# Patient Record
Sex: FEMALE | Race: WHITE | NOT HISPANIC OR LATINO | Employment: OTHER | URBAN - METROPOLITAN AREA
[De-identification: names, ages, dates, MRNs, and addresses within clinical notes are randomized per-mention and may not be internally consistent; named-entity substitution may affect disease eponyms.]

---

## 2017-01-19 ENCOUNTER — ALLSCRIPTS OFFICE VISIT (OUTPATIENT)
Dept: OTHER | Facility: OTHER | Age: 72
End: 2017-01-19

## 2017-02-22 ENCOUNTER — APPOINTMENT (EMERGENCY)
Dept: RADIOLOGY | Facility: HOSPITAL | Age: 72
DRG: 811 | End: 2017-02-22
Payer: MEDICARE

## 2017-02-22 ENCOUNTER — HOSPITAL ENCOUNTER (INPATIENT)
Facility: HOSPITAL | Age: 72
LOS: 7 days | Discharge: HOME HEALTH | DRG: 811 | End: 2017-03-01
Attending: EMERGENCY MEDICINE | Admitting: INTERNAL MEDICINE
Payer: MEDICARE

## 2017-02-22 ENCOUNTER — ALLSCRIPTS OFFICE VISIT (OUTPATIENT)
Dept: OTHER | Facility: OTHER | Age: 72
End: 2017-02-22

## 2017-02-22 ENCOUNTER — APPOINTMENT (INPATIENT)
Dept: RADIOLOGY | Facility: HOSPITAL | Age: 72
DRG: 811 | End: 2017-02-22
Payer: MEDICARE

## 2017-02-22 DIAGNOSIS — D64.9 ANEMIA: ICD-10-CM

## 2017-02-22 DIAGNOSIS — K92.2 GI BLEED: ICD-10-CM

## 2017-02-22 DIAGNOSIS — I95.9 HYPOTENSION: ICD-10-CM

## 2017-02-22 DIAGNOSIS — G11.8 SPINOCEREBELLAR ATAXIA (HCC): ICD-10-CM

## 2017-02-22 DIAGNOSIS — E27.40 ADRENAL INSUFFICIENCY (HCC): ICD-10-CM

## 2017-02-22 DIAGNOSIS — G93.40 ENCEPHALOPATHY ACUTE: ICD-10-CM

## 2017-02-22 DIAGNOSIS — R55 SYNCOPE: Primary | ICD-10-CM
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
## 2017-02-22 PROBLEM — L03.116 CELLULITIS OF LEFT LOWER EXTREMITY: Status: ACTIVE | Noted: 2017-02-22

## 2017-02-22 PROBLEM — R10.9 ABDOMINAL PAIN: Status: ACTIVE | Noted: 2017-02-22

## 2017-02-22 LAB
ANION GAP SERPL CALCULATED.3IONS-SCNC: 7 MMOL/L (ref 4–13)
ATRIAL RATE: 86 BPM
BACTERIA UR QL AUTO: ABNORMAL /HPF
BASOPHILS # BLD AUTO: 0.01 THOUSANDS/ΜL (ref 0–0.1)
BASOPHILS NFR BLD AUTO: 0 % (ref 0–1)
BILIRUB UR QL STRIP: NEGATIVE
BUN SERPL-MCNC: 48 MG/DL (ref 5–25)
CALCIUM SERPL-MCNC: 8 MG/DL (ref 8.3–10.1)
CHLORIDE SERPL-SCNC: 103 MMOL/L (ref 100–108)
CLARITY UR: CLEAR
CO2 SERPL-SCNC: 31 MMOL/L (ref 21–32)
COLOR UR: YELLOW
CREAT SERPL-MCNC: 1.1 MG/DL (ref 0.6–1.3)
EOSINOPHIL # BLD AUTO: 0.06 THOUSAND/ΜL (ref 0–0.61)
EOSINOPHIL NFR BLD AUTO: 1 % (ref 0–6)
ERYTHROCYTE [DISTWIDTH] IN BLOOD BY AUTOMATED COUNT: 14 % (ref 11.6–15.1)
GFR SERPL CREATININE-BSD FRML MDRD: 49 ML/MIN/1.73SQ M
GLUCOSE SERPL-MCNC: 86 MG/DL (ref 65–140)
GLUCOSE UR STRIP-MCNC: NEGATIVE MG/DL
HCT VFR BLD AUTO: 27.1 % (ref 34.8–46.1)
HGB BLD-MCNC: 8.8 G/DL (ref 11.5–15.4)
HGB UR QL STRIP.AUTO: NEGATIVE
KETONES UR STRIP-MCNC: NEGATIVE MG/DL
LEUKOCYTE ESTERASE UR QL STRIP: ABNORMAL
LYMPHOCYTES # BLD AUTO: 1.48 THOUSANDS/ΜL (ref 0.6–4.47)
LYMPHOCYTES NFR BLD AUTO: 20 % (ref 14–44)
MCH RBC QN AUTO: 30 PG (ref 26.8–34.3)
MCHC RBC AUTO-ENTMCNC: 32.5 G/DL (ref 31.4–37.4)
MCV RBC AUTO: 93 FL (ref 82–98)
MONOCYTES # BLD AUTO: 0.32 THOUSAND/ΜL (ref 0.17–1.22)
MONOCYTES NFR BLD AUTO: 4 % (ref 4–12)
NEUTROPHILS # BLD AUTO: 5.68 THOUSANDS/ΜL (ref 1.85–7.62)
NEUTS SEG NFR BLD AUTO: 75 % (ref 43–75)
NITRITE UR QL STRIP: NEGATIVE
NON-SQ EPI CELLS URNS QL MICRO: ABNORMAL /HPF
NRBC BLD AUTO-RTO: 0 /100 WBCS
PH UR STRIP.AUTO: 6.5 [PH] (ref 4.5–8)
PLATELET # BLD AUTO: 172 THOUSANDS/UL (ref 149–390)
PMV BLD AUTO: 11 FL (ref 8.9–12.7)
POTASSIUM SERPL-SCNC: 3.7 MMOL/L (ref 3.5–5.3)
PROT UR STRIP-MCNC: ABNORMAL MG/DL
QRS AXIS: -39 DEGREES
QRSD INTERVAL: 96 MS
QT INTERVAL: 372 MS
QTC INTERVAL: 429 MS
RBC # BLD AUTO: 2.93 MILLION/UL (ref 3.81–5.12)
RBC #/AREA URNS AUTO: ABNORMAL /HPF
SODIUM SERPL-SCNC: 141 MMOL/L (ref 136–145)
SP GR UR STRIP.AUTO: 1.01 (ref 1–1.03)
SPECIMEN SOURCE: NORMAL
T WAVE AXIS: 78 DEGREES
TROPONIN I BLD-MCNC: 0.01 NG/ML (ref 0–0.08)
TROPONIN I SERPL-MCNC: 0.02 NG/ML
TSH SERPL DL<=0.05 MIU/L-ACNC: 3.4 UIU/ML (ref 0.36–3.74)
UROBILINOGEN UR QL STRIP.AUTO: 0.2 E.U./DL
VENTRICULAR RATE: 80 BPM
WBC # BLD AUTO: 7.56 THOUSAND/UL (ref 4.31–10.16)
WBC #/AREA URNS AUTO: ABNORMAL /HPF

## 2017-02-22 PROCEDURE — 84484 ASSAY OF TROPONIN QUANT: CPT | Performed by: PHYSICIAN ASSISTANT

## 2017-02-22 PROCEDURE — 85025 COMPLETE CBC W/AUTO DIFF WBC: CPT | Performed by: EMERGENCY MEDICINE

## 2017-02-22 PROCEDURE — 84443 ASSAY THYROID STIM HORMONE: CPT | Performed by: EMERGENCY MEDICINE

## 2017-02-22 PROCEDURE — 72040 X-RAY EXAM NECK SPINE 2-3 VW: CPT

## 2017-02-22 PROCEDURE — 93005 ELECTROCARDIOGRAM TRACING: CPT | Performed by: EMERGENCY MEDICINE

## 2017-02-22 PROCEDURE — 71020 HB CHEST X-RAY 2VW FRONTAL&LATL: CPT

## 2017-02-22 PROCEDURE — 87077 CULTURE AEROBIC IDENTIFY: CPT

## 2017-02-22 PROCEDURE — 96360 HYDRATION IV INFUSION INIT: CPT

## 2017-02-22 PROCEDURE — 70450 CT HEAD/BRAIN W/O DYE: CPT

## 2017-02-22 PROCEDURE — 72080 X-RAY EXAM THORACOLMB 2/> VW: CPT

## 2017-02-22 PROCEDURE — 87086 URINE CULTURE/COLONY COUNT: CPT

## 2017-02-22 PROCEDURE — 72170 X-RAY EXAM OF PELVIS: CPT

## 2017-02-22 PROCEDURE — 80048 BASIC METABOLIC PNL TOTAL CA: CPT | Performed by: EMERGENCY MEDICINE

## 2017-02-22 PROCEDURE — 36415 COLL VENOUS BLD VENIPUNCTURE: CPT | Performed by: EMERGENCY MEDICINE

## 2017-02-22 PROCEDURE — 87186 SC STD MICRODIL/AGAR DIL: CPT

## 2017-02-22 PROCEDURE — 81001 URINALYSIS AUTO W/SCOPE: CPT

## 2017-02-22 PROCEDURE — 84484 ASSAY OF TROPONIN QUANT: CPT

## 2017-02-22 RX ORDER — MORPHINE SULFATE 2 MG/ML
2 INJECTION, SOLUTION INTRAMUSCULAR; INTRAVENOUS EVERY 4 HOURS PRN
Status: DISCONTINUED | OUTPATIENT
Start: 2017-02-22 | End: 2017-02-23

## 2017-02-22 RX ORDER — LORAZEPAM 2 MG/ML
2 INJECTION INTRAMUSCULAR EVERY 6 HOURS PRN
Status: DISCONTINUED | OUTPATIENT
Start: 2017-02-22 | End: 2017-03-01 | Stop reason: HOSPADM

## 2017-02-22 RX ORDER — ROPINIROLE 1 MG/1
3 TABLET, FILM COATED ORAL
Status: DISCONTINUED | OUTPATIENT
Start: 2017-02-22 | End: 2017-02-25

## 2017-02-22 RX ORDER — LORAZEPAM 2 MG/ML
INJECTION INTRAMUSCULAR
Status: DISPENSED
Start: 2017-02-22 | End: 2017-02-23

## 2017-02-22 RX ORDER — ONDANSETRON 2 MG/ML
4 INJECTION INTRAMUSCULAR; INTRAVENOUS EVERY 6 HOURS PRN
Status: DISCONTINUED | OUTPATIENT
Start: 2017-02-22 | End: 2017-03-01 | Stop reason: HOSPADM

## 2017-02-22 RX ORDER — DOXYCYCLINE HYCLATE 100 MG/1
100 CAPSULE ORAL EVERY 12 HOURS SCHEDULED
Status: DISCONTINUED | OUTPATIENT
Start: 2017-02-22 | End: 2017-02-24 | Stop reason: ALTCHOICE

## 2017-02-22 RX ORDER — DOXYCYCLINE HYCLATE 100 MG/1
CAPSULE ORAL
Status: DISPENSED
Start: 2017-02-22 | End: 2017-02-23

## 2017-02-22 RX ORDER — LEVOTHYROXINE SODIUM 0.07 MG/1
75 TABLET ORAL
Status: DISCONTINUED | OUTPATIENT
Start: 2017-02-23 | End: 2017-03-01 | Stop reason: HOSPADM

## 2017-02-22 RX ORDER — PANTOPRAZOLE SODIUM 40 MG/1
40 TABLET, DELAYED RELEASE ORAL
Status: DISCONTINUED | OUTPATIENT
Start: 2017-02-23 | End: 2017-02-23

## 2017-02-22 RX ORDER — SODIUM CHLORIDE 9 MG/ML
100 INJECTION, SOLUTION INTRAVENOUS CONTINUOUS
Status: DISCONTINUED | OUTPATIENT
Start: 2017-02-22 | End: 2017-02-25

## 2017-02-22 RX ORDER — MAGNESIUM HYDROXIDE/ALUMINUM HYDROXICE/SIMETHICONE 120; 1200; 1200 MG/30ML; MG/30ML; MG/30ML
30 SUSPENSION ORAL EVERY 6 HOURS PRN
Status: DISCONTINUED | OUTPATIENT
Start: 2017-02-22 | End: 2017-03-01 | Stop reason: HOSPADM

## 2017-02-22 RX ADMIN — LORAZEPAM 2 MG: 2 INJECTION INTRAMUSCULAR; INTRAVENOUS at 23:24

## 2017-02-22 RX ADMIN — SODIUM CHLORIDE 100 ML/HR: 0.9 INJECTION, SOLUTION INTRAVENOUS at 21:31

## 2017-02-22 RX ADMIN — SODIUM CHLORIDE 1000 ML: 0.9 INJECTION, SOLUTION INTRAVENOUS at 15:28

## 2017-02-23 ENCOUNTER — APPOINTMENT (INPATIENT)
Dept: RADIOLOGY | Facility: HOSPITAL | Age: 72
DRG: 811 | End: 2017-02-23
Payer: MEDICARE

## 2017-02-23 LAB
ABO GROUP BLD BPU: NORMAL
ABO GROUP BLD: NORMAL
ANION GAP SERPL CALCULATED.3IONS-SCNC: 6 MMOL/L (ref 4–13)
BLD GP AB SCN SERPL QL: NEGATIVE
BPU ID: NORMAL
BUN SERPL-MCNC: 47 MG/DL (ref 5–25)
CALCIUM SERPL-MCNC: 7.2 MG/DL (ref 8.3–10.1)
CHLORIDE SERPL-SCNC: 112 MMOL/L (ref 100–108)
CO2 SERPL-SCNC: 27 MMOL/L (ref 21–32)
CORTIS AM PEAK SERPL-MCNC: 2.4 UG/ML (ref 4.2–22.4)
CREAT SERPL-MCNC: 0.82 MG/DL (ref 0.6–1.3)
CROSSMATCH: NORMAL
ERYTHROCYTE [DISTWIDTH] IN BLOOD BY AUTOMATED COUNT: 14.2 % (ref 11.6–15.1)
FERRITIN SERPL-MCNC: 48 NG/ML (ref 8–388)
GFR SERPL CREATININE-BSD FRML MDRD: >60 ML/MIN/1.73SQ M
GLUCOSE SERPL-MCNC: 86 MG/DL (ref 65–140)
HCT VFR BLD AUTO: 17.7 % (ref 34.8–46.1)
HCT VFR BLD AUTO: 19 % (ref 34.8–46.1)
HCT VFR BLD AUTO: 21.9 % (ref 34.8–46.1)
HGB BLD-MCNC: 5.7 G/DL (ref 11.5–15.4)
HGB BLD-MCNC: 6.1 G/DL (ref 11.5–15.4)
HGB BLD-MCNC: 7.3 G/DL (ref 11.5–15.4)
IRON SATN MFR SERPL: 43 %
IRON SERPL-MCNC: 134 UG/DL (ref 50–170)
MAGNESIUM SERPL-MCNC: 1.9 MG/DL (ref 1.6–2.6)
MCH RBC QN AUTO: 29.9 PG (ref 26.8–34.3)
MCHC RBC AUTO-ENTMCNC: 32.1 G/DL (ref 31.4–37.4)
MCV RBC AUTO: 93 FL (ref 82–98)
PLATELET # BLD AUTO: 102 THOUSANDS/UL (ref 149–390)
PMV BLD AUTO: 11.4 FL (ref 8.9–12.7)
POTASSIUM SERPL-SCNC: 4.4 MMOL/L (ref 3.5–5.3)
PROLACTIN SERPL-MCNC: 3.2 NG/ML
RBC # BLD AUTO: 2.04 MILLION/UL (ref 3.81–5.12)
RH BLD: POSITIVE
SODIUM SERPL-SCNC: 145 MMOL/L (ref 136–145)
TIBC SERPL-MCNC: 311 UG/DL (ref 250–450)
TROPONIN I SERPL-MCNC: 0.03 NG/ML
UNIT DISPENSE STATUS: NORMAL
UNIT PRODUCT CODE: NORMAL
UNIT RH: NORMAL
WBC # BLD AUTO: 7.97 THOUSAND/UL (ref 4.31–10.16)

## 2017-02-23 PROCEDURE — 85018 HEMOGLOBIN: CPT | Performed by: INTERNAL MEDICINE

## 2017-02-23 PROCEDURE — 83735 ASSAY OF MAGNESIUM: CPT | Performed by: PHYSICIAN ASSISTANT

## 2017-02-23 PROCEDURE — 30233N1 TRANSFUSION OF NONAUTOLOGOUS RED BLOOD CELLS INTO PERIPHERAL VEIN, PERCUTANEOUS APPROACH: ICD-10-PCS | Performed by: HOSPITALIST

## 2017-02-23 PROCEDURE — 84146 ASSAY OF PROLACTIN: CPT | Performed by: PHYSICIAN ASSISTANT

## 2017-02-23 PROCEDURE — 3E0234Z INTRODUCTION OF SERUM, TOXOID AND VACCINE INTO MUSCLE, PERCUTANEOUS APPROACH: ICD-10-PCS | Performed by: HOSPITALIST

## 2017-02-23 PROCEDURE — 92610 EVALUATE SWALLOWING FUNCTION: CPT

## 2017-02-23 PROCEDURE — 86920 COMPATIBILITY TEST SPIN: CPT

## 2017-02-23 PROCEDURE — 86850 RBC ANTIBODY SCREEN: CPT | Performed by: INTERNAL MEDICINE

## 2017-02-23 PROCEDURE — 85014 HEMATOCRIT: CPT | Performed by: INTERNAL MEDICINE

## 2017-02-23 PROCEDURE — P9021 RED BLOOD CELLS UNIT: HCPCS

## 2017-02-23 PROCEDURE — 82728 ASSAY OF FERRITIN: CPT | Performed by: PHYSICIAN ASSISTANT

## 2017-02-23 PROCEDURE — 85027 COMPLETE CBC AUTOMATED: CPT | Performed by: PHYSICIAN ASSISTANT

## 2017-02-23 PROCEDURE — 72125 CT NECK SPINE W/O DYE: CPT

## 2017-02-23 PROCEDURE — 82533 TOTAL CORTISOL: CPT | Performed by: PHYSICIAN ASSISTANT

## 2017-02-23 PROCEDURE — 84484 ASSAY OF TROPONIN QUANT: CPT | Performed by: PHYSICIAN ASSISTANT

## 2017-02-23 PROCEDURE — 36415 COLL VENOUS BLD VENIPUNCTURE: CPT | Performed by: PHYSICIAN ASSISTANT

## 2017-02-23 PROCEDURE — 86901 BLOOD TYPING SEROLOGIC RH(D): CPT | Performed by: INTERNAL MEDICINE

## 2017-02-23 PROCEDURE — 99285 EMERGENCY DEPT VISIT HI MDM: CPT

## 2017-02-23 PROCEDURE — 83540 ASSAY OF IRON: CPT | Performed by: PHYSICIAN ASSISTANT

## 2017-02-23 PROCEDURE — 80048 BASIC METABOLIC PNL TOTAL CA: CPT | Performed by: PHYSICIAN ASSISTANT

## 2017-02-23 PROCEDURE — 74177 CT ABD & PELVIS W/CONTRAST: CPT

## 2017-02-23 PROCEDURE — 86900 BLOOD TYPING SEROLOGIC ABO: CPT | Performed by: INTERNAL MEDICINE

## 2017-02-23 PROCEDURE — 93971 EXTREMITY STUDY: CPT

## 2017-02-23 PROCEDURE — 83550 IRON BINDING TEST: CPT | Performed by: PHYSICIAN ASSISTANT

## 2017-02-23 RX ORDER — DOCUSATE SODIUM 100 MG/1
100 CAPSULE, LIQUID FILLED ORAL 2 TIMES DAILY
Status: DISCONTINUED | OUTPATIENT
Start: 2017-02-23 | End: 2017-03-01 | Stop reason: HOSPADM

## 2017-02-23 RX ORDER — MORPHINE SULFATE 2 MG/ML
2 INJECTION, SOLUTION INTRAMUSCULAR; INTRAVENOUS ONCE
Status: DISCONTINUED | OUTPATIENT
Start: 2017-02-23 | End: 2017-02-23

## 2017-02-23 RX ORDER — POLYETHYLENE GLYCOL 3350 17 G/17G
17 POWDER, FOR SOLUTION ORAL DAILY
Status: DISCONTINUED | OUTPATIENT
Start: 2017-02-24 | End: 2017-03-01 | Stop reason: HOSPADM

## 2017-02-23 RX ORDER — MORPHINE SULFATE 4 MG/ML
4 INJECTION, SOLUTION INTRAMUSCULAR; INTRAVENOUS EVERY 4 HOURS PRN
Status: DISCONTINUED | OUTPATIENT
Start: 2017-02-23 | End: 2017-02-25

## 2017-02-23 RX ORDER — PANTOPRAZOLE SODIUM 40 MG/1
40 TABLET, DELAYED RELEASE ORAL
Status: DISCONTINUED | OUTPATIENT
Start: 2017-02-23 | End: 2017-03-01 | Stop reason: HOSPADM

## 2017-02-23 RX ORDER — ROPINIROLE 3 MG/1
3 TABLET, FILM COATED ORAL
Status: ON HOLD | COMMUNITY
End: 2017-03-01

## 2017-02-23 RX ORDER — PREGABALIN 50 MG/1
50 CAPSULE ORAL 3 TIMES DAILY
Status: DISCONTINUED | OUTPATIENT
Start: 2017-02-23 | End: 2017-03-01 | Stop reason: HOSPADM

## 2017-02-23 RX ADMIN — MORPHINE SULFATE 4 MG: 4 INJECTION, SOLUTION INTRAMUSCULAR; INTRAVENOUS at 22:49

## 2017-02-23 RX ADMIN — MORPHINE SULFATE 2 MG: 2 INJECTION, SOLUTION INTRAMUSCULAR; INTRAVENOUS at 14:00

## 2017-02-23 RX ADMIN — PREGABALIN 50 MG: 50 CAPSULE ORAL at 16:03

## 2017-02-23 RX ADMIN — MORPHINE SULFATE 2 MG: 2 INJECTION, SOLUTION INTRAMUSCULAR; INTRAVENOUS at 13:48

## 2017-02-23 RX ADMIN — MORPHINE SULFATE 2 MG: 2 INJECTION, SOLUTION INTRAMUSCULAR; INTRAVENOUS at 02:00

## 2017-02-23 RX ADMIN — IOHEXOL 60 ML: 350 INJECTION, SOLUTION INTRAVENOUS at 16:28

## 2017-02-23 RX ADMIN — MORPHINE SULFATE 4 MG: 4 INJECTION, SOLUTION INTRAMUSCULAR; INTRAVENOUS at 15:56

## 2017-02-23 RX ADMIN — PANTOPRAZOLE SODIUM 40 MG: 40 TABLET, DELAYED RELEASE ORAL at 16:01

## 2017-02-23 RX ADMIN — MORPHINE SULFATE 2 MG: 2 INJECTION, SOLUTION INTRAMUSCULAR; INTRAVENOUS at 10:24

## 2017-02-23 RX ADMIN — SODIUM CHLORIDE 100 ML/HR: 0.9 INJECTION, SOLUTION INTRAVENOUS at 19:01

## 2017-02-23 RX ADMIN — CEFAZOLIN SODIUM 1000 MG: 1 SOLUTION INTRAVENOUS at 18:09

## 2017-02-24 ENCOUNTER — APPOINTMENT (INPATIENT)
Dept: NEUROLOGY | Facility: AMBULATORY SURGERY CENTER | Age: 72
DRG: 811 | End: 2017-02-24
Payer: MEDICARE

## 2017-02-24 ENCOUNTER — GENERIC CONVERSION - ENCOUNTER (OUTPATIENT)
Dept: OTHER | Facility: OTHER | Age: 72
End: 2017-02-24

## 2017-02-24 LAB
ABO GROUP BLD BPU: NORMAL
ALBUMIN SERPL BCP-MCNC: 1.9 G/DL (ref 3.5–5)
ALP SERPL-CCNC: 113 U/L (ref 46–116)
ALT SERPL W P-5'-P-CCNC: 10 U/L (ref 12–78)
ANION GAP SERPL CALCULATED.3IONS-SCNC: 7 MMOL/L (ref 4–13)
AST SERPL W P-5'-P-CCNC: 13 U/L (ref 5–45)
BACTERIA UR CULT: NORMAL
BACTERIA UR CULT: NORMAL
BASOPHILS # BLD AUTO: 0.01 THOUSANDS/ΜL (ref 0–0.1)
BASOPHILS NFR BLD AUTO: 0 % (ref 0–1)
BILIRUB DIRECT SERPL-MCNC: 0.23 MG/DL (ref 0–0.2)
BILIRUB SERPL-MCNC: 0.94 MG/DL (ref 0.2–1)
BPU ID: NORMAL
BUN SERPL-MCNC: 33 MG/DL (ref 5–25)
CALCIUM SERPL-MCNC: 6.9 MG/DL (ref 8.3–10.1)
CHLORIDE SERPL-SCNC: 117 MMOL/L (ref 100–108)
CO2 SERPL-SCNC: 24 MMOL/L (ref 21–32)
CREAT SERPL-MCNC: 0.64 MG/DL (ref 0.6–1.3)
CROSSMATCH: NORMAL
EOSINOPHIL # BLD AUTO: 0.11 THOUSAND/ΜL (ref 0–0.61)
EOSINOPHIL NFR BLD AUTO: 2 % (ref 0–6)
ERYTHROCYTE [DISTWIDTH] IN BLOOD BY AUTOMATED COUNT: 15.4 % (ref 11.6–15.1)
GFR SERPL CREATININE-BSD FRML MDRD: >60 ML/MIN/1.73SQ M
GLUCOSE SERPL-MCNC: 106 MG/DL (ref 65–140)
HCT VFR BLD AUTO: 20.2 % (ref 34.8–46.1)
HCT VFR BLD AUTO: 22.7 % (ref 34.8–46.1)
HGB BLD-MCNC: 6.6 G/DL (ref 11.5–15.4)
HGB BLD-MCNC: 7.7 G/DL (ref 11.5–15.4)
LYMPHOCYTES # BLD AUTO: 0.81 THOUSANDS/ΜL (ref 0.6–4.47)
LYMPHOCYTES NFR BLD AUTO: 13 % (ref 14–44)
MCH RBC QN AUTO: 30.7 PG (ref 26.8–34.3)
MCHC RBC AUTO-ENTMCNC: 33.9 G/DL (ref 31.4–37.4)
MCV RBC AUTO: 90 FL (ref 82–98)
MONOCYTES # BLD AUTO: 0.27 THOUSAND/ΜL (ref 0.17–1.22)
MONOCYTES NFR BLD AUTO: 4 % (ref 4–12)
NEUTROPHILS # BLD AUTO: 5.14 THOUSANDS/ΜL (ref 1.85–7.62)
NEUTS SEG NFR BLD AUTO: 81 % (ref 43–75)
NRBC BLD AUTO-RTO: 0 /100 WBCS
PLATELET # BLD AUTO: 116 THOUSANDS/UL (ref 149–390)
PMV BLD AUTO: 10.3 FL (ref 8.9–12.7)
POTASSIUM SERPL-SCNC: 3.5 MMOL/L (ref 3.5–5.3)
PROT SERPL-MCNC: 4 G/DL (ref 6.4–8.2)
RBC # BLD AUTO: 2.51 MILLION/UL (ref 3.81–5.12)
SODIUM SERPL-SCNC: 148 MMOL/L (ref 136–145)
TROPONIN I SERPL-MCNC: <0.02 NG/ML
UNIT DISPENSE STATUS: NORMAL
UNIT PRODUCT CODE: NORMAL
UNIT RH: NORMAL
WBC # BLD AUTO: 6.35 THOUSAND/UL (ref 4.31–10.16)

## 2017-02-24 PROCEDURE — 92526 ORAL FUNCTION THERAPY: CPT

## 2017-02-24 PROCEDURE — 95816 EEG AWAKE AND DROWSY: CPT

## 2017-02-24 PROCEDURE — G8979 MOBILITY GOAL STATUS: HCPCS

## 2017-02-24 PROCEDURE — 80048 BASIC METABOLIC PNL TOTAL CA: CPT | Performed by: INTERNAL MEDICINE

## 2017-02-24 PROCEDURE — G8987 SELF CARE CURRENT STATUS: HCPCS

## 2017-02-24 PROCEDURE — 85025 COMPLETE CBC W/AUTO DIFF WBC: CPT | Performed by: INTERNAL MEDICINE

## 2017-02-24 PROCEDURE — 97167 OT EVAL HIGH COMPLEX 60 MIN: CPT

## 2017-02-24 PROCEDURE — G8988 SELF CARE GOAL STATUS: HCPCS

## 2017-02-24 PROCEDURE — 85018 HEMOGLOBIN: CPT | Performed by: INTERNAL MEDICINE

## 2017-02-24 PROCEDURE — G8978 MOBILITY CURRENT STATUS: HCPCS

## 2017-02-24 PROCEDURE — P9021 RED BLOOD CELLS UNIT: HCPCS

## 2017-02-24 PROCEDURE — 80076 HEPATIC FUNCTION PANEL: CPT | Performed by: PHYSICIAN ASSISTANT

## 2017-02-24 PROCEDURE — 85014 HEMATOCRIT: CPT | Performed by: INTERNAL MEDICINE

## 2017-02-24 PROCEDURE — 97163 PT EVAL HIGH COMPLEX 45 MIN: CPT

## 2017-02-24 PROCEDURE — 84484 ASSAY OF TROPONIN QUANT: CPT | Performed by: PHYSICIAN ASSISTANT

## 2017-02-24 RX ORDER — DOXYCYCLINE HYCLATE 100 MG/1
100 CAPSULE ORAL EVERY 12 HOURS SCHEDULED
Status: DISCONTINUED | OUTPATIENT
Start: 2017-02-24 | End: 2017-03-01

## 2017-02-24 RX ADMIN — DOCUSATE SODIUM 100 MG: 100 CAPSULE, LIQUID FILLED ORAL at 19:26

## 2017-02-24 RX ADMIN — CEFAZOLIN SODIUM 1000 MG: 1 SOLUTION INTRAVENOUS at 19:21

## 2017-02-24 RX ADMIN — SODIUM CHLORIDE 100 ML/HR: 0.9 INJECTION, SOLUTION INTRAVENOUS at 05:26

## 2017-02-24 RX ADMIN — SODIUM CHLORIDE 250 ML: 0.9 INJECTION, SOLUTION INTRAVENOUS at 16:10

## 2017-02-24 RX ADMIN — MORPHINE SULFATE 4 MG: 4 INJECTION, SOLUTION INTRAMUSCULAR; INTRAVENOUS at 14:38

## 2017-02-24 RX ADMIN — PREGABALIN 50 MG: 50 CAPSULE ORAL at 11:35

## 2017-02-24 RX ADMIN — PANTOPRAZOLE SODIUM 40 MG: 40 TABLET, DELAYED RELEASE ORAL at 19:28

## 2017-02-24 RX ADMIN — MORPHINE SULFATE 4 MG: 4 INJECTION, SOLUTION INTRAMUSCULAR; INTRAVENOUS at 22:08

## 2017-02-24 RX ADMIN — HYDROCORTISONE SODIUM SUCCINATE 50 MG: 100 INJECTION, POWDER, FOR SOLUTION INTRAMUSCULAR; INTRAVENOUS at 16:07

## 2017-02-24 RX ADMIN — PREGABALIN 50 MG: 50 CAPSULE ORAL at 19:26

## 2017-02-24 RX ADMIN — DOXYCYCLINE 100 MG: 100 CAPSULE ORAL at 14:10

## 2017-02-24 RX ADMIN — CEFAZOLIN SODIUM 1000 MG: 1 SOLUTION INTRAVENOUS at 01:44

## 2017-02-24 RX ADMIN — DOCUSATE SODIUM 100 MG: 100 CAPSULE, LIQUID FILLED ORAL at 11:36

## 2017-02-24 RX ADMIN — MORPHINE SULFATE 4 MG: 4 INJECTION, SOLUTION INTRAMUSCULAR; INTRAVENOUS at 05:17

## 2017-02-24 RX ADMIN — CEFAZOLIN SODIUM 1000 MG: 1 SOLUTION INTRAVENOUS at 11:25

## 2017-02-24 RX ADMIN — DOXYCYCLINE 100 MG: 100 CAPSULE ORAL at 22:07

## 2017-02-24 RX ADMIN — ROPINIROLE 3 MG: 1 TABLET, FILM COATED ORAL at 22:08

## 2017-02-24 RX ADMIN — POLYETHYLENE GLYCOL 3350 17 G: 17 POWDER, FOR SOLUTION ORAL at 11:35

## 2017-02-24 RX ADMIN — SODIUM CHLORIDE 100 ML/HR: 0.9 INJECTION, SOLUTION INTRAVENOUS at 19:23

## 2017-02-25 LAB
ABO GROUP BLD BPU: NORMAL
ABO GROUP BLD BPU: NORMAL
ANION GAP SERPL CALCULATED.3IONS-SCNC: 6 MMOL/L (ref 4–13)
BPU ID: NORMAL
BPU ID: NORMAL
BUN SERPL-MCNC: 43 MG/DL (ref 5–25)
CALCIUM SERPL-MCNC: 7.5 MG/DL (ref 8.3–10.1)
CHLORIDE SERPL-SCNC: 115 MMOL/L (ref 100–108)
CO2 SERPL-SCNC: 26 MMOL/L (ref 21–32)
CORTIS AM PEAK SERPL-MCNC: 6.8 UG/ML (ref 4.2–22.4)
CORTIS SERPL-MCNC: 21.7 UG/ML
CORTIS SERPL-MCNC: 7.3 UG/ML
CREAT SERPL-MCNC: 0.74 MG/DL (ref 0.6–1.3)
CROSSMATCH: NORMAL
CROSSMATCH: NORMAL
ERYTHROCYTE [DISTWIDTH] IN BLOOD BY AUTOMATED COUNT: 15.4 % (ref 11.6–15.1)
FIBRINOGEN PPP-MCNC: 260 MG/DL (ref 227–495)
GFR SERPL CREATININE-BSD FRML MDRD: >60 ML/MIN/1.73SQ M
GLUCOSE SERPL-MCNC: 102 MG/DL (ref 65–140)
HCT VFR BLD AUTO: 14.1 % (ref 34.8–46.1)
HCT VFR BLD AUTO: 14.7 % (ref 34.8–46.1)
HCT VFR BLD AUTO: 22.9 % (ref 34.8–46.1)
HEMOCCULT STL QL: NEGATIVE
HGB BLD-MCNC: 4.6 G/DL (ref 11.5–15.4)
HGB BLD-MCNC: 4.8 G/DL (ref 11.5–15.4)
HGB BLD-MCNC: 7.8 G/DL (ref 11.5–15.4)
LDH SERPL-CCNC: 141 U/L (ref 81–234)
LDH SERPL-CCNC: 143 U/L (ref 81–234)
MCH RBC QN AUTO: 29.3 PG (ref 26.8–34.3)
MCHC RBC AUTO-ENTMCNC: 32.7 G/DL (ref 31.4–37.4)
MCV RBC AUTO: 90 FL (ref 82–98)
PLATELET # BLD AUTO: 117 THOUSANDS/UL (ref 149–390)
PMV BLD AUTO: 10.4 FL (ref 8.9–12.7)
POTASSIUM SERPL-SCNC: 3.8 MMOL/L (ref 3.5–5.3)
RBC # BLD AUTO: 1.64 MILLION/UL (ref 3.81–5.12)
RETICS # AUTO: ABNORMAL 10*3/UL (ref 14097–95744)
RETICS # CALC: 2.63 % (ref 0.37–1.87)
SODIUM SERPL-SCNC: 147 MMOL/L (ref 136–145)
UNIT DISPENSE STATUS: NORMAL
UNIT DISPENSE STATUS: NORMAL
UNIT PRODUCT CODE: NORMAL
UNIT PRODUCT CODE: NORMAL
UNIT RH: NORMAL
UNIT RH: NORMAL
WBC # BLD AUTO: 6.14 THOUSAND/UL (ref 4.31–10.16)

## 2017-02-25 PROCEDURE — 97530 THERAPEUTIC ACTIVITIES: CPT

## 2017-02-25 PROCEDURE — 85384 FIBRINOGEN ACTIVITY: CPT | Performed by: INTERNAL MEDICINE

## 2017-02-25 PROCEDURE — 85018 HEMOGLOBIN: CPT | Performed by: INTERNAL MEDICINE

## 2017-02-25 PROCEDURE — 85027 COMPLETE CBC AUTOMATED: CPT | Performed by: INTERNAL MEDICINE

## 2017-02-25 PROCEDURE — 83615 LACTATE (LD) (LDH) ENZYME: CPT | Performed by: INTERNAL MEDICINE

## 2017-02-25 PROCEDURE — 85014 HEMATOCRIT: CPT | Performed by: INTERNAL MEDICINE

## 2017-02-25 PROCEDURE — 82272 OCCULT BLD FECES 1-3 TESTS: CPT | Performed by: INTERNAL MEDICINE

## 2017-02-25 PROCEDURE — 80048 BASIC METABOLIC PNL TOTAL CA: CPT | Performed by: INTERNAL MEDICINE

## 2017-02-25 PROCEDURE — P9021 RED BLOOD CELLS UNIT: HCPCS

## 2017-02-25 PROCEDURE — 83010 ASSAY OF HAPTOGLOBIN QUANT: CPT | Performed by: INTERNAL MEDICINE

## 2017-02-25 PROCEDURE — 82533 TOTAL CORTISOL: CPT | Performed by: INTERNAL MEDICINE

## 2017-02-25 PROCEDURE — 85045 AUTOMATED RETICULOCYTE COUNT: CPT | Performed by: INTERNAL MEDICINE

## 2017-02-25 PROCEDURE — 82024 ASSAY OF ACTH: CPT | Performed by: INTERNAL MEDICINE

## 2017-02-25 RX ORDER — COSYNTROPIN 0.25 MG/ML
0.25 INJECTION, POWDER, FOR SOLUTION INTRAMUSCULAR; INTRAVENOUS ONCE
Status: COMPLETED | OUTPATIENT
Start: 2017-02-25 | End: 2017-02-25

## 2017-02-25 RX ORDER — ACETAMINOPHEN 325 MG/1
650 TABLET ORAL EVERY 6 HOURS SCHEDULED
Status: DISCONTINUED | OUTPATIENT
Start: 2017-02-25 | End: 2017-03-01 | Stop reason: HOSPADM

## 2017-02-25 RX ORDER — BISACODYL 10 MG
10 SUPPOSITORY, RECTAL RECTAL DAILY
Status: DISCONTINUED | OUTPATIENT
Start: 2017-02-25 | End: 2017-03-01 | Stop reason: HOSPADM

## 2017-02-25 RX ORDER — ROPINIROLE 1 MG/1
3 TABLET, FILM COATED ORAL 3 TIMES DAILY
Status: DISCONTINUED | OUTPATIENT
Start: 2017-02-25 | End: 2017-02-26

## 2017-02-25 RX ADMIN — CEFAZOLIN SODIUM 1000 MG: 1 SOLUTION INTRAVENOUS at 21:24

## 2017-02-25 RX ADMIN — ACETAMINOPHEN 650 MG: 325 TABLET, FILM COATED ORAL at 23:53

## 2017-02-25 RX ADMIN — DOCUSATE SODIUM 100 MG: 100 CAPSULE, LIQUID FILLED ORAL at 18:48

## 2017-02-25 RX ADMIN — COSYNTROPIN 0.25 MG: 0.25 INJECTION, POWDER, LYOPHILIZED, FOR SOLUTION INTRAMUSCULAR; INTRAVENOUS at 15:45

## 2017-02-25 RX ADMIN — ROPINIROLE 3 MG: 1 TABLET, FILM COATED ORAL at 21:41

## 2017-02-25 RX ADMIN — PREGABALIN 50 MG: 50 CAPSULE ORAL at 18:48

## 2017-02-25 RX ADMIN — DOCUSATE SODIUM 100 MG: 100 CAPSULE, LIQUID FILLED ORAL at 10:13

## 2017-02-25 RX ADMIN — PREGABALIN 50 MG: 50 CAPSULE ORAL at 21:41

## 2017-02-25 RX ADMIN — BISACODYL 10 MG: 10 SUPPOSITORY RECTAL at 11:26

## 2017-02-25 RX ADMIN — LORAZEPAM 2 MG: 2 INJECTION INTRAMUSCULAR; INTRAVENOUS at 07:11

## 2017-02-25 RX ADMIN — DOXYCYCLINE 100 MG: 100 CAPSULE ORAL at 10:12

## 2017-02-25 RX ADMIN — PANTOPRAZOLE SODIUM 40 MG: 40 TABLET, DELAYED RELEASE ORAL at 15:57

## 2017-02-25 RX ADMIN — MORPHINE SULFATE 4 MG: 4 INJECTION, SOLUTION INTRAMUSCULAR; INTRAVENOUS at 10:13

## 2017-02-25 RX ADMIN — PREGABALIN 50 MG: 50 CAPSULE ORAL at 00:34

## 2017-02-25 RX ADMIN — CEFAZOLIN SODIUM 1000 MG: 1 SOLUTION INTRAVENOUS at 02:26

## 2017-02-25 RX ADMIN — SODIUM CHLORIDE 250 ML: 0.9 INJECTION, SOLUTION INTRAVENOUS at 00:33

## 2017-02-25 RX ADMIN — LEVOTHYROXINE SODIUM 75 MCG: 75 TABLET ORAL at 06:05

## 2017-02-25 RX ADMIN — PREGABALIN 50 MG: 50 CAPSULE ORAL at 15:57

## 2017-02-25 RX ADMIN — SODIUM CHLORIDE 100 ML/HR: 0.9 INJECTION, SOLUTION INTRAVENOUS at 06:06

## 2017-02-25 RX ADMIN — DOXYCYCLINE 100 MG: 100 CAPSULE ORAL at 21:41

## 2017-02-25 RX ADMIN — PANTOPRAZOLE SODIUM 40 MG: 40 TABLET, DELAYED RELEASE ORAL at 06:05

## 2017-02-25 RX ADMIN — PREGABALIN 50 MG: 50 CAPSULE ORAL at 10:13

## 2017-02-25 RX ADMIN — ACETAMINOPHEN 650 MG: 325 TABLET, FILM COATED ORAL at 18:48

## 2017-02-25 RX ADMIN — CEFAZOLIN SODIUM 1000 MG: 1 SOLUTION INTRAVENOUS at 10:17

## 2017-02-25 RX ADMIN — POLYETHYLENE GLYCOL 3350 17 G: 17 POWDER, FOR SOLUTION ORAL at 10:12

## 2017-02-26 LAB
ABO GROUP BLD BPU: NORMAL
ANION GAP SERPL CALCULATED.3IONS-SCNC: 5 MMOL/L (ref 4–13)
BASOPHILS # BLD AUTO: 0.01 THOUSANDS/ΜL (ref 0–0.1)
BASOPHILS NFR BLD AUTO: 0 % (ref 0–1)
BPU ID: NORMAL
BUN SERPL-MCNC: 35 MG/DL (ref 5–25)
CALCIUM SERPL-MCNC: 7.7 MG/DL (ref 8.3–10.1)
CHLORIDE SERPL-SCNC: 117 MMOL/L (ref 100–108)
CO2 SERPL-SCNC: 26 MMOL/L (ref 21–32)
CORTIS SERPL-MCNC: 18.2 UG/ML
CREAT SERPL-MCNC: 0.67 MG/DL (ref 0.6–1.3)
CROSSMATCH: NORMAL
EOSINOPHIL # BLD AUTO: 0.11 THOUSAND/ΜL (ref 0–0.61)
EOSINOPHIL NFR BLD AUTO: 2 % (ref 0–6)
ERYTHROCYTE [DISTWIDTH] IN BLOOD BY AUTOMATED COUNT: 15.2 % (ref 11.6–15.1)
ERYTHROCYTE [DISTWIDTH] IN BLOOD BY AUTOMATED COUNT: 15.4 % (ref 11.6–15.1)
FOLATE SERPL-MCNC: 4.9 NG/ML (ref 3.1–17.5)
GFR SERPL CREATININE-BSD FRML MDRD: >60 ML/MIN/1.73SQ M
GLUCOSE SERPL-MCNC: 76 MG/DL (ref 65–140)
HAPTOGLOB SERPL-MCNC: 84 MG/DL (ref 34–200)
HAPTOGLOB SERPL-MCNC: 92 MG/DL (ref 34–200)
HCT VFR BLD AUTO: 20.9 % (ref 34.8–46.1)
HCT VFR BLD AUTO: 25.8 % (ref 34.8–46.1)
HGB BLD-MCNC: 7 G/DL (ref 11.5–15.4)
HGB BLD-MCNC: 8.7 G/DL (ref 11.5–15.4)
LYMPHOCYTES # BLD AUTO: 1.02 THOUSANDS/ΜL (ref 0.6–4.47)
LYMPHOCYTES NFR BLD AUTO: 21 % (ref 14–44)
MCH RBC QN AUTO: 29.5 PG (ref 26.8–34.3)
MCH RBC QN AUTO: 30.5 PG (ref 26.8–34.3)
MCHC RBC AUTO-ENTMCNC: 33.5 G/DL (ref 31.4–37.4)
MCHC RBC AUTO-ENTMCNC: 33.7 G/DL (ref 31.4–37.4)
MCV RBC AUTO: 88 FL (ref 82–98)
MCV RBC AUTO: 91 FL (ref 82–98)
MONOCYTES # BLD AUTO: 0.18 THOUSAND/ΜL (ref 0.17–1.22)
MONOCYTES NFR BLD AUTO: 4 % (ref 4–12)
NEUTROPHILS # BLD AUTO: 3.57 THOUSANDS/ΜL (ref 1.85–7.62)
NEUTS SEG NFR BLD AUTO: 73 % (ref 43–75)
NRBC BLD AUTO-RTO: 0 /100 WBCS
PLATELET # BLD AUTO: 109 THOUSANDS/UL (ref 149–390)
PLATELET # BLD AUTO: 110 THOUSANDS/UL (ref 149–390)
PMV BLD AUTO: 10.8 FL (ref 8.9–12.7)
PMV BLD AUTO: 11 FL (ref 8.9–12.7)
POTASSIUM SERPL-SCNC: 3.9 MMOL/L (ref 3.5–5.3)
RBC # BLD AUTO: 2.37 MILLION/UL (ref 3.81–5.12)
RBC # BLD AUTO: 2.85 MILLION/UL (ref 3.81–5.12)
SODIUM SERPL-SCNC: 148 MMOL/L (ref 136–145)
UNIT DISPENSE STATUS: NORMAL
UNIT PRODUCT CODE: NORMAL
UNIT RH: NORMAL
VIT B12 SERPL-MCNC: 434 PG/ML (ref 100–900)
WBC # BLD AUTO: 4.9 THOUSAND/UL (ref 4.31–10.16)
WBC # BLD AUTO: 6.48 THOUSAND/UL (ref 4.31–10.16)

## 2017-02-26 PROCEDURE — 82746 ASSAY OF FOLIC ACID SERUM: CPT | Performed by: INTERNAL MEDICINE

## 2017-02-26 PROCEDURE — 85025 COMPLETE CBC W/AUTO DIFF WBC: CPT | Performed by: PHYSICIAN ASSISTANT

## 2017-02-26 PROCEDURE — P9021 RED BLOOD CELLS UNIT: HCPCS

## 2017-02-26 PROCEDURE — 82607 VITAMIN B-12: CPT | Performed by: INTERNAL MEDICINE

## 2017-02-26 PROCEDURE — 80048 BASIC METABOLIC PNL TOTAL CA: CPT | Performed by: PHYSICIAN ASSISTANT

## 2017-02-26 PROCEDURE — 85027 COMPLETE CBC AUTOMATED: CPT | Performed by: INTERNAL MEDICINE

## 2017-02-26 RX ORDER — DEXTROSE AND SODIUM CHLORIDE 5; .45 G/100ML; G/100ML
75 INJECTION, SOLUTION INTRAVENOUS CONTINUOUS
Status: DISCONTINUED | OUTPATIENT
Start: 2017-02-26 | End: 2017-02-27

## 2017-02-26 RX ORDER — ROPINIROLE 1 MG/1
2 TABLET, FILM COATED ORAL 3 TIMES DAILY
Status: DISCONTINUED | OUTPATIENT
Start: 2017-02-26 | End: 2017-03-01 | Stop reason: HOSPADM

## 2017-02-26 RX ADMIN — ROPINIROLE 2 MG: 1 TABLET, FILM COATED ORAL at 20:59

## 2017-02-26 RX ADMIN — ACETAMINOPHEN 650 MG: 325 TABLET, FILM COATED ORAL at 17:43

## 2017-02-26 RX ADMIN — CEFAZOLIN SODIUM 1000 MG: 1 SOLUTION INTRAVENOUS at 20:59

## 2017-02-26 RX ADMIN — PANTOPRAZOLE SODIUM 40 MG: 40 TABLET, DELAYED RELEASE ORAL at 17:43

## 2017-02-26 RX ADMIN — PREGABALIN 50 MG: 50 CAPSULE ORAL at 20:59

## 2017-02-26 RX ADMIN — ACETAMINOPHEN 650 MG: 325 TABLET, FILM COATED ORAL at 05:39

## 2017-02-26 RX ADMIN — DOXYCYCLINE 100 MG: 100 CAPSULE ORAL at 20:59

## 2017-02-26 RX ADMIN — SODIUM CHLORIDE 250 ML: 0.9 INJECTION, SOLUTION INTRAVENOUS at 03:26

## 2017-02-26 RX ADMIN — ROPINIROLE 2 MG: 1 TABLET, FILM COATED ORAL at 12:27

## 2017-02-26 RX ADMIN — ACETAMINOPHEN 650 MG: 325 TABLET, FILM COATED ORAL at 12:27

## 2017-02-26 RX ADMIN — LEVOTHYROXINE SODIUM 75 MCG: 75 TABLET ORAL at 05:38

## 2017-02-26 RX ADMIN — ROPINIROLE 2 MG: 1 TABLET, FILM COATED ORAL at 17:43

## 2017-02-26 RX ADMIN — CEFAZOLIN SODIUM 1000 MG: 1 SOLUTION INTRAVENOUS at 12:34

## 2017-02-26 RX ADMIN — PREGABALIN 50 MG: 50 CAPSULE ORAL at 17:43

## 2017-02-26 RX ADMIN — CEFAZOLIN SODIUM 1000 MG: 1 SOLUTION INTRAVENOUS at 04:47

## 2017-02-26 RX ADMIN — POLYETHYLENE GLYCOL 3350 17 G: 17 POWDER, FOR SOLUTION ORAL at 10:45

## 2017-02-26 RX ADMIN — PREGABALIN 50 MG: 50 CAPSULE ORAL at 10:44

## 2017-02-26 RX ADMIN — DEXTROSE AND SODIUM CHLORIDE 75 ML/HR: 5; .45 INJECTION, SOLUTION INTRAVENOUS at 12:24

## 2017-02-26 RX ADMIN — DOXYCYCLINE 100 MG: 100 CAPSULE ORAL at 10:44

## 2017-02-26 RX ADMIN — PANTOPRAZOLE SODIUM 40 MG: 40 TABLET, DELAYED RELEASE ORAL at 05:39

## 2017-02-27 LAB
ANION GAP SERPL CALCULATED.3IONS-SCNC: 6 MMOL/L (ref 4–13)
BUN SERPL-MCNC: 28 MG/DL (ref 5–25)
CALCIUM SERPL-MCNC: 7.8 MG/DL (ref 8.3–10.1)
CHLORIDE SERPL-SCNC: 112 MMOL/L (ref 100–108)
CO2 SERPL-SCNC: 26 MMOL/L (ref 21–32)
CREAT SERPL-MCNC: 0.7 MG/DL (ref 0.6–1.3)
ERYTHROCYTE [DISTWIDTH] IN BLOOD BY AUTOMATED COUNT: 15.5 % (ref 11.6–15.1)
GFR SERPL CREATININE-BSD FRML MDRD: >60 ML/MIN/1.73SQ M
GLUCOSE SERPL-MCNC: 87 MG/DL (ref 65–140)
HCT VFR BLD AUTO: 25.7 % (ref 34.8–46.1)
HGB BLD-MCNC: 8.7 G/DL (ref 11.5–15.4)
MCH RBC QN AUTO: 31.1 PG (ref 26.8–34.3)
MCHC RBC AUTO-ENTMCNC: 33.9 G/DL (ref 31.4–37.4)
MCV RBC AUTO: 92 FL (ref 82–98)
PLATELET # BLD AUTO: 120 THOUSANDS/UL (ref 149–390)
PMV BLD AUTO: 11.9 FL (ref 8.9–12.7)
POTASSIUM SERPL-SCNC: 3.6 MMOL/L (ref 3.5–5.3)
RBC # BLD AUTO: 2.8 MILLION/UL (ref 3.81–5.12)
SODIUM SERPL-SCNC: 144 MMOL/L (ref 136–145)
WBC # BLD AUTO: 5.38 THOUSAND/UL (ref 4.31–10.16)

## 2017-02-27 PROCEDURE — 97110 THERAPEUTIC EXERCISES: CPT

## 2017-02-27 PROCEDURE — 85027 COMPLETE CBC AUTOMATED: CPT | Performed by: INTERNAL MEDICINE

## 2017-02-27 PROCEDURE — 97116 GAIT TRAINING THERAPY: CPT

## 2017-02-27 PROCEDURE — 97530 THERAPEUTIC ACTIVITIES: CPT

## 2017-02-27 PROCEDURE — 80048 BASIC METABOLIC PNL TOTAL CA: CPT | Performed by: INTERNAL MEDICINE

## 2017-02-27 PROCEDURE — 97532 HB COGNITIVE SKILLS DEVELOPMENT: CPT

## 2017-02-27 RX ADMIN — DOCUSATE SODIUM 100 MG: 100 CAPSULE, LIQUID FILLED ORAL at 11:20

## 2017-02-27 RX ADMIN — CEFAZOLIN SODIUM 1000 MG: 1 SOLUTION INTRAVENOUS at 14:00

## 2017-02-27 RX ADMIN — DOXYCYCLINE 100 MG: 100 CAPSULE ORAL at 21:16

## 2017-02-27 RX ADMIN — LEVOTHYROXINE SODIUM 75 MCG: 75 TABLET ORAL at 05:12

## 2017-02-27 RX ADMIN — DOCUSATE SODIUM 100 MG: 100 CAPSULE, LIQUID FILLED ORAL at 18:32

## 2017-02-27 RX ADMIN — DOXYCYCLINE 100 MG: 100 CAPSULE ORAL at 11:20

## 2017-02-27 RX ADMIN — DEXTROSE AND SODIUM CHLORIDE 75 ML/HR: 5; .45 INJECTION, SOLUTION INTRAVENOUS at 01:04

## 2017-02-27 RX ADMIN — ACETAMINOPHEN 650 MG: 325 TABLET, FILM COATED ORAL at 01:05

## 2017-02-27 RX ADMIN — ACETAMINOPHEN 650 MG: 325 TABLET, FILM COATED ORAL at 18:32

## 2017-02-27 RX ADMIN — ROPINIROLE 2 MG: 1 TABLET, FILM COATED ORAL at 21:16

## 2017-02-27 RX ADMIN — ACETAMINOPHEN 650 MG: 325 TABLET, FILM COATED ORAL at 05:12

## 2017-02-27 RX ADMIN — PREGABALIN 50 MG: 50 CAPSULE ORAL at 11:20

## 2017-02-27 RX ADMIN — ROPINIROLE 2 MG: 1 TABLET, FILM COATED ORAL at 17:02

## 2017-02-27 RX ADMIN — POLYETHYLENE GLYCOL 3350 17 G: 17 POWDER, FOR SOLUTION ORAL at 11:20

## 2017-02-27 RX ADMIN — BISACODYL 10 MG: 10 SUPPOSITORY RECTAL at 11:20

## 2017-02-27 RX ADMIN — PANTOPRAZOLE SODIUM 40 MG: 40 TABLET, DELAYED RELEASE ORAL at 17:02

## 2017-02-27 RX ADMIN — CEFAZOLIN SODIUM 1000 MG: 1 SOLUTION INTRAVENOUS at 04:13

## 2017-02-27 RX ADMIN — ACETAMINOPHEN 650 MG: 325 TABLET, FILM COATED ORAL at 13:40

## 2017-02-27 RX ADMIN — CEFAZOLIN SODIUM 1000 MG: 1 SOLUTION INTRAVENOUS at 21:16

## 2017-02-27 RX ADMIN — PREGABALIN 50 MG: 50 CAPSULE ORAL at 17:02

## 2017-02-27 RX ADMIN — PREGABALIN 50 MG: 50 CAPSULE ORAL at 21:16

## 2017-02-28 LAB
ERYTHROCYTE [DISTWIDTH] IN BLOOD BY AUTOMATED COUNT: 15.1 % (ref 11.6–15.1)
HCT VFR BLD AUTO: 29.9 % (ref 34.8–46.1)
HGB BLD-MCNC: 10.2 G/DL (ref 11.5–15.4)
MCH RBC QN AUTO: 31.3 PG (ref 26.8–34.3)
MCHC RBC AUTO-ENTMCNC: 34.1 G/DL (ref 31.4–37.4)
MCV RBC AUTO: 92 FL (ref 82–98)
PLATELET # BLD AUTO: 183 THOUSANDS/UL (ref 149–390)
PMV BLD AUTO: 11.6 FL (ref 8.9–12.7)
RBC # BLD AUTO: 3.26 MILLION/UL (ref 3.81–5.12)
WBC # BLD AUTO: 7.46 THOUSAND/UL (ref 4.31–10.16)

## 2017-02-28 PROCEDURE — 85027 COMPLETE CBC AUTOMATED: CPT | Performed by: INTERNAL MEDICINE

## 2017-02-28 PROCEDURE — 97530 THERAPEUTIC ACTIVITIES: CPT

## 2017-02-28 RX ADMIN — DOXYCYCLINE 100 MG: 100 CAPSULE ORAL at 09:00

## 2017-02-28 RX ADMIN — CEFAZOLIN SODIUM 1000 MG: 1 SOLUTION INTRAVENOUS at 04:49

## 2017-02-28 RX ADMIN — ROPINIROLE 2 MG: 1 TABLET, FILM COATED ORAL at 08:56

## 2017-02-28 RX ADMIN — DOCUSATE SODIUM 100 MG: 100 CAPSULE, LIQUID FILLED ORAL at 17:40

## 2017-02-28 RX ADMIN — PANTOPRAZOLE SODIUM 40 MG: 40 TABLET, DELAYED RELEASE ORAL at 17:40

## 2017-02-28 RX ADMIN — LEVOTHYROXINE SODIUM 75 MCG: 75 TABLET ORAL at 05:02

## 2017-02-28 RX ADMIN — ROPINIROLE 2 MG: 1 TABLET, FILM COATED ORAL at 21:34

## 2017-02-28 RX ADMIN — CEFAZOLIN SODIUM 1000 MG: 1 SOLUTION INTRAVENOUS at 21:34

## 2017-02-28 RX ADMIN — ACETAMINOPHEN 650 MG: 325 TABLET, FILM COATED ORAL at 05:02

## 2017-02-28 RX ADMIN — DOXYCYCLINE 100 MG: 100 CAPSULE ORAL at 21:34

## 2017-02-28 RX ADMIN — PREGABALIN 50 MG: 50 CAPSULE ORAL at 21:34

## 2017-02-28 RX ADMIN — ACETAMINOPHEN 650 MG: 325 TABLET, FILM COATED ORAL at 12:33

## 2017-02-28 RX ADMIN — CEFAZOLIN SODIUM 1000 MG: 1 SOLUTION INTRAVENOUS at 12:32

## 2017-02-28 RX ADMIN — ROPINIROLE 2 MG: 1 TABLET, FILM COATED ORAL at 17:40

## 2017-02-28 RX ADMIN — POLYETHYLENE GLYCOL 3350 17 G: 17 POWDER, FOR SOLUTION ORAL at 09:00

## 2017-02-28 RX ADMIN — BISACODYL 10 MG: 10 SUPPOSITORY RECTAL at 08:56

## 2017-02-28 RX ADMIN — PREGABALIN 50 MG: 50 CAPSULE ORAL at 09:00

## 2017-02-28 RX ADMIN — ACETAMINOPHEN 650 MG: 325 TABLET, FILM COATED ORAL at 18:11

## 2017-02-28 RX ADMIN — DOCUSATE SODIUM 100 MG: 100 CAPSULE, LIQUID FILLED ORAL at 08:59

## 2017-02-28 RX ADMIN — PREGABALIN 50 MG: 50 CAPSULE ORAL at 17:40

## 2017-03-01 ENCOUNTER — APPOINTMENT (INPATIENT)
Dept: PHYSICAL THERAPY | Facility: HOSPITAL | Age: 72
DRG: 811 | End: 2017-03-01
Payer: MEDICARE

## 2017-03-01 ENCOUNTER — APPOINTMENT (INPATIENT)
Dept: SPEECH THERAPY | Facility: HOSPITAL | Age: 72
DRG: 811 | End: 2017-03-01
Payer: MEDICARE

## 2017-03-01 VITALS
TEMPERATURE: 97.6 F | DIASTOLIC BLOOD PRESSURE: 68 MMHG | HEART RATE: 72 BPM | BODY MASS INDEX: 20.49 KG/M2 | WEIGHT: 120 LBS | SYSTOLIC BLOOD PRESSURE: 144 MMHG | OXYGEN SATURATION: 97 % | RESPIRATION RATE: 18 BRPM | HEIGHT: 64 IN

## 2017-03-01 LAB
BASOPHILS # BLD AUTO: 0.02 THOUSANDS/ΜL (ref 0–0.1)
BASOPHILS NFR BLD AUTO: 0 % (ref 0–1)
EOSINOPHIL # BLD AUTO: 0.21 THOUSAND/ΜL (ref 0–0.61)
EOSINOPHIL NFR BLD AUTO: 4 % (ref 0–6)
ERYTHROCYTE [DISTWIDTH] IN BLOOD BY AUTOMATED COUNT: 15.4 % (ref 11.6–15.1)
HCT VFR BLD AUTO: 29.3 % (ref 34.8–46.1)
HGB BLD-MCNC: 9.7 G/DL (ref 11.5–15.4)
LYMPHOCYTES # BLD AUTO: 1.13 THOUSANDS/ΜL (ref 0.6–4.47)
LYMPHOCYTES NFR BLD AUTO: 20 % (ref 14–44)
MCH RBC QN AUTO: 30.5 PG (ref 26.8–34.3)
MCHC RBC AUTO-ENTMCNC: 33.1 G/DL (ref 31.4–37.4)
MCV RBC AUTO: 92 FL (ref 82–98)
MONOCYTES # BLD AUTO: 0.45 THOUSAND/ΜL (ref 0.17–1.22)
MONOCYTES NFR BLD AUTO: 8 % (ref 4–12)
NEUTROPHILS # BLD AUTO: 3.98 THOUSANDS/ΜL (ref 1.85–7.62)
NEUTS SEG NFR BLD AUTO: 68 % (ref 43–75)
NRBC BLD AUTO-RTO: 0 /100 WBCS
PLATELET # BLD AUTO: 188 THOUSANDS/UL (ref 149–390)
PMV BLD AUTO: 11.3 FL (ref 8.9–12.7)
RBC # BLD AUTO: 3.18 MILLION/UL (ref 3.81–5.12)
WBC # BLD AUTO: 5.8 THOUSAND/UL (ref 4.31–10.16)

## 2017-03-01 PROCEDURE — 85025 COMPLETE CBC W/AUTO DIFF WBC: CPT | Performed by: HOSPITALIST

## 2017-03-01 PROCEDURE — 97110 THERAPEUTIC EXERCISES: CPT

## 2017-03-01 PROCEDURE — 92526 ORAL FUNCTION THERAPY: CPT

## 2017-03-01 PROCEDURE — 97116 GAIT TRAINING THERAPY: CPT

## 2017-03-01 RX ORDER — ROPINIROLE 3 MG/1
3 TABLET, FILM COATED ORAL 3 TIMES DAILY
Qty: 90 TABLET | Refills: 0 | Status: SHIPPED | OUTPATIENT
Start: 2017-03-01 | End: 2018-03-29

## 2017-03-01 RX ORDER — PREGABALIN 50 MG/1
50 CAPSULE ORAL 3 TIMES DAILY
Qty: 30 CAPSULE | Refills: 0 | Status: SHIPPED | OUTPATIENT
Start: 2017-03-01 | End: 2017-03-01

## 2017-03-01 RX ORDER — PREGABALIN 50 MG/1
50 CAPSULE ORAL 3 TIMES DAILY
Qty: 30 CAPSULE | Refills: 0 | Status: SHIPPED | OUTPATIENT
Start: 2017-03-01 | End: 2017-03-11

## 2017-03-01 RX ADMIN — DOCUSATE SODIUM 100 MG: 100 CAPSULE, LIQUID FILLED ORAL at 18:35

## 2017-03-01 RX ADMIN — LEVOTHYROXINE SODIUM 75 MCG: 75 TABLET ORAL at 05:53

## 2017-03-01 RX ADMIN — ROPINIROLE 2 MG: 1 TABLET, FILM COATED ORAL at 16:35

## 2017-03-01 RX ADMIN — ACETAMINOPHEN 650 MG: 325 TABLET, FILM COATED ORAL at 05:03

## 2017-03-01 RX ADMIN — PANTOPRAZOLE SODIUM 40 MG: 40 TABLET, DELAYED RELEASE ORAL at 16:35

## 2017-03-01 RX ADMIN — POLYETHYLENE GLYCOL 3350 17 G: 17 POWDER, FOR SOLUTION ORAL at 10:14

## 2017-03-01 RX ADMIN — ACETAMINOPHEN 650 MG: 325 TABLET, FILM COATED ORAL at 18:25

## 2017-03-01 RX ADMIN — BISACODYL 10 MG: 10 SUPPOSITORY RECTAL at 10:14

## 2017-03-01 RX ADMIN — PREGABALIN 50 MG: 50 CAPSULE ORAL at 17:35

## 2017-03-01 RX ADMIN — CEFAZOLIN SODIUM 1000 MG: 1 SOLUTION INTRAVENOUS at 05:03

## 2017-03-01 RX ADMIN — CEFAZOLIN SODIUM 1000 MG: 1 SOLUTION INTRAVENOUS at 12:51

## 2017-03-01 RX ADMIN — PREGABALIN 50 MG: 50 CAPSULE ORAL at 10:14

## 2017-03-01 RX ADMIN — ACETAMINOPHEN 650 MG: 325 TABLET, FILM COATED ORAL at 12:54

## 2017-03-01 RX ADMIN — DOCUSATE SODIUM 100 MG: 100 CAPSULE, LIQUID FILLED ORAL at 10:14

## 2017-03-01 RX ADMIN — ROPINIROLE 2 MG: 1 TABLET, FILM COATED ORAL at 10:14

## 2017-03-01 RX ADMIN — DOXYCYCLINE 100 MG: 100 CAPSULE ORAL at 10:14

## 2017-03-02 ENCOUNTER — GENERIC CONVERSION - ENCOUNTER (OUTPATIENT)
Dept: OTHER | Facility: OTHER | Age: 72
End: 2017-03-02

## 2017-03-02 LAB — ACTH PLAS-MCNC: 6.5 PG/ML (ref 7.2–63.3)

## 2017-03-03 ENCOUNTER — HOSPITAL ENCOUNTER (INPATIENT)
Facility: HOSPITAL | Age: 72
LOS: 13 days | Discharge: NURSING, REHAB OR LONG-TERM CARE | DRG: 100 | End: 2017-03-16
Attending: EMERGENCY MEDICINE | Admitting: INTERNAL MEDICINE
Payer: MEDICARE

## 2017-03-03 ENCOUNTER — APPOINTMENT (INPATIENT)
Dept: RADIOLOGY | Facility: HOSPITAL | Age: 72
DRG: 100 | End: 2017-03-03
Payer: MEDICARE

## 2017-03-03 ENCOUNTER — APPOINTMENT (EMERGENCY)
Dept: RADIOLOGY | Facility: HOSPITAL | Age: 72
DRG: 100 | End: 2017-03-03
Payer: MEDICARE

## 2017-03-03 DIAGNOSIS — E87.0 HYPERNATREMIA: ICD-10-CM

## 2017-03-03 DIAGNOSIS — Z86.59 HISTORY OF DEPRESSION: ICD-10-CM

## 2017-03-03 DIAGNOSIS — E23.2 DIABETES INSIPIDUS (HCC): Primary | ICD-10-CM

## 2017-03-03 DIAGNOSIS — R00.1 BRADYCARDIA: ICD-10-CM

## 2017-03-03 DIAGNOSIS — R27.0 ATAXIA: ICD-10-CM

## 2017-03-03 DIAGNOSIS — K22.0 ESOPHAGEAL ACHALASIA: ICD-10-CM

## 2017-03-03 DIAGNOSIS — N28.0 INFARCTION OF KIDNEY (HCC): ICD-10-CM

## 2017-03-03 DIAGNOSIS — R41.82 ALTERED MENTAL STATUS: ICD-10-CM

## 2017-03-03 DIAGNOSIS — T68.XXXA HYPOTHERMIA: ICD-10-CM

## 2017-03-03 DIAGNOSIS — R41.89 COGNITIVE CHANGE: ICD-10-CM

## 2017-03-03 DIAGNOSIS — R33.9 URINARY RETENTION: ICD-10-CM

## 2017-03-03 DIAGNOSIS — Z87.898 HISTORY OF SEIZURE: ICD-10-CM

## 2017-03-03 PROBLEM — F41.8 DEPRESSION WITH ANXIETY: Chronic | Status: ACTIVE | Noted: 2017-03-03

## 2017-03-03 PROBLEM — I95.9 HYPOTENSION: Status: ACTIVE | Noted: 2017-03-03

## 2017-03-03 PROBLEM — K21.9 GERD (GASTROESOPHAGEAL REFLUX DISEASE): Chronic | Status: ACTIVE | Noted: 2017-03-03

## 2017-03-03 PROBLEM — G25.81 RESTLESS LEG SYNDROME: Chronic | Status: ACTIVE | Noted: 2017-03-03

## 2017-03-03 PROBLEM — E03.9 HYPOTHYROID: Chronic | Status: ACTIVE | Noted: 2017-03-03

## 2017-03-03 PROBLEM — G11.9 CEREBRAL ATAXIA (HCC): Chronic | Status: ACTIVE | Noted: 2017-03-03

## 2017-03-03 PROBLEM — E87.6 HYPOKALEMIA: Status: ACTIVE | Noted: 2017-03-03

## 2017-03-03 PROBLEM — G93.40 ENCEPHALOPATHY: Chronic | Status: ACTIVE | Noted: 2017-03-03

## 2017-03-03 LAB
ALBUMIN SERPL BCP-MCNC: 2.4 G/DL (ref 3.5–5)
ALP SERPL-CCNC: 217 U/L (ref 46–116)
ALT SERPL W P-5'-P-CCNC: 20 U/L (ref 12–78)
ANION GAP SERPL CALCULATED.3IONS-SCNC: 6 MMOL/L (ref 4–13)
ANION GAP SERPL CALCULATED.3IONS-SCNC: 8 MMOL/L (ref 4–13)
ARTERIAL PATENCY WRIST A: YES
AST SERPL W P-5'-P-CCNC: 62 U/L (ref 5–45)
BACTERIA UR QL AUTO: NORMAL /HPF
BASE EXCESS BLDA CALC-SCNC: -3 MMOL/L (ref -2–3)
BASE EXCESS BLDA CALC-SCNC: 0 MMOL/L (ref -2–3)
BASE EXCESS BLDA CALC-SCNC: 0.5 MMOL/L
BASOPHILS # BLD AUTO: 0.01 THOUSANDS/ΜL (ref 0–0.1)
BASOPHILS NFR BLD AUTO: 0 % (ref 0–1)
BILIRUB SERPL-MCNC: 0.78 MG/DL (ref 0.2–1)
BILIRUB UR QL STRIP: NEGATIVE
BUN SERPL-MCNC: 12 MG/DL (ref 5–25)
BUN SERPL-MCNC: 14 MG/DL (ref 5–25)
CA-I BLD-SCNC: 1.08 MMOL/L (ref 1.12–1.32)
CA-I BLD-SCNC: 1.13 MMOL/L (ref 1.12–1.32)
CALCIUM SERPL-MCNC: 7.8 MG/DL (ref 8.3–10.1)
CALCIUM SERPL-MCNC: 8.1 MG/DL (ref 8.3–10.1)
CHLORIDE SERPL-SCNC: 113 MMOL/L (ref 100–108)
CHLORIDE SERPL-SCNC: 115 MMOL/L (ref 100–108)
CK MB SERPL-MCNC: 3 % (ref 0–2.5)
CK MB SERPL-MCNC: 8.9 NG/ML (ref 0–5)
CK SERPL-CCNC: 299 U/L (ref 26–192)
CLARITY UR: CLEAR
CO2 SERPL-SCNC: 25 MMOL/L (ref 21–32)
CO2 SERPL-SCNC: 30 MMOL/L (ref 21–32)
COLOR UR: YELLOW
COLOR, POC: NORMAL
CORTIS SERPL-MCNC: 24.8 UG/ML
CREAT SERPL-MCNC: 0.67 MG/DL (ref 0.6–1.3)
CREAT SERPL-MCNC: 0.76 MG/DL (ref 0.6–1.3)
EOSINOPHIL # BLD AUTO: 0.04 THOUSAND/ΜL (ref 0–0.61)
EOSINOPHIL NFR BLD AUTO: 1 % (ref 0–6)
ERYTHROCYTE [DISTWIDTH] IN BLOOD BY AUTOMATED COUNT: 15.3 % (ref 11.6–15.1)
GFR SERPL CREATININE-BSD FRML MDRD: >60 ML/MIN/1.73SQ M
GFR SERPL CREATININE-BSD FRML MDRD: >60 ML/MIN/1.73SQ M
GLUCOSE SERPL-MCNC: 113 MG/DL (ref 65–140)
GLUCOSE SERPL-MCNC: 146 MG/DL (ref 65–140)
GLUCOSE SERPL-MCNC: 152 MG/DL (ref 65–140)
GLUCOSE SERPL-MCNC: 161 MG/DL (ref 65–140)
GLUCOSE UR STRIP-MCNC: ABNORMAL MG/DL
HCO3 BLDA-SCNC: 23 MMOL/L (ref 22–28)
HCO3 BLDA-SCNC: 24 MMOL/L (ref 24–30)
HCO3 BLDA-SCNC: 27.8 MMOL/L (ref 24–30)
HCT VFR BLD AUTO: 35.9 % (ref 34.8–46.1)
HCT VFR BLD CALC: 19 % (ref 34.8–46.1)
HCT VFR BLD CALC: 35 % (ref 34.8–46.1)
HGB BLD-MCNC: 11.8 G/DL (ref 11.5–15.4)
HGB BLDA-MCNC: 11.9 G/DL (ref 11.5–15.4)
HGB BLDA-MCNC: 6.5 G/DL (ref 11.5–15.4)
HGB UR QL STRIP.AUTO: ABNORMAL
HOROWITZ INDEX BLDA+IHG-RTO: 55 MM[HG]
HYALINE CASTS #/AREA URNS LPF: NORMAL /LPF
INR PPP: 0.99 (ref 0.86–1.16)
KETONES UR STRIP-MCNC: NEGATIVE MG/DL
LACTATE SERPL-SCNC: 1.3 MMOL/L (ref 0.5–2)
LACTATE SERPL-SCNC: 1.4 MMOL/L (ref 0.5–2)
LEUKOCYTE ESTERASE UR QL STRIP: NEGATIVE
LYMPHOCYTES # BLD AUTO: 0.87 THOUSANDS/ΜL (ref 0.6–4.47)
LYMPHOCYTES NFR BLD AUTO: 22 % (ref 14–44)
MAGNESIUM SERPL-MCNC: 1.6 MG/DL (ref 1.6–2.6)
MCH RBC QN AUTO: 30.3 PG (ref 26.8–34.3)
MCHC RBC AUTO-ENTMCNC: 32.9 G/DL (ref 31.4–37.4)
MCV RBC AUTO: 92 FL (ref 82–98)
MONOCYTES # BLD AUTO: 0.3 THOUSAND/ΜL (ref 0.17–1.22)
MONOCYTES NFR BLD AUTO: 8 % (ref 4–12)
NEUTROPHILS # BLD AUTO: 2.73 THOUSANDS/ΜL (ref 1.85–7.62)
NEUTS SEG NFR BLD AUTO: 69 % (ref 43–75)
NITRITE UR QL STRIP: NEGATIVE
NON-SQ EPI CELLS URNS QL MICRO: NORMAL /HPF
NRBC BLD AUTO-RTO: 0 /100 WBCS
O2 CT BLDA-SCNC: 14.5 ML/DL (ref 16–23)
OSMOLALITY UR/SERPL-RTO: 305 MMOL/KG (ref 282–298)
OSMOLALITY UR: 309 MMOL/KG
OXYHGB MFR BLDA: 98.6 % (ref 94–97)
PCO2 BLD: 26 MMOL/L (ref 21–32)
PCO2 BLD: 29 MMOL/L (ref 21–32)
PCO2 BLD: 51.8 MM HG (ref 42–50)
PCO2 BLD: 56.7 MM HG (ref 42–50)
PCO2 BLDA: 29.8 MM HG (ref 36–44)
PEEP RESPIRATORY: 5 CM[H2O]
PH BLD: 7.27 [PH] (ref 7.3–7.4)
PH BLD: 7.3 [PH] (ref 7.3–7.4)
PH BLDA: 7.51 [PH] (ref 7.35–7.45)
PH UR STRIP.AUTO: 8.5 [PH] (ref 4.5–8)
PLATELET # BLD AUTO: 140 THOUSANDS/UL (ref 149–390)
PMV BLD AUTO: 11.2 FL (ref 8.9–12.7)
PO2 BLD: 32 MM HG (ref 35–45)
PO2 BLD: 52 MM HG (ref 35–45)
PO2 BLDA: 220 MM HG (ref 75–129)
POTASSIUM BLD-SCNC: 2.6 MMOL/L (ref 3.5–5.3)
POTASSIUM BLD-SCNC: 2.7 MMOL/L (ref 3.5–5.3)
POTASSIUM SERPL-SCNC: 2.8 MMOL/L (ref 3.5–5.3)
POTASSIUM SERPL-SCNC: 4.2 MMOL/L (ref 3.5–5.3)
PROT SERPL-MCNC: 5.5 G/DL (ref 6.4–8.2)
PROT UR STRIP-MCNC: NEGATIVE MG/DL
PROTHROMBIN TIME: 13.2 SECONDS (ref 12–14.3)
RBC # BLD AUTO: 3.89 MILLION/UL (ref 3.81–5.12)
RBC #/AREA URNS AUTO: NORMAL /HPF
SAO2 % BLD FROM PO2: 54 % (ref 95–98)
SAO2 % BLD FROM PO2: 81 % (ref 95–98)
SODIUM 24H UR-SCNC: 131 MOL/L
SODIUM BLD-SCNC: 146 MMOL/L (ref 136–145)
SODIUM BLD-SCNC: 148 MMOL/L (ref 136–145)
SODIUM SERPL-SCNC: 148 MMOL/L (ref 136–145)
SODIUM SERPL-SCNC: 149 MMOL/L (ref 136–145)
SP GR UR STRIP.AUTO: 1.01 (ref 1–1.03)
SPECIMEN SOURCE: ABNORMAL
T4 FREE SERPL-MCNC: 1.52 NG/DL (ref 0.76–1.46)
TROPONIN I BLD-MCNC: 0.09 NG/ML (ref 0–0.08)
TSH SERPL DL<=0.05 MIU/L-ACNC: 8.97 UIU/ML (ref 0.36–3.74)
UROBILINOGEN UR QL STRIP.AUTO: 0.2 E.U./DL
VENT AC: 14
VENT- AC: AC
VT SETTING VENT: 450 ML
WBC # BLD AUTO: 3.97 THOUSAND/UL (ref 4.31–10.16)
WBC #/AREA URNS AUTO: NORMAL /HPF

## 2017-03-03 PROCEDURE — 85025 COMPLETE CBC W/AUTO DIFF WBC: CPT | Performed by: SURGERY

## 2017-03-03 PROCEDURE — 72125 CT NECK SPINE W/O DYE: CPT

## 2017-03-03 PROCEDURE — 94760 N-INVAS EAR/PLS OXIMETRY 1: CPT

## 2017-03-03 PROCEDURE — 84295 ASSAY OF SERUM SODIUM: CPT

## 2017-03-03 PROCEDURE — 99285 EMERGENCY DEPT VISIT HI MDM: CPT

## 2017-03-03 PROCEDURE — 71010 HB CHEST X-RAY 1 VIEW FRONTAL: CPT

## 2017-03-03 PROCEDURE — 0BH18EZ INSERTION OF ENDOTRACHEAL AIRWAY INTO TRACHEA, VIA NATURAL OR ARTIFICIAL OPENING ENDOSCOPIC: ICD-10-PCS | Performed by: EMERGENCY MEDICINE

## 2017-03-03 PROCEDURE — 94002 VENT MGMT INPAT INIT DAY: CPT

## 2017-03-03 PROCEDURE — 82947 ASSAY GLUCOSE BLOOD QUANT: CPT

## 2017-03-03 PROCEDURE — 96361 HYDRATE IV INFUSION ADD-ON: CPT

## 2017-03-03 PROCEDURE — 71260 CT THORAX DX C+: CPT

## 2017-03-03 PROCEDURE — 81002 URINALYSIS NONAUTO W/O SCOPE: CPT | Performed by: EMERGENCY MEDICINE

## 2017-03-03 PROCEDURE — 02HV33Z INSERTION OF INFUSION DEVICE INTO SUPERIOR VENA CAVA, PERCUTANEOUS APPROACH: ICD-10-PCS | Performed by: EMERGENCY MEDICINE

## 2017-03-03 PROCEDURE — 83735 ASSAY OF MAGNESIUM: CPT | Performed by: INTERNAL MEDICINE

## 2017-03-03 PROCEDURE — 83605 ASSAY OF LACTIC ACID: CPT | Performed by: EMERGENCY MEDICINE

## 2017-03-03 PROCEDURE — 0T9B70Z DRAINAGE OF BLADDER WITH DRAINAGE DEVICE, VIA NATURAL OR ARTIFICIAL OPENING: ICD-10-PCS | Performed by: INTERNAL MEDICINE

## 2017-03-03 PROCEDURE — 36415 COLL VENOUS BLD VENIPUNCTURE: CPT

## 2017-03-03 PROCEDURE — 80053 COMPREHEN METABOLIC PANEL: CPT | Performed by: SURGERY

## 2017-03-03 PROCEDURE — 82330 ASSAY OF CALCIUM: CPT

## 2017-03-03 PROCEDURE — 81001 URINALYSIS AUTO W/SCOPE: CPT

## 2017-03-03 PROCEDURE — 96376 TX/PRO/DX INJ SAME DRUG ADON: CPT

## 2017-03-03 PROCEDURE — 84439 ASSAY OF FREE THYROXINE: CPT | Performed by: EMERGENCY MEDICINE

## 2017-03-03 PROCEDURE — 36600 WITHDRAWAL OF ARTERIAL BLOOD: CPT

## 2017-03-03 PROCEDURE — 84132 ASSAY OF SERUM POTASSIUM: CPT

## 2017-03-03 PROCEDURE — 85610 PROTHROMBIN TIME: CPT | Performed by: SURGERY

## 2017-03-03 PROCEDURE — 5A1945Z RESPIRATORY VENTILATION, 24-96 CONSECUTIVE HOURS: ICD-10-PCS | Performed by: EMERGENCY MEDICINE

## 2017-03-03 PROCEDURE — 82553 CREATINE MB FRACTION: CPT | Performed by: SURGERY

## 2017-03-03 PROCEDURE — 74177 CT ABD & PELVIS W/CONTRAST: CPT

## 2017-03-03 PROCEDURE — 84443 ASSAY THYROID STIM HORMONE: CPT | Performed by: EMERGENCY MEDICINE

## 2017-03-03 PROCEDURE — 83935 ASSAY OF URINE OSMOLALITY: CPT | Performed by: EMERGENCY MEDICINE

## 2017-03-03 PROCEDURE — 82803 BLOOD GASES ANY COMBINATION: CPT

## 2017-03-03 PROCEDURE — 84300 ASSAY OF URINE SODIUM: CPT | Performed by: EMERGENCY MEDICINE

## 2017-03-03 PROCEDURE — 83930 ASSAY OF BLOOD OSMOLALITY: CPT | Performed by: EMERGENCY MEDICINE

## 2017-03-03 PROCEDURE — 71010 HB CHEST X-RAY 1 VIEW FRONTAL (PORTABLE): CPT

## 2017-03-03 PROCEDURE — 96365 THER/PROPH/DIAG IV INF INIT: CPT

## 2017-03-03 PROCEDURE — 82550 ASSAY OF CK (CPK): CPT | Performed by: SURGERY

## 2017-03-03 PROCEDURE — 82533 TOTAL CORTISOL: CPT | Performed by: EMERGENCY MEDICINE

## 2017-03-03 PROCEDURE — 96375 TX/PRO/DX INJ NEW DRUG ADDON: CPT

## 2017-03-03 PROCEDURE — 82805 BLOOD GASES W/O2 SATURATION: CPT | Performed by: INTERNAL MEDICINE

## 2017-03-03 PROCEDURE — 87086 URINE CULTURE/COLONY COUNT: CPT

## 2017-03-03 PROCEDURE — 85014 HEMATOCRIT: CPT

## 2017-03-03 PROCEDURE — 84484 ASSAY OF TROPONIN QUANT: CPT

## 2017-03-03 PROCEDURE — 70450 CT HEAD/BRAIN W/O DYE: CPT

## 2017-03-03 PROCEDURE — 80048 BASIC METABOLIC PNL TOTAL CA: CPT | Performed by: INTERNAL MEDICINE

## 2017-03-03 PROCEDURE — 87040 BLOOD CULTURE FOR BACTERIA: CPT | Performed by: EMERGENCY MEDICINE

## 2017-03-03 RX ORDER — POTASSIUM CHLORIDE 20 MEQ/1
40 TABLET, EXTENDED RELEASE ORAL ONCE
Status: DISCONTINUED | OUTPATIENT
Start: 2017-03-03 | End: 2017-03-03

## 2017-03-03 RX ORDER — SODIUM CHLORIDE 9 MG/ML
INJECTION, SOLUTION INTRAVENOUS
Status: COMPLETED | OUTPATIENT
Start: 2017-03-03 | End: 2017-03-03

## 2017-03-03 RX ORDER — PROPOFOL 10 MG/ML
5-50 INJECTION, EMULSION INTRAVENOUS
Status: DISCONTINUED | OUTPATIENT
Start: 2017-03-03 | End: 2017-03-03

## 2017-03-03 RX ORDER — POTASSIUM CHLORIDE 29.8 MG/ML
40 INJECTION INTRAVENOUS ONCE
Status: COMPLETED | OUTPATIENT
Start: 2017-03-03 | End: 2017-03-03

## 2017-03-03 RX ORDER — SUCCINYLCHOLINE CHLORIDE 20 MG/ML
80 INJECTION INTRAMUSCULAR; INTRAVENOUS ONCE
Status: COMPLETED | OUTPATIENT
Start: 2017-03-03 | End: 2017-03-03

## 2017-03-03 RX ORDER — SODIUM CHLORIDE 450 MG/100ML
100 INJECTION, SOLUTION INTRAVENOUS CONTINUOUS
Status: DISCONTINUED | OUTPATIENT
Start: 2017-03-03 | End: 2017-03-05

## 2017-03-03 RX ORDER — FENTANYL CITRATE 50 UG/ML
100 INJECTION, SOLUTION INTRAMUSCULAR; INTRAVENOUS ONCE
Status: COMPLETED | OUTPATIENT
Start: 2017-03-03 | End: 2017-03-03

## 2017-03-03 RX ORDER — ROPINIROLE 1 MG/1
2 TABLET, FILM COATED ORAL 3 TIMES DAILY
Status: DISCONTINUED | OUTPATIENT
Start: 2017-03-03 | End: 2017-03-04

## 2017-03-03 RX ORDER — LEVOTHYROXINE SODIUM 0.07 MG/1
75 TABLET ORAL
Status: DISCONTINUED | OUTPATIENT
Start: 2017-03-04 | End: 2017-03-16 | Stop reason: HOSPADM

## 2017-03-03 RX ORDER — ETOMIDATE 2 MG/ML
15 INJECTION INTRAVENOUS ONCE
Status: COMPLETED | OUTPATIENT
Start: 2017-03-03 | End: 2017-03-03

## 2017-03-03 RX ORDER — PROPOFOL 10 MG/ML
50 INJECTION, EMULSION INTRAVENOUS ONCE
Status: COMPLETED | OUTPATIENT
Start: 2017-03-03 | End: 2017-03-03

## 2017-03-03 RX ORDER — CHLORHEXIDINE GLUCONATE 0.12 MG/ML
15 RINSE ORAL EVERY 12 HOURS SCHEDULED
Status: DISCONTINUED | OUTPATIENT
Start: 2017-03-03 | End: 2017-03-04

## 2017-03-03 RX ORDER — FENTANYL CITRATE 50 UG/ML
50 INJECTION, SOLUTION INTRAMUSCULAR; INTRAVENOUS ONCE
Status: DISCONTINUED | OUTPATIENT
Start: 2017-03-03 | End: 2017-03-03

## 2017-03-03 RX ORDER — FENTANYL CITRATE 50 UG/ML
50 INJECTION, SOLUTION INTRAMUSCULAR; INTRAVENOUS
Status: DISCONTINUED | OUTPATIENT
Start: 2017-03-03 | End: 2017-03-06

## 2017-03-03 RX ORDER — POTASSIUM CHLORIDE 14.9 MG/ML
20 INJECTION INTRAVENOUS
Status: DISCONTINUED | OUTPATIENT
Start: 2017-03-03 | End: 2017-03-04

## 2017-03-03 RX ORDER — PREGABALIN 25 MG/1
50 CAPSULE ORAL 3 TIMES DAILY
Status: DISCONTINUED | OUTPATIENT
Start: 2017-03-03 | End: 2017-03-16 | Stop reason: HOSPADM

## 2017-03-03 RX ADMIN — PREGABALIN 50 MG: 50 CAPSULE ORAL at 20:31

## 2017-03-03 RX ADMIN — METRONIDAZOLE 500 MG: 500 INJECTION, SOLUTION INTRAVENOUS at 21:09

## 2017-03-03 RX ADMIN — DEXMEDETOMIDINE HYDROCHLORIDE 0.4 MCG/KG/HR: 100 INJECTION, SOLUTION INTRAVENOUS at 23:36

## 2017-03-03 RX ADMIN — IOHEXOL 85 ML: 350 INJECTION, SOLUTION INTRAVENOUS at 11:06

## 2017-03-03 RX ADMIN — PROPOFOL 50 MG: 10 INJECTION, EMULSION INTRAVENOUS at 13:54

## 2017-03-03 RX ADMIN — FENTANYL CITRATE 100 MCG: 50 INJECTION INTRAMUSCULAR; INTRAVENOUS at 13:17

## 2017-03-03 RX ADMIN — Medication 80 MG: at 13:08

## 2017-03-03 RX ADMIN — ETOMIDATE 15 MG: 2 INJECTION, SOLUTION INTRAVENOUS at 13:07

## 2017-03-03 RX ADMIN — SODIUM CHLORIDE 500 ML: 0.9 INJECTION, SOLUTION INTRAVENOUS at 12:29

## 2017-03-03 RX ADMIN — CEFEPIME 2000 MG: 2 INJECTION, POWDER, FOR SOLUTION INTRAMUSCULAR; INTRAVENOUS at 16:00

## 2017-03-03 RX ADMIN — FENTANYL CITRATE 100 MCG: 50 INJECTION INTRAMUSCULAR; INTRAVENOUS at 13:47

## 2017-03-03 RX ADMIN — CHLORHEXIDINE GLUCONATE 15 ML: 1.2 RINSE ORAL at 23:36

## 2017-03-03 RX ADMIN — SODIUM CHLORIDE 1000 ML/HR: 0.9 INJECTION, SOLUTION INTRAVENOUS at 10:15

## 2017-03-03 RX ADMIN — FENTANYL CITRATE 100 MCG/HR: at 13:36

## 2017-03-03 RX ADMIN — SODIUM CHLORIDE 100 ML/HR: 0.45 INJECTION, SOLUTION INTRAVENOUS at 18:55

## 2017-03-03 RX ADMIN — NOREPINEPHRINE BITARTRATE 5 MCG/MIN: 1 INJECTION INTRAVENOUS at 13:02

## 2017-03-03 RX ADMIN — POTASSIUM CHLORIDE 40 MEQ: 400 INJECTION, SOLUTION INTRAVENOUS at 19:54

## 2017-03-03 RX ADMIN — ROPINIROLE 2 MG: 1 TABLET, FILM COATED ORAL at 21:10

## 2017-03-03 RX ADMIN — DEXMEDETOMIDINE HYDROCHLORIDE 0.7 MCG/KG/HR: 100 INJECTION, SOLUTION INTRAVENOUS at 20:27

## 2017-03-03 RX ADMIN — POTASSIUM CHLORIDE 20 MEQ: 200 INJECTION, SOLUTION INTRAVENOUS at 15:45

## 2017-03-03 RX ADMIN — DEXAMETHASONE SODIUM PHOSPHATE 10 MG: 10 INJECTION INTRAMUSCULAR; INTRAVENOUS at 12:41

## 2017-03-03 RX ADMIN — PROPOFOL 20 MCG/KG/MIN: 10 INJECTION, EMULSION INTRAVENOUS at 13:57

## 2017-03-04 ENCOUNTER — APPOINTMENT (INPATIENT)
Dept: NON INVASIVE DIAGNOSTICS | Facility: HOSPITAL | Age: 72
DRG: 100 | End: 2017-03-04
Payer: MEDICARE

## 2017-03-04 ENCOUNTER — APPOINTMENT (INPATIENT)
Dept: RADIOLOGY | Facility: HOSPITAL | Age: 72
DRG: 100 | End: 2017-03-04
Payer: MEDICARE

## 2017-03-04 PROBLEM — E87.6 HYPOKALEMIA: Status: RESOLVED | Noted: 2017-03-03 | Resolved: 2017-03-04

## 2017-03-04 PROBLEM — K22.0 ESOPHAGEAL ACHALASIA: Status: ACTIVE | Noted: 2017-03-04

## 2017-03-04 LAB
ALBUMIN SERPL BCP-MCNC: 2 G/DL (ref 3.5–5)
ALP SERPL-CCNC: 165 U/L (ref 46–116)
ALT SERPL W P-5'-P-CCNC: 13 U/L (ref 12–78)
ANION GAP SERPL CALCULATED.3IONS-SCNC: 6 MMOL/L (ref 4–13)
ANION GAP SERPL CALCULATED.3IONS-SCNC: 7 MMOL/L (ref 4–13)
ARTERIAL PATENCY WRIST A: YES
AST SERPL W P-5'-P-CCNC: 25 U/L (ref 5–45)
ATRIAL RATE: 48 BPM
BACTERIA UR CULT: NORMAL
BASE EXCESS BLDA CALC-SCNC: -1.5 MMOL/L
BASOPHILS # BLD AUTO: 0 THOUSANDS/ΜL (ref 0–0.1)
BASOPHILS NFR BLD AUTO: 0 % (ref 0–1)
BILIRUB SERPL-MCNC: 0.36 MG/DL (ref 0.2–1)
BUN SERPL-MCNC: 14 MG/DL (ref 5–25)
BUN SERPL-MCNC: 17 MG/DL (ref 5–25)
CALCIUM SERPL-MCNC: 7.7 MG/DL (ref 8.3–10.1)
CALCIUM SERPL-MCNC: 7.8 MG/DL (ref 8.3–10.1)
CHLORIDE SERPL-SCNC: 113 MMOL/L (ref 100–108)
CHLORIDE SERPL-SCNC: 114 MMOL/L (ref 100–108)
CO2 SERPL-SCNC: 25 MMOL/L (ref 21–32)
CO2 SERPL-SCNC: 26 MMOL/L (ref 21–32)
CREAT SERPL-MCNC: 0.65 MG/DL (ref 0.6–1.3)
CREAT SERPL-MCNC: 0.77 MG/DL (ref 0.6–1.3)
EOSINOPHIL # BLD AUTO: 0 THOUSAND/ΜL (ref 0–0.61)
EOSINOPHIL NFR BLD AUTO: 0 % (ref 0–6)
ERYTHROCYTE [DISTWIDTH] IN BLOOD BY AUTOMATED COUNT: 15.6 % (ref 11.6–15.1)
GFR SERPL CREATININE-BSD FRML MDRD: >60 ML/MIN/1.73SQ M
GFR SERPL CREATININE-BSD FRML MDRD: >60 ML/MIN/1.73SQ M
GLUCOSE SERPL-MCNC: 105 MG/DL (ref 65–140)
GLUCOSE SERPL-MCNC: 132 MG/DL (ref 65–140)
HCO3 BLDA-SCNC: 22 MMOL/L (ref 22–28)
HCT VFR BLD AUTO: 27.3 % (ref 34.8–46.1)
HGB BLD-MCNC: 8.1 G/DL (ref 11.5–15.4)
HGB BLD-MCNC: 8.9 G/DL (ref 11.5–15.4)
HOROWITZ INDEX BLDA+IHG-RTO: 40 MM[HG]
LYMPHOCYTES # BLD AUTO: 0.57 THOUSANDS/ΜL (ref 0.6–4.47)
LYMPHOCYTES NFR BLD AUTO: 11 % (ref 14–44)
MAGNESIUM SERPL-MCNC: 1.5 MG/DL (ref 1.6–2.6)
MCH RBC QN AUTO: 30.1 PG (ref 26.8–34.3)
MCHC RBC AUTO-ENTMCNC: 32.6 G/DL (ref 31.4–37.4)
MCV RBC AUTO: 92 FL (ref 82–98)
MONOCYTES # BLD AUTO: 0.17 THOUSAND/ΜL (ref 0.17–1.22)
MONOCYTES NFR BLD AUTO: 3 % (ref 4–12)
NEUTROPHILS # BLD AUTO: 4.5 THOUSANDS/ΜL (ref 1.85–7.62)
NEUTS SEG NFR BLD AUTO: 86 % (ref 43–75)
NRBC BLD AUTO-RTO: 0 /100 WBCS
O2 CT BLDA-SCNC: 14.2 ML/DL (ref 16–23)
OXYHGB MFR BLDA: 98.3 % (ref 94–97)
P AXIS: 63 DEGREES
PCO2 BLDA: 32.5 MM HG (ref 36–44)
PEEP RESPIRATORY: 5 CM[H2O]
PH BLDA: 7.45 [PH] (ref 7.35–7.45)
PHOSPHATE SERPL-MCNC: 3.2 MG/DL (ref 2.3–4.1)
PLATELET # BLD AUTO: 166 THOUSANDS/UL (ref 149–390)
PMV BLD AUTO: 10.9 FL (ref 8.9–12.7)
PO2 BLDA: 158.5 MM HG (ref 75–129)
POTASSIUM SERPL-SCNC: 4.2 MMOL/L (ref 3.5–5.3)
POTASSIUM SERPL-SCNC: 4.4 MMOL/L (ref 3.5–5.3)
PR INTERVAL: 142 MS
PROT SERPL-MCNC: 4.4 G/DL (ref 6.4–8.2)
QRS AXIS: -26 DEGREES
QRSD INTERVAL: 113 MS
QT INTERVAL: 517 MS
QTC INTERVAL: 462 MS
RBC # BLD AUTO: 2.96 MILLION/UL (ref 3.81–5.12)
SODIUM SERPL-SCNC: 145 MMOL/L (ref 136–145)
SODIUM SERPL-SCNC: 146 MMOL/L (ref 136–145)
SPECIMEN SOURCE: ABNORMAL
T WAVE AXIS: 53 DEGREES
VENT AC: 14
VENT- AC: AC
VENTRICULAR RATE: 48 BPM
VT SETTING VENT: 400 ML
WBC # BLD AUTO: 5.25 THOUSAND/UL (ref 4.31–10.16)

## 2017-03-04 PROCEDURE — 83735 ASSAY OF MAGNESIUM: CPT | Performed by: INTERNAL MEDICINE

## 2017-03-04 PROCEDURE — 94760 N-INVAS EAR/PLS OXIMETRY 1: CPT

## 2017-03-04 PROCEDURE — 94003 VENT MGMT INPAT SUBQ DAY: CPT

## 2017-03-04 PROCEDURE — 36600 WITHDRAWAL OF ARTERIAL BLOOD: CPT

## 2017-03-04 PROCEDURE — 82805 BLOOD GASES W/O2 SATURATION: CPT | Performed by: EMERGENCY MEDICINE

## 2017-03-04 PROCEDURE — 71010 HB CHEST X-RAY 1 VIEW FRONTAL (PORTABLE): CPT

## 2017-03-04 PROCEDURE — 80053 COMPREHEN METABOLIC PANEL: CPT | Performed by: INTERNAL MEDICINE

## 2017-03-04 PROCEDURE — 83519 RIA NONANTIBODY: CPT | Performed by: NURSE PRACTITIONER

## 2017-03-04 PROCEDURE — 85018 HEMOGLOBIN: CPT | Performed by: INTERNAL MEDICINE

## 2017-03-04 PROCEDURE — 80048 BASIC METABOLIC PNL TOTAL CA: CPT | Performed by: NURSE PRACTITIONER

## 2017-03-04 PROCEDURE — 94669 MECHANICAL CHEST WALL OSCILL: CPT

## 2017-03-04 PROCEDURE — C9113 INJ PANTOPRAZOLE SODIUM, VIA: HCPCS | Performed by: INTERNAL MEDICINE

## 2017-03-04 PROCEDURE — 85025 COMPLETE CBC W/AUTO DIFF WBC: CPT | Performed by: INTERNAL MEDICINE

## 2017-03-04 PROCEDURE — 87476 LYME DIS DNA AMP PROBE: CPT | Performed by: NURSE PRACTITIONER

## 2017-03-04 PROCEDURE — 84100 ASSAY OF PHOSPHORUS: CPT | Performed by: INTERNAL MEDICINE

## 2017-03-04 PROCEDURE — 93306 TTE W/DOPPLER COMPLETE: CPT

## 2017-03-04 PROCEDURE — 84238 ASSAY NONENDOCRINE RECEPTOR: CPT | Performed by: NURSE PRACTITIONER

## 2017-03-04 PROCEDURE — 93005 ELECTROCARDIOGRAM TRACING: CPT

## 2017-03-04 RX ORDER — PANTOPRAZOLE SODIUM 40 MG/1
40 INJECTION, POWDER, FOR SOLUTION INTRAVENOUS
Status: DISCONTINUED | OUTPATIENT
Start: 2017-03-04 | End: 2017-03-16 | Stop reason: HOSPADM

## 2017-03-04 RX ORDER — MAGNESIUM SULFATE HEPTAHYDRATE 40 MG/ML
2 INJECTION, SOLUTION INTRAVENOUS ONCE
Status: COMPLETED | OUTPATIENT
Start: 2017-03-04 | End: 2017-03-04

## 2017-03-04 RX ORDER — LEVETIRACETAM 750 MG/1
750 TABLET ORAL EVERY 12 HOURS SCHEDULED
Status: DISCONTINUED | OUTPATIENT
Start: 2017-03-04 | End: 2017-03-10

## 2017-03-04 RX ORDER — MIDODRINE HYDROCHLORIDE 5 MG/1
5 TABLET ORAL 3 TIMES DAILY
Status: DISCONTINUED | OUTPATIENT
Start: 2017-03-04 | End: 2017-03-05

## 2017-03-04 RX ORDER — ALBUMIN, HUMAN INJ 5% 5 %
12.5 SOLUTION INTRAVENOUS ONCE
Status: COMPLETED | OUTPATIENT
Start: 2017-03-04 | End: 2017-03-04

## 2017-03-04 RX ADMIN — CHLORHEXIDINE GLUCONATE 15 ML: 1.2 RINSE ORAL at 08:07

## 2017-03-04 RX ADMIN — NOREPINEPHRINE BITARTRATE 15 MCG/MIN: 1 INJECTION INTRAVENOUS at 20:20

## 2017-03-04 RX ADMIN — ALBUMIN HUMAN 12.5 G: 0.05 INJECTION, SOLUTION INTRAVENOUS at 11:41

## 2017-03-04 RX ADMIN — ROPINIROLE 2 MG: 1 TABLET, FILM COATED ORAL at 08:07

## 2017-03-04 RX ADMIN — CEFEPIME 2000 MG: 2 INJECTION, POWDER, FOR SOLUTION INTRAMUSCULAR; INTRAVENOUS at 03:43

## 2017-03-04 RX ADMIN — METRONIDAZOLE 500 MG: 500 INJECTION, SOLUTION INTRAVENOUS at 19:43

## 2017-03-04 RX ADMIN — SODIUM CHLORIDE 500 ML: 0.9 INJECTION, SOLUTION INTRAVENOUS at 15:30

## 2017-03-04 RX ADMIN — MAGNESIUM SULFATE HEPTAHYDRATE 2 G: 40 INJECTION, SOLUTION INTRAVENOUS at 08:07

## 2017-03-04 RX ADMIN — LEVOTHYROXINE SODIUM 75 MCG: 75 TABLET ORAL at 08:07

## 2017-03-04 RX ADMIN — PANTOPRAZOLE SODIUM 40 MG: 40 INJECTION, POWDER, FOR SOLUTION INTRAVENOUS at 08:07

## 2017-03-04 RX ADMIN — SODIUM CHLORIDE, SODIUM LACTATE, POTASSIUM CHLORIDE, AND CALCIUM CHLORIDE 500 ML: .6; .31; .03; .02 INJECTION, SOLUTION INTRAVENOUS at 04:00

## 2017-03-04 RX ADMIN — DEXMEDETOMIDINE HYDROCHLORIDE 0.3 MCG/KG/HR: 100 INJECTION, SOLUTION INTRAVENOUS at 10:07

## 2017-03-04 RX ADMIN — ROPINIROLE 2 MG: 1 TABLET, FILM COATED ORAL at 15:07

## 2017-03-04 RX ADMIN — LEVETIRACETAM 750 MG: 750 TABLET, FILM COATED ORAL at 15:06

## 2017-03-04 RX ADMIN — MAGNESIUM SULFATE HEPTAHYDRATE 2 G: 40 INJECTION, SOLUTION INTRAVENOUS at 11:15

## 2017-03-04 RX ADMIN — SODIUM CHLORIDE 100 ML/HR: 0.45 INJECTION, SOLUTION INTRAVENOUS at 03:41

## 2017-03-04 RX ADMIN — MIDODRINE HYDROCHLORIDE 5 MG: 5 TABLET ORAL at 21:03

## 2017-03-04 RX ADMIN — DEXTROSE 2.5 MCG/MIN: 5 SOLUTION INTRAVENOUS at 20:37

## 2017-03-04 RX ADMIN — DEXTROSE 1 MCG/MIN: 5 SOLUTION INTRAVENOUS at 13:24

## 2017-03-04 RX ADMIN — MIDODRINE HYDROCHLORIDE 5 MG: 5 TABLET ORAL at 11:56

## 2017-03-04 RX ADMIN — PREGABALIN 50 MG: 50 CAPSULE ORAL at 08:07

## 2017-03-04 RX ADMIN — ENOXAPARIN SODIUM 40 MG: 40 INJECTION SUBCUTANEOUS at 08:07

## 2017-03-04 RX ADMIN — LEVETIRACETAM 750 MG: 750 TABLET, FILM COATED ORAL at 21:03

## 2017-03-04 RX ADMIN — METRONIDAZOLE 500 MG: 500 INJECTION, SOLUTION INTRAVENOUS at 04:36

## 2017-03-04 RX ADMIN — MIDODRINE HYDROCHLORIDE 5 MG: 5 TABLET ORAL at 15:06

## 2017-03-04 RX ADMIN — PREGABALIN 50 MG: 50 CAPSULE ORAL at 21:20

## 2017-03-04 RX ADMIN — PREGABALIN 50 MG: 50 CAPSULE ORAL at 15:09

## 2017-03-04 RX ADMIN — CEFEPIME 2000 MG: 2 INJECTION, POWDER, FOR SOLUTION INTRAMUSCULAR; INTRAVENOUS at 15:06

## 2017-03-04 RX ADMIN — FENTANYL CITRATE 50 MCG: 50 INJECTION INTRAMUSCULAR; INTRAVENOUS at 05:23

## 2017-03-04 RX ADMIN — METRONIDAZOLE 500 MG: 500 INJECTION, SOLUTION INTRAVENOUS at 11:50

## 2017-03-04 RX ADMIN — SODIUM CHLORIDE 100 ML/HR: 0.45 INJECTION, SOLUTION INTRAVENOUS at 16:48

## 2017-03-05 ENCOUNTER — APPOINTMENT (INPATIENT)
Dept: RADIOLOGY | Facility: HOSPITAL | Age: 72
DRG: 100 | End: 2017-03-05
Payer: MEDICARE

## 2017-03-05 LAB
ANION GAP SERPL CALCULATED.3IONS-SCNC: 6 MMOL/L (ref 4–13)
ANION GAP SERPL CALCULATED.3IONS-SCNC: 6 MMOL/L (ref 4–13)
BASOPHILS # BLD AUTO: 0.01 THOUSANDS/ΜL (ref 0–0.1)
BASOPHILS NFR BLD AUTO: 0 % (ref 0–1)
BUN SERPL-MCNC: 12 MG/DL (ref 5–25)
BUN SERPL-MCNC: 15 MG/DL (ref 5–25)
CALCIUM SERPL-MCNC: 7.4 MG/DL (ref 8.3–10.1)
CALCIUM SERPL-MCNC: 7.6 MG/DL (ref 8.3–10.1)
CHLORIDE SERPL-SCNC: 114 MMOL/L (ref 100–108)
CHLORIDE SERPL-SCNC: 114 MMOL/L (ref 100–108)
CO2 SERPL-SCNC: 26 MMOL/L (ref 21–32)
CO2 SERPL-SCNC: 27 MMOL/L (ref 21–32)
CREAT SERPL-MCNC: 0.72 MG/DL (ref 0.6–1.3)
CREAT SERPL-MCNC: 0.76 MG/DL (ref 0.6–1.3)
EOSINOPHIL # BLD AUTO: 0.01 THOUSAND/ΜL (ref 0–0.61)
EOSINOPHIL NFR BLD AUTO: 0 % (ref 0–6)
ERYTHROCYTE [DISTWIDTH] IN BLOOD BY AUTOMATED COUNT: 16.3 % (ref 11.6–15.1)
GFR SERPL CREATININE-BSD FRML MDRD: >60 ML/MIN/1.73SQ M
GFR SERPL CREATININE-BSD FRML MDRD: >60 ML/MIN/1.73SQ M
GLUCOSE SERPL-MCNC: 115 MG/DL (ref 65–140)
GLUCOSE SERPL-MCNC: 118 MG/DL (ref 65–140)
HCT VFR BLD AUTO: 27.1 % (ref 34.8–46.1)
HGB BLD-MCNC: 8.8 G/DL (ref 11.5–15.4)
LYMPHOCYTES # BLD AUTO: 1.37 THOUSANDS/ΜL (ref 0.6–4.47)
LYMPHOCYTES NFR BLD AUTO: 17 % (ref 14–44)
MAGNESIUM SERPL-MCNC: 2.8 MG/DL (ref 1.6–2.6)
MCH RBC QN AUTO: 30.7 PG (ref 26.8–34.3)
MCHC RBC AUTO-ENTMCNC: 32.5 G/DL (ref 31.4–37.4)
MCV RBC AUTO: 94 FL (ref 82–98)
MONOCYTES # BLD AUTO: 0.4 THOUSAND/ΜL (ref 0.17–1.22)
MONOCYTES NFR BLD AUTO: 5 % (ref 4–12)
NEUTROPHILS # BLD AUTO: 6.37 THOUSANDS/ΜL (ref 1.85–7.62)
NEUTS SEG NFR BLD AUTO: 78 % (ref 43–75)
NRBC BLD AUTO-RTO: 0 /100 WBCS
PLATELET # BLD AUTO: 163 THOUSANDS/UL (ref 149–390)
PMV BLD AUTO: 11.1 FL (ref 8.9–12.7)
POTASSIUM SERPL-SCNC: 3.5 MMOL/L (ref 3.5–5.3)
POTASSIUM SERPL-SCNC: 3.7 MMOL/L (ref 3.5–5.3)
RBC # BLD AUTO: 2.87 MILLION/UL (ref 3.81–5.12)
SODIUM SERPL-SCNC: 146 MMOL/L (ref 136–145)
SODIUM SERPL-SCNC: 147 MMOL/L (ref 136–145)
WBC # BLD AUTO: 8.18 THOUSAND/UL (ref 4.31–10.16)

## 2017-03-05 PROCEDURE — 80048 BASIC METABOLIC PNL TOTAL CA: CPT | Performed by: INTERNAL MEDICINE

## 2017-03-05 PROCEDURE — 85025 COMPLETE CBC W/AUTO DIFF WBC: CPT | Performed by: INTERNAL MEDICINE

## 2017-03-05 PROCEDURE — 94669 MECHANICAL CHEST WALL OSCILL: CPT

## 2017-03-05 PROCEDURE — 83735 ASSAY OF MAGNESIUM: CPT | Performed by: INTERNAL MEDICINE

## 2017-03-05 PROCEDURE — 94760 N-INVAS EAR/PLS OXIMETRY 1: CPT

## 2017-03-05 PROCEDURE — 76770 US EXAM ABDO BACK WALL COMP: CPT

## 2017-03-05 PROCEDURE — C9113 INJ PANTOPRAZOLE SODIUM, VIA: HCPCS | Performed by: INTERNAL MEDICINE

## 2017-03-05 PROCEDURE — 80048 BASIC METABOLIC PNL TOTAL CA: CPT | Performed by: NURSE PRACTITIONER

## 2017-03-05 PROCEDURE — 70551 MRI BRAIN STEM W/O DYE: CPT

## 2017-03-05 RX ORDER — NOREPINEPHRINE BITARTRATE 1 MG/ML
INJECTION, SOLUTION INTRAVENOUS
Status: COMPLETED
Start: 2017-03-05 | End: 2017-03-05

## 2017-03-05 RX ORDER — POTASSIUM CHLORIDE 14.9 MG/ML
20 INJECTION INTRAVENOUS ONCE
Status: COMPLETED | OUTPATIENT
Start: 2017-03-05 | End: 2017-03-05

## 2017-03-05 RX ORDER — ALBUMIN, HUMAN INJ 5% 5 %
12.5 SOLUTION INTRAVENOUS ONCE
Status: COMPLETED | OUTPATIENT
Start: 2017-03-05 | End: 2017-03-05

## 2017-03-05 RX ORDER — DEXTROSE MONOHYDRATE 50 MG/ML
50 INJECTION, SOLUTION INTRAVENOUS CONTINUOUS
Status: DISCONTINUED | OUTPATIENT
Start: 2017-03-05 | End: 2017-03-16 | Stop reason: HOSPADM

## 2017-03-05 RX ORDER — MIDODRINE HYDROCHLORIDE 5 MG/1
10 TABLET ORAL 3 TIMES DAILY
Status: DISCONTINUED | OUTPATIENT
Start: 2017-03-05 | End: 2017-03-16 | Stop reason: HOSPADM

## 2017-03-05 RX ORDER — POTASSIUM CHLORIDE 29.8 MG/ML
40 INJECTION INTRAVENOUS ONCE
Status: COMPLETED | OUTPATIENT
Start: 2017-03-05 | End: 2017-03-05

## 2017-03-05 RX ORDER — ALBUMIN, HUMAN INJ 5% 5 %
SOLUTION INTRAVENOUS
Status: COMPLETED
Start: 2017-03-05 | End: 2017-03-05

## 2017-03-05 RX ADMIN — NOREPINEPHRINE BITARTRATE 2 MCG/MIN: 1 INJECTION INTRAVENOUS at 15:23

## 2017-03-05 RX ADMIN — LEVETIRACETAM 750 MG: 750 TABLET, FILM COATED ORAL at 20:33

## 2017-03-05 RX ADMIN — METRONIDAZOLE 500 MG: 500 INJECTION, SOLUTION INTRAVENOUS at 20:40

## 2017-03-05 RX ADMIN — ENOXAPARIN SODIUM 40 MG: 40 INJECTION SUBCUTANEOUS at 09:50

## 2017-03-05 RX ADMIN — MIDODRINE HYDROCHLORIDE 10 MG: 5 TABLET ORAL at 20:33

## 2017-03-05 RX ADMIN — CEFEPIME 2000 MG: 2 INJECTION, POWDER, FOR SOLUTION INTRAMUSCULAR; INTRAVENOUS at 16:03

## 2017-03-05 RX ADMIN — POTASSIUM CHLORIDE 20 MEQ: 200 INJECTION, SOLUTION INTRAVENOUS at 16:03

## 2017-03-05 RX ADMIN — PANTOPRAZOLE SODIUM 40 MG: 40 INJECTION, POWDER, FOR SOLUTION INTRAVENOUS at 07:04

## 2017-03-05 RX ADMIN — METRONIDAZOLE 500 MG: 500 INJECTION, SOLUTION INTRAVENOUS at 12:38

## 2017-03-05 RX ADMIN — PREGABALIN 50 MG: 50 CAPSULE ORAL at 10:27

## 2017-03-05 RX ADMIN — DEXTROSE 1 MCG/MIN: 5 SOLUTION INTRAVENOUS at 11:00

## 2017-03-05 RX ADMIN — PREGABALIN 50 MG: 50 CAPSULE ORAL at 16:03

## 2017-03-05 RX ADMIN — CEFEPIME 2000 MG: 2 INJECTION, POWDER, FOR SOLUTION INTRAMUSCULAR; INTRAVENOUS at 03:41

## 2017-03-05 RX ADMIN — NOREPINEPHRINE BITARTRATE 1 MCG/MIN: 1 INJECTION INTRAVENOUS at 19:22

## 2017-03-05 RX ADMIN — DEXTROSE 100 ML/HR: 5 SOLUTION INTRAVENOUS at 07:01

## 2017-03-05 RX ADMIN — DEXTROSE 1.5 MCG/MIN: 5 SOLUTION INTRAVENOUS at 23:14

## 2017-03-05 RX ADMIN — NOREPINEPHRINE BITARTRATE: 1 INJECTION INTRAVENOUS at 16:07

## 2017-03-05 RX ADMIN — PREGABALIN 50 MG: 50 CAPSULE ORAL at 20:33

## 2017-03-05 RX ADMIN — MIDODRINE HYDROCHLORIDE 5 MG: 5 TABLET ORAL at 10:01

## 2017-03-05 RX ADMIN — LEVETIRACETAM 750 MG: 750 TABLET, FILM COATED ORAL at 10:01

## 2017-03-05 RX ADMIN — POTASSIUM CHLORIDE 40 MEQ: 400 INJECTION, SOLUTION INTRAVENOUS at 10:00

## 2017-03-05 RX ADMIN — MIDODRINE HYDROCHLORIDE 10 MG: 5 TABLET ORAL at 16:03

## 2017-03-05 RX ADMIN — ALBUMIN HUMAN 12.5 G: 0.05 INJECTION, SOLUTION INTRAVENOUS at 15:12

## 2017-03-05 RX ADMIN — DEXTROSE 100 ML/HR: 5 SOLUTION INTRAVENOUS at 19:24

## 2017-03-05 RX ADMIN — ALBUMIN, HUMAN INJ 5% 12.5 G: 5 SOLUTION at 15:12

## 2017-03-05 RX ADMIN — METRONIDAZOLE 500 MG: 500 INJECTION, SOLUTION INTRAVENOUS at 03:45

## 2017-03-05 RX ADMIN — SODIUM CHLORIDE 100 ML/HR: 0.45 INJECTION, SOLUTION INTRAVENOUS at 03:39

## 2017-03-05 RX ADMIN — LEVOTHYROXINE SODIUM 75 MCG: 75 TABLET ORAL at 07:04

## 2017-03-05 RX ADMIN — HYDROCORTISONE SODIUM SUCCINATE 25 MG: 100 INJECTION, POWDER, FOR SOLUTION INTRAMUSCULAR; INTRAVENOUS at 17:50

## 2017-03-05 RX ADMIN — HYDROCORTISONE SODIUM SUCCINATE 25 MG: 100 INJECTION, POWDER, FOR SOLUTION INTRAMUSCULAR; INTRAVENOUS at 22:12

## 2017-03-06 ENCOUNTER — APPOINTMENT (INPATIENT)
Dept: SPEECH THERAPY | Facility: HOSPITAL | Age: 72
DRG: 100 | End: 2017-03-06
Payer: MEDICARE

## 2017-03-06 LAB
ANION GAP SERPL CALCULATED.3IONS-SCNC: 6 MMOL/L (ref 4–13)
ANION GAP SERPL CALCULATED.3IONS-SCNC: 7 MMOL/L (ref 4–13)
BASOPHILS # BLD AUTO: 0.01 THOUSANDS/ΜL (ref 0–0.1)
BASOPHILS NFR BLD AUTO: 0 % (ref 0–1)
BUN SERPL-MCNC: 8 MG/DL (ref 5–25)
BUN SERPL-MCNC: 8 MG/DL (ref 5–25)
CALCIUM SERPL-MCNC: 8.1 MG/DL (ref 8.3–10.1)
CALCIUM SERPL-MCNC: 8.2 MG/DL (ref 8.3–10.1)
CHLORIDE SERPL-SCNC: 111 MMOL/L (ref 100–108)
CHLORIDE SERPL-SCNC: 114 MMOL/L (ref 100–108)
CO2 SERPL-SCNC: 27 MMOL/L (ref 21–32)
CO2 SERPL-SCNC: 29 MMOL/L (ref 21–32)
CREAT SERPL-MCNC: 0.7 MG/DL (ref 0.6–1.3)
CREAT SERPL-MCNC: 0.74 MG/DL (ref 0.6–1.3)
EOSINOPHIL # BLD AUTO: 0 THOUSAND/ΜL (ref 0–0.61)
EOSINOPHIL NFR BLD AUTO: 0 % (ref 0–6)
ERYTHROCYTE [DISTWIDTH] IN BLOOD BY AUTOMATED COUNT: 16.2 % (ref 11.6–15.1)
GFR SERPL CREATININE-BSD FRML MDRD: >60 ML/MIN/1.73SQ M
GFR SERPL CREATININE-BSD FRML MDRD: >60 ML/MIN/1.73SQ M
GLUCOSE SERPL-MCNC: 134 MG/DL (ref 65–140)
GLUCOSE SERPL-MCNC: 147 MG/DL (ref 65–140)
HCT VFR BLD AUTO: 26.6 % (ref 34.8–46.1)
HGB BLD-MCNC: 8.5 G/DL (ref 11.5–15.4)
LYMPHOCYTES # BLD AUTO: 0.47 THOUSANDS/ΜL (ref 0.6–4.47)
LYMPHOCYTES NFR BLD AUTO: 9 % (ref 14–44)
MAGNESIUM SERPL-MCNC: 2.2 MG/DL (ref 1.6–2.6)
MCH RBC QN AUTO: 30.5 PG (ref 26.8–34.3)
MCHC RBC AUTO-ENTMCNC: 32 G/DL (ref 31.4–37.4)
MCV RBC AUTO: 95 FL (ref 82–98)
MONOCYTES # BLD AUTO: 0.1 THOUSAND/ΜL (ref 0.17–1.22)
MONOCYTES NFR BLD AUTO: 2 % (ref 4–12)
NEUTROPHILS # BLD AUTO: 4.48 THOUSANDS/ΜL (ref 1.85–7.62)
NEUTS SEG NFR BLD AUTO: 89 % (ref 43–75)
NRBC BLD AUTO-RTO: 0 /100 WBCS
OSMOLALITY UR/SERPL-RTO: 303 MMOL/KG (ref 282–298)
OSMOLALITY UR: 314 MMOL/KG
PLATELET # BLD AUTO: 162 THOUSANDS/UL (ref 149–390)
PMV BLD AUTO: 10.2 FL (ref 8.9–12.7)
POTASSIUM SERPL-SCNC: 3.6 MMOL/L (ref 3.5–5.3)
POTASSIUM SERPL-SCNC: 3.9 MMOL/L (ref 3.5–5.3)
RBC # BLD AUTO: 2.79 MILLION/UL (ref 3.81–5.12)
SODIUM 24H UR-SCNC: 60 MOL/L
SODIUM SERPL-SCNC: 145 MMOL/L (ref 136–145)
SODIUM SERPL-SCNC: 149 MMOL/L (ref 136–145)
WBC # BLD AUTO: 5.06 THOUSAND/UL (ref 4.31–10.16)

## 2017-03-06 PROCEDURE — 94669 MECHANICAL CHEST WALL OSCILL: CPT

## 2017-03-06 PROCEDURE — 83735 ASSAY OF MAGNESIUM: CPT | Performed by: NURSE PRACTITIONER

## 2017-03-06 PROCEDURE — 85025 COMPLETE CBC W/AUTO DIFF WBC: CPT | Performed by: NURSE PRACTITIONER

## 2017-03-06 PROCEDURE — 80048 BASIC METABOLIC PNL TOTAL CA: CPT | Performed by: NURSE PRACTITIONER

## 2017-03-06 PROCEDURE — C9113 INJ PANTOPRAZOLE SODIUM, VIA: HCPCS | Performed by: INTERNAL MEDICINE

## 2017-03-06 PROCEDURE — 92610 EVALUATE SWALLOWING FUNCTION: CPT

## 2017-03-06 PROCEDURE — 80048 BASIC METABOLIC PNL TOTAL CA: CPT | Performed by: INTERNAL MEDICINE

## 2017-03-06 PROCEDURE — 83930 ASSAY OF BLOOD OSMOLALITY: CPT | Performed by: EMERGENCY MEDICINE

## 2017-03-06 PROCEDURE — 83935 ASSAY OF URINE OSMOLALITY: CPT | Performed by: EMERGENCY MEDICINE

## 2017-03-06 PROCEDURE — 84300 ASSAY OF URINE SODIUM: CPT | Performed by: EMERGENCY MEDICINE

## 2017-03-06 RX ORDER — POTASSIUM CHLORIDE 29.8 MG/ML
40 INJECTION INTRAVENOUS ONCE
Status: COMPLETED | OUTPATIENT
Start: 2017-03-06 | End: 2017-03-06

## 2017-03-06 RX ADMIN — METRONIDAZOLE 500 MG: 500 INJECTION, SOLUTION INTRAVENOUS at 12:22

## 2017-03-06 RX ADMIN — PANTOPRAZOLE SODIUM 40 MG: 40 INJECTION, POWDER, FOR SOLUTION INTRAVENOUS at 05:03

## 2017-03-06 RX ADMIN — PREGABALIN 50 MG: 50 CAPSULE ORAL at 22:30

## 2017-03-06 RX ADMIN — ENOXAPARIN SODIUM 40 MG: 40 INJECTION SUBCUTANEOUS at 11:41

## 2017-03-06 RX ADMIN — METRONIDAZOLE 500 MG: 500 INJECTION, SOLUTION INTRAVENOUS at 03:38

## 2017-03-06 RX ADMIN — LEVETIRACETAM 750 MG: 750 TABLET, FILM COATED ORAL at 22:31

## 2017-03-06 RX ADMIN — DEXTROSE 100 ML/HR: 5 SOLUTION INTRAVENOUS at 06:43

## 2017-03-06 RX ADMIN — CEFEPIME 2000 MG: 2 INJECTION, POWDER, FOR SOLUTION INTRAMUSCULAR; INTRAVENOUS at 15:42

## 2017-03-06 RX ADMIN — LEVETIRACETAM 750 MG: 750 TABLET, FILM COATED ORAL at 11:47

## 2017-03-06 RX ADMIN — MIDODRINE HYDROCHLORIDE 10 MG: 5 TABLET ORAL at 15:41

## 2017-03-06 RX ADMIN — HYDROCORTISONE SODIUM SUCCINATE 25 MG: 100 INJECTION, POWDER, FOR SOLUTION INTRAMUSCULAR; INTRAVENOUS at 05:03

## 2017-03-06 RX ADMIN — MIDODRINE HYDROCHLORIDE 10 MG: 5 TABLET ORAL at 11:46

## 2017-03-06 RX ADMIN — CEFEPIME 2000 MG: 2 INJECTION, POWDER, FOR SOLUTION INTRAMUSCULAR; INTRAVENOUS at 03:38

## 2017-03-06 RX ADMIN — PREGABALIN 50 MG: 50 CAPSULE ORAL at 15:54

## 2017-03-06 RX ADMIN — POTASSIUM CHLORIDE 40 MEQ: 400 INJECTION, SOLUTION INTRAVENOUS at 15:45

## 2017-03-06 RX ADMIN — PREGABALIN 50 MG: 50 CAPSULE ORAL at 11:47

## 2017-03-06 RX ADMIN — MIDODRINE HYDROCHLORIDE 10 MG: 5 TABLET ORAL at 22:32

## 2017-03-06 RX ADMIN — LEVOTHYROXINE SODIUM 75 MCG: 75 TABLET ORAL at 05:03

## 2017-03-06 RX ADMIN — DEXTROSE 125 ML/HR: 5 SOLUTION INTRAVENOUS at 15:40

## 2017-03-07 ENCOUNTER — APPOINTMENT (INPATIENT)
Dept: SPEECH THERAPY | Facility: HOSPITAL | Age: 72
DRG: 100 | End: 2017-03-07
Payer: MEDICARE

## 2017-03-07 ENCOUNTER — APPOINTMENT (INPATIENT)
Dept: PHYSICAL THERAPY | Facility: HOSPITAL | Age: 72
DRG: 100 | End: 2017-03-07
Payer: MEDICARE

## 2017-03-07 LAB
ACHR BIND AB SER-SCNC: <0.03 NMOL/L (ref 0–0.24)
ANION GAP SERPL CALCULATED.3IONS-SCNC: 6 MMOL/L (ref 4–13)
BUN SERPL-MCNC: 6 MG/DL (ref 5–25)
CALCIUM SERPL-MCNC: 8.4 MG/DL (ref 8.3–10.1)
CHLORIDE SERPL-SCNC: 113 MMOL/L (ref 100–108)
CO2 SERPL-SCNC: 30 MMOL/L (ref 21–32)
CREAT SERPL-MCNC: 0.75 MG/DL (ref 0.6–1.3)
GFR SERPL CREATININE-BSD FRML MDRD: >60 ML/MIN/1.73SQ M
GLUCOSE SERPL-MCNC: 101 MG/DL (ref 65–140)
OSMOLALITY UR: 149 MMOL/KG
POTASSIUM SERPL-SCNC: 3.9 MMOL/L (ref 3.5–5.3)
SODIUM 24H UR-SCNC: 55 MOL/L
SODIUM SERPL-SCNC: 149 MMOL/L (ref 136–145)
UUN 24H UR-MCNC: 52 MG/DL

## 2017-03-07 PROCEDURE — 92526 ORAL FUNCTION THERAPY: CPT

## 2017-03-07 PROCEDURE — 97167 OT EVAL HIGH COMPLEX 60 MIN: CPT

## 2017-03-07 PROCEDURE — 83935 ASSAY OF URINE OSMOLALITY: CPT | Performed by: INTERNAL MEDICINE

## 2017-03-07 PROCEDURE — G8988 SELF CARE GOAL STATUS: HCPCS

## 2017-03-07 PROCEDURE — G8979 MOBILITY GOAL STATUS: HCPCS

## 2017-03-07 PROCEDURE — 80048 BASIC METABOLIC PNL TOTAL CA: CPT | Performed by: INTERNAL MEDICINE

## 2017-03-07 PROCEDURE — 97163 PT EVAL HIGH COMPLEX 45 MIN: CPT

## 2017-03-07 PROCEDURE — 84540 ASSAY OF URINE/UREA-N: CPT | Performed by: INTERNAL MEDICINE

## 2017-03-07 PROCEDURE — C9113 INJ PANTOPRAZOLE SODIUM, VIA: HCPCS | Performed by: INTERNAL MEDICINE

## 2017-03-07 PROCEDURE — 84300 ASSAY OF URINE SODIUM: CPT | Performed by: INTERNAL MEDICINE

## 2017-03-07 PROCEDURE — G8987 SELF CARE CURRENT STATUS: HCPCS

## 2017-03-07 PROCEDURE — 94668 MNPJ CHEST WALL SBSQ: CPT | Performed by: SOCIAL WORKER

## 2017-03-07 PROCEDURE — G8978 MOBILITY CURRENT STATUS: HCPCS

## 2017-03-07 RX ADMIN — CEFEPIME 2000 MG: 2 INJECTION, POWDER, FOR SOLUTION INTRAMUSCULAR; INTRAVENOUS at 05:08

## 2017-03-07 RX ADMIN — PANTOPRAZOLE SODIUM 40 MG: 40 INJECTION, POWDER, FOR SOLUTION INTRAVENOUS at 05:06

## 2017-03-07 RX ADMIN — METRONIDAZOLE 500 MG: 500 INJECTION, SOLUTION INTRAVENOUS at 21:34

## 2017-03-07 RX ADMIN — LEVETIRACETAM 750 MG: 750 TABLET, FILM COATED ORAL at 21:37

## 2017-03-07 RX ADMIN — PREGABALIN 50 MG: 50 CAPSULE ORAL at 15:52

## 2017-03-07 RX ADMIN — DEXTROSE 125 ML/HR: 5 SOLUTION INTRAVENOUS at 00:42

## 2017-03-07 RX ADMIN — ENOXAPARIN SODIUM 40 MG: 40 INJECTION SUBCUTANEOUS at 08:55

## 2017-03-07 RX ADMIN — CEFEPIME 2000 MG: 2 INJECTION, POWDER, FOR SOLUTION INTRAMUSCULAR; INTRAVENOUS at 15:52

## 2017-03-07 RX ADMIN — METRONIDAZOLE 500 MG: 500 INJECTION, SOLUTION INTRAVENOUS at 14:44

## 2017-03-07 RX ADMIN — MIDODRINE HYDROCHLORIDE 10 MG: 5 TABLET ORAL at 21:41

## 2017-03-07 RX ADMIN — PREGABALIN 50 MG: 50 CAPSULE ORAL at 21:36

## 2017-03-07 RX ADMIN — LEVETIRACETAM 750 MG: 750 TABLET, FILM COATED ORAL at 08:55

## 2017-03-07 RX ADMIN — DEXTROSE 150 ML/HR: 5 SOLUTION INTRAVENOUS at 17:46

## 2017-03-07 RX ADMIN — MIDODRINE HYDROCHLORIDE 10 MG: 5 TABLET ORAL at 15:52

## 2017-03-07 RX ADMIN — LEVOTHYROXINE SODIUM 75 MCG: 75 TABLET ORAL at 05:06

## 2017-03-07 RX ADMIN — DEXTROSE 125 ML/HR: 5 SOLUTION INTRAVENOUS at 09:59

## 2017-03-07 RX ADMIN — METRONIDAZOLE 500 MG: 500 INJECTION, SOLUTION INTRAVENOUS at 02:00

## 2017-03-07 RX ADMIN — DEXTROSE 150 ML/HR: 5 SOLUTION INTRAVENOUS at 23:51

## 2017-03-07 RX ADMIN — MIDODRINE HYDROCHLORIDE 10 MG: 5 TABLET ORAL at 08:55

## 2017-03-07 RX ADMIN — PREGABALIN 50 MG: 50 CAPSULE ORAL at 08:55

## 2017-03-08 ENCOUNTER — APPOINTMENT (INPATIENT)
Dept: OCCUPATIONAL THERAPY | Facility: HOSPITAL | Age: 72
DRG: 100 | End: 2017-03-08
Payer: MEDICARE

## 2017-03-08 ENCOUNTER — APPOINTMENT (INPATIENT)
Dept: SPEECH THERAPY | Facility: HOSPITAL | Age: 72
DRG: 100 | End: 2017-03-08
Payer: MEDICARE

## 2017-03-08 ENCOUNTER — APPOINTMENT (INPATIENT)
Dept: PHYSICAL THERAPY | Facility: HOSPITAL | Age: 72
DRG: 100 | End: 2017-03-08
Payer: MEDICARE

## 2017-03-08 LAB
ACHR BLOCK AB/ACHR TOTAL SFR SER: 14 % (ref 0–25)
ANION GAP SERPL CALCULATED.3IONS-SCNC: 6 MMOL/L (ref 4–13)
B BURGDOR DNA SPEC QL NAA+PROBE: NEGATIVE
BACTERIA BLD CULT: NORMAL
BACTERIA BLD CULT: NORMAL
BUN SERPL-MCNC: 7 MG/DL (ref 5–25)
CALCIUM SERPL-MCNC: 8.4 MG/DL (ref 8.3–10.1)
CHLORIDE SERPL-SCNC: 107 MMOL/L (ref 100–108)
CO2 SERPL-SCNC: 30 MMOL/L (ref 21–32)
CREAT SERPL-MCNC: 0.8 MG/DL (ref 0.6–1.3)
GFR SERPL CREATININE-BSD FRML MDRD: >60 ML/MIN/1.73SQ M
GLUCOSE SERPL-MCNC: 122 MG/DL (ref 65–140)
POTASSIUM SERPL-SCNC: 3.9 MMOL/L (ref 3.5–5.3)
SODIUM SERPL-SCNC: 143 MMOL/L (ref 136–145)

## 2017-03-08 PROCEDURE — C9113 INJ PANTOPRAZOLE SODIUM, VIA: HCPCS | Performed by: INTERNAL MEDICINE

## 2017-03-08 PROCEDURE — 97116 GAIT TRAINING THERAPY: CPT

## 2017-03-08 PROCEDURE — 97535 SELF CARE MNGMENT TRAINING: CPT

## 2017-03-08 PROCEDURE — 80048 BASIC METABOLIC PNL TOTAL CA: CPT | Performed by: INTERNAL MEDICINE

## 2017-03-08 PROCEDURE — 92526 ORAL FUNCTION THERAPY: CPT

## 2017-03-08 PROCEDURE — 97530 THERAPEUTIC ACTIVITIES: CPT

## 2017-03-08 RX ORDER — POLYETHYLENE GLYCOL 3350 17 G/17G
17 POWDER, FOR SOLUTION ORAL DAILY
Status: DISCONTINUED | OUTPATIENT
Start: 2017-03-08 | End: 2017-03-16 | Stop reason: HOSPADM

## 2017-03-08 RX ORDER — ACETAMINOPHEN 325 MG/1
650 TABLET ORAL EVERY 6 HOURS PRN
Status: DISCONTINUED | OUTPATIENT
Start: 2017-03-08 | End: 2017-03-16 | Stop reason: HOSPADM

## 2017-03-08 RX ADMIN — ACETAMINOPHEN 650 MG: 325 TABLET, FILM COATED ORAL at 12:00

## 2017-03-08 RX ADMIN — PREGABALIN 50 MG: 50 CAPSULE ORAL at 08:57

## 2017-03-08 RX ADMIN — CEFEPIME 2000 MG: 2 INJECTION, POWDER, FOR SOLUTION INTRAMUSCULAR; INTRAVENOUS at 03:18

## 2017-03-08 RX ADMIN — MIDODRINE HYDROCHLORIDE 10 MG: 5 TABLET ORAL at 15:33

## 2017-03-08 RX ADMIN — LEVOTHYROXINE SODIUM 75 MCG: 75 TABLET ORAL at 05:34

## 2017-03-08 RX ADMIN — LEVETIRACETAM 750 MG: 750 TABLET, FILM COATED ORAL at 08:52

## 2017-03-08 RX ADMIN — PANTOPRAZOLE SODIUM 40 MG: 40 INJECTION, POWDER, FOR SOLUTION INTRAVENOUS at 05:28

## 2017-03-08 RX ADMIN — MIDODRINE HYDROCHLORIDE 10 MG: 5 TABLET ORAL at 08:53

## 2017-03-08 RX ADMIN — ENOXAPARIN SODIUM 40 MG: 40 INJECTION SUBCUTANEOUS at 08:52

## 2017-03-08 RX ADMIN — POLYETHYLENE GLYCOL 3350 17 G: 17 POWDER, FOR SOLUTION ORAL at 12:00

## 2017-03-08 RX ADMIN — ACETAMINOPHEN 650 MG: 325 TABLET, FILM COATED ORAL at 19:51

## 2017-03-08 RX ADMIN — MIDODRINE HYDROCHLORIDE 10 MG: 5 TABLET ORAL at 20:38

## 2017-03-08 RX ADMIN — DEXTROSE 100 ML/HR: 5 SOLUTION INTRAVENOUS at 12:07

## 2017-03-08 RX ADMIN — LEVETIRACETAM 750 MG: 750 TABLET, FILM COATED ORAL at 20:38

## 2017-03-08 RX ADMIN — DEXTROSE 150 ML/HR: 5 SOLUTION INTRAVENOUS at 07:15

## 2017-03-08 RX ADMIN — PREGABALIN 50 MG: 50 CAPSULE ORAL at 20:39

## 2017-03-08 RX ADMIN — METRONIDAZOLE 500 MG: 500 INJECTION, SOLUTION INTRAVENOUS at 05:31

## 2017-03-08 RX ADMIN — PREGABALIN 50 MG: 50 CAPSULE ORAL at 15:33

## 2017-03-08 RX ADMIN — DEXTROSE 100 ML/HR: 5 SOLUTION INTRAVENOUS at 17:43

## 2017-03-08 RX ADMIN — CEFEPIME 2000 MG: 2 INJECTION, POWDER, FOR SOLUTION INTRAMUSCULAR; INTRAVENOUS at 15:29

## 2017-03-08 RX ADMIN — METRONIDAZOLE 500 MG: 500 INJECTION, SOLUTION INTRAVENOUS at 21:50

## 2017-03-08 RX ADMIN — METRONIDAZOLE 500 MG: 500 INJECTION, SOLUTION INTRAVENOUS at 14:15

## 2017-03-09 ENCOUNTER — APPOINTMENT (INPATIENT)
Dept: RADIOLOGY | Facility: HOSPITAL | Age: 72
DRG: 100 | End: 2017-03-09
Payer: MEDICARE

## 2017-03-09 ENCOUNTER — APPOINTMENT (INPATIENT)
Dept: SPEECH THERAPY | Facility: HOSPITAL | Age: 72
DRG: 100 | End: 2017-03-09
Payer: MEDICARE

## 2017-03-09 LAB
ANION GAP SERPL CALCULATED.3IONS-SCNC: 6 MMOL/L (ref 4–13)
BUN SERPL-MCNC: 11 MG/DL (ref 5–25)
CALCIUM SERPL-MCNC: 8.6 MG/DL (ref 8.3–10.1)
CHLORIDE SERPL-SCNC: 112 MMOL/L (ref 100–108)
CO2 SERPL-SCNC: 30 MMOL/L (ref 21–32)
CREAT SERPL-MCNC: 0.93 MG/DL (ref 0.6–1.3)
DEPRECATED AT III PPP: 113 % OF NORMAL (ref 92–136)
GFR SERPL CREATININE-BSD FRML MDRD: 59.4 ML/MIN/1.73SQ M
GLUCOSE SERPL-MCNC: 118 MG/DL (ref 65–140)
POTASSIUM SERPL-SCNC: 3.6 MMOL/L (ref 3.5–5.3)
PROT C AG ACT/NOR PPP IA: 86 % OF NORMAL (ref 60–150)
SODIUM SERPL-SCNC: 148 MMOL/L (ref 136–145)

## 2017-03-09 PROCEDURE — 85598 HEXAGNAL PHOSPH PLTLT NEUTRL: CPT | Performed by: INTERNAL MEDICINE

## 2017-03-09 PROCEDURE — 80048 BASIC METABOLIC PNL TOTAL CA: CPT | Performed by: INTERNAL MEDICINE

## 2017-03-09 PROCEDURE — 85300 ANTITHROMBIN III ACTIVITY: CPT | Performed by: INTERNAL MEDICINE

## 2017-03-09 PROCEDURE — 85613 RUSSELL VIPER VENOM DILUTED: CPT | Performed by: INTERNAL MEDICINE

## 2017-03-09 PROCEDURE — 86147 CARDIOLIPIN ANTIBODY EA IG: CPT | Performed by: INTERNAL MEDICINE

## 2017-03-09 PROCEDURE — C9113 INJ PANTOPRAZOLE SODIUM, VIA: HCPCS | Performed by: INTERNAL MEDICINE

## 2017-03-09 PROCEDURE — 85305 CLOT INHIBIT PROT S TOTAL: CPT | Performed by: INTERNAL MEDICINE

## 2017-03-09 PROCEDURE — 85306 CLOT INHIBIT PROT S FREE: CPT | Performed by: INTERNAL MEDICINE

## 2017-03-09 PROCEDURE — 85705 THROMBOPLASTIN INHIBITION: CPT | Performed by: INTERNAL MEDICINE

## 2017-03-09 PROCEDURE — 85303 CLOT INHIBIT PROT C ACTIVITY: CPT | Performed by: INTERNAL MEDICINE

## 2017-03-09 PROCEDURE — 85670 THROMBIN TIME PLASMA: CPT | Performed by: INTERNAL MEDICINE

## 2017-03-09 PROCEDURE — 86255 FLUORESCENT ANTIBODY SCREEN: CPT | Performed by: INTERNAL MEDICINE

## 2017-03-09 PROCEDURE — 85732 THROMBOPLASTIN TIME PARTIAL: CPT | Performed by: INTERNAL MEDICINE

## 2017-03-09 PROCEDURE — 74220 X-RAY XM ESOPHAGUS 1CNTRST: CPT

## 2017-03-09 PROCEDURE — 81241 F5 GENE: CPT | Performed by: INTERNAL MEDICINE

## 2017-03-09 PROCEDURE — 86146 BETA-2 GLYCOPROTEIN ANTIBODY: CPT | Performed by: INTERNAL MEDICINE

## 2017-03-09 PROCEDURE — 81240 F2 GENE: CPT | Performed by: INTERNAL MEDICINE

## 2017-03-09 RX ADMIN — MIDODRINE HYDROCHLORIDE 10 MG: 5 TABLET ORAL at 07:48

## 2017-03-09 RX ADMIN — PREGABALIN 50 MG: 50 CAPSULE ORAL at 07:47

## 2017-03-09 RX ADMIN — DEXTROSE 100 ML/HR: 5 SOLUTION INTRAVENOUS at 04:53

## 2017-03-09 RX ADMIN — CEFEPIME 2000 MG: 2 INJECTION, POWDER, FOR SOLUTION INTRAMUSCULAR; INTRAVENOUS at 15:49

## 2017-03-09 RX ADMIN — PREGABALIN 50 MG: 50 CAPSULE ORAL at 20:15

## 2017-03-09 RX ADMIN — METRONIDAZOLE 500 MG: 500 INJECTION, SOLUTION INTRAVENOUS at 22:50

## 2017-03-09 RX ADMIN — CEFEPIME 2000 MG: 2 INJECTION, POWDER, FOR SOLUTION INTRAMUSCULAR; INTRAVENOUS at 03:51

## 2017-03-09 RX ADMIN — METRONIDAZOLE 500 MG: 500 INJECTION, SOLUTION INTRAVENOUS at 05:25

## 2017-03-09 RX ADMIN — DEXTROSE 100 ML/HR: 5 SOLUTION INTRAVENOUS at 15:48

## 2017-03-09 RX ADMIN — LEVETIRACETAM 750 MG: 750 TABLET, FILM COATED ORAL at 07:48

## 2017-03-09 RX ADMIN — METRONIDAZOLE 500 MG: 500 INJECTION, SOLUTION INTRAVENOUS at 14:22

## 2017-03-09 RX ADMIN — ENOXAPARIN SODIUM 40 MG: 40 INJECTION SUBCUTANEOUS at 07:47

## 2017-03-09 RX ADMIN — LEVETIRACETAM 750 MG: 750 TABLET, FILM COATED ORAL at 20:15

## 2017-03-09 RX ADMIN — PREGABALIN 50 MG: 50 CAPSULE ORAL at 15:57

## 2017-03-09 RX ADMIN — PANTOPRAZOLE SODIUM 40 MG: 40 INJECTION, POWDER, FOR SOLUTION INTRAVENOUS at 05:25

## 2017-03-09 RX ADMIN — MIDODRINE HYDROCHLORIDE 10 MG: 5 TABLET ORAL at 20:15

## 2017-03-09 RX ADMIN — MIDODRINE HYDROCHLORIDE 10 MG: 5 TABLET ORAL at 15:57

## 2017-03-09 RX ADMIN — LEVOTHYROXINE SODIUM 75 MCG: 75 TABLET ORAL at 05:30

## 2017-03-10 ENCOUNTER — ANESTHESIA EVENT (INPATIENT)
Dept: GASTROENTEROLOGY | Facility: HOSPITAL | Age: 72
DRG: 100 | End: 2017-03-10
Payer: MEDICARE

## 2017-03-10 ENCOUNTER — APPOINTMENT (INPATIENT)
Dept: PHYSICAL THERAPY | Facility: HOSPITAL | Age: 72
DRG: 100 | End: 2017-03-10
Payer: MEDICARE

## 2017-03-10 ENCOUNTER — APPOINTMENT (INPATIENT)
Dept: SPEECH THERAPY | Facility: HOSPITAL | Age: 72
DRG: 100 | End: 2017-03-10
Payer: MEDICARE

## 2017-03-10 ENCOUNTER — GENERIC CONVERSION - ENCOUNTER (OUTPATIENT)
Dept: OTHER | Facility: OTHER | Age: 72
End: 2017-03-10

## 2017-03-10 ENCOUNTER — APPOINTMENT (INPATIENT)
Dept: RADIOLOGY | Facility: HOSPITAL | Age: 72
DRG: 100 | End: 2017-03-10
Payer: MEDICARE

## 2017-03-10 ENCOUNTER — ANESTHESIA (INPATIENT)
Dept: GASTROENTEROLOGY | Facility: HOSPITAL | Age: 72
DRG: 100 | End: 2017-03-10
Payer: MEDICARE

## 2017-03-10 LAB
ANION GAP SERPL CALCULATED.3IONS-SCNC: 6 MMOL/L (ref 4–13)
BUN SERPL-MCNC: 14 MG/DL (ref 5–25)
CALCIUM SERPL-MCNC: 8.5 MG/DL (ref 8.3–10.1)
CARDIOLIPIN IGA SER IA-ACNC: <9 APL U/ML (ref 0–11)
CARDIOLIPIN IGG SER IA-ACNC: <9 GPL U/ML (ref 0–14)
CARDIOLIPIN IGM SER IA-ACNC: 16 MPL U/ML (ref 0–12)
CHLORIDE SERPL-SCNC: 110 MMOL/L (ref 100–108)
CO2 SERPL-SCNC: 29 MMOL/L (ref 21–32)
CREAT SERPL-MCNC: 1.01 MG/DL (ref 0.6–1.3)
CRP SERPL QL: 7.6 MG/L
ERYTHROCYTE [SEDIMENTATION RATE] IN BLOOD: 23 MM/HOUR (ref 0–20)
GFR SERPL CREATININE-BSD FRML MDRD: 54 ML/MIN/1.73SQ M
GLUCOSE SERPL-MCNC: 112 MG/DL (ref 65–140)
POTASSIUM SERPL-SCNC: 3.7 MMOL/L (ref 3.5–5.3)
SODIUM SERPL-SCNC: 145 MMOL/L (ref 136–145)

## 2017-03-10 PROCEDURE — 92610 EVALUATE SWALLOWING FUNCTION: CPT

## 2017-03-10 PROCEDURE — 92526 ORAL FUNCTION THERAPY: CPT

## 2017-03-10 PROCEDURE — 86140 C-REACTIVE PROTEIN: CPT | Performed by: INTERNAL MEDICINE

## 2017-03-10 PROCEDURE — 74000 HB X-RAY EXAM OF ABDOMEN (SINGLE ANTEROPOSTERIOR VIEW) (PORTABLE): CPT

## 2017-03-10 PROCEDURE — 80048 BASIC METABOLIC PNL TOTAL CA: CPT | Performed by: INTERNAL MEDICINE

## 2017-03-10 PROCEDURE — C9113 INJ PANTOPRAZOLE SODIUM, VIA: HCPCS

## 2017-03-10 PROCEDURE — 86038 ANTINUCLEAR ANTIBODIES: CPT | Performed by: INTERNAL MEDICINE

## 2017-03-10 PROCEDURE — 86039 ANTINUCLEAR ANTIBODIES (ANA): CPT | Performed by: INTERNAL MEDICINE

## 2017-03-10 PROCEDURE — 0DJ08ZZ INSPECTION OF UPPER INTESTINAL TRACT, VIA NATURAL OR ARTIFICIAL OPENING ENDOSCOPIC: ICD-10-PCS | Performed by: INTERNAL MEDICINE

## 2017-03-10 PROCEDURE — 85652 RBC SED RATE AUTOMATED: CPT | Performed by: INTERNAL MEDICINE

## 2017-03-10 RX ORDER — LEVOTHYROXINE SODIUM 0.07 MG/1
TABLET ORAL
Status: COMPLETED
Start: 2017-03-10 | End: 2017-03-10

## 2017-03-10 RX ORDER — ALPRAZOLAM 0.5 MG/1
0.5 TABLET ORAL ONCE
Status: DISCONTINUED | OUTPATIENT
Start: 2017-03-10 | End: 2017-03-16 | Stop reason: HOSPADM

## 2017-03-10 RX ORDER — PANTOPRAZOLE SODIUM 40 MG/1
TABLET, DELAYED RELEASE ORAL
Status: DISPENSED
Start: 2017-03-10 | End: 2017-03-10

## 2017-03-10 RX ORDER — EPHEDRINE SULFATE 50 MG/ML
INJECTION, SOLUTION INTRAVENOUS AS NEEDED
Status: DISCONTINUED | OUTPATIENT
Start: 2017-03-10 | End: 2017-03-10 | Stop reason: SURG

## 2017-03-10 RX ORDER — MORPHINE SULFATE 2 MG/ML
INJECTION, SOLUTION INTRAMUSCULAR; INTRAVENOUS
Status: COMPLETED
Start: 2017-03-10 | End: 2017-03-10

## 2017-03-10 RX ORDER — MORPHINE SULFATE 2 MG/ML
2 INJECTION, SOLUTION INTRAMUSCULAR; INTRAVENOUS ONCE
Status: DISCONTINUED | OUTPATIENT
Start: 2017-03-10 | End: 2017-03-16 | Stop reason: HOSPADM

## 2017-03-10 RX ORDER — PANTOPRAZOLE SODIUM 40 MG/1
INJECTION, POWDER, FOR SOLUTION INTRAVENOUS
Status: COMPLETED
Start: 2017-03-10 | End: 2017-03-10

## 2017-03-10 RX ORDER — MORPHINE SULFATE 2 MG/ML
1 INJECTION, SOLUTION INTRAMUSCULAR; INTRAVENOUS
Status: DISCONTINUED | OUTPATIENT
Start: 2017-03-10 | End: 2017-03-16 | Stop reason: HOSPADM

## 2017-03-10 RX ORDER — LIDOCAINE HYDROCHLORIDE 10 MG/ML
INJECTION, SOLUTION INFILTRATION; PERINEURAL AS NEEDED
Status: DISCONTINUED | OUTPATIENT
Start: 2017-03-10 | End: 2017-03-10 | Stop reason: SURG

## 2017-03-10 RX ORDER — PROPOFOL 10 MG/ML
INJECTION, EMULSION INTRAVENOUS AS NEEDED
Status: DISCONTINUED | OUTPATIENT
Start: 2017-03-10 | End: 2017-03-10 | Stop reason: SURG

## 2017-03-10 RX ORDER — SUCCINYLCHOLINE CHLORIDE 20 MG/ML
INJECTION INTRAMUSCULAR; INTRAVENOUS AS NEEDED
Status: DISCONTINUED | OUTPATIENT
Start: 2017-03-10 | End: 2017-03-10 | Stop reason: SURG

## 2017-03-10 RX ORDER — SODIUM CHLORIDE 9 MG/ML
75 INJECTION, SOLUTION INTRAVENOUS CONTINUOUS
Status: DISCONTINUED | OUTPATIENT
Start: 2017-03-10 | End: 2017-03-11

## 2017-03-10 RX ORDER — FENTANYL CITRATE 50 UG/ML
INJECTION, SOLUTION INTRAMUSCULAR; INTRAVENOUS AS NEEDED
Status: DISCONTINUED | OUTPATIENT
Start: 2017-03-10 | End: 2017-03-10 | Stop reason: SURG

## 2017-03-10 RX ADMIN — LIDOCAINE HYDROCHLORIDE 50 MG: 10 INJECTION, SOLUTION INFILTRATION; PERINEURAL at 11:11

## 2017-03-10 RX ADMIN — MIDODRINE HYDROCHLORIDE 10 MG: 5 TABLET ORAL at 17:08

## 2017-03-10 RX ADMIN — PREGABALIN 50 MG: 50 CAPSULE ORAL at 21:33

## 2017-03-10 RX ADMIN — EPHEDRINE SULFATE 5 MG: 50 INJECTION, SOLUTION INTRAMUSCULAR; INTRAVENOUS; SUBCUTANEOUS at 11:22

## 2017-03-10 RX ADMIN — MIDODRINE HYDROCHLORIDE 10 MG: 5 TABLET ORAL at 21:33

## 2017-03-10 RX ADMIN — PROPOFOL 120 MG: 10 INJECTION, EMULSION INTRAVENOUS at 11:11

## 2017-03-10 RX ADMIN — ENOXAPARIN SODIUM 40 MG: 40 INJECTION SUBCUTANEOUS at 09:18

## 2017-03-10 RX ADMIN — LEVOTHYROXINE SODIUM 75 MCG: 75 TABLET ORAL at 05:48

## 2017-03-10 RX ADMIN — SODIUM CHLORIDE 75 ML/HR: 0.9 INJECTION, SOLUTION INTRAVENOUS at 21:33

## 2017-03-10 RX ADMIN — METRONIDAZOLE 500 MG: 500 INJECTION, SOLUTION INTRAVENOUS at 05:43

## 2017-03-10 RX ADMIN — SODIUM CHLORIDE 75 ML/HR: 0.9 INJECTION, SOLUTION INTRAVENOUS at 23:22

## 2017-03-10 RX ADMIN — CEFEPIME 2000 MG: 2 INJECTION, POWDER, FOR SOLUTION INTRAMUSCULAR; INTRAVENOUS at 04:38

## 2017-03-10 RX ADMIN — METRONIDAZOLE 500 MG: 500 INJECTION, SOLUTION INTRAVENOUS at 14:41

## 2017-03-10 RX ADMIN — DEXTROSE 100 ML/HR: 5 SOLUTION INTRAVENOUS at 14:39

## 2017-03-10 RX ADMIN — PANTOPRAZOLE SODIUM 40 MG: 40 INJECTION, POWDER, FOR SOLUTION INTRAVENOUS at 05:52

## 2017-03-10 RX ADMIN — PREGABALIN 50 MG: 50 CAPSULE ORAL at 17:08

## 2017-03-10 RX ADMIN — SODIUM CHLORIDE 500 ML: 0.9 INJECTION, SOLUTION INTRAVENOUS at 17:14

## 2017-03-10 RX ADMIN — DEXTROSE 100 ML/HR: 5 SOLUTION INTRAVENOUS at 01:58

## 2017-03-10 RX ADMIN — MORPHINE SULFATE 2 MG: 2 INJECTION, SOLUTION INTRAMUSCULAR; INTRAVENOUS at 17:56

## 2017-03-10 RX ADMIN — SUCCINYLCHOLINE CHLORIDE 140 MG: 20 INJECTION, SOLUTION INTRAMUSCULAR; INTRAVENOUS at 11:11

## 2017-03-10 RX ADMIN — LEVETIRACETAM 750 MG: 100 INJECTION, SOLUTION INTRAVENOUS at 10:19

## 2017-03-10 RX ADMIN — LEVETIRACETAM 750 MG: 100 INJECTION, SOLUTION INTRAVENOUS at 21:35

## 2017-03-10 RX ADMIN — FENTANYL CITRATE 50 MCG: 50 INJECTION, SOLUTION INTRAMUSCULAR; INTRAVENOUS at 11:11

## 2017-03-11 LAB
ANION GAP SERPL CALCULATED.3IONS-SCNC: 6 MMOL/L (ref 4–13)
APTT HEX PL PPP: 1 SEC (ref 0–11)
APTT SCREEN TO CONFIRM RATIO: 0.86 RATIO (ref 0–1.4)
APTT-LA IMM 4:1 NP PPP: 41 SEC (ref 0–40.6)
B2 GLYCOPROT1 IGA SER-ACNC: <9 GPI IGA UNITS (ref 0–25)
B2 GLYCOPROT1 IGG SER-ACNC: <9 GPI IGG UNITS (ref 0–20)
B2 GLYCOPROT1 IGM SER-ACNC: <9 GPI IGM UNITS (ref 0–32)
BASOPHILS # BLD AUTO: 0.01 THOUSANDS/ΜL (ref 0–0.1)
BASOPHILS NFR BLD AUTO: 0 % (ref 0–1)
BUN SERPL-MCNC: 10 MG/DL (ref 5–25)
CALCIUM SERPL-MCNC: 8.7 MG/DL (ref 8.3–10.1)
CHLORIDE SERPL-SCNC: 107 MMOL/L (ref 100–108)
CO2 SERPL-SCNC: 30 MMOL/L (ref 21–32)
CONFIRM APTT/NORMAL: 43.2 SEC (ref 0–55)
CREAT SERPL-MCNC: 0.89 MG/DL (ref 0.6–1.3)
DRVVT IMM 1:2 NP PPP: 41.6 SEC (ref 0–44)
EOSINOPHIL # BLD AUTO: 0.12 THOUSAND/ΜL (ref 0–0.61)
EOSINOPHIL NFR BLD AUTO: 2 % (ref 0–6)
ERYTHROCYTE [DISTWIDTH] IN BLOOD BY AUTOMATED COUNT: 15.8 % (ref 11.6–15.1)
FERRITIN SERPL-MCNC: 180 NG/ML (ref 8–388)
FERRITIN SERPL-MCNC: 190 NG/ML (ref 8–388)
GFR SERPL CREATININE-BSD FRML MDRD: >60 ML/MIN/1.73SQ M
GLUCOSE SERPL-MCNC: 116 MG/DL (ref 65–140)
HCT VFR BLD AUTO: 30.7 % (ref 34.8–46.1)
HGB BLD-MCNC: 9.7 G/DL (ref 11.5–15.4)
IRON SATN MFR SERPL: 32 %
IRON SERPL-MCNC: 76 UG/DL (ref 50–170)
LA PPP-IMP: ABNORMAL
LYMPHOCYTES # BLD AUTO: 1.27 THOUSANDS/ΜL (ref 0.6–4.47)
LYMPHOCYTES NFR BLD AUTO: 16 % (ref 14–44)
MAGNESIUM SERPL-MCNC: 1.9 MG/DL (ref 1.6–2.6)
MCH RBC QN AUTO: 30.6 PG (ref 26.8–34.3)
MCHC RBC AUTO-ENTMCNC: 31.6 G/DL (ref 31.4–37.4)
MCV RBC AUTO: 97 FL (ref 82–98)
MONOCYTES # BLD AUTO: 0.44 THOUSAND/ΜL (ref 0.17–1.22)
MONOCYTES NFR BLD AUTO: 6 % (ref 4–12)
NEUTROPHILS # BLD AUTO: 5.9 THOUSANDS/ΜL (ref 1.85–7.62)
NEUTS SEG NFR BLD AUTO: 76 % (ref 43–75)
NRBC BLD AUTO-RTO: 0 /100 WBCS
PLATELET # BLD AUTO: 242 THOUSANDS/UL (ref 149–390)
PMV BLD AUTO: 11.1 FL (ref 8.9–12.7)
POTASSIUM SERPL-SCNC: 3.4 MMOL/L (ref 3.5–5.3)
PROT S ACT/NOR PPP: 85 % (ref 63–140)
RBC # BLD AUTO: 3.17 MILLION/UL (ref 3.81–5.12)
SCREEN APTT: 44.9 SEC (ref 0–43.6)
SCREEN DRVVT: 49 SEC (ref 0–44)
SODIUM SERPL-SCNC: 143 MMOL/L (ref 136–145)
THROMBIN TIME: 15.4 SEC (ref 0–20.9)
TIBC SERPL-MCNC: 235 UG/DL (ref 250–450)
WBC # BLD AUTO: 7.75 THOUSAND/UL (ref 4.31–10.16)

## 2017-03-11 PROCEDURE — 80048 BASIC METABOLIC PNL TOTAL CA: CPT | Performed by: INTERNAL MEDICINE

## 2017-03-11 PROCEDURE — C9113 INJ PANTOPRAZOLE SODIUM, VIA: HCPCS | Performed by: INTERNAL MEDICINE

## 2017-03-11 PROCEDURE — 83735 ASSAY OF MAGNESIUM: CPT | Performed by: INTERNAL MEDICINE

## 2017-03-11 PROCEDURE — 82728 ASSAY OF FERRITIN: CPT | Performed by: INTERNAL MEDICINE

## 2017-03-11 PROCEDURE — 83550 IRON BINDING TEST: CPT | Performed by: INTERNAL MEDICINE

## 2017-03-11 PROCEDURE — 85025 COMPLETE CBC W/AUTO DIFF WBC: CPT | Performed by: INTERNAL MEDICINE

## 2017-03-11 PROCEDURE — 83540 ASSAY OF IRON: CPT | Performed by: INTERNAL MEDICINE

## 2017-03-11 RX ORDER — ONDANSETRON 2 MG/ML
4 INJECTION INTRAMUSCULAR; INTRAVENOUS EVERY 6 HOURS PRN
Status: DISCONTINUED | OUTPATIENT
Start: 2017-03-11 | End: 2017-03-16 | Stop reason: HOSPADM

## 2017-03-11 RX ORDER — DOCUSATE SODIUM 100 MG/1
100 CAPSULE, LIQUID FILLED ORAL 2 TIMES DAILY
Status: DISCONTINUED | OUTPATIENT
Start: 2017-03-11 | End: 2017-03-16 | Stop reason: HOSPADM

## 2017-03-11 RX ORDER — ONDANSETRON 2 MG/ML
INJECTION INTRAMUSCULAR; INTRAVENOUS
Status: COMPLETED
Start: 2017-03-11 | End: 2017-03-11

## 2017-03-11 RX ORDER — BISACODYL 10 MG
10 SUPPOSITORY, RECTAL RECTAL ONCE
Status: COMPLETED | OUTPATIENT
Start: 2017-03-11 | End: 2017-03-11

## 2017-03-11 RX ORDER — SIMETHICONE 20 MG/.3ML
40 EMULSION ORAL EVERY 6 HOURS PRN
Status: DISCONTINUED | OUTPATIENT
Start: 2017-03-11 | End: 2017-03-13

## 2017-03-11 RX ORDER — SENNOSIDES 8.6 MG
1 TABLET ORAL
Status: DISCONTINUED | OUTPATIENT
Start: 2017-03-11 | End: 2017-03-16 | Stop reason: HOSPADM

## 2017-03-11 RX ADMIN — POLYETHYLENE GLYCOL 3350 17 G: 17 POWDER, FOR SOLUTION ORAL at 11:19

## 2017-03-11 RX ADMIN — DEXTROSE 100 ML/HR: 5 SOLUTION INTRAVENOUS at 03:54

## 2017-03-11 RX ADMIN — DOCUSATE SODIUM 100 MG: 100 CAPSULE, LIQUID FILLED ORAL at 14:27

## 2017-03-11 RX ADMIN — LEVETIRACETAM 750 MG: 100 INJECTION, SOLUTION INTRAVENOUS at 20:34

## 2017-03-11 RX ADMIN — LEVETIRACETAM 750 MG: 100 INJECTION, SOLUTION INTRAVENOUS at 11:19

## 2017-03-11 RX ADMIN — DOCUSATE SODIUM 100 MG: 100 CAPSULE, LIQUID FILLED ORAL at 16:31

## 2017-03-11 RX ADMIN — MORPHINE SULFATE 1 MG: 2 INJECTION, SOLUTION INTRAMUSCULAR; INTRAVENOUS at 00:10

## 2017-03-11 RX ADMIN — LEVOTHYROXINE SODIUM 75 MCG: 75 TABLET ORAL at 06:20

## 2017-03-11 RX ADMIN — PREGABALIN 50 MG: 50 CAPSULE ORAL at 11:18

## 2017-03-11 RX ADMIN — SENNOSIDES 8.6 MG: 8.6 TABLET, FILM COATED ORAL at 21:06

## 2017-03-11 RX ADMIN — PREGABALIN 50 MG: 50 CAPSULE ORAL at 16:30

## 2017-03-11 RX ADMIN — ONDANSETRON 4 MG: 2 INJECTION INTRAMUSCULAR; INTRAVENOUS at 01:52

## 2017-03-11 RX ADMIN — ACETAMINOPHEN 650 MG: 325 TABLET, FILM COATED ORAL at 11:19

## 2017-03-11 RX ADMIN — LEVOTHYROXINE SODIUM 75 MCG: 75 TABLET ORAL at 20:39

## 2017-03-11 RX ADMIN — MORPHINE SULFATE 1 MG: 2 INJECTION, SOLUTION INTRAMUSCULAR; INTRAVENOUS at 15:40

## 2017-03-11 RX ADMIN — BISACODYL 10 MG: 10 SUPPOSITORY RECTAL at 14:28

## 2017-03-11 RX ADMIN — DEXTROSE 50 ML/HR: 5 SOLUTION INTRAVENOUS at 20:32

## 2017-03-11 RX ADMIN — ENOXAPARIN SODIUM 40 MG: 40 INJECTION SUBCUTANEOUS at 11:19

## 2017-03-11 RX ADMIN — MIDODRINE HYDROCHLORIDE 10 MG: 5 TABLET ORAL at 20:37

## 2017-03-11 RX ADMIN — DEXTROSE 100 ML/HR: 5 SOLUTION INTRAVENOUS at 12:44

## 2017-03-11 RX ADMIN — ACETAMINOPHEN 650 MG: 325 TABLET, FILM COATED ORAL at 19:35

## 2017-03-11 RX ADMIN — MIDODRINE HYDROCHLORIDE 10 MG: 5 TABLET ORAL at 11:19

## 2017-03-11 RX ADMIN — PREGABALIN 50 MG: 50 CAPSULE ORAL at 20:37

## 2017-03-11 RX ADMIN — PANTOPRAZOLE SODIUM 40 MG: 40 INJECTION, POWDER, FOR SOLUTION INTRAVENOUS at 06:20

## 2017-03-11 RX ADMIN — MIDODRINE HYDROCHLORIDE 10 MG: 5 TABLET ORAL at 16:30

## 2017-03-12 LAB
ANION GAP SERPL CALCULATED.3IONS-SCNC: 6 MMOL/L (ref 4–13)
BUN SERPL-MCNC: 7 MG/DL (ref 5–25)
CALCIUM SERPL-MCNC: 8.5 MG/DL (ref 8.3–10.1)
CHLORIDE SERPL-SCNC: 113 MMOL/L (ref 100–108)
CO2 SERPL-SCNC: 29 MMOL/L (ref 21–32)
CREAT SERPL-MCNC: 0.74 MG/DL (ref 0.6–1.3)
GFR SERPL CREATININE-BSD FRML MDRD: >60 ML/MIN/1.73SQ M
GLUCOSE SERPL-MCNC: 79 MG/DL (ref 65–140)
POTASSIUM SERPL-SCNC: 4.4 MMOL/L (ref 3.5–5.3)
PROT S ACT/NOR PPP: 48 % (ref 57–157)
PROT S PPP-ACNC: 102 % (ref 60–150)
SODIUM SERPL-SCNC: 148 MMOL/L (ref 136–145)

## 2017-03-12 PROCEDURE — 80048 BASIC METABOLIC PNL TOTAL CA: CPT | Performed by: INTERNAL MEDICINE

## 2017-03-12 PROCEDURE — C9113 INJ PANTOPRAZOLE SODIUM, VIA: HCPCS | Performed by: INTERNAL MEDICINE

## 2017-03-12 RX ADMIN — MIDODRINE HYDROCHLORIDE 10 MG: 5 TABLET ORAL at 10:45

## 2017-03-12 RX ADMIN — LEVETIRACETAM 750 MG: 100 INJECTION, SOLUTION INTRAVENOUS at 20:26

## 2017-03-12 RX ADMIN — LEVOTHYROXINE SODIUM 75 MCG: 75 TABLET ORAL at 05:24

## 2017-03-12 RX ADMIN — MIDODRINE HYDROCHLORIDE 10 MG: 5 TABLET ORAL at 18:29

## 2017-03-12 RX ADMIN — ACETAMINOPHEN 650 MG: 325 TABLET, FILM COATED ORAL at 10:46

## 2017-03-12 RX ADMIN — MIDODRINE HYDROCHLORIDE 10 MG: 5 TABLET ORAL at 20:35

## 2017-03-12 RX ADMIN — MIDODRINE HYDROCHLORIDE 10 MG: 5 TABLET ORAL at 15:52

## 2017-03-12 RX ADMIN — DOCUSATE SODIUM 100 MG: 100 CAPSULE, LIQUID FILLED ORAL at 18:29

## 2017-03-12 RX ADMIN — DOCUSATE SODIUM 100 MG: 100 CAPSULE, LIQUID FILLED ORAL at 10:46

## 2017-03-12 RX ADMIN — PREGABALIN 50 MG: 50 CAPSULE ORAL at 15:52

## 2017-03-12 RX ADMIN — SENNOSIDES 8.6 MG: 8.6 TABLET, FILM COATED ORAL at 20:35

## 2017-03-12 RX ADMIN — PREGABALIN 50 MG: 50 CAPSULE ORAL at 20:35

## 2017-03-12 RX ADMIN — POLYETHYLENE GLYCOL 3350 17 G: 17 POWDER, FOR SOLUTION ORAL at 10:45

## 2017-03-12 RX ADMIN — LEVETIRACETAM 750 MG: 100 INJECTION, SOLUTION INTRAVENOUS at 11:02

## 2017-03-12 RX ADMIN — ACETAMINOPHEN 650 MG: 325 TABLET, FILM COATED ORAL at 01:22

## 2017-03-12 RX ADMIN — MORPHINE SULFATE 1 MG: 2 INJECTION, SOLUTION INTRAMUSCULAR; INTRAVENOUS at 22:54

## 2017-03-12 RX ADMIN — PANTOPRAZOLE SODIUM 40 MG: 40 INJECTION, POWDER, FOR SOLUTION INTRAVENOUS at 05:26

## 2017-03-12 RX ADMIN — ENOXAPARIN SODIUM 40 MG: 40 INJECTION SUBCUTANEOUS at 10:45

## 2017-03-12 RX ADMIN — PREGABALIN 50 MG: 50 CAPSULE ORAL at 10:46

## 2017-03-13 ENCOUNTER — APPOINTMENT (INPATIENT)
Dept: OCCUPATIONAL THERAPY | Facility: HOSPITAL | Age: 72
DRG: 100 | End: 2017-03-13
Payer: MEDICARE

## 2017-03-13 ENCOUNTER — APPOINTMENT (INPATIENT)
Dept: RADIOLOGY | Facility: HOSPITAL | Age: 72
DRG: 100 | End: 2017-03-13
Attending: INTERNAL MEDICINE
Payer: MEDICARE

## 2017-03-13 ENCOUNTER — GENERIC CONVERSION - ENCOUNTER (OUTPATIENT)
Dept: OTHER | Facility: OTHER | Age: 72
End: 2017-03-13

## 2017-03-13 LAB
ANA HOMOGEN SER QL IF: NORMAL
ANA HOMOGEN TITR SER: NORMAL {TITER}
ANION GAP SERPL CALCULATED.3IONS-SCNC: 6 MMOL/L (ref 4–13)
BUN SERPL-MCNC: 6 MG/DL (ref 5–25)
C-ANCA TITR SER IF: NORMAL TITER
CALCIUM SERPL-MCNC: 8.5 MG/DL (ref 8.3–10.1)
CHLORIDE SERPL-SCNC: 109 MMOL/L (ref 100–108)
CO2 SERPL-SCNC: 33 MMOL/L (ref 21–32)
CREAT SERPL-MCNC: 0.74 MG/DL (ref 0.6–1.3)
GFR SERPL CREATININE-BSD FRML MDRD: >60 ML/MIN/1.73SQ M
GLUCOSE SERPL-MCNC: 102 MG/DL (ref 65–140)
MYELOPEROXIDASE AB SER IA-ACNC: <9 U/ML (ref 0–9)
P-ANCA ATYPICAL TITR SER IF: NORMAL TITER
P-ANCA TITR SER IF: NORMAL TITER
POTASSIUM SERPL-SCNC: 3.7 MMOL/L (ref 3.5–5.3)
PROTEINASE3 AB SER IA-ACNC: <3.5 U/ML (ref 0–3.5)
RYE IGE QN: POSITIVE
SODIUM SERPL-SCNC: 148 MMOL/L (ref 136–145)

## 2017-03-13 PROCEDURE — C9113 INJ PANTOPRAZOLE SODIUM, VIA: HCPCS | Performed by: INTERNAL MEDICINE

## 2017-03-13 PROCEDURE — 92611 MOTION FLUOROSCOPY/SWALLOW: CPT

## 2017-03-13 PROCEDURE — 97530 THERAPEUTIC ACTIVITIES: CPT

## 2017-03-13 PROCEDURE — 71020 HB CHEST X-RAY 2VW FRONTAL&LATL: CPT

## 2017-03-13 PROCEDURE — 74230 X-RAY XM SWLNG FUNCJ C+: CPT

## 2017-03-13 PROCEDURE — 97535 SELF CARE MNGMENT TRAINING: CPT

## 2017-03-13 PROCEDURE — 80048 BASIC METABOLIC PNL TOTAL CA: CPT | Performed by: INTERNAL MEDICINE

## 2017-03-13 PROCEDURE — 97116 GAIT TRAINING THERAPY: CPT

## 2017-03-13 RX ADMIN — POLYETHYLENE GLYCOL 3350 17 G: 17 POWDER, FOR SOLUTION ORAL at 08:11

## 2017-03-13 RX ADMIN — PREGABALIN 50 MG: 50 CAPSULE ORAL at 21:14

## 2017-03-13 RX ADMIN — ACETAMINOPHEN 650 MG: 325 TABLET, FILM COATED ORAL at 07:55

## 2017-03-13 RX ADMIN — MIDODRINE HYDROCHLORIDE 10 MG: 5 TABLET ORAL at 21:14

## 2017-03-13 RX ADMIN — LEVOTHYROXINE SODIUM 75 MCG: 75 TABLET ORAL at 05:36

## 2017-03-13 RX ADMIN — MIDODRINE HYDROCHLORIDE 10 MG: 5 TABLET ORAL at 17:18

## 2017-03-13 RX ADMIN — LEVETIRACETAM 750 MG: 100 INJECTION, SOLUTION INTRAVENOUS at 21:15

## 2017-03-13 RX ADMIN — LEVETIRACETAM 750 MG: 100 INJECTION, SOLUTION INTRAVENOUS at 08:11

## 2017-03-13 RX ADMIN — PANTOPRAZOLE SODIUM 40 MG: 40 INJECTION, POWDER, FOR SOLUTION INTRAVENOUS at 05:37

## 2017-03-13 RX ADMIN — SENNOSIDES 8.6 MG: 8.6 TABLET, FILM COATED ORAL at 21:14

## 2017-03-13 RX ADMIN — DOCUSATE SODIUM 100 MG: 100 CAPSULE, LIQUID FILLED ORAL at 17:18

## 2017-03-13 RX ADMIN — PREGABALIN 50 MG: 50 CAPSULE ORAL at 08:11

## 2017-03-13 RX ADMIN — ENOXAPARIN SODIUM 40 MG: 40 INJECTION SUBCUTANEOUS at 08:11

## 2017-03-13 RX ADMIN — PREGABALIN 50 MG: 50 CAPSULE ORAL at 17:18

## 2017-03-13 RX ADMIN — DOCUSATE SODIUM 100 MG: 100 CAPSULE, LIQUID FILLED ORAL at 08:11

## 2017-03-13 RX ADMIN — MORPHINE SULFATE 1 MG: 2 INJECTION, SOLUTION INTRAMUSCULAR; INTRAVENOUS at 05:34

## 2017-03-13 RX ADMIN — MIDODRINE HYDROCHLORIDE 10 MG: 5 TABLET ORAL at 08:11

## 2017-03-14 ENCOUNTER — APPOINTMENT (INPATIENT)
Dept: PHYSICAL THERAPY | Facility: HOSPITAL | Age: 72
DRG: 100 | End: 2017-03-14
Payer: MEDICARE

## 2017-03-14 LAB
ANION GAP SERPL CALCULATED.3IONS-SCNC: 6 MMOL/L (ref 4–13)
BUN SERPL-MCNC: 7 MG/DL (ref 5–25)
CALCIUM SERPL-MCNC: 9 MG/DL (ref 8.3–10.1)
CHLORIDE SERPL-SCNC: 105 MMOL/L (ref 100–108)
CO2 SERPL-SCNC: 34 MMOL/L (ref 21–32)
CREAT SERPL-MCNC: 0.74 MG/DL (ref 0.6–1.3)
F5 GENE MUT ANL BLD/T: NORMAL
FACTOR V LEIDEN INTERPRETATION: NORMAL
GFR SERPL CREATININE-BSD FRML MDRD: >60 ML/MIN/1.73SQ M
GLUCOSE SERPL-MCNC: 84 MG/DL (ref 65–140)
POTASSIUM SERPL-SCNC: 4.1 MMOL/L (ref 3.5–5.3)
SODIUM SERPL-SCNC: 145 MMOL/L (ref 136–145)

## 2017-03-14 PROCEDURE — 97110 THERAPEUTIC EXERCISES: CPT

## 2017-03-14 PROCEDURE — C9113 INJ PANTOPRAZOLE SODIUM, VIA: HCPCS | Performed by: INTERNAL MEDICINE

## 2017-03-14 PROCEDURE — 80048 BASIC METABOLIC PNL TOTAL CA: CPT | Performed by: INTERNAL MEDICINE

## 2017-03-14 RX ADMIN — PREGABALIN 50 MG: 50 CAPSULE ORAL at 16:39

## 2017-03-14 RX ADMIN — PREGABALIN 50 MG: 50 CAPSULE ORAL at 09:58

## 2017-03-14 RX ADMIN — MIDODRINE HYDROCHLORIDE 10 MG: 5 TABLET ORAL at 09:58

## 2017-03-14 RX ADMIN — LEVOTHYROXINE SODIUM 75 MCG: 75 TABLET ORAL at 05:48

## 2017-03-14 RX ADMIN — POLYETHYLENE GLYCOL 3350 17 G: 17 POWDER, FOR SOLUTION ORAL at 09:58

## 2017-03-14 RX ADMIN — MIDODRINE HYDROCHLORIDE 10 MG: 5 TABLET ORAL at 16:39

## 2017-03-14 RX ADMIN — LEVETIRACETAM 750 MG: 100 INJECTION, SOLUTION INTRAVENOUS at 21:17

## 2017-03-14 RX ADMIN — MORPHINE SULFATE 1 MG: 2 INJECTION, SOLUTION INTRAMUSCULAR; INTRAVENOUS at 03:28

## 2017-03-14 RX ADMIN — DOCUSATE SODIUM 100 MG: 100 CAPSULE, LIQUID FILLED ORAL at 09:58

## 2017-03-14 RX ADMIN — DOCUSATE SODIUM 100 MG: 100 CAPSULE, LIQUID FILLED ORAL at 16:39

## 2017-03-14 RX ADMIN — SENNOSIDES 8.6 MG: 8.6 TABLET, FILM COATED ORAL at 21:13

## 2017-03-14 RX ADMIN — PANTOPRAZOLE SODIUM 40 MG: 40 INJECTION, POWDER, FOR SOLUTION INTRAVENOUS at 05:48

## 2017-03-14 RX ADMIN — MIDODRINE HYDROCHLORIDE 10 MG: 5 TABLET ORAL at 21:14

## 2017-03-14 RX ADMIN — ENOXAPARIN SODIUM 40 MG: 40 INJECTION SUBCUTANEOUS at 09:58

## 2017-03-14 RX ADMIN — DEXTROSE 50 ML/HR: 5 SOLUTION INTRAVENOUS at 09:57

## 2017-03-14 RX ADMIN — LEVETIRACETAM 750 MG: 100 INJECTION, SOLUTION INTRAVENOUS at 09:58

## 2017-03-14 RX ADMIN — PREGABALIN 50 MG: 50 CAPSULE ORAL at 21:14

## 2017-03-15 ENCOUNTER — APPOINTMENT (INPATIENT)
Dept: SPEECH THERAPY | Facility: HOSPITAL | Age: 72
DRG: 100 | End: 2017-03-15
Payer: MEDICARE

## 2017-03-15 LAB
ANION GAP SERPL CALCULATED.3IONS-SCNC: 6 MMOL/L (ref 4–13)
BASOPHILS # BLD AUTO: 0.04 THOUSANDS/ΜL (ref 0–0.1)
BASOPHILS NFR BLD AUTO: 1 % (ref 0–1)
BUN SERPL-MCNC: 10 MG/DL (ref 5–25)
CALCIUM SERPL-MCNC: 9.3 MG/DL (ref 8.3–10.1)
CHLORIDE SERPL-SCNC: 105 MMOL/L (ref 100–108)
CO2 SERPL-SCNC: 34 MMOL/L (ref 21–32)
CREAT SERPL-MCNC: 0.71 MG/DL (ref 0.6–1.3)
EOSINOPHIL # BLD AUTO: 0.18 THOUSAND/ΜL (ref 0–0.61)
EOSINOPHIL NFR BLD AUTO: 4 % (ref 0–6)
ERYTHROCYTE [DISTWIDTH] IN BLOOD BY AUTOMATED COUNT: 15.4 % (ref 11.6–15.1)
F2 GENE MUT ANL BLD/T: NORMAL
GFR SERPL CREATININE-BSD FRML MDRD: >60 ML/MIN/1.73SQ M
GLUCOSE SERPL-MCNC: 87 MG/DL (ref 65–140)
HCT VFR BLD AUTO: 32 % (ref 34.8–46.1)
HGB BLD-MCNC: 10.1 G/DL (ref 11.5–15.4)
LYMPHOCYTES # BLD AUTO: 1.61 THOUSANDS/ΜL (ref 0.6–4.47)
LYMPHOCYTES NFR BLD AUTO: 32 % (ref 14–44)
Lab: NORMAL
MCH RBC QN AUTO: 30.4 PG (ref 26.8–34.3)
MCHC RBC AUTO-ENTMCNC: 31.6 G/DL (ref 31.4–37.4)
MCV RBC AUTO: 96 FL (ref 82–98)
MONOCYTES # BLD AUTO: 0.41 THOUSAND/ΜL (ref 0.17–1.22)
MONOCYTES NFR BLD AUTO: 8 % (ref 4–12)
NEUTROPHILS # BLD AUTO: 2.73 THOUSANDS/ΜL (ref 1.85–7.62)
NEUTS SEG NFR BLD AUTO: 55 % (ref 43–75)
NRBC BLD AUTO-RTO: 0 /100 WBCS
PLATELET # BLD AUTO: 270 THOUSANDS/UL (ref 149–390)
PMV BLD AUTO: 10.9 FL (ref 8.9–12.7)
POTASSIUM SERPL-SCNC: 4 MMOL/L (ref 3.5–5.3)
RBC # BLD AUTO: 3.32 MILLION/UL (ref 3.81–5.12)
SODIUM SERPL-SCNC: 145 MMOL/L (ref 136–145)
WBC # BLD AUTO: 4.99 THOUSAND/UL (ref 4.31–10.16)

## 2017-03-15 PROCEDURE — 92526 ORAL FUNCTION THERAPY: CPT

## 2017-03-15 PROCEDURE — 85025 COMPLETE CBC W/AUTO DIFF WBC: CPT | Performed by: INTERNAL MEDICINE

## 2017-03-15 PROCEDURE — C9113 INJ PANTOPRAZOLE SODIUM, VIA: HCPCS | Performed by: INTERNAL MEDICINE

## 2017-03-15 PROCEDURE — 80048 BASIC METABOLIC PNL TOTAL CA: CPT | Performed by: INTERNAL MEDICINE

## 2017-03-15 RX ADMIN — MIDODRINE HYDROCHLORIDE 10 MG: 5 TABLET ORAL at 16:10

## 2017-03-15 RX ADMIN — MIDODRINE HYDROCHLORIDE 10 MG: 5 TABLET ORAL at 21:46

## 2017-03-15 RX ADMIN — LEVETIRACETAM 750 MG: 100 INJECTION, SOLUTION INTRAVENOUS at 08:52

## 2017-03-15 RX ADMIN — PREGABALIN 50 MG: 50 CAPSULE ORAL at 21:46

## 2017-03-15 RX ADMIN — ACETAMINOPHEN 650 MG: 325 TABLET, FILM COATED ORAL at 19:37

## 2017-03-15 RX ADMIN — MIDODRINE HYDROCHLORIDE 10 MG: 5 TABLET ORAL at 08:50

## 2017-03-15 RX ADMIN — PREGABALIN 50 MG: 50 CAPSULE ORAL at 16:10

## 2017-03-15 RX ADMIN — DEXTROSE 50 ML/HR: 5 SOLUTION INTRAVENOUS at 00:10

## 2017-03-15 RX ADMIN — ENOXAPARIN SODIUM 40 MG: 40 INJECTION SUBCUTANEOUS at 08:50

## 2017-03-15 RX ADMIN — DOCUSATE SODIUM 100 MG: 100 CAPSULE, LIQUID FILLED ORAL at 16:10

## 2017-03-15 RX ADMIN — PANTOPRAZOLE SODIUM 40 MG: 40 INJECTION, POWDER, FOR SOLUTION INTRAVENOUS at 05:53

## 2017-03-15 RX ADMIN — LEVETIRACETAM 750 MG: 100 INJECTION, SOLUTION INTRAVENOUS at 21:46

## 2017-03-15 RX ADMIN — PREGABALIN 50 MG: 50 CAPSULE ORAL at 08:50

## 2017-03-15 RX ADMIN — LEVOTHYROXINE SODIUM 75 MCG: 75 TABLET ORAL at 05:53

## 2017-03-15 RX ADMIN — DOCUSATE SODIUM 100 MG: 100 CAPSULE, LIQUID FILLED ORAL at 08:50

## 2017-03-15 RX ADMIN — SENNOSIDES 8.6 MG: 8.6 TABLET, FILM COATED ORAL at 21:46

## 2017-03-16 ENCOUNTER — APPOINTMENT (INPATIENT)
Dept: PHYSICAL THERAPY | Facility: HOSPITAL | Age: 72
DRG: 100 | End: 2017-03-16
Payer: MEDICARE

## 2017-03-16 ENCOUNTER — APPOINTMENT (INPATIENT)
Dept: SPEECH THERAPY | Facility: HOSPITAL | Age: 72
DRG: 100 | End: 2017-03-16
Payer: MEDICARE

## 2017-03-16 VITALS
TEMPERATURE: 97.4 F | RESPIRATION RATE: 18 BRPM | SYSTOLIC BLOOD PRESSURE: 127 MMHG | HEIGHT: 64 IN | DIASTOLIC BLOOD PRESSURE: 86 MMHG | OXYGEN SATURATION: 99 % | BODY MASS INDEX: 18.78 KG/M2 | HEART RATE: 81 BPM | WEIGHT: 110 LBS

## 2017-03-16 PROCEDURE — 97116 GAIT TRAINING THERAPY: CPT

## 2017-03-16 PROCEDURE — 97530 THERAPEUTIC ACTIVITIES: CPT

## 2017-03-16 PROCEDURE — C9113 INJ PANTOPRAZOLE SODIUM, VIA: HCPCS | Performed by: INTERNAL MEDICINE

## 2017-03-16 PROCEDURE — 97110 THERAPEUTIC EXERCISES: CPT

## 2017-03-16 PROCEDURE — 92526 ORAL FUNCTION THERAPY: CPT

## 2017-03-16 RX ORDER — LEVETIRACETAM 750 MG/1
750 TABLET ORAL 2 TIMES DAILY
Qty: 60 TABLET | Refills: 0 | Status: SHIPPED | OUTPATIENT
Start: 2017-03-16 | End: 2018-03-29

## 2017-03-16 RX ORDER — ROPINIROLE 0.25 MG/1
0.25 TABLET, FILM COATED ORAL 3 TIMES DAILY
Qty: 90 TABLET | Refills: 0 | Status: SHIPPED | OUTPATIENT
Start: 2017-03-16 | End: 2018-03-29

## 2017-03-16 RX ORDER — LEVETIRACETAM 750 MG/1
750 TABLET ORAL EVERY 12 HOURS SCHEDULED
Status: DISCONTINUED | OUTPATIENT
Start: 2017-03-16 | End: 2017-03-16 | Stop reason: HOSPADM

## 2017-03-16 RX ORDER — PREGABALIN 50 MG/1
50 CAPSULE ORAL 3 TIMES DAILY
Qty: 30 CAPSULE | Refills: 0 | Status: SHIPPED | OUTPATIENT
Start: 2017-03-16 | End: 2017-03-26

## 2017-03-16 RX ORDER — MIDODRINE HYDROCHLORIDE 10 MG/1
10 TABLET ORAL 3 TIMES DAILY
Qty: 90 TABLET | Refills: 0 | Status: SHIPPED | OUTPATIENT
Start: 2017-03-16 | End: 2017-04-15

## 2017-03-16 RX ORDER — LEVOTHYROXINE SODIUM 0.07 MG/1
75 TABLET ORAL
Qty: 30 TABLET | Refills: 0 | Status: SHIPPED | OUTPATIENT
Start: 2017-03-16 | End: 2017-04-15

## 2017-03-16 RX ADMIN — PREGABALIN 50 MG: 50 CAPSULE ORAL at 08:33

## 2017-03-16 RX ADMIN — MORPHINE SULFATE 1 MG: 2 INJECTION, SOLUTION INTRAMUSCULAR; INTRAVENOUS at 03:47

## 2017-03-16 RX ADMIN — MIDODRINE HYDROCHLORIDE 10 MG: 5 TABLET ORAL at 08:33

## 2017-03-16 RX ADMIN — ENOXAPARIN SODIUM 40 MG: 40 INJECTION SUBCUTANEOUS at 08:33

## 2017-03-16 RX ADMIN — MIDODRINE HYDROCHLORIDE 10 MG: 5 TABLET ORAL at 15:54

## 2017-03-16 RX ADMIN — DOCUSATE SODIUM 100 MG: 100 CAPSULE, LIQUID FILLED ORAL at 15:54

## 2017-03-16 RX ADMIN — ACETAMINOPHEN 650 MG: 325 TABLET, FILM COATED ORAL at 15:54

## 2017-03-16 RX ADMIN — LEVOTHYROXINE SODIUM 75 MCG: 75 TABLET ORAL at 06:07

## 2017-03-16 RX ADMIN — PREGABALIN 50 MG: 50 CAPSULE ORAL at 15:54

## 2017-03-16 RX ADMIN — LEVETIRACETAM 750 MG: 100 INJECTION, SOLUTION INTRAVENOUS at 08:30

## 2017-03-16 RX ADMIN — DOCUSATE SODIUM 100 MG: 100 CAPSULE, LIQUID FILLED ORAL at 08:33

## 2017-03-16 RX ADMIN — PANTOPRAZOLE SODIUM 40 MG: 40 INJECTION, POWDER, FOR SOLUTION INTRAVENOUS at 06:07

## 2017-03-16 RX ADMIN — POLYETHYLENE GLYCOL 3350 17 G: 17 POWDER, FOR SOLUTION ORAL at 08:33

## 2017-04-19 ENCOUNTER — APPOINTMENT (EMERGENCY)
Dept: CT IMAGING | Facility: HOSPITAL | Age: 72
DRG: 917 | End: 2017-04-19
Payer: MEDICARE

## 2017-04-19 ENCOUNTER — APPOINTMENT (INPATIENT)
Dept: RADIOLOGY | Facility: HOSPITAL | Age: 72
DRG: 917 | End: 2017-04-19
Payer: MEDICARE

## 2017-04-19 ENCOUNTER — HOSPITAL ENCOUNTER (EMERGENCY)
Facility: HOSPITAL | Age: 72
Discharge: HOME | End: 2017-04-19
Payer: MEDICARE

## 2017-04-19 ENCOUNTER — HOSPITAL ENCOUNTER (INPATIENT)
Facility: HOSPITAL | Age: 72
LOS: 9 days | DRG: 917 | End: 2017-04-28
Attending: EMERGENCY MEDICINE | Admitting: INTERNAL MEDICINE
Payer: MEDICARE

## 2017-04-19 DIAGNOSIS — T50.902A OVERDOSE, INTENTIONAL SELF-HARM, INITIAL ENCOUNTER (HCC): Primary | ICD-10-CM

## 2017-04-19 DIAGNOSIS — S52.90XA FOREARM FRACTURE: ICD-10-CM

## 2017-04-19 DIAGNOSIS — J96.90 RESPIRATORY FAILURE (HCC): ICD-10-CM

## 2017-04-19 DIAGNOSIS — R41.82 ALTERED MENTAL STATUS: ICD-10-CM

## 2017-04-19 DIAGNOSIS — T68.XXXA HYPOTHERMIA, INITIAL ENCOUNTER: ICD-10-CM

## 2017-04-19 PROBLEM — T50.901A OVERDOSE: Status: ACTIVE | Noted: 2017-04-19

## 2017-04-19 PROBLEM — M62.82 RHABDOMYOLYSIS: Status: ACTIVE | Noted: 2017-04-19

## 2017-04-19 PROBLEM — R74.01 TRANSAMINITIS: Status: ACTIVE | Noted: 2017-04-19

## 2017-04-19 PROBLEM — G93.40 ACUTE ENCEPHALOPATHY: Status: ACTIVE | Noted: 2017-04-19

## 2017-04-19 PROBLEM — E87.2 LACTIC ACID ACIDOSIS: Status: ACTIVE | Noted: 2017-04-19

## 2017-04-19 PROBLEM — J96.01 ACUTE RESPIRATORY FAILURE WITH HYPOXIA (HCC): Status: ACTIVE | Noted: 2017-04-19

## 2017-04-19 LAB
ALBUMIN SERPL BCP-MCNC: 2.4 G/DL (ref 3.5–5)
ALBUMIN SERPL BCP-MCNC: 3.4 G/DL (ref 3.5–5)
ALP SERPL-CCNC: 178 U/L (ref 46–116)
ALP SERPL-CCNC: 247 U/L (ref 46–116)
ALT SERPL W P-5'-P-CCNC: 32 U/L (ref 12–78)
ALT SERPL W P-5'-P-CCNC: 44 U/L (ref 12–78)
AMMONIA PLAS-SCNC: <10 UMOL/L (ref 11–35)
AMPHETAMINES SERPL QL SCN: NEGATIVE
ANCILLARY VALUES: ABNORMAL
ANION GAP SERPL CALCULATED.3IONS-SCNC: 11 MMOL/L (ref 4–13)
ANION GAP SERPL CALCULATED.3IONS-SCNC: 9 MMOL/L (ref 4–13)
APAP SERPL-MCNC: <2 UG/ML (ref 10–30)
APTT PPP: 29 SECONDS (ref 24–36)
ARTERIAL PATENCY WRIST A: ABNORMAL
AST SERPL W P-5'-P-CCNC: 33 U/L (ref 5–45)
AST SERPL W P-5'-P-CCNC: 48 U/L (ref 5–45)
ATRIAL RATE: 681 BPM
BACTERIA UR QL AUTO: ABNORMAL /HPF
BARBITURATES UR QL: NEGATIVE
BASE EXCESS BLDA CALC-SCNC: 11 MMOL/L (ref -2–3)
BASE EXCESS BLDA CALC-SCNC: 6 MMOL/L (ref -2–3)
BASOPHILS # BLD AUTO: 0.01 THOUSANDS/ΜL (ref 0–0.1)
BASOPHILS # BLD MANUAL: 0 THOUSAND/UL (ref 0–0.1)
BASOPHILS NFR BLD AUTO: 0 % (ref 0–1)
BASOPHILS NFR MAR MANUAL: 0 % (ref 0–1)
BENZODIAZ UR QL: NEGATIVE
BILIRUB SERPL-MCNC: 0.6 MG/DL (ref 0.2–1)
BILIRUB SERPL-MCNC: 0.6 MG/DL (ref 0.2–1)
BILIRUB UR QL STRIP: NEGATIVE
BUN SERPL-MCNC: 15 MG/DL (ref 5–25)
BUN SERPL-MCNC: 19 MG/DL (ref 5–25)
CA-I BLD-SCNC: 0.96 MMOL/L (ref 1.12–1.32)
CA-I BLD-SCNC: 1.1 MMOL/L (ref 1.12–1.32)
CA-I BLD-SCNC: 1.12 MMOL/L (ref 1.12–1.32)
CALCIUM SERPL-MCNC: 7.6 MG/DL (ref 8.3–10.1)
CALCIUM SERPL-MCNC: 9.7 MG/DL (ref 8.3–10.1)
CHLORIDE SERPL-SCNC: 102 MMOL/L (ref 100–108)
CHLORIDE SERPL-SCNC: 106 MMOL/L (ref 100–108)
CK MB SERPL-MCNC: 1.1 % (ref 0–2.5)
CK MB SERPL-MCNC: 1.4 % (ref 0–2.5)
CK MB SERPL-MCNC: 1.5 % (ref 0–2.5)
CK MB SERPL-MCNC: 1.5 % (ref 0–2.5)
CK MB SERPL-MCNC: 10.2 NG/ML (ref 0–5)
CK MB SERPL-MCNC: 10.8 NG/ML (ref 0–5)
CK MB SERPL-MCNC: 10.8 NG/ML (ref 0–5)
CK MB SERPL-MCNC: 6.4 NG/ML (ref 0–5)
CK SERPL-CCNC: 557 U/L (ref 26–192)
CK SERPL-CCNC: 688 U/L (ref 26–192)
CK SERPL-CCNC: 697 U/L (ref 26–192)
CK SERPL-CCNC: 755 U/L (ref 26–192)
CLARITY UR: ABNORMAL
CO2 SERPL-SCNC: 32 MMOL/L (ref 21–32)
CO2 SERPL-SCNC: 36 MMOL/L (ref 21–32)
COCAINE UR QL: NEGATIVE
COLOR UR: ABNORMAL
CORTIS SERPL-MCNC: 31.8 UG/ML
CREAT SERPL-MCNC: 0.94 MG/DL (ref 0.6–1.3)
CREAT SERPL-MCNC: 1.1 MG/DL (ref 0.6–1.3)
DS:DELIVERY SYSTEM: AC
EOSINOPHIL # BLD AUTO: 0.02 THOUSAND/ΜL (ref 0–0.61)
EOSINOPHIL # BLD MANUAL: 0 THOUSAND/UL (ref 0–0.4)
EOSINOPHIL NFR BLD AUTO: 0 % (ref 0–6)
EOSINOPHIL NFR BLD MANUAL: 0 % (ref 0–6)
ERYTHROCYTE [DISTWIDTH] IN BLOOD BY AUTOMATED COUNT: 14.6 % (ref 11.6–15.1)
ERYTHROCYTE [DISTWIDTH] IN BLOOD BY AUTOMATED COUNT: 14.8 % (ref 11.6–15.1)
ETHANOL SERPL-MCNC: <3 MG/DL (ref 0–3)
FIO2 GAS DIL.REBREATH: 40 L
GFR SERPL CREATININE-BSD FRML MDRD: 49 ML/MIN/1.73SQ M
GFR SERPL CREATININE-BSD FRML MDRD: 58.7 ML/MIN/1.73SQ M
GLUCOSE SERPL-MCNC: 105 MG/DL (ref 65–140)
GLUCOSE SERPL-MCNC: 115 MG/DL (ref 65–140)
GLUCOSE SERPL-MCNC: 117 MG/DL (ref 65–140)
GLUCOSE SERPL-MCNC: 117 MG/DL (ref 65–140)
GLUCOSE SERPL-MCNC: 59 MG/DL (ref 65–140)
GLUCOSE SERPL-MCNC: 60 MG/DL (ref 65–140)
GLUCOSE SERPL-MCNC: 77 MG/DL (ref 65–140)
GLUCOSE UR STRIP-MCNC: NEGATIVE MG/DL
HCO3 BLDA-SCNC: 30.2 MMOL/L (ref 22–28)
HCO3 BLDA-SCNC: 36.4 MMOL/L (ref 24–30)
HCT VFR BLD AUTO: 26.4 % (ref 34.8–46.1)
HCT VFR BLD AUTO: 35.9 % (ref 34.8–46.1)
HCT VFR BLD CALC: 24 % (ref 34.8–46.1)
HCT VFR BLD CALC: 35 % (ref 34.8–46.1)
HGB BLD-MCNC: 11.5 G/DL (ref 11.5–15.4)
HGB BLD-MCNC: 8.1 G/DL (ref 11.5–15.4)
HGB BLDA-MCNC: 11.9 G/DL (ref 11.5–15.4)
HGB BLDA-MCNC: 8.2 G/DL (ref 11.5–15.4)
HGB UR QL STRIP.AUTO: ABNORMAL
INR PPP: 0.97 (ref 0.86–1.16)
KETONES UR STRIP-MCNC: NEGATIVE MG/DL
LACTATE SERPL-SCNC: 1.8 MMOL/L (ref 0.5–2)
LACTATE SERPL-SCNC: 2.8 MMOL/L (ref 0.5–2)
LEUKOCYTE ESTERASE UR QL STRIP: ABNORMAL
LYMPHOCYTES # BLD AUTO: 0.85 THOUSAND/UL (ref 0.6–4.47)
LYMPHOCYTES # BLD AUTO: 0.94 THOUSANDS/ΜL (ref 0.6–4.47)
LYMPHOCYTES # BLD AUTO: 7 % (ref 14–44)
LYMPHOCYTES NFR BLD AUTO: 18 % (ref 14–44)
MAGNESIUM SERPL-MCNC: 1.6 MG/DL (ref 1.6–2.6)
MCH RBC QN AUTO: 29.5 PG (ref 26.8–34.3)
MCH RBC QN AUTO: 30.3 PG (ref 26.8–34.3)
MCHC RBC AUTO-ENTMCNC: 30.7 G/DL (ref 31.4–37.4)
MCHC RBC AUTO-ENTMCNC: 32 G/DL (ref 31.4–37.4)
MCV RBC AUTO: 95 FL (ref 82–98)
MCV RBC AUTO: 96 FL (ref 82–98)
METHADONE UR QL: NEGATIVE
MONOCYTES # BLD AUTO: 0.38 THOUSAND/ΜL (ref 0.17–1.22)
MONOCYTES # BLD AUTO: 0.48 THOUSAND/UL (ref 0–1.22)
MONOCYTES NFR BLD AUTO: 7 % (ref 4–12)
MONOCYTES NFR BLD: 4 % (ref 4–12)
NEUTROPHILS # BLD AUTO: 3.97 THOUSANDS/ΜL (ref 1.85–7.62)
NEUTROPHILS # BLD MANUAL: 10.77 THOUSAND/UL (ref 1.85–7.62)
NEUTS BAND NFR BLD MANUAL: 1 % (ref 0–8)
NEUTS SEG NFR BLD AUTO: 75 % (ref 43–75)
NEUTS SEG NFR BLD AUTO: 88 % (ref 43–75)
NITRITE UR QL STRIP: NEGATIVE
NON-SQ EPI CELLS URNS QL MICRO: ABNORMAL /HPF
OPIATES UR QL SCN: NEGATIVE
PCO2 BLD: 31 MMOL/L (ref 21–32)
PCO2 BLD: 38 MMOL/L (ref 21–32)
PCO2 BLD: 42.3 MM HG (ref 36–44)
PCO2 BLD: 48.4 MM HG (ref 42–50)
PCP UR QL: NEGATIVE
PH BLD: 7.46 [PH] (ref 7.35–7.45)
PH BLD: 7.49 [PH] (ref 7.3–7.4)
PH UR STRIP.AUTO: 5.5 [PH] (ref 4.5–8)
PHOSPHATE SERPL-MCNC: 2.5 MG/DL (ref 2.3–4.1)
PLATELET # BLD AUTO: 111 THOUSANDS/UL (ref 149–390)
PLATELET # BLD AUTO: 130 THOUSANDS/UL (ref 149–390)
PLATELET # BLD AUTO: 137 THOUSANDS/UL (ref 149–390)
PLATELET BLD QL SMEAR: ABNORMAL
PMV BLD AUTO: 11.6 FL (ref 8.9–12.7)
PMV BLD AUTO: 12.1 FL (ref 8.9–12.7)
PMV BLD AUTO: 12.2 FL (ref 8.9–12.7)
PO2 BLD: 149 MM HG (ref 75–129)
PO2 BLD: 39 MM HG (ref 35–45)
POTASSIUM BLD-SCNC: 3.3 MMOL/L (ref 3.5–5.3)
POTASSIUM BLD-SCNC: 3.7 MMOL/L (ref 3.5–5.3)
POTASSIUM SERPL-SCNC: 3.3 MMOL/L (ref 3.5–5.3)
POTASSIUM SERPL-SCNC: 3.7 MMOL/L (ref 3.5–5.3)
PRESSURE SETTING: 5
PROT SERPL-MCNC: 5.3 G/DL (ref 6.4–8.2)
PROT SERPL-MCNC: 7.5 G/DL (ref 6.4–8.2)
PROT UR STRIP-MCNC: NEGATIVE MG/DL
PROTHROMBIN TIME: 12.7 SECONDS (ref 12–14.3)
QRS AXIS: -20 DEGREES
QRSD INTERVAL: 110 MS
QT INTERVAL: 426 MS
QTC INTERVAL: 509 MS
RBC # BLD AUTO: 2.75 MILLION/UL (ref 3.81–5.12)
RBC # BLD AUTO: 3.79 MILLION/UL (ref 3.81–5.12)
RBC #/AREA URNS AUTO: ABNORMAL /HPF
RESPIRATORY RATE: 12
SALICYLATES SERPL-MCNC: <3 MG/DL (ref 3–20)
SAMPLE SITE: ABNORMAL
SAO2 % BLD FROM PO2: 77 % (ref 95–98)
SAO2 % BLD FROM PO2: 99 % (ref 95–98)
SODIUM BLD-SCNC: 144 MMOL/L (ref 136–145)
SODIUM BLD-SCNC: 146 MMOL/L (ref 136–145)
SODIUM SERPL-SCNC: 147 MMOL/L (ref 136–145)
SODIUM SERPL-SCNC: 149 MMOL/L (ref 136–145)
SP GR UR STRIP.AUTO: <=1.005 (ref 1–1.03)
SPECIMEN SOURCE: ABNORMAL
SPECIMEN SOURCE: ABNORMAL
T WAVE AXIS: 93 DEGREES
THC UR QL: NEGATIVE
TOTAL CELLS COUNTED SPEC: 100
TROPONIN I SERPL-MCNC: 0.02 NG/ML
TSH SERPL DL<=0.05 MIU/L-ACNC: 0.45 UIU/ML (ref 0.36–3.74)
UROBILINOGEN UR QL STRIP.AUTO: 0.2 E.U./DL
VENT TYPE: ABNORMAL
VENTILATION VALUE: 400
VENTRICULAR RATE: 86 BPM
WBC # BLD AUTO: 12.1 THOUSAND/UL (ref 4.31–10.16)
WBC # BLD AUTO: 5.32 THOUSAND/UL (ref 4.31–10.16)
WBC #/AREA URNS AUTO: ABNORMAL /HPF

## 2017-04-19 PROCEDURE — 82330 ASSAY OF CALCIUM: CPT

## 2017-04-19 PROCEDURE — 96365 THER/PROPH/DIAG IV INF INIT: CPT

## 2017-04-19 PROCEDURE — 85014 HEMATOCRIT: CPT

## 2017-04-19 PROCEDURE — 82803 BLOOD GASES ANY COMBINATION: CPT

## 2017-04-19 PROCEDURE — 36600 WITHDRAWAL OF ARTERIAL BLOOD: CPT

## 2017-04-19 PROCEDURE — 83605 ASSAY OF LACTIC ACID: CPT | Performed by: PHYSICIAN ASSISTANT

## 2017-04-19 PROCEDURE — 80047 BASIC METABLC PNL IONIZED CA: CPT

## 2017-04-19 PROCEDURE — 83735 ASSAY OF MAGNESIUM: CPT | Performed by: PHYSICIAN ASSISTANT

## 2017-04-19 PROCEDURE — 82947 ASSAY GLUCOSE BLOOD QUANT: CPT

## 2017-04-19 PROCEDURE — 82553 CREATINE MB FRACTION: CPT | Performed by: INTERNAL MEDICINE

## 2017-04-19 PROCEDURE — 99282 EMERGENCY DEPT VISIT SF MDM: CPT

## 2017-04-19 PROCEDURE — 85025 COMPLETE CBC W/AUTO DIFF WBC: CPT | Performed by: PHYSICIAN ASSISTANT

## 2017-04-19 PROCEDURE — 36415 COLL VENOUS BLD VENIPUNCTURE: CPT | Performed by: EMERGENCY MEDICINE

## 2017-04-19 PROCEDURE — 85610 PROTHROMBIN TIME: CPT | Performed by: EMERGENCY MEDICINE

## 2017-04-19 PROCEDURE — 84132 ASSAY OF SERUM POTASSIUM: CPT

## 2017-04-19 PROCEDURE — 83605 ASSAY OF LACTIC ACID: CPT | Performed by: EMERGENCY MEDICINE

## 2017-04-19 PROCEDURE — 80320 DRUG SCREEN QUANTALCOHOLS: CPT | Performed by: EMERGENCY MEDICINE

## 2017-04-19 PROCEDURE — 96374 THER/PROPH/DIAG INJ IV PUSH: CPT

## 2017-04-19 PROCEDURE — 85027 COMPLETE CBC AUTOMATED: CPT | Performed by: EMERGENCY MEDICINE

## 2017-04-19 PROCEDURE — 93005 ELECTROCARDIOGRAM TRACING: CPT | Performed by: EMERGENCY MEDICINE

## 2017-04-19 PROCEDURE — 82553 CREATINE MB FRACTION: CPT | Performed by: EMERGENCY MEDICINE

## 2017-04-19 PROCEDURE — 87077 CULTURE AEROBIC IDENTIFY: CPT | Performed by: EMERGENCY MEDICINE

## 2017-04-19 PROCEDURE — 85049 AUTOMATED PLATELET COUNT: CPT | Performed by: PHYSICIAN ASSISTANT

## 2017-04-19 PROCEDURE — 85007 BL SMEAR W/DIFF WBC COUNT: CPT | Performed by: EMERGENCY MEDICINE

## 2017-04-19 PROCEDURE — 94760 N-INVAS EAR/PLS OXIMETRY 1: CPT

## 2017-04-19 PROCEDURE — 71010 HB CHEST X-RAY 1 VIEW FRONTAL (PORTABLE): CPT

## 2017-04-19 PROCEDURE — 84295 ASSAY OF SERUM SODIUM: CPT

## 2017-04-19 PROCEDURE — 80053 COMPREHEN METABOLIC PANEL: CPT | Performed by: PHYSICIAN ASSISTANT

## 2017-04-19 PROCEDURE — 0T9B70Z DRAINAGE OF BLADDER WITH DRAINAGE DEVICE, VIA NATURAL OR ARTIFICIAL OPENING: ICD-10-PCS | Performed by: FAMILY MEDICINE

## 2017-04-19 PROCEDURE — 81001 URINALYSIS AUTO W/SCOPE: CPT | Performed by: EMERGENCY MEDICINE

## 2017-04-19 PROCEDURE — 96361 HYDRATE IV INFUSION ADD-ON: CPT

## 2017-04-19 PROCEDURE — 80307 DRUG TEST PRSMV CHEM ANLYZR: CPT | Performed by: EMERGENCY MEDICINE

## 2017-04-19 PROCEDURE — 82553 CREATINE MB FRACTION: CPT | Performed by: PHYSICIAN ASSISTANT

## 2017-04-19 PROCEDURE — 96375 TX/PRO/DX INJ NEW DRUG ADDON: CPT

## 2017-04-19 PROCEDURE — 82330 ASSAY OF CALCIUM: CPT | Performed by: PHYSICIAN ASSISTANT

## 2017-04-19 PROCEDURE — 84443 ASSAY THYROID STIM HORMONE: CPT | Performed by: PHYSICIAN ASSISTANT

## 2017-04-19 PROCEDURE — 82948 REAGENT STRIP/BLOOD GLUCOSE: CPT

## 2017-04-19 PROCEDURE — 87086 URINE CULTURE/COLONY COUNT: CPT | Performed by: EMERGENCY MEDICINE

## 2017-04-19 PROCEDURE — 82533 TOTAL CORTISOL: CPT | Performed by: PHYSICIAN ASSISTANT

## 2017-04-19 PROCEDURE — 82550 ASSAY OF CK (CPK): CPT | Performed by: EMERGENCY MEDICINE

## 2017-04-19 PROCEDURE — 82550 ASSAY OF CK (CPK): CPT | Performed by: PHYSICIAN ASSISTANT

## 2017-04-19 PROCEDURE — 86592 SYPHILIS TEST NON-TREP QUAL: CPT | Performed by: PHYSICIAN ASSISTANT

## 2017-04-19 PROCEDURE — 84484 ASSAY OF TROPONIN QUANT: CPT | Performed by: EMERGENCY MEDICINE

## 2017-04-19 PROCEDURE — 70450 CT HEAD/BRAIN W/O DYE: CPT

## 2017-04-19 PROCEDURE — 73070 X-RAY EXAM OF ELBOW: CPT

## 2017-04-19 PROCEDURE — 80329 ANALGESICS NON-OPIOID 1 OR 2: CPT | Performed by: EMERGENCY MEDICINE

## 2017-04-19 PROCEDURE — 5A1945Z RESPIRATORY VENTILATION, 24-96 CONSECUTIVE HOURS: ICD-10-PCS | Performed by: FAMILY MEDICINE

## 2017-04-19 PROCEDURE — 82140 ASSAY OF AMMONIA: CPT | Performed by: PHYSICIAN ASSISTANT

## 2017-04-19 PROCEDURE — 82550 ASSAY OF CK (CPK): CPT | Performed by: INTERNAL MEDICINE

## 2017-04-19 PROCEDURE — 73090 X-RAY EXAM OF FOREARM: CPT

## 2017-04-19 PROCEDURE — 0BH17EZ INSERTION OF ENDOTRACHEAL AIRWAY INTO TRACHEA, VIA NATURAL OR ARTIFICIAL OPENING: ICD-10-PCS | Performed by: FAMILY MEDICINE

## 2017-04-19 PROCEDURE — 80053 COMPREHEN METABOLIC PANEL: CPT | Performed by: EMERGENCY MEDICINE

## 2017-04-19 PROCEDURE — 85730 THROMBOPLASTIN TIME PARTIAL: CPT | Performed by: EMERGENCY MEDICINE

## 2017-04-19 PROCEDURE — 87040 BLOOD CULTURE FOR BACTERIA: CPT | Performed by: EMERGENCY MEDICINE

## 2017-04-19 PROCEDURE — 84100 ASSAY OF PHOSPHORUS: CPT | Performed by: PHYSICIAN ASSISTANT

## 2017-04-19 PROCEDURE — 99291 CRITICAL CARE FIRST HOUR: CPT

## 2017-04-19 PROCEDURE — 87186 SC STD MICRODIL/AGAR DIL: CPT | Performed by: EMERGENCY MEDICINE

## 2017-04-19 PROCEDURE — 94002 VENT MGMT INPAT INIT DAY: CPT

## 2017-04-19 RX ORDER — DEXTROSE AND SODIUM CHLORIDE 5; .45 G/100ML; G/100ML
150 INJECTION, SOLUTION INTRAVENOUS CONTINUOUS
Status: DISCONTINUED | OUTPATIENT
Start: 2017-04-19 | End: 2017-04-21

## 2017-04-19 RX ORDER — PROPOFOL 10 MG/ML
5-50 INJECTION, EMULSION INTRAVENOUS
Status: DISCONTINUED | OUTPATIENT
Start: 2017-04-19 | End: 2017-04-21

## 2017-04-19 RX ORDER — ETOMIDATE 2 MG/ML
20 INJECTION INTRAVENOUS ONCE
Status: COMPLETED | OUTPATIENT
Start: 2017-04-19 | End: 2017-04-19

## 2017-04-19 RX ORDER — SUCCINYLCHOLINE CHLORIDE 20 MG/ML
100 INJECTION INTRAMUSCULAR; INTRAVENOUS ONCE
Status: COMPLETED | OUTPATIENT
Start: 2017-04-19 | End: 2017-04-19

## 2017-04-19 RX ORDER — LORAZEPAM 2 MG/ML
INJECTION INTRAMUSCULAR
Status: COMPLETED
Start: 2017-04-19 | End: 2017-04-19

## 2017-04-19 RX ORDER — PROPOFOL 10 MG/ML
INJECTION, EMULSION INTRAVENOUS
Status: DISCONTINUED
Start: 2017-04-19 | End: 2017-04-19 | Stop reason: HOSPADM

## 2017-04-19 RX ORDER — NALOXONE HYDROCHLORIDE 1 MG/ML
2 INJECTION INTRAMUSCULAR; INTRAVENOUS; SUBCUTANEOUS ONCE
Status: COMPLETED | OUTPATIENT
Start: 2017-04-19 | End: 2017-04-19

## 2017-04-19 RX ORDER — LORAZEPAM 2 MG/ML
4 INJECTION INTRAMUSCULAR ONCE
Status: COMPLETED | OUTPATIENT
Start: 2017-04-19 | End: 2017-04-19

## 2017-04-19 RX ORDER — SODIUM CHLORIDE, SODIUM GLUCONATE, SODIUM ACETATE, POTASSIUM CHLORIDE, MAGNESIUM CHLORIDE, SODIUM PHOSPHATE, DIBASIC, AND POTASSIUM PHOSPHATE .53; .5; .37; .037; .03; .012; .00082 G/100ML; G/100ML; G/100ML; G/100ML; G/100ML; G/100ML; G/100ML
500 INJECTION, SOLUTION INTRAVENOUS AS NEEDED
Status: DISCONTINUED | OUTPATIENT
Start: 2017-04-19 | End: 2017-04-20

## 2017-04-19 RX ORDER — SODIUM CHLORIDE 9 MG/ML
999 INJECTION, SOLUTION INTRAVENOUS CONTINUOUS
Status: DISCONTINUED | OUTPATIENT
Start: 2017-04-19 | End: 2017-04-19

## 2017-04-19 RX ORDER — HEPARIN SODIUM 5000 [USP'U]/ML
5000 INJECTION, SOLUTION INTRAVENOUS; SUBCUTANEOUS EVERY 8 HOURS SCHEDULED
Status: DISCONTINUED | OUTPATIENT
Start: 2017-04-19 | End: 2017-04-22

## 2017-04-19 RX ORDER — SODIUM CHLORIDE 9 MG/ML
125 INJECTION, SOLUTION INTRAVENOUS CONTINUOUS
Status: DISCONTINUED | OUTPATIENT
Start: 2017-04-19 | End: 2017-04-19

## 2017-04-19 RX ORDER — MAGNESIUM SULFATE HEPTAHYDRATE 40 MG/ML
2 INJECTION, SOLUTION INTRAVENOUS ONCE
Status: COMPLETED | OUTPATIENT
Start: 2017-04-19 | End: 2017-04-20

## 2017-04-19 RX ORDER — DEXTROSE MONOHYDRATE 25 G/50ML
INJECTION, SOLUTION INTRAVENOUS
Status: COMPLETED
Start: 2017-04-19 | End: 2017-04-19

## 2017-04-19 RX ADMIN — DEXTROSE MONOHYDRATE 25 ML: 25 INJECTION, SOLUTION INTRAVENOUS at 21:28

## 2017-04-19 RX ADMIN — SODIUM CHLORIDE 125 ML/HR: 0.9 INJECTION, SOLUTION INTRAVENOUS at 16:15

## 2017-04-19 RX ADMIN — Medication 100 MG: at 12:58

## 2017-04-19 RX ADMIN — ETOMIDATE 20 MG: 2 INJECTION, SOLUTION INTRAVENOUS at 12:57

## 2017-04-19 RX ADMIN — CALCIUM GLUCONATE 1 G: 94 INJECTION, SOLUTION INTRAVENOUS at 20:52

## 2017-04-19 RX ADMIN — NALOXONE HYDROCHLORIDE 2 MG: 1 INJECTION PARENTERAL at 12:45

## 2017-04-19 RX ADMIN — HEPARIN SODIUM 5000 UNITS: 5000 INJECTION, SOLUTION INTRAVENOUS; SUBCUTANEOUS at 21:29

## 2017-04-19 RX ADMIN — LORAZEPAM 4 MG: 2 INJECTION, SOLUTION INTRAMUSCULAR; INTRAVENOUS at 13:17

## 2017-04-19 RX ADMIN — LORAZEPAM 4 MG: 2 INJECTION INTRAMUSCULAR at 13:17

## 2017-04-19 RX ADMIN — MAGNESIUM SULFATE HEPTAHYDRATE 2 G: 40 INJECTION, SOLUTION INTRAVENOUS at 21:28

## 2017-04-19 RX ADMIN — DEXTROSE AND SODIUM CHLORIDE 125 ML/HR: 5; .45 INJECTION, SOLUTION INTRAVENOUS at 21:25

## 2017-04-19 RX ADMIN — PROPOFOL 30 MCG/KG/MIN: 10 INJECTION, EMULSION INTRAVENOUS at 13:02

## 2017-04-19 RX ADMIN — SODIUM CHLORIDE 1000 ML: 0.9 INJECTION, SOLUTION INTRAVENOUS at 14:12

## 2017-04-19 RX ADMIN — SODIUM CHLORIDE 1000 ML: 0.9 INJECTION, SOLUTION INTRAVENOUS at 12:59

## 2017-04-20 PROBLEM — T68.XXXA HYPOTHERMIA: Status: ACTIVE | Noted: 2017-04-20

## 2017-04-20 PROBLEM — E87.0 HYPERNATREMIA: Status: ACTIVE | Noted: 2017-04-20

## 2017-04-20 PROBLEM — D69.6 THROMBOCYTOPENIA (HCC): Status: ACTIVE | Noted: 2017-04-20

## 2017-04-20 PROBLEM — D72.829 LEUKOCYTOSIS: Status: ACTIVE | Noted: 2017-04-20

## 2017-04-20 LAB
ALBUMIN SERPL BCP-MCNC: 2.6 G/DL (ref 3.5–5)
ALP SERPL-CCNC: 194 U/L (ref 46–116)
ALT SERPL W P-5'-P-CCNC: 30 U/L (ref 12–78)
ANION GAP SERPL CALCULATED.3IONS-SCNC: 10 MMOL/L (ref 4–13)
ANION GAP SERPL CALCULATED.3IONS-SCNC: 5 MMOL/L (ref 4–13)
ANION GAP SERPL CALCULATED.3IONS-SCNC: 9 MMOL/L (ref 4–13)
AST SERPL W P-5'-P-CCNC: 35 U/L (ref 5–45)
ATRIAL RATE: 82 BPM
BASOPHILS # BLD AUTO: 0.01 THOUSANDS/ΜL (ref 0–0.1)
BASOPHILS NFR BLD AUTO: 0 % (ref 0–1)
BILIRUB SERPL-MCNC: 0.6 MG/DL (ref 0.2–1)
BUN SERPL-MCNC: 12 MG/DL (ref 5–25)
BUN SERPL-MCNC: 13 MG/DL (ref 5–25)
BUN SERPL-MCNC: 9 MG/DL (ref 5–25)
CALCIUM SERPL-MCNC: 7.9 MG/DL (ref 8.3–10.1)
CALCIUM SERPL-MCNC: 8 MG/DL (ref 8.3–10.1)
CALCIUM SERPL-MCNC: 8.4 MG/DL (ref 8.3–10.1)
CHLORIDE SERPL-SCNC: 103 MMOL/L (ref 100–108)
CHLORIDE SERPL-SCNC: 105 MMOL/L (ref 100–108)
CHLORIDE SERPL-SCNC: 106 MMOL/L (ref 100–108)
CK MB SERPL-MCNC: 2.6 NG/ML (ref 0–5)
CK MB SERPL-MCNC: 3.4 NG/ML (ref 0–5)
CK MB SERPL-MCNC: 3.5 NG/ML (ref 0–5)
CK MB SERPL-MCNC: 4.5 NG/ML (ref 0–5)
CK MB SERPL-MCNC: <1 % (ref 0–2.5)
CK SERPL-CCNC: 488 U/L (ref 26–192)
CK SERPL-CCNC: 529 U/L (ref 26–192)
CK SERPL-CCNC: 540 U/L (ref 26–192)
CK SERPL-CCNC: 581 U/L (ref 26–192)
CO2 SERPL-SCNC: 32 MMOL/L (ref 21–32)
CO2 SERPL-SCNC: 33 MMOL/L (ref 21–32)
CO2 SERPL-SCNC: 33 MMOL/L (ref 21–32)
CREAT SERPL-MCNC: 0.92 MG/DL (ref 0.6–1.3)
CREAT SERPL-MCNC: 0.99 MG/DL (ref 0.6–1.3)
CREAT SERPL-MCNC: 1.03 MG/DL (ref 0.6–1.3)
EOSINOPHIL # BLD AUTO: 0.09 THOUSAND/ΜL (ref 0–0.61)
EOSINOPHIL NFR BLD AUTO: 1 % (ref 0–6)
ERYTHROCYTE [DISTWIDTH] IN BLOOD BY AUTOMATED COUNT: 14.8 % (ref 11.6–15.1)
FOLATE SERPL-MCNC: 5.4 NG/ML (ref 3.1–17.5)
GFR SERPL CREATININE-BSD FRML MDRD: 52.8 ML/MIN/1.73SQ M
GFR SERPL CREATININE-BSD FRML MDRD: 55.3 ML/MIN/1.73SQ M
GFR SERPL CREATININE-BSD FRML MDRD: >60 ML/MIN/1.73SQ M
GLUCOSE SERPL-MCNC: 100 MG/DL (ref 65–140)
GLUCOSE SERPL-MCNC: 100 MG/DL (ref 65–140)
GLUCOSE SERPL-MCNC: 106 MG/DL (ref 65–140)
HCT VFR BLD AUTO: 28.5 % (ref 34.8–46.1)
HGB BLD-MCNC: 9 G/DL (ref 11.5–15.4)
LYMPHOCYTES # BLD AUTO: 1.21 THOUSANDS/ΜL (ref 0.6–4.47)
LYMPHOCYTES NFR BLD AUTO: 16 % (ref 14–44)
MAGNESIUM SERPL-MCNC: 2.3 MG/DL (ref 1.6–2.6)
MAGNESIUM SERPL-MCNC: 2.5 MG/DL (ref 1.6–2.6)
MCH RBC QN AUTO: 30.3 PG (ref 26.8–34.3)
MCHC RBC AUTO-ENTMCNC: 31.6 G/DL (ref 31.4–37.4)
MCV RBC AUTO: 96 FL (ref 82–98)
MONOCYTES # BLD AUTO: 0.4 THOUSAND/ΜL (ref 0.17–1.22)
MONOCYTES NFR BLD AUTO: 5 % (ref 4–12)
NEUTROPHILS # BLD AUTO: 5.79 THOUSANDS/ΜL (ref 1.85–7.62)
NEUTS SEG NFR BLD AUTO: 78 % (ref 43–75)
P AXIS: 52 DEGREES
PHOSPHATE SERPL-MCNC: 2.5 MG/DL (ref 2.3–4.1)
PHOSPHATE SERPL-MCNC: 2.6 MG/DL (ref 2.3–4.1)
PLATELET # BLD AUTO: 119 THOUSANDS/UL (ref 149–390)
PMV BLD AUTO: 11.2 FL (ref 8.9–12.7)
POTASSIUM SERPL-SCNC: 3.1 MMOL/L (ref 3.5–5.3)
POTASSIUM SERPL-SCNC: 3.5 MMOL/L (ref 3.5–5.3)
POTASSIUM SERPL-SCNC: 3.5 MMOL/L (ref 3.5–5.3)
PR INTERVAL: 124 MS
PROT SERPL-MCNC: 5.7 G/DL (ref 6.4–8.2)
QRS AXIS: -36 DEGREES
QRSD INTERVAL: 108 MS
QT INTERVAL: 408 MS
QTC INTERVAL: 476 MS
RBC # BLD AUTO: 2.97 MILLION/UL (ref 3.81–5.12)
SODIUM SERPL-SCNC: 143 MMOL/L (ref 136–145)
SODIUM SERPL-SCNC: 145 MMOL/L (ref 136–145)
SODIUM SERPL-SCNC: 148 MMOL/L (ref 136–145)
T WAVE AXIS: 86 DEGREES
TSH SERPL DL<=0.05 MIU/L-ACNC: 0.25 UIU/ML (ref 0.36–3.74)
VENTRICULAR RATE: 82 BPM
VIT B12 SERPL-MCNC: 789 PG/ML (ref 100–900)
WBC # BLD AUTO: 7.5 THOUSAND/UL (ref 4.31–10.16)

## 2017-04-20 PROCEDURE — 80048 BASIC METABOLIC PNL TOTAL CA: CPT | Performed by: PHYSICIAN ASSISTANT

## 2017-04-20 PROCEDURE — 93005 ELECTROCARDIOGRAM TRACING: CPT | Performed by: PHYSICIAN ASSISTANT

## 2017-04-20 PROCEDURE — 82553 CREATINE MB FRACTION: CPT | Performed by: PHYSICIAN ASSISTANT

## 2017-04-20 PROCEDURE — 82550 ASSAY OF CK (CPK): CPT | Performed by: PHYSICIAN ASSISTANT

## 2017-04-20 PROCEDURE — 82550 ASSAY OF CK (CPK): CPT | Performed by: INTERNAL MEDICINE

## 2017-04-20 PROCEDURE — 82553 CREATINE MB FRACTION: CPT | Performed by: INTERNAL MEDICINE

## 2017-04-20 PROCEDURE — 84443 ASSAY THYROID STIM HORMONE: CPT | Performed by: PHYSICIAN ASSISTANT

## 2017-04-20 PROCEDURE — 84100 ASSAY OF PHOSPHORUS: CPT | Performed by: PHYSICIAN ASSISTANT

## 2017-04-20 PROCEDURE — 85025 COMPLETE CBC W/AUTO DIFF WBC: CPT | Performed by: INTERNAL MEDICINE

## 2017-04-20 PROCEDURE — 83735 ASSAY OF MAGNESIUM: CPT | Performed by: INTERNAL MEDICINE

## 2017-04-20 PROCEDURE — 94150 VITAL CAPACITY TEST: CPT

## 2017-04-20 PROCEDURE — 94760 N-INVAS EAR/PLS OXIMETRY 1: CPT

## 2017-04-20 PROCEDURE — 80053 COMPREHEN METABOLIC PANEL: CPT | Performed by: INTERNAL MEDICINE

## 2017-04-20 PROCEDURE — 83735 ASSAY OF MAGNESIUM: CPT | Performed by: PHYSICIAN ASSISTANT

## 2017-04-20 PROCEDURE — 84100 ASSAY OF PHOSPHORUS: CPT | Performed by: INTERNAL MEDICINE

## 2017-04-20 PROCEDURE — 82746 ASSAY OF FOLIC ACID SERUM: CPT | Performed by: PHYSICIAN ASSISTANT

## 2017-04-20 PROCEDURE — 82607 VITAMIN B-12: CPT | Performed by: PHYSICIAN ASSISTANT

## 2017-04-20 PROCEDURE — 94003 VENT MGMT INPAT SUBQ DAY: CPT

## 2017-04-20 RX ORDER — CHLORHEXIDINE GLUCONATE 0.12 MG/ML
15 RINSE ORAL EVERY 12 HOURS SCHEDULED
Status: DISCONTINUED | OUTPATIENT
Start: 2017-04-20 | End: 2017-04-23

## 2017-04-20 RX ORDER — POTASSIUM CHLORIDE 14.9 MG/ML
20 INJECTION INTRAVENOUS ONCE
Status: COMPLETED | OUTPATIENT
Start: 2017-04-20 | End: 2017-04-20

## 2017-04-20 RX ORDER — LORAZEPAM 2 MG/ML
0.5 INJECTION INTRAMUSCULAR ONCE
Status: COMPLETED | OUTPATIENT
Start: 2017-04-20 | End: 2017-04-20

## 2017-04-20 RX ORDER — LORAZEPAM 2 MG/ML
INJECTION INTRAMUSCULAR
Status: COMPLETED
Start: 2017-04-20 | End: 2017-04-20

## 2017-04-20 RX ORDER — LEVETIRACETAM 100 MG/ML
750 SOLUTION ORAL EVERY 12 HOURS SCHEDULED
Status: DISCONTINUED | OUTPATIENT
Start: 2017-04-20 | End: 2017-04-23

## 2017-04-20 RX ORDER — POTASSIUM CHLORIDE 20MEQ/15ML
40 LIQUID (ML) ORAL ONCE
Status: COMPLETED | OUTPATIENT
Start: 2017-04-20 | End: 2017-04-20

## 2017-04-20 RX ORDER — POTASSIUM CHLORIDE 20 MEQ/1
20 TABLET, EXTENDED RELEASE ORAL
Status: DISCONTINUED | OUTPATIENT
Start: 2017-04-20 | End: 2017-04-20

## 2017-04-20 RX ADMIN — HEPARIN SODIUM 5000 UNITS: 5000 INJECTION, SOLUTION INTRAVENOUS; SUBCUTANEOUS at 21:02

## 2017-04-20 RX ADMIN — LORAZEPAM 0.5 MG: 2 INJECTION, SOLUTION INTRAMUSCULAR; INTRAVENOUS at 19:48

## 2017-04-20 RX ADMIN — POTASSIUM CHLORIDE 40 MEQ: 20 SOLUTION ORAL at 16:30

## 2017-04-20 RX ADMIN — POTASSIUM CHLORIDE 20 MEQ: 1500 TABLET, EXTENDED RELEASE ORAL at 07:38

## 2017-04-20 RX ADMIN — POTASSIUM CHLORIDE 20 MEQ: 200 INJECTION, SOLUTION INTRAVENOUS at 03:49

## 2017-04-20 RX ADMIN — DEXTROSE AND SODIUM CHLORIDE 125 ML/HR: 5; .45 INJECTION, SOLUTION INTRAVENOUS at 22:50

## 2017-04-20 RX ADMIN — LEVETIRACETAM 750 MG: 100 SOLUTION ORAL at 20:00

## 2017-04-20 RX ADMIN — HEPARIN SODIUM 5000 UNITS: 5000 INJECTION, SOLUTION INTRAVENOUS; SUBCUTANEOUS at 05:00

## 2017-04-20 RX ADMIN — POTASSIUM CHLORIDE 20 MEQ: 200 INJECTION, SOLUTION INTRAVENOUS at 01:24

## 2017-04-20 RX ADMIN — CHLORHEXIDINE GLUCONATE 15 ML: 1.2 RINSE ORAL at 09:16

## 2017-04-20 RX ADMIN — HEPARIN SODIUM 5000 UNITS: 5000 INJECTION, SOLUTION INTRAVENOUS; SUBCUTANEOUS at 13:03

## 2017-04-20 RX ADMIN — DEXTROSE AND SODIUM CHLORIDE 125 ML/HR: 5; .45 INJECTION, SOLUTION INTRAVENOUS at 14:00

## 2017-04-20 RX ADMIN — LEVETIRACETAM 750 MG: 100 SOLUTION ORAL at 13:04

## 2017-04-20 RX ADMIN — LORAZEPAM 0.5 MG: 2 INJECTION INTRAMUSCULAR at 19:48

## 2017-04-20 RX ADMIN — CHLORHEXIDINE GLUCONATE 15 ML: 1.2 RINSE ORAL at 20:00

## 2017-04-20 RX ADMIN — OMEPRAZOLE MAGNESIUM 20 MG: 20 CAPSULE, DELAYED RELEASE ORAL at 09:16

## 2017-04-21 PROBLEM — N39.0 URINARY TRACT INFECTION: Status: ACTIVE | Noted: 2017-04-21

## 2017-04-21 LAB
ALBUMIN SERPL BCP-MCNC: 2.2 G/DL (ref 3.5–5)
ALP SERPL-CCNC: 188 U/L (ref 46–116)
ALT SERPL W P-5'-P-CCNC: 25 U/L (ref 12–78)
ANION GAP BLD CALC-SCNC: 16 MMOL/L (ref 4–13)
ANION GAP SERPL CALCULATED.3IONS-SCNC: 9 MMOL/L (ref 4–13)
AST SERPL W P-5'-P-CCNC: 42 U/L (ref 5–45)
BACTERIA UR CULT: NORMAL
BACTERIA UR CULT: NORMAL
BASOPHILS # BLD AUTO: 0.01 THOUSANDS/ΜL (ref 0–0.1)
BASOPHILS NFR BLD AUTO: 0 % (ref 0–1)
BILIRUB SERPL-MCNC: 1 MG/DL (ref 0.2–1)
BUN BLD-MCNC: 22 MG/DL (ref 5–25)
BUN SERPL-MCNC: 8 MG/DL (ref 5–25)
CA-I BLD-SCNC: 1.1 MMOL/L (ref 1.12–1.32)
CALCIUM SERPL-MCNC: 8.2 MG/DL (ref 8.3–10.1)
CHLORIDE BLD-SCNC: 97 MMOL/L (ref 100–108)
CHLORIDE SERPL-SCNC: 104 MMOL/L (ref 100–108)
CK MB SERPL-MCNC: 0.8 NG/ML (ref 0–5)
CK MB SERPL-MCNC: 3.8 NG/ML (ref 0–5)
CK MB SERPL-MCNC: <1 % (ref 0–2.5)
CK MB SERPL-MCNC: <1 % (ref 0–2.5)
CK SERPL-CCNC: 313 U/L (ref 26–192)
CK SERPL-CCNC: 644 U/L (ref 26–192)
CO2 SERPL-SCNC: 28 MMOL/L (ref 21–32)
CREAT BLD-MCNC: 1 MG/DL (ref 0.6–1.3)
CREAT SERPL-MCNC: 0.98 MG/DL (ref 0.6–1.3)
EOSINOPHIL # BLD AUTO: 0.09 THOUSAND/ΜL (ref 0–0.61)
EOSINOPHIL NFR BLD AUTO: 1 % (ref 0–6)
ERYTHROCYTE [DISTWIDTH] IN BLOOD BY AUTOMATED COUNT: 14.6 % (ref 11.6–15.1)
GFR SERPL CREATININE-BSD FRML MDRD: 54.7 ML/MIN/1.73SQ M
GFR SERPL CREATININE-BSD FRML MDRD: 55.9 ML/MIN/1.73SQ M
GLUCOSE SERPL-MCNC: 117 MG/DL (ref 65–140)
GLUCOSE SERPL-MCNC: 121 MG/DL (ref 65–140)
HCT VFR BLD AUTO: 28.8 % (ref 34.8–46.1)
HCT VFR BLD CALC: 36 % (ref 34.8–46.1)
HGB BLD-MCNC: 9.1 G/DL (ref 11.5–15.4)
HGB BLDA-MCNC: 12.2 G/DL (ref 11.5–15.4)
LYMPHOCYTES # BLD AUTO: 1.05 THOUSANDS/ΜL (ref 0.6–4.47)
LYMPHOCYTES NFR BLD AUTO: 14 % (ref 14–44)
MAGNESIUM SERPL-MCNC: 1.8 MG/DL (ref 1.6–2.6)
MCH RBC QN AUTO: 30 PG (ref 26.8–34.3)
MCHC RBC AUTO-ENTMCNC: 31.6 G/DL (ref 31.4–37.4)
MCV RBC AUTO: 95 FL (ref 82–98)
MONOCYTES # BLD AUTO: 0.5 THOUSAND/ΜL (ref 0.17–1.22)
MONOCYTES NFR BLD AUTO: 7 % (ref 4–12)
NEUTROPHILS # BLD AUTO: 5.96 THOUSANDS/ΜL (ref 1.85–7.62)
NEUTS SEG NFR BLD AUTO: 78 % (ref 43–75)
PCO2 BLD: 35 MMOL/L (ref 21–32)
PHOSPHATE SERPL-MCNC: 3.3 MG/DL (ref 2.3–4.1)
PLATELET # BLD AUTO: 100 THOUSANDS/UL (ref 149–390)
PMV BLD AUTO: 11.7 FL (ref 8.9–12.7)
POTASSIUM BLD-SCNC: 3.7 MMOL/L (ref 3.5–5.3)
POTASSIUM SERPL-SCNC: 3.8 MMOL/L (ref 3.5–5.3)
PROT SERPL-MCNC: 5.5 G/DL (ref 6.4–8.2)
RBC # BLD AUTO: 3.03 MILLION/UL (ref 3.81–5.12)
RPR SER QL: NORMAL
SODIUM BLD-SCNC: 144 MMOL/L (ref 136–145)
SODIUM SERPL-SCNC: 141 MMOL/L (ref 136–145)
SPECIMEN SOURCE: ABNORMAL
WBC # BLD AUTO: 7.61 THOUSAND/UL (ref 4.31–10.16)

## 2017-04-21 PROCEDURE — 94150 VITAL CAPACITY TEST: CPT

## 2017-04-21 PROCEDURE — 82550 ASSAY OF CK (CPK): CPT | Performed by: PHYSICIAN ASSISTANT

## 2017-04-21 PROCEDURE — 85025 COMPLETE CBC W/AUTO DIFF WBC: CPT | Performed by: PHYSICIAN ASSISTANT

## 2017-04-21 PROCEDURE — 82553 CREATINE MB FRACTION: CPT | Performed by: PHYSICIAN ASSISTANT

## 2017-04-21 PROCEDURE — 82306 VITAMIN D 25 HYDROXY: CPT | Performed by: INTERNAL MEDICINE

## 2017-04-21 PROCEDURE — 80053 COMPREHEN METABOLIC PANEL: CPT | Performed by: PHYSICIAN ASSISTANT

## 2017-04-21 PROCEDURE — 84100 ASSAY OF PHOSPHORUS: CPT | Performed by: PHYSICIAN ASSISTANT

## 2017-04-21 PROCEDURE — 94003 VENT MGMT INPAT SUBQ DAY: CPT

## 2017-04-21 PROCEDURE — 83735 ASSAY OF MAGNESIUM: CPT | Performed by: PHYSICIAN ASSISTANT

## 2017-04-21 PROCEDURE — 94760 N-INVAS EAR/PLS OXIMETRY 1: CPT

## 2017-04-21 RX ORDER — MIDODRINE HYDROCHLORIDE 10 MG/1
5 TABLET ORAL 3 TIMES DAILY
COMMUNITY

## 2017-04-21 RX ORDER — LEVOTHYROXINE SODIUM 0.07 MG/1
75 TABLET ORAL
Status: DISCONTINUED | OUTPATIENT
Start: 2017-04-22 | End: 2017-04-28 | Stop reason: HOSPADM

## 2017-04-21 RX ORDER — NYSTATIN 100000 [USP'U]/G
1 POWDER TOPICAL AS NEEDED
Status: DISCONTINUED | OUTPATIENT
Start: 2017-04-21 | End: 2017-04-21

## 2017-04-21 RX ORDER — LORAZEPAM 2 MG/ML
0.5 INJECTION INTRAMUSCULAR EVERY 6 HOURS PRN
Status: DISCONTINUED | OUTPATIENT
Start: 2017-04-21 | End: 2017-04-21

## 2017-04-21 RX ORDER — SODIUM CHLORIDE, SODIUM GLUCONATE, SODIUM ACETATE, POTASSIUM CHLORIDE, MAGNESIUM CHLORIDE, SODIUM PHOSPHATE, DIBASIC, AND POTASSIUM PHOSPHATE .53; .5; .37; .037; .03; .012; .00082 G/100ML; G/100ML; G/100ML; G/100ML; G/100ML; G/100ML; G/100ML
75 INJECTION, SOLUTION INTRAVENOUS CONTINUOUS
Status: DISCONTINUED | OUTPATIENT
Start: 2017-04-21 | End: 2017-04-23

## 2017-04-21 RX ORDER — ROPINIROLE 5 MG/1
5 TABLET, FILM COATED ORAL 3 TIMES DAILY
COMMUNITY
End: 2018-03-29

## 2017-04-21 RX ORDER — LEVOTHYROXINE SODIUM 0.07 MG/1
75 TABLET ORAL DAILY
COMMUNITY
End: 2018-06-18 | Stop reason: SDUPTHER

## 2017-04-21 RX ORDER — FENTANYL CITRATE 50 UG/ML
25 INJECTION, SOLUTION INTRAMUSCULAR; INTRAVENOUS ONCE
Status: COMPLETED | OUTPATIENT
Start: 2017-04-21 | End: 2017-04-21

## 2017-04-21 RX ORDER — GABAPENTIN 100 MG/1
100 CAPSULE ORAL DAILY
Status: DISCONTINUED | OUTPATIENT
Start: 2017-04-22 | End: 2017-04-28 | Stop reason: HOSPADM

## 2017-04-21 RX ORDER — GABAPENTIN 100 MG/1
100 CAPSULE ORAL DAILY
COMMUNITY
End: 2017-05-10 | Stop reason: HOSPADM

## 2017-04-21 RX ORDER — LEVETIRACETAM 750 MG/1
750 TABLET ORAL DAILY
COMMUNITY
End: 2018-03-29

## 2017-04-21 RX ORDER — LORAZEPAM 2 MG/ML
INJECTION INTRAMUSCULAR
Status: COMPLETED
Start: 2017-04-21 | End: 2017-04-21

## 2017-04-21 RX ORDER — PROPOFOL 10 MG/ML
5-50 INJECTION, EMULSION INTRAVENOUS
Status: DISCONTINUED | OUTPATIENT
Start: 2017-04-21 | End: 2017-04-23

## 2017-04-21 RX ORDER — NYSTATIN 100000 [USP'U]/G
1 POWDER TOPICAL 2 TIMES DAILY PRN
Status: DISCONTINUED | OUTPATIENT
Start: 2017-04-21 | End: 2017-04-28 | Stop reason: HOSPADM

## 2017-04-21 RX ORDER — PREGABALIN 50 MG/1
50 CAPSULE ORAL 3 TIMES DAILY
Status: DISCONTINUED | OUTPATIENT
Start: 2017-04-21 | End: 2017-04-28 | Stop reason: HOSPADM

## 2017-04-21 RX ORDER — PREGABALIN 50 MG/1
50 CAPSULE ORAL 3 TIMES DAILY
COMMUNITY
End: 2018-03-29 | Stop reason: SDUPTHER

## 2017-04-21 RX ADMIN — CEFAZOLIN SODIUM 1000 MG: 1 SOLUTION INTRAVENOUS at 21:22

## 2017-04-21 RX ADMIN — SODIUM CHLORIDE, SODIUM GLUCONATE, SODIUM ACETATE, POTASSIUM CHLORIDE, MAGNESIUM CHLORIDE, SODIUM PHOSPHATE, DIBASIC, AND POTASSIUM PHOSPHATE 75 ML/HR: .53; .5; .37; .037; .03; .012; .00082 INJECTION, SOLUTION INTRAVENOUS at 23:22

## 2017-04-21 RX ADMIN — LEVETIRACETAM 750 MG: 100 SOLUTION ORAL at 08:35

## 2017-04-21 RX ADMIN — CHLORHEXIDINE GLUCONATE 15 ML: 1.2 RINSE ORAL at 08:34

## 2017-04-21 RX ADMIN — PREGABALIN 50 MG: 50 CAPSULE ORAL at 21:21

## 2017-04-21 RX ADMIN — CEFAZOLIN SODIUM 1000 MG: 1 SOLUTION INTRAVENOUS at 14:36

## 2017-04-21 RX ADMIN — SODIUM CHLORIDE, SODIUM GLUCONATE, SODIUM ACETATE, POTASSIUM CHLORIDE, MAGNESIUM CHLORIDE, SODIUM PHOSPHATE, DIBASIC, AND POTASSIUM PHOSPHATE 75 ML/HR: .53; .5; .37; .037; .03; .012; .00082 INJECTION, SOLUTION INTRAVENOUS at 11:17

## 2017-04-21 RX ADMIN — LORAZEPAM 0.5 MG: 2 INJECTION, SOLUTION INTRAMUSCULAR; INTRAVENOUS at 01:36

## 2017-04-21 RX ADMIN — CHLORHEXIDINE GLUCONATE 15 ML: 1.2 RINSE ORAL at 21:21

## 2017-04-21 RX ADMIN — DEXTROSE AND SODIUM CHLORIDE 150 ML/HR: 5; .45 INJECTION, SOLUTION INTRAVENOUS at 05:56

## 2017-04-21 RX ADMIN — DEXMEDETOMIDINE HYDROCHLORIDE 0.2 MCG/KG/HR: 100 INJECTION, SOLUTION INTRAVENOUS at 11:20

## 2017-04-21 RX ADMIN — OMEPRAZOLE MAGNESIUM 20 MG: 20 CAPSULE, DELAYED RELEASE ORAL at 08:36

## 2017-04-21 RX ADMIN — LORAZEPAM 0.5 MG: 2 INJECTION INTRAMUSCULAR at 01:36

## 2017-04-21 RX ADMIN — HEPARIN SODIUM 5000 UNITS: 5000 INJECTION, SOLUTION INTRAVENOUS; SUBCUTANEOUS at 21:21

## 2017-04-21 RX ADMIN — PROPOFOL 5 MCG/KG/MIN: 10 INJECTION, EMULSION INTRAVENOUS at 17:24

## 2017-04-21 RX ADMIN — FENTANYL CITRATE 25 MCG: 50 INJECTION INTRAMUSCULAR; INTRAVENOUS at 23:30

## 2017-04-21 RX ADMIN — HEPARIN SODIUM 5000 UNITS: 5000 INJECTION, SOLUTION INTRAVENOUS; SUBCUTANEOUS at 05:04

## 2017-04-21 RX ADMIN — NYSTATIN 1 APPLICATION: 100000 POWDER TOPICAL at 23:20

## 2017-04-21 RX ADMIN — LEVETIRACETAM 750 MG: 100 SOLUTION ORAL at 21:39

## 2017-04-21 RX ADMIN — HEPARIN SODIUM 5000 UNITS: 5000 INJECTION, SOLUTION INTRAVENOUS; SUBCUTANEOUS at 13:57

## 2017-04-22 LAB
ANCILLARY VALUES: ABNORMAL
ANION GAP SERPL CALCULATED.3IONS-SCNC: 12 MMOL/L (ref 4–13)
ARTERIAL PATENCY WRIST A: ABNORMAL
BASE EXCESS BLDA CALC-SCNC: 1 MMOL/L (ref -2–3)
BASOPHILS # BLD AUTO: 0.01 THOUSANDS/ΜL (ref 0–0.1)
BASOPHILS NFR BLD AUTO: 0 % (ref 0–1)
BUN SERPL-MCNC: 10 MG/DL (ref 5–25)
CA-I BLD-SCNC: 1.22 MMOL/L (ref 1.12–1.32)
CALCIUM SERPL-MCNC: 8.9 MG/DL (ref 8.3–10.1)
CHLORIDE SERPL-SCNC: 103 MMOL/L (ref 100–108)
CK MB SERPL-MCNC: 0.7 NG/ML (ref 0–5)
CK MB SERPL-MCNC: <1 % (ref 0–2.5)
CK SERPL-CCNC: 252 U/L (ref 26–192)
CO2 SERPL-SCNC: 27 MMOL/L (ref 21–32)
CREAT SERPL-MCNC: 0.89 MG/DL (ref 0.6–1.3)
DS:DELIVERY SYSTEM: AC
EOSINOPHIL # BLD AUTO: 0.15 THOUSAND/ΜL (ref 0–0.61)
EOSINOPHIL NFR BLD AUTO: 2 % (ref 0–6)
ERYTHROCYTE [DISTWIDTH] IN BLOOD BY AUTOMATED COUNT: 14.3 % (ref 11.6–15.1)
FIO2 GAS DIL.REBREATH: 40 L
GFR SERPL CREATININE-BSD FRML MDRD: >60 ML/MIN/1.73SQ M
GLUCOSE SERPL-MCNC: 103 MG/DL (ref 65–140)
GLUCOSE SERPL-MCNC: 83 MG/DL (ref 65–140)
GLUCOSE SERPL-MCNC: 88 MG/DL (ref 65–140)
GLUCOSE SERPL-MCNC: 91 MG/DL (ref 65–140)
GLUCOSE SERPL-MCNC: 95 MG/DL (ref 65–140)
GLUCOSE SERPL-MCNC: 97 MG/DL (ref 65–140)
HCO3 BLDA-SCNC: 26.1 MMOL/L (ref 22–28)
HCT VFR BLD AUTO: 30.7 % (ref 34.8–46.1)
HCT VFR BLD CALC: 25 % (ref 34.8–46.1)
HGB BLD-MCNC: 9.9 G/DL (ref 11.5–15.4)
HGB BLDA-MCNC: 8.5 G/DL (ref 11.5–15.4)
LYMPHOCYTES # BLD AUTO: 1.08 THOUSANDS/ΜL (ref 0.6–4.47)
LYMPHOCYTES NFR BLD AUTO: 14 % (ref 14–44)
MAGNESIUM SERPL-MCNC: 1.8 MG/DL (ref 1.6–2.6)
MCH RBC QN AUTO: 30 PG (ref 26.8–34.3)
MCHC RBC AUTO-ENTMCNC: 32.2 G/DL (ref 31.4–37.4)
MCV RBC AUTO: 93 FL (ref 82–98)
MONOCYTES # BLD AUTO: 0.3 THOUSAND/ΜL (ref 0.17–1.22)
MONOCYTES NFR BLD AUTO: 4 % (ref 4–12)
NEUTROPHILS # BLD AUTO: 6.3 THOUSANDS/ΜL (ref 1.85–7.62)
NEUTS SEG NFR BLD AUTO: 80 % (ref 43–75)
PCO2 BLD: 27 MMOL/L (ref 21–32)
PCO2 BLD: 41.8 MM HG (ref 36–44)
PH BLD: 7.4 [PH] (ref 7.35–7.45)
PHOSPHATE SERPL-MCNC: 3.9 MG/DL (ref 2.3–4.1)
PLATELET # BLD AUTO: 123 THOUSANDS/UL (ref 149–390)
PMV BLD AUTO: 12.3 FL (ref 8.9–12.7)
PO2 BLD: 109 MM HG (ref 75–129)
POTASSIUM BLD-SCNC: 3.8 MMOL/L (ref 3.5–5.3)
POTASSIUM SERPL-SCNC: 3.5 MMOL/L (ref 3.5–5.3)
PRESSURE SETTING: 5
RBC # BLD AUTO: 3.3 MILLION/UL (ref 3.81–5.12)
RESPIRATORY RATE: 12
SAMPLE SITE: 11
SAO2 % BLD FROM PO2: 98 % (ref 95–98)
SODIUM BLD-SCNC: 138 MMOL/L (ref 136–145)
SODIUM SERPL-SCNC: 142 MMOL/L (ref 136–145)
SPECIMEN SOURCE: ABNORMAL
VENTILATION VALUE: 400
WBC # BLD AUTO: 7.84 THOUSAND/UL (ref 4.31–10.16)

## 2017-04-22 PROCEDURE — 84100 ASSAY OF PHOSPHORUS: CPT | Performed by: PHYSICIAN ASSISTANT

## 2017-04-22 PROCEDURE — 83735 ASSAY OF MAGNESIUM: CPT | Performed by: PHYSICIAN ASSISTANT

## 2017-04-22 PROCEDURE — 82330 ASSAY OF CALCIUM: CPT

## 2017-04-22 PROCEDURE — 36600 WITHDRAWAL OF ARTERIAL BLOOD: CPT

## 2017-04-22 PROCEDURE — 94760 N-INVAS EAR/PLS OXIMETRY 1: CPT

## 2017-04-22 PROCEDURE — 82947 ASSAY GLUCOSE BLOOD QUANT: CPT

## 2017-04-22 PROCEDURE — 82948 REAGENT STRIP/BLOOD GLUCOSE: CPT

## 2017-04-22 PROCEDURE — 80048 BASIC METABOLIC PNL TOTAL CA: CPT | Performed by: PHYSICIAN ASSISTANT

## 2017-04-22 PROCEDURE — 85014 HEMATOCRIT: CPT

## 2017-04-22 PROCEDURE — 82803 BLOOD GASES ANY COMBINATION: CPT

## 2017-04-22 PROCEDURE — 82553 CREATINE MB FRACTION: CPT | Performed by: PHYSICIAN ASSISTANT

## 2017-04-22 PROCEDURE — 84132 ASSAY OF SERUM POTASSIUM: CPT

## 2017-04-22 PROCEDURE — 94003 VENT MGMT INPAT SUBQ DAY: CPT

## 2017-04-22 PROCEDURE — 94150 VITAL CAPACITY TEST: CPT

## 2017-04-22 PROCEDURE — 84295 ASSAY OF SERUM SODIUM: CPT

## 2017-04-22 PROCEDURE — 82550 ASSAY OF CK (CPK): CPT | Performed by: PHYSICIAN ASSISTANT

## 2017-04-22 PROCEDURE — 85025 COMPLETE CBC W/AUTO DIFF WBC: CPT | Performed by: PHYSICIAN ASSISTANT

## 2017-04-22 RX ORDER — MAGNESIUM SULFATE HEPTAHYDRATE 40 MG/ML
2 INJECTION, SOLUTION INTRAVENOUS ONCE
Status: DISCONTINUED | OUTPATIENT
Start: 2017-04-22 | End: 2017-04-22

## 2017-04-22 RX ORDER — POTASSIUM CHLORIDE 20MEQ/15ML
40 LIQUID (ML) ORAL ONCE
Status: COMPLETED | OUTPATIENT
Start: 2017-04-22 | End: 2017-04-22

## 2017-04-22 RX ORDER — MAGNESIUM SULFATE HEPTAHYDRATE 40 MG/ML
2 INJECTION, SOLUTION INTRAVENOUS ONCE
Status: COMPLETED | OUTPATIENT
Start: 2017-04-22 | End: 2017-04-22

## 2017-04-22 RX ORDER — MIDODRINE HYDROCHLORIDE 5 MG/1
10 TABLET ORAL 3 TIMES DAILY
Status: DISCONTINUED | OUTPATIENT
Start: 2017-04-22 | End: 2017-04-28 | Stop reason: HOSPADM

## 2017-04-22 RX ORDER — ROPINIROLE 0.25 MG/1
0.5 TABLET, FILM COATED ORAL 3 TIMES DAILY
Status: DISCONTINUED | OUTPATIENT
Start: 2017-04-22 | End: 2017-04-22

## 2017-04-22 RX ORDER — POTASSIUM CHLORIDE 20MEQ/15ML
40 LIQUID (ML) ORAL ONCE
Status: DISCONTINUED | OUTPATIENT
Start: 2017-04-22 | End: 2017-04-22

## 2017-04-22 RX ADMIN — PROPOFOL 5 MCG/KG/MIN: 10 INJECTION, EMULSION INTRAVENOUS at 05:00

## 2017-04-22 RX ADMIN — MIDODRINE HYDROCHLORIDE 10 MG: 5 TABLET ORAL at 08:43

## 2017-04-22 RX ADMIN — CHLORHEXIDINE GLUCONATE 15 ML: 1.2 RINSE ORAL at 08:42

## 2017-04-22 RX ADMIN — PREGABALIN 50 MG: 50 CAPSULE ORAL at 21:07

## 2017-04-22 RX ADMIN — CHLORHEXIDINE GLUCONATE 15 ML: 1.2 RINSE ORAL at 21:07

## 2017-04-22 RX ADMIN — LEVETIRACETAM 750 MG: 100 SOLUTION ORAL at 08:43

## 2017-04-22 RX ADMIN — GABAPENTIN 100 MG: 100 CAPSULE ORAL at 08:42

## 2017-04-22 RX ADMIN — POTASSIUM CHLORIDE 40 MEQ: 20 SOLUTION ORAL at 05:50

## 2017-04-22 RX ADMIN — MIDODRINE HYDROCHLORIDE 10 MG: 5 TABLET ORAL at 15:19

## 2017-04-22 RX ADMIN — PREGABALIN 50 MG: 50 CAPSULE ORAL at 15:20

## 2017-04-22 RX ADMIN — CEFAZOLIN SODIUM 1000 MG: 1 SOLUTION INTRAVENOUS at 05:42

## 2017-04-22 RX ADMIN — MIDODRINE HYDROCHLORIDE 10 MG: 5 TABLET ORAL at 21:07

## 2017-04-22 RX ADMIN — PROPOFOL 5 MCG/KG/MIN: 10 INJECTION, EMULSION INTRAVENOUS at 17:16

## 2017-04-22 RX ADMIN — MAGNESIUM SULFATE HEPTAHYDRATE 2 G: 40 INJECTION, SOLUTION INTRAVENOUS at 06:32

## 2017-04-22 RX ADMIN — NYSTATIN 1 APPLICATION: 100000 POWDER TOPICAL at 17:57

## 2017-04-22 RX ADMIN — CEFAZOLIN SODIUM 1000 MG: 1 SOLUTION INTRAVENOUS at 21:16

## 2017-04-22 RX ADMIN — CEFAZOLIN SODIUM 1000 MG: 1 SOLUTION INTRAVENOUS at 13:38

## 2017-04-22 RX ADMIN — HEPARIN SODIUM 5000 UNITS: 5000 INJECTION, SOLUTION INTRAVENOUS; SUBCUTANEOUS at 05:48

## 2017-04-22 RX ADMIN — SODIUM CHLORIDE, SODIUM GLUCONATE, SODIUM ACETATE, POTASSIUM CHLORIDE, MAGNESIUM CHLORIDE, SODIUM PHOSPHATE, DIBASIC, AND POTASSIUM PHOSPHATE 75 ML/HR: .53; .5; .37; .037; .03; .012; .00082 INJECTION, SOLUTION INTRAVENOUS at 14:20

## 2017-04-22 RX ADMIN — LEVETIRACETAM 750 MG: 100 SOLUTION ORAL at 21:14

## 2017-04-22 RX ADMIN — OMEPRAZOLE MAGNESIUM 20 MG: 20 CAPSULE, DELAYED RELEASE ORAL at 08:43

## 2017-04-22 RX ADMIN — HEPARIN SODIUM 5000 UNITS: 5000 INJECTION, SOLUTION INTRAVENOUS; SUBCUTANEOUS at 13:38

## 2017-04-22 RX ADMIN — LEVOTHYROXINE SODIUM 75 MCG: 75 TABLET ORAL at 05:50

## 2017-04-22 RX ADMIN — PREGABALIN 50 MG: 50 CAPSULE ORAL at 08:42

## 2017-04-23 LAB
ALBUMIN SERPL BCP-MCNC: 2.3 G/DL (ref 3.5–5)
ALP SERPL-CCNC: 212 U/L (ref 46–116)
ALT SERPL W P-5'-P-CCNC: 21 U/L (ref 12–78)
ANION GAP SERPL CALCULATED.3IONS-SCNC: 8 MMOL/L (ref 4–13)
ANION GAP SERPL CALCULATED.3IONS-SCNC: 8 MMOL/L (ref 4–13)
AST SERPL W P-5'-P-CCNC: 37 U/L (ref 5–45)
BASOPHILS # BLD AUTO: 0.01 THOUSANDS/ΜL (ref 0–0.1)
BASOPHILS NFR BLD AUTO: 0 % (ref 0–1)
BILIRUB SERPL-MCNC: 0.5 MG/DL (ref 0.2–1)
BUN SERPL-MCNC: 11 MG/DL (ref 5–25)
BUN SERPL-MCNC: 11 MG/DL (ref 5–25)
CA-I BLD-SCNC: 1.09 MMOL/L (ref 1.12–1.32)
CALCIUM SERPL-MCNC: 8.9 MG/DL (ref 8.3–10.1)
CALCIUM SERPL-MCNC: 8.9 MG/DL (ref 8.3–10.1)
CHLORIDE SERPL-SCNC: 104 MMOL/L (ref 100–108)
CHLORIDE SERPL-SCNC: 104 MMOL/L (ref 100–108)
CO2 SERPL-SCNC: 28 MMOL/L (ref 21–32)
CO2 SERPL-SCNC: 28 MMOL/L (ref 21–32)
CREAT SERPL-MCNC: 0.81 MG/DL (ref 0.6–1.3)
CREAT SERPL-MCNC: 0.81 MG/DL (ref 0.6–1.3)
EOSINOPHIL # BLD AUTO: 0.21 THOUSAND/ΜL (ref 0–0.61)
EOSINOPHIL NFR BLD AUTO: 3 % (ref 0–6)
ERYTHROCYTE [DISTWIDTH] IN BLOOD BY AUTOMATED COUNT: 14.2 % (ref 11.6–15.1)
GFR SERPL CREATININE-BSD FRML MDRD: >60 ML/MIN/1.73SQ M
GFR SERPL CREATININE-BSD FRML MDRD: >60 ML/MIN/1.73SQ M
GLUCOSE SERPL-MCNC: 114 MG/DL (ref 65–140)
GLUCOSE SERPL-MCNC: 78 MG/DL (ref 65–140)
GLUCOSE SERPL-MCNC: 78 MG/DL (ref 65–140)
GLUCOSE SERPL-MCNC: 94 MG/DL (ref 65–140)
HCT VFR BLD AUTO: 27.8 % (ref 34.8–46.1)
HGB BLD-MCNC: 9 G/DL (ref 11.5–15.4)
LYMPHOCYTES # BLD AUTO: 0.98 THOUSANDS/ΜL (ref 0.6–4.47)
LYMPHOCYTES NFR BLD AUTO: 13 % (ref 14–44)
MAGNESIUM SERPL-MCNC: 2.3 MG/DL (ref 1.6–2.6)
MCH RBC QN AUTO: 30.5 PG (ref 26.8–34.3)
MCHC RBC AUTO-ENTMCNC: 32.4 G/DL (ref 31.4–37.4)
MCV RBC AUTO: 94 FL (ref 82–98)
MONOCYTES # BLD AUTO: 0.42 THOUSAND/ΜL (ref 0.17–1.22)
MONOCYTES NFR BLD AUTO: 6 % (ref 4–12)
NEUTROPHILS # BLD AUTO: 5.7 THOUSANDS/ΜL (ref 1.85–7.62)
NEUTS SEG NFR BLD AUTO: 78 % (ref 43–75)
PHOSPHATE SERPL-MCNC: 5.2 MG/DL (ref 2.3–4.1)
PLATELET # BLD AUTO: 123 THOUSANDS/UL (ref 149–390)
PMV BLD AUTO: 13.3 FL (ref 8.9–12.7)
POTASSIUM SERPL-SCNC: 4.3 MMOL/L (ref 3.5–5.3)
POTASSIUM SERPL-SCNC: 4.3 MMOL/L (ref 3.5–5.3)
PROT SERPL-MCNC: 6 G/DL (ref 6.4–8.2)
RBC # BLD AUTO: 2.95 MILLION/UL (ref 3.81–5.12)
SODIUM SERPL-SCNC: 140 MMOL/L (ref 136–145)
SODIUM SERPL-SCNC: 140 MMOL/L (ref 136–145)
WBC # BLD AUTO: 7.32 THOUSAND/UL (ref 4.31–10.16)

## 2017-04-23 PROCEDURE — 82948 REAGENT STRIP/BLOOD GLUCOSE: CPT

## 2017-04-23 PROCEDURE — 94150 VITAL CAPACITY TEST: CPT

## 2017-04-23 PROCEDURE — 94760 N-INVAS EAR/PLS OXIMETRY 1: CPT

## 2017-04-23 PROCEDURE — 80048 BASIC METABOLIC PNL TOTAL CA: CPT | Performed by: PHYSICIAN ASSISTANT

## 2017-04-23 PROCEDURE — 83735 ASSAY OF MAGNESIUM: CPT | Performed by: PHYSICIAN ASSISTANT

## 2017-04-23 PROCEDURE — 94003 VENT MGMT INPAT SUBQ DAY: CPT

## 2017-04-23 PROCEDURE — 84100 ASSAY OF PHOSPHORUS: CPT | Performed by: PHYSICIAN ASSISTANT

## 2017-04-23 PROCEDURE — 85025 COMPLETE CBC W/AUTO DIFF WBC: CPT | Performed by: PHYSICIAN ASSISTANT

## 2017-04-23 PROCEDURE — 80053 COMPREHEN METABOLIC PANEL: CPT | Performed by: PHYSICIAN ASSISTANT

## 2017-04-23 PROCEDURE — 82330 ASSAY OF CALCIUM: CPT | Performed by: NURSE PRACTITIONER

## 2017-04-23 RX ORDER — ALBUMIN, HUMAN INJ 5% 5 %
25 SOLUTION INTRAVENOUS ONCE
Status: COMPLETED | OUTPATIENT
Start: 2017-04-23 | End: 2017-04-24

## 2017-04-23 RX ORDER — PANTOPRAZOLE SODIUM 40 MG/1
40 TABLET, DELAYED RELEASE ORAL
Status: DISCONTINUED | OUTPATIENT
Start: 2017-04-24 | End: 2017-04-23

## 2017-04-23 RX ORDER — DOPAMINE HYDROCHLORIDE 160 MG/100ML
1-20 INJECTION, SOLUTION INTRAVENOUS
Status: DISCONTINUED | OUTPATIENT
Start: 2017-04-23 | End: 2017-04-25

## 2017-04-23 RX ORDER — KETOROLAC TROMETHAMINE 30 MG/ML
30 INJECTION, SOLUTION INTRAMUSCULAR; INTRAVENOUS ONCE
Status: COMPLETED | OUTPATIENT
Start: 2017-04-23 | End: 2017-04-23

## 2017-04-23 RX ORDER — PANTOPRAZOLE SODIUM 40 MG/1
40 INJECTION, POWDER, FOR SOLUTION INTRAVENOUS DAILY
Status: DISCONTINUED | OUTPATIENT
Start: 2017-04-24 | End: 2017-04-25

## 2017-04-23 RX ORDER — SENNOSIDES 8.8 MG/5ML
8.8 LIQUID ORAL 2 TIMES DAILY
Status: DISCONTINUED | OUTPATIENT
Start: 2017-04-23 | End: 2017-04-28 | Stop reason: HOSPADM

## 2017-04-23 RX ADMIN — SODIUM CHLORIDE 500 ML: 0.9 INJECTION, SOLUTION INTRAVENOUS at 18:20

## 2017-04-23 RX ADMIN — ENOXAPARIN SODIUM 40 MG: 40 INJECTION SUBCUTANEOUS at 08:46

## 2017-04-23 RX ADMIN — NYSTATIN 1 APPLICATION: 100000 POWDER TOPICAL at 02:18

## 2017-04-23 RX ADMIN — ALBUMIN HUMAN 25 G: 0.05 INJECTION, SOLUTION INTRAVENOUS at 23:19

## 2017-04-23 RX ADMIN — CHLORHEXIDINE GLUCONATE 15 ML: 1.2 RINSE ORAL at 08:44

## 2017-04-23 RX ADMIN — CALCIUM GLUCONATE 2 G: 94 INJECTION, SOLUTION INTRAVENOUS at 05:54

## 2017-04-23 RX ADMIN — MIDODRINE HYDROCHLORIDE 10 MG: 5 TABLET ORAL at 08:44

## 2017-04-23 RX ADMIN — PREGABALIN 50 MG: 50 CAPSULE ORAL at 08:44

## 2017-04-23 RX ADMIN — CEFAZOLIN SODIUM 1000 MG: 1 SOLUTION INTRAVENOUS at 13:09

## 2017-04-23 RX ADMIN — NYSTATIN 1 APPLICATION: 100000 POWDER TOPICAL at 15:45

## 2017-04-23 RX ADMIN — CEFAZOLIN SODIUM 1000 MG: 1 SOLUTION INTRAVENOUS at 05:31

## 2017-04-23 RX ADMIN — LEVOTHYROXINE SODIUM 75 MCG: 75 TABLET ORAL at 05:35

## 2017-04-23 RX ADMIN — CEFAZOLIN SODIUM 1000 MG: 1 SOLUTION INTRAVENOUS at 22:04

## 2017-04-23 RX ADMIN — PROPOFOL 5 MCG/KG/MIN: 10 INJECTION, EMULSION INTRAVENOUS at 05:07

## 2017-04-23 RX ADMIN — SODIUM CHLORIDE, SODIUM GLUCONATE, SODIUM ACETATE, POTASSIUM CHLORIDE, MAGNESIUM CHLORIDE, SODIUM PHOSPHATE, DIBASIC, AND POTASSIUM PHOSPHATE 75 ML/HR: .53; .5; .37; .037; .03; .012; .00082 INJECTION, SOLUTION INTRAVENOUS at 02:39

## 2017-04-23 RX ADMIN — SODIUM CHLORIDE 500 ML: 0.9 INJECTION, SOLUTION INTRAVENOUS at 17:16

## 2017-04-23 RX ADMIN — LEVETIRACETAM 750 MG: 100 INJECTION, SOLUTION INTRAVENOUS at 21:29

## 2017-04-23 RX ADMIN — OMEPRAZOLE MAGNESIUM 20 MG: 20 CAPSULE, DELAYED RELEASE ORAL at 08:44

## 2017-04-23 RX ADMIN — GABAPENTIN 100 MG: 100 CAPSULE ORAL at 08:44

## 2017-04-23 RX ADMIN — DOPAMINE HYDROCHLORIDE IN DEXTROSE 1 MCG/KG/MIN: 1.6 INJECTION, SOLUTION INTRAVENOUS at 19:55

## 2017-04-23 RX ADMIN — KETOROLAC TROMETHAMINE 30 MG: 30 INJECTION, SOLUTION INTRAMUSCULAR at 13:24

## 2017-04-23 RX ADMIN — LEVETIRACETAM 750 MG: 100 SOLUTION ORAL at 08:44

## 2017-04-23 RX ADMIN — SENNOSIDES A AND B 8.8 MG: 415.36 LIQUID ORAL at 08:44

## 2017-04-24 PROBLEM — D72.829 LEUKOCYTOSIS: Status: RESOLVED | Noted: 2017-04-20 | Resolved: 2017-04-24

## 2017-04-24 PROBLEM — G11.9 ATAXIA DUE TO CEREBELLAR DEGENERATION (HCC): Status: ACTIVE | Noted: 2017-04-24

## 2017-04-24 PROBLEM — E87.0 HYPERNATREMIA: Status: RESOLVED | Noted: 2017-04-20 | Resolved: 2017-04-24

## 2017-04-24 PROBLEM — G92.9 TOXIC ENCEPHALOPATHY: Status: ACTIVE | Noted: 2017-04-24

## 2017-04-24 PROBLEM — T68.XXXA HYPOTHERMIA: Status: RESOLVED | Noted: 2017-04-20 | Resolved: 2017-04-24

## 2017-04-24 PROBLEM — R65.20 SEVERE SEPSIS(995.92): Status: ACTIVE | Noted: 2017-04-24

## 2017-04-24 PROBLEM — E87.2 LACTIC ACID ACIDOSIS: Status: RESOLVED | Noted: 2017-04-19 | Resolved: 2017-04-24

## 2017-04-24 PROBLEM — G93.41 ACUTE METABOLIC ENCEPHALOPATHY: Status: ACTIVE | Noted: 2017-04-24

## 2017-04-24 PROBLEM — A41.9 SEVERE SEPSIS(995.92): Status: ACTIVE | Noted: 2017-04-24

## 2017-04-24 LAB
25(OH)D2 SERPL-MCNC: <1 NG/ML
25(OH)D3 SERPL-MCNC: 13 NG/ML
25(OH)D3+25(OH)D2 SERPL-MCNC: 13 NG/ML
ANION GAP SERPL CALCULATED.3IONS-SCNC: 8 MMOL/L (ref 4–13)
BACTERIA BLD CULT: NORMAL
BACTERIA BLD CULT: NORMAL
BUN SERPL-MCNC: 12 MG/DL (ref 5–25)
CA-I BLD-SCNC: 1.16 MMOL/L (ref 1.12–1.32)
CALCIUM SERPL-MCNC: 9.5 MG/DL (ref 8.3–10.1)
CHLORIDE SERPL-SCNC: 104 MMOL/L (ref 100–108)
CK SERPL-CCNC: 78 U/L (ref 26–192)
CO2 SERPL-SCNC: 32 MMOL/L (ref 21–32)
CORTIS SERPL-MCNC: 18.3 UG/ML
CREAT SERPL-MCNC: 0.96 MG/DL (ref 0.6–1.3)
ERYTHROCYTE [DISTWIDTH] IN BLOOD BY AUTOMATED COUNT: 14 % (ref 11.6–15.1)
GFR SERPL CREATININE-BSD FRML MDRD: 57.3 ML/MIN/1.73SQ M
GLUCOSE SERPL-MCNC: 80 MG/DL (ref 65–140)
HCT VFR BLD AUTO: 30 % (ref 34.8–46.1)
HGB BLD-MCNC: 9.5 G/DL (ref 11.5–15.4)
MAGNESIUM SERPL-MCNC: 1.9 MG/DL (ref 1.6–2.6)
MCH RBC QN AUTO: 30.2 PG (ref 26.8–34.3)
MCHC RBC AUTO-ENTMCNC: 31.7 G/DL (ref 31.4–37.4)
MCV RBC AUTO: 95 FL (ref 82–98)
PHOSPHATE SERPL-MCNC: 4.4 MG/DL (ref 2.3–4.1)
PLATELET # BLD AUTO: 163 THOUSANDS/UL (ref 149–390)
PMV BLD AUTO: 11.7 FL (ref 8.9–12.7)
POTASSIUM SERPL-SCNC: 3.9 MMOL/L (ref 3.5–5.3)
RBC # BLD AUTO: 3.15 MILLION/UL (ref 3.81–5.12)
SODIUM SERPL-SCNC: 144 MMOL/L (ref 136–145)
TROPONIN I SERPL-MCNC: <0.02 NG/ML
WBC # BLD AUTO: 8.58 THOUSAND/UL (ref 4.31–10.16)

## 2017-04-24 PROCEDURE — 84100 ASSAY OF PHOSPHORUS: CPT | Performed by: NURSE PRACTITIONER

## 2017-04-24 PROCEDURE — G8978 MOBILITY CURRENT STATUS: HCPCS

## 2017-04-24 PROCEDURE — G8979 MOBILITY GOAL STATUS: HCPCS

## 2017-04-24 PROCEDURE — 84484 ASSAY OF TROPONIN QUANT: CPT | Performed by: NURSE PRACTITIONER

## 2017-04-24 PROCEDURE — 97167 OT EVAL HIGH COMPLEX 60 MIN: CPT

## 2017-04-24 PROCEDURE — 83735 ASSAY OF MAGNESIUM: CPT | Performed by: NURSE PRACTITIONER

## 2017-04-24 PROCEDURE — 97163 PT EVAL HIGH COMPLEX 45 MIN: CPT

## 2017-04-24 PROCEDURE — G8988 SELF CARE GOAL STATUS: HCPCS

## 2017-04-24 PROCEDURE — C9113 INJ PANTOPRAZOLE SODIUM, VIA: HCPCS | Performed by: NURSE PRACTITIONER

## 2017-04-24 PROCEDURE — 82533 TOTAL CORTISOL: CPT | Performed by: PHYSICIAN ASSISTANT

## 2017-04-24 PROCEDURE — 82550 ASSAY OF CK (CPK): CPT | Performed by: NURSE PRACTITIONER

## 2017-04-24 PROCEDURE — 92610 EVALUATE SWALLOWING FUNCTION: CPT

## 2017-04-24 PROCEDURE — G8987 SELF CARE CURRENT STATUS: HCPCS

## 2017-04-24 PROCEDURE — 85027 COMPLETE CBC AUTOMATED: CPT | Performed by: NURSE PRACTITIONER

## 2017-04-24 PROCEDURE — 97110 THERAPEUTIC EXERCISES: CPT

## 2017-04-24 PROCEDURE — 82330 ASSAY OF CALCIUM: CPT | Performed by: NURSE PRACTITIONER

## 2017-04-24 PROCEDURE — 80048 BASIC METABOLIC PNL TOTAL CA: CPT | Performed by: NURSE PRACTITIONER

## 2017-04-24 RX ORDER — SODIUM CHLORIDE 9 MG/ML
75 INJECTION, SOLUTION INTRAVENOUS CONTINUOUS
Status: DISCONTINUED | OUTPATIENT
Start: 2017-04-24 | End: 2017-04-25

## 2017-04-24 RX ORDER — KETOROLAC TROMETHAMINE 30 MG/ML
30 INJECTION, SOLUTION INTRAMUSCULAR; INTRAVENOUS ONCE
Status: COMPLETED | OUTPATIENT
Start: 2017-04-24 | End: 2017-04-24

## 2017-04-24 RX ADMIN — GABAPENTIN 100 MG: 100 CAPSULE ORAL at 11:19

## 2017-04-24 RX ADMIN — MIDODRINE HYDROCHLORIDE 10 MG: 5 TABLET ORAL at 11:18

## 2017-04-24 RX ADMIN — CEFAZOLIN SODIUM 1000 MG: 1 SOLUTION INTRAVENOUS at 22:37

## 2017-04-24 RX ADMIN — PREGABALIN 50 MG: 50 CAPSULE ORAL at 20:08

## 2017-04-24 RX ADMIN — MIDODRINE HYDROCHLORIDE 10 MG: 5 TABLET ORAL at 20:08

## 2017-04-24 RX ADMIN — LEVETIRACETAM 750 MG: 100 INJECTION, SOLUTION INTRAVENOUS at 11:23

## 2017-04-24 RX ADMIN — LEVETIRACETAM 750 MG: 100 INJECTION, SOLUTION INTRAVENOUS at 20:08

## 2017-04-24 RX ADMIN — PANTOPRAZOLE SODIUM 40 MG: 40 INJECTION, POWDER, FOR SOLUTION INTRAVENOUS at 09:40

## 2017-04-24 RX ADMIN — SENNOSIDES A AND B 8.8 MG: 415.36 LIQUID ORAL at 20:09

## 2017-04-24 RX ADMIN — CEFAZOLIN SODIUM 1000 MG: 1 SOLUTION INTRAVENOUS at 14:58

## 2017-04-24 RX ADMIN — CEFAZOLIN SODIUM 1000 MG: 1 SOLUTION INTRAVENOUS at 05:23

## 2017-04-24 RX ADMIN — SODIUM CHLORIDE 125 ML/HR: 0.9 INJECTION, SOLUTION INTRAVENOUS at 09:33

## 2017-04-24 RX ADMIN — MIDODRINE HYDROCHLORIDE 10 MG: 5 TABLET ORAL at 15:31

## 2017-04-24 RX ADMIN — SODIUM CHLORIDE 125 ML/HR: 0.9 INJECTION, SOLUTION INTRAVENOUS at 20:15

## 2017-04-24 RX ADMIN — SODIUM CHLORIDE 500 ML: 0.9 INJECTION, SOLUTION INTRAVENOUS at 15:39

## 2017-04-24 RX ADMIN — ENOXAPARIN SODIUM 40 MG: 40 INJECTION SUBCUTANEOUS at 09:40

## 2017-04-24 RX ADMIN — KETOROLAC TROMETHAMINE 30 MG: 30 INJECTION, SOLUTION INTRAMUSCULAR at 05:51

## 2017-04-24 RX ADMIN — LEVOTHYROXINE SODIUM 75 MCG: 75 TABLET ORAL at 11:18

## 2017-04-24 RX ADMIN — PREGABALIN 50 MG: 50 CAPSULE ORAL at 15:31

## 2017-04-24 RX ADMIN — NYSTATIN 1 APPLICATION: 100000 POWDER TOPICAL at 04:42

## 2017-04-24 RX ADMIN — NYSTATIN 1 APPLICATION: 100000 POWDER TOPICAL at 23:52

## 2017-04-25 PROBLEM — T14.91XA SUICIDE ATTEMPT (HCC): Status: ACTIVE | Noted: 2017-04-25

## 2017-04-25 PROBLEM — R65.20 SEVERE SEPSIS(995.92): Status: RESOLVED | Noted: 2017-04-24 | Resolved: 2017-04-25

## 2017-04-25 PROBLEM — D69.6 THROMBOCYTOPENIA (HCC): Status: RESOLVED | Noted: 2017-04-20 | Resolved: 2017-04-25

## 2017-04-25 PROBLEM — G92.9 TOXIC ENCEPHALOPATHY: Status: RESOLVED | Noted: 2017-04-24 | Resolved: 2017-04-25

## 2017-04-25 PROBLEM — G93.41 ACUTE METABOLIC ENCEPHALOPATHY: Status: RESOLVED | Noted: 2017-04-24 | Resolved: 2017-04-25

## 2017-04-25 PROBLEM — G93.40 ACUTE ENCEPHALOPATHY: Status: RESOLVED | Noted: 2017-04-19 | Resolved: 2017-04-25

## 2017-04-25 PROBLEM — J96.01 ACUTE RESPIRATORY FAILURE WITH HYPOXIA (HCC): Status: RESOLVED | Noted: 2017-04-19 | Resolved: 2017-04-25

## 2017-04-25 PROBLEM — A41.9 SEVERE SEPSIS(995.92): Status: RESOLVED | Noted: 2017-04-24 | Resolved: 2017-04-25

## 2017-04-25 PROBLEM — R74.01 TRANSAMINITIS: Status: RESOLVED | Noted: 2017-04-19 | Resolved: 2017-04-25

## 2017-04-25 PROBLEM — M62.82 RHABDOMYOLYSIS: Status: RESOLVED | Noted: 2017-04-19 | Resolved: 2017-04-25

## 2017-04-25 LAB
ALBUMIN SERPL BCP-MCNC: 2.3 G/DL (ref 3.5–5)
ALP SERPL-CCNC: 171 U/L (ref 46–116)
ALT SERPL W P-5'-P-CCNC: 15 U/L (ref 12–78)
ANION GAP SERPL CALCULATED.3IONS-SCNC: 6 MMOL/L (ref 4–13)
AST SERPL W P-5'-P-CCNC: 21 U/L (ref 5–45)
BASOPHILS # BLD AUTO: 0.01 THOUSANDS/ΜL (ref 0–0.1)
BASOPHILS NFR BLD AUTO: 0 % (ref 0–1)
BILIRUB SERPL-MCNC: 0.4 MG/DL (ref 0.2–1)
BUN SERPL-MCNC: 15 MG/DL (ref 5–25)
CALCIUM SERPL-MCNC: 8.8 MG/DL (ref 8.3–10.1)
CHLORIDE SERPL-SCNC: 108 MMOL/L (ref 100–108)
CO2 SERPL-SCNC: 29 MMOL/L (ref 21–32)
CREAT SERPL-MCNC: 0.85 MG/DL (ref 0.6–1.3)
EOSINOPHIL # BLD AUTO: 0.14 THOUSAND/ΜL (ref 0–0.61)
EOSINOPHIL NFR BLD AUTO: 3 % (ref 0–6)
ERYTHROCYTE [DISTWIDTH] IN BLOOD BY AUTOMATED COUNT: 13.8 % (ref 11.6–15.1)
GFR SERPL CREATININE-BSD FRML MDRD: >60 ML/MIN/1.73SQ M
GLUCOSE SERPL-MCNC: 80 MG/DL (ref 65–140)
HCT VFR BLD AUTO: 25 % (ref 34.8–46.1)
HGB BLD-MCNC: 7.8 G/DL (ref 11.5–15.4)
HGB BLD-MCNC: 8.5 G/DL (ref 11.5–15.4)
LYMPHOCYTES # BLD AUTO: 1.18 THOUSANDS/ΜL (ref 0.6–4.47)
LYMPHOCYTES NFR BLD AUTO: 25 % (ref 14–44)
MCH RBC QN AUTO: 30.1 PG (ref 26.8–34.3)
MCHC RBC AUTO-ENTMCNC: 31.2 G/DL (ref 31.4–37.4)
MCV RBC AUTO: 97 FL (ref 82–98)
MONOCYTES # BLD AUTO: 0.31 THOUSAND/ΜL (ref 0.17–1.22)
MONOCYTES NFR BLD AUTO: 7 % (ref 4–12)
NEUTROPHILS # BLD AUTO: 3.03 THOUSANDS/ΜL (ref 1.85–7.62)
NEUTS SEG NFR BLD AUTO: 65 % (ref 43–75)
PLATELET # BLD AUTO: 150 THOUSANDS/UL (ref 149–390)
PMV BLD AUTO: 10.9 FL (ref 8.9–12.7)
POTASSIUM SERPL-SCNC: 3.9 MMOL/L (ref 3.5–5.3)
PROT SERPL-MCNC: 5.7 G/DL (ref 6.4–8.2)
RBC # BLD AUTO: 2.59 MILLION/UL (ref 3.81–5.12)
SODIUM SERPL-SCNC: 143 MMOL/L (ref 136–145)
WBC # BLD AUTO: 4.67 THOUSAND/UL (ref 4.31–10.16)

## 2017-04-25 PROCEDURE — 80053 COMPREHEN METABOLIC PANEL: CPT | Performed by: PHYSICIAN ASSISTANT

## 2017-04-25 PROCEDURE — 97110 THERAPEUTIC EXERCISES: CPT

## 2017-04-25 PROCEDURE — 97530 THERAPEUTIC ACTIVITIES: CPT

## 2017-04-25 PROCEDURE — 97116 GAIT TRAINING THERAPY: CPT

## 2017-04-25 PROCEDURE — 97535 SELF CARE MNGMENT TRAINING: CPT

## 2017-04-25 PROCEDURE — 85025 COMPLETE CBC W/AUTO DIFF WBC: CPT | Performed by: PHYSICIAN ASSISTANT

## 2017-04-25 PROCEDURE — 85018 HEMOGLOBIN: CPT | Performed by: PHYSICIAN ASSISTANT

## 2017-04-25 PROCEDURE — 92526 ORAL FUNCTION THERAPY: CPT

## 2017-04-25 RX ORDER — FAMOTIDINE 20 MG/1
20 TABLET, FILM COATED ORAL 2 TIMES DAILY
Status: DISCONTINUED | OUTPATIENT
Start: 2017-04-25 | End: 2017-04-28 | Stop reason: HOSPADM

## 2017-04-25 RX ORDER — KETOROLAC TROMETHAMINE 30 MG/ML
15 INJECTION, SOLUTION INTRAMUSCULAR; INTRAVENOUS ONCE
Status: COMPLETED | OUTPATIENT
Start: 2017-04-25 | End: 2017-04-25

## 2017-04-25 RX ORDER — LEVETIRACETAM 500 MG/1
750 TABLET ORAL EVERY 12 HOURS SCHEDULED
Status: DISCONTINUED | OUTPATIENT
Start: 2017-04-25 | End: 2017-04-28 | Stop reason: HOSPADM

## 2017-04-25 RX ORDER — CEPHALEXIN 250 MG/1
500 CAPSULE ORAL EVERY 12 HOURS SCHEDULED
Status: COMPLETED | OUTPATIENT
Start: 2017-04-25 | End: 2017-04-25

## 2017-04-25 RX ORDER — POTASSIUM CHLORIDE 20 MEQ/1
20 TABLET, EXTENDED RELEASE ORAL ONCE
Status: COMPLETED | OUTPATIENT
Start: 2017-04-25 | End: 2017-04-25

## 2017-04-25 RX ADMIN — LEVOTHYROXINE SODIUM 75 MCG: 75 TABLET ORAL at 05:29

## 2017-04-25 RX ADMIN — MIDODRINE HYDROCHLORIDE 10 MG: 5 TABLET ORAL at 16:56

## 2017-04-25 RX ADMIN — FAMOTIDINE 20 MG: 20 TABLET ORAL at 08:09

## 2017-04-25 RX ADMIN — SENNOSIDES A AND B 8.8 MG: 415.36 LIQUID ORAL at 18:33

## 2017-04-25 RX ADMIN — KETOROLAC TROMETHAMINE 15 MG: 30 INJECTION, SOLUTION INTRAMUSCULAR at 05:31

## 2017-04-25 RX ADMIN — ENOXAPARIN SODIUM 40 MG: 40 INJECTION SUBCUTANEOUS at 08:04

## 2017-04-25 RX ADMIN — PREGABALIN 50 MG: 50 CAPSULE ORAL at 21:49

## 2017-04-25 RX ADMIN — SODIUM CHLORIDE 125 ML/HR: 0.9 INJECTION, SOLUTION INTRAVENOUS at 04:00

## 2017-04-25 RX ADMIN — POTASSIUM CHLORIDE 20 MEQ: 1500 TABLET, EXTENDED RELEASE ORAL at 08:04

## 2017-04-25 RX ADMIN — CEPHALEXIN 500 MG: 250 CAPSULE ORAL at 21:49

## 2017-04-25 RX ADMIN — CEPHALEXIN 500 MG: 250 CAPSULE ORAL at 13:42

## 2017-04-25 RX ADMIN — MIDODRINE HYDROCHLORIDE 10 MG: 5 TABLET ORAL at 21:49

## 2017-04-25 RX ADMIN — LEVETIRACETAM 750 MG: 500 TABLET ORAL at 21:49

## 2017-04-25 RX ADMIN — PREGABALIN 50 MG: 50 CAPSULE ORAL at 08:04

## 2017-04-25 RX ADMIN — CEFAZOLIN SODIUM 1000 MG: 1 SOLUTION INTRAVENOUS at 05:32

## 2017-04-25 RX ADMIN — LEVETIRACETAM 750 MG: 500 TABLET ORAL at 08:09

## 2017-04-25 RX ADMIN — KETOROLAC TROMETHAMINE 15 MG: 30 INJECTION, SOLUTION INTRAMUSCULAR at 23:52

## 2017-04-25 RX ADMIN — GABAPENTIN 100 MG: 100 CAPSULE ORAL at 08:04

## 2017-04-25 RX ADMIN — SENNOSIDES A AND B 8.8 MG: 415.36 LIQUID ORAL at 08:06

## 2017-04-25 RX ADMIN — MIDODRINE HYDROCHLORIDE 10 MG: 5 TABLET ORAL at 08:04

## 2017-04-25 RX ADMIN — PREGABALIN 50 MG: 50 CAPSULE ORAL at 16:56

## 2017-04-25 RX ADMIN — NYSTATIN 1 APPLICATION: 100000 POWDER TOPICAL at 21:50

## 2017-04-25 RX ADMIN — FAMOTIDINE 20 MG: 20 TABLET ORAL at 18:33

## 2017-04-26 LAB
ALBUMIN SERPL BCP-MCNC: 2.5 G/DL (ref 3.5–5)
ALP SERPL-CCNC: 171 U/L (ref 46–116)
ALT SERPL W P-5'-P-CCNC: 13 U/L (ref 12–78)
ANION GAP SERPL CALCULATED.3IONS-SCNC: 5 MMOL/L (ref 4–13)
AST SERPL W P-5'-P-CCNC: 22 U/L (ref 5–45)
BASOPHILS # BLD AUTO: 0.01 THOUSANDS/ΜL (ref 0–0.1)
BASOPHILS NFR BLD AUTO: 0 % (ref 0–1)
BILIRUB SERPL-MCNC: 0.3 MG/DL (ref 0.2–1)
BUN SERPL-MCNC: 10 MG/DL (ref 5–25)
CALCIUM SERPL-MCNC: 9.1 MG/DL (ref 8.3–10.1)
CHLORIDE SERPL-SCNC: 109 MMOL/L (ref 100–108)
CO2 SERPL-SCNC: 32 MMOL/L (ref 21–32)
CREAT SERPL-MCNC: 0.83 MG/DL (ref 0.6–1.3)
EOSINOPHIL # BLD AUTO: 0.21 THOUSAND/ΜL (ref 0–0.61)
EOSINOPHIL NFR BLD AUTO: 5 % (ref 0–6)
ERYTHROCYTE [DISTWIDTH] IN BLOOD BY AUTOMATED COUNT: 13.6 % (ref 11.6–15.1)
GFR SERPL CREATININE-BSD FRML MDRD: >60 ML/MIN/1.73SQ M
GLUCOSE SERPL-MCNC: 89 MG/DL (ref 65–140)
HCT VFR BLD AUTO: 26.9 % (ref 34.8–46.1)
HGB BLD-MCNC: 8.4 G/DL (ref 11.5–15.4)
LYMPHOCYTES # BLD AUTO: 1.07 THOUSANDS/ΜL (ref 0.6–4.47)
LYMPHOCYTES NFR BLD AUTO: 25 % (ref 14–44)
MAGNESIUM SERPL-MCNC: 1.7 MG/DL (ref 1.6–2.6)
MCH RBC QN AUTO: 29.9 PG (ref 26.8–34.3)
MCHC RBC AUTO-ENTMCNC: 31.2 G/DL (ref 31.4–37.4)
MCV RBC AUTO: 96 FL (ref 82–98)
MONOCYTES # BLD AUTO: 0.25 THOUSAND/ΜL (ref 0.17–1.22)
MONOCYTES NFR BLD AUTO: 6 % (ref 4–12)
NEUTROPHILS # BLD AUTO: 2.69 THOUSANDS/ΜL (ref 1.85–7.62)
NEUTS SEG NFR BLD AUTO: 64 % (ref 43–75)
PHOSPHATE SERPL-MCNC: 3.2 MG/DL (ref 2.3–4.1)
PLATELET # BLD AUTO: 181 THOUSANDS/UL (ref 149–390)
PMV BLD AUTO: 10.9 FL (ref 8.9–12.7)
POTASSIUM SERPL-SCNC: 3.9 MMOL/L (ref 3.5–5.3)
PROT SERPL-MCNC: 6 G/DL (ref 6.4–8.2)
RBC # BLD AUTO: 2.81 MILLION/UL (ref 3.81–5.12)
SODIUM SERPL-SCNC: 146 MMOL/L (ref 136–145)
WBC # BLD AUTO: 4.23 THOUSAND/UL (ref 4.31–10.16)

## 2017-04-26 PROCEDURE — 84100 ASSAY OF PHOSPHORUS: CPT | Performed by: PHYSICIAN ASSISTANT

## 2017-04-26 PROCEDURE — 85025 COMPLETE CBC W/AUTO DIFF WBC: CPT | Performed by: PHYSICIAN ASSISTANT

## 2017-04-26 PROCEDURE — 97116 GAIT TRAINING THERAPY: CPT

## 2017-04-26 PROCEDURE — 97535 SELF CARE MNGMENT TRAINING: CPT

## 2017-04-26 PROCEDURE — 83735 ASSAY OF MAGNESIUM: CPT | Performed by: PHYSICIAN ASSISTANT

## 2017-04-26 PROCEDURE — 97110 THERAPEUTIC EXERCISES: CPT

## 2017-04-26 PROCEDURE — 80053 COMPREHEN METABOLIC PANEL: CPT | Performed by: PHYSICIAN ASSISTANT

## 2017-04-26 PROCEDURE — 97530 THERAPEUTIC ACTIVITIES: CPT

## 2017-04-26 RX ORDER — IBUPROFEN 600 MG/1
600 TABLET ORAL EVERY 6 HOURS PRN
Status: DISCONTINUED | OUTPATIENT
Start: 2017-04-26 | End: 2017-04-28

## 2017-04-26 RX ADMIN — SENNOSIDES A AND B 8.8 MG: 415.36 LIQUID ORAL at 08:32

## 2017-04-26 RX ADMIN — LEVETIRACETAM 750 MG: 500 TABLET ORAL at 22:00

## 2017-04-26 RX ADMIN — FAMOTIDINE 20 MG: 20 TABLET ORAL at 17:17

## 2017-04-26 RX ADMIN — PREGABALIN 50 MG: 50 CAPSULE ORAL at 17:17

## 2017-04-26 RX ADMIN — PREGABALIN 50 MG: 50 CAPSULE ORAL at 22:00

## 2017-04-26 RX ADMIN — IBUPROFEN 600 MG: 600 TABLET ORAL at 12:36

## 2017-04-26 RX ADMIN — MIDODRINE HYDROCHLORIDE 10 MG: 5 TABLET ORAL at 08:31

## 2017-04-26 RX ADMIN — PREGABALIN 50 MG: 50 CAPSULE ORAL at 08:31

## 2017-04-26 RX ADMIN — LEVETIRACETAM 750 MG: 500 TABLET ORAL at 08:31

## 2017-04-26 RX ADMIN — MIDODRINE HYDROCHLORIDE 10 MG: 5 TABLET ORAL at 17:16

## 2017-04-26 RX ADMIN — IBUPROFEN 600 MG: 600 TABLET ORAL at 06:40

## 2017-04-26 RX ADMIN — GABAPENTIN 100 MG: 100 CAPSULE ORAL at 08:31

## 2017-04-26 RX ADMIN — MIDODRINE HYDROCHLORIDE 10 MG: 5 TABLET ORAL at 22:00

## 2017-04-26 RX ADMIN — SENNOSIDES A AND B 8.8 MG: 415.36 LIQUID ORAL at 17:17

## 2017-04-26 RX ADMIN — FAMOTIDINE 20 MG: 20 TABLET ORAL at 08:31

## 2017-04-26 RX ADMIN — LEVOTHYROXINE SODIUM 75 MCG: 75 TABLET ORAL at 06:40

## 2017-04-26 RX ADMIN — ENOXAPARIN SODIUM 40 MG: 40 INJECTION SUBCUTANEOUS at 08:31

## 2017-04-27 ENCOUNTER — APPOINTMENT (INPATIENT)
Dept: RADIOLOGY | Facility: HOSPITAL | Age: 72
DRG: 917 | End: 2017-04-27
Payer: MEDICARE

## 2017-04-27 ENCOUNTER — GENERIC CONVERSION - ENCOUNTER (OUTPATIENT)
Dept: OTHER | Facility: OTHER | Age: 72
End: 2017-04-27

## 2017-04-27 PROBLEM — E46 PROTEIN CALORIE MALNUTRITION (HCC): Status: ACTIVE | Noted: 2017-04-27

## 2017-04-27 PROBLEM — R51.9 HEADACHE: Status: ACTIVE | Noted: 2017-04-27

## 2017-04-27 PROCEDURE — 92611 MOTION FLUOROSCOPY/SWALLOW: CPT

## 2017-04-27 PROCEDURE — 92526 ORAL FUNCTION THERAPY: CPT

## 2017-04-27 PROCEDURE — 74230 X-RAY XM SWLNG FUNCJ C+: CPT

## 2017-04-27 RX ADMIN — ENOXAPARIN SODIUM 40 MG: 40 INJECTION SUBCUTANEOUS at 08:51

## 2017-04-27 RX ADMIN — SENNOSIDES A AND B 8.8 MG: 415.36 LIQUID ORAL at 09:09

## 2017-04-27 RX ADMIN — PREGABALIN 50 MG: 50 CAPSULE ORAL at 21:12

## 2017-04-27 RX ADMIN — LEVETIRACETAM 750 MG: 500 TABLET ORAL at 08:52

## 2017-04-27 RX ADMIN — SENNOSIDES A AND B 8.8 MG: 415.36 LIQUID ORAL at 17:25

## 2017-04-27 RX ADMIN — PREGABALIN 50 MG: 50 CAPSULE ORAL at 08:52

## 2017-04-27 RX ADMIN — MIDODRINE HYDROCHLORIDE 10 MG: 5 TABLET ORAL at 21:11

## 2017-04-27 RX ADMIN — FAMOTIDINE 20 MG: 20 TABLET ORAL at 08:52

## 2017-04-27 RX ADMIN — IBUPROFEN 600 MG: 600 TABLET ORAL at 05:07

## 2017-04-27 RX ADMIN — MIDODRINE HYDROCHLORIDE 10 MG: 5 TABLET ORAL at 08:51

## 2017-04-27 RX ADMIN — LEVETIRACETAM 750 MG: 500 TABLET ORAL at 21:12

## 2017-04-27 RX ADMIN — LEVOTHYROXINE SODIUM 75 MCG: 75 TABLET ORAL at 05:07

## 2017-04-27 RX ADMIN — FAMOTIDINE 20 MG: 20 TABLET ORAL at 17:25

## 2017-04-27 RX ADMIN — MIDODRINE HYDROCHLORIDE 10 MG: 5 TABLET ORAL at 17:25

## 2017-04-27 RX ADMIN — GABAPENTIN 100 MG: 100 CAPSULE ORAL at 08:52

## 2017-04-27 RX ADMIN — PREGABALIN 50 MG: 50 CAPSULE ORAL at 17:25

## 2017-04-28 ENCOUNTER — HOSPITAL ENCOUNTER (INPATIENT)
Facility: HOSPITAL | Age: 72
LOS: 12 days | Discharge: HOME HEALTH | DRG: 885 | End: 2017-05-10
Attending: PSYCHIATRY & NEUROLOGY | Admitting: PSYCHIATRY & NEUROLOGY
Payer: MEDICARE

## 2017-04-28 ENCOUNTER — APPOINTMENT (INPATIENT)
Dept: OCCUPATIONAL THERAPY | Facility: HOSPITAL | Age: 72
DRG: 917 | End: 2017-04-28
Payer: MEDICARE

## 2017-04-28 VITALS
RESPIRATION RATE: 18 BRPM | BODY MASS INDEX: 20.85 KG/M2 | WEIGHT: 113.32 LBS | HEART RATE: 77 BPM | OXYGEN SATURATION: 100 % | HEIGHT: 62 IN | SYSTOLIC BLOOD PRESSURE: 158 MMHG | TEMPERATURE: 97.6 F | DIASTOLIC BLOOD PRESSURE: 83 MMHG

## 2017-04-28 DIAGNOSIS — R41.89 COGNITIVE CHANGE: ICD-10-CM

## 2017-04-28 DIAGNOSIS — Z97.8 FOLEY CATHETER IN PLACE: ICD-10-CM

## 2017-04-28 DIAGNOSIS — R68.89 ABNORMAL VITAL SIGNS: Primary | ICD-10-CM

## 2017-04-28 DIAGNOSIS — F33.2 MAJOR DEPRESSIVE DISORDER, RECURRENT SEVERE WITHOUT PSYCHOTIC FEATURES (HCC): ICD-10-CM

## 2017-04-28 DIAGNOSIS — R52 PAIN: ICD-10-CM

## 2017-04-28 DIAGNOSIS — F32.3 MAJOR DEPRESSIVE DISORDER WITH PSYCHOTIC FEATURES (HCC): ICD-10-CM

## 2017-04-28 PROBLEM — A41.9 SEPSIS (HCC): Status: RESOLVED | Noted: 2017-04-24 | Resolved: 2017-04-28

## 2017-04-28 LAB
ANION GAP SERPL CALCULATED.3IONS-SCNC: 8 MMOL/L (ref 4–13)
BUN SERPL-MCNC: 9 MG/DL (ref 5–25)
CALCIUM SERPL-MCNC: 8.9 MG/DL (ref 8.3–10.1)
CHLORIDE SERPL-SCNC: 108 MMOL/L (ref 100–108)
CO2 SERPL-SCNC: 32 MMOL/L (ref 21–32)
CREAT SERPL-MCNC: 0.9 MG/DL (ref 0.6–1.3)
GFR SERPL CREATININE-BSD FRML MDRD: >60 ML/MIN/1.73SQ M
GLUCOSE SERPL-MCNC: 78 MG/DL (ref 65–140)
POTASSIUM SERPL-SCNC: 3.8 MMOL/L (ref 3.5–5.3)
SODIUM SERPL-SCNC: 148 MMOL/L (ref 136–145)

## 2017-04-28 PROCEDURE — 97535 SELF CARE MNGMENT TRAINING: CPT

## 2017-04-28 PROCEDURE — 97530 THERAPEUTIC ACTIVITIES: CPT

## 2017-04-28 PROCEDURE — 80048 BASIC METABOLIC PNL TOTAL CA: CPT | Performed by: FAMILY MEDICINE

## 2017-04-28 RX ORDER — IBUPROFEN 600 MG/1
600 TABLET ORAL EVERY 6 HOURS PRN
Status: CANCELLED | OUTPATIENT
Start: 2017-04-28

## 2017-04-28 RX ORDER — LEVOTHYROXINE SODIUM 0.07 MG/1
75 TABLET ORAL
Status: DISCONTINUED | OUTPATIENT
Start: 2017-04-29 | End: 2017-05-10 | Stop reason: HOSPADM

## 2017-04-28 RX ORDER — NYSTATIN 100000 [USP'U]/G
1 POWDER TOPICAL 2 TIMES DAILY PRN
Status: DISCONTINUED | OUTPATIENT
Start: 2017-04-28 | End: 2017-05-10 | Stop reason: HOSPADM

## 2017-04-28 RX ORDER — LEVETIRACETAM 500 MG/1
750 TABLET ORAL EVERY 12 HOURS SCHEDULED
Status: CANCELLED | OUTPATIENT
Start: 2017-04-28

## 2017-04-28 RX ORDER — OLANZAPINE 5 MG/1
2.5 TABLET ORAL EVERY 8 HOURS PRN
Status: DISCONTINUED | OUTPATIENT
Start: 2017-04-28 | End: 2017-05-10 | Stop reason: HOSPADM

## 2017-04-28 RX ORDER — MAGNESIUM HYDROXIDE/ALUMINUM HYDROXICE/SIMETHICONE 120; 1200; 1200 MG/30ML; MG/30ML; MG/30ML
30 SUSPENSION ORAL EVERY 4 HOURS PRN
Status: DISCONTINUED | OUTPATIENT
Start: 2017-04-28 | End: 2017-05-10 | Stop reason: HOSPADM

## 2017-04-28 RX ORDER — FAMOTIDINE 20 MG/1
20 TABLET, FILM COATED ORAL 2 TIMES DAILY
Status: DISCONTINUED | OUTPATIENT
Start: 2017-04-29 | End: 2017-05-10 | Stop reason: HOSPADM

## 2017-04-28 RX ORDER — RISPERIDONE 1 MG/1
1 TABLET, ORALLY DISINTEGRATING ORAL EVERY 8 HOURS PRN
Status: DISCONTINUED | OUTPATIENT
Start: 2017-04-28 | End: 2017-05-10 | Stop reason: HOSPADM

## 2017-04-28 RX ORDER — LEVOTHYROXINE SODIUM 0.07 MG/1
75 TABLET ORAL
Status: CANCELLED | OUTPATIENT
Start: 2017-04-29

## 2017-04-28 RX ORDER — OLANZAPINE 10 MG/1
2.5 INJECTION, POWDER, LYOPHILIZED, FOR SOLUTION INTRAMUSCULAR EVERY 8 HOURS PRN
Status: DISCONTINUED | OUTPATIENT
Start: 2017-04-28 | End: 2017-05-10 | Stop reason: HOSPADM

## 2017-04-28 RX ORDER — TRAZODONE HYDROCHLORIDE 50 MG/1
25 TABLET ORAL
Status: DISCONTINUED | OUTPATIENT
Start: 2017-04-28 | End: 2017-04-29

## 2017-04-28 RX ORDER — PREGABALIN 25 MG/1
50 CAPSULE ORAL 3 TIMES DAILY
Status: DISCONTINUED | OUTPATIENT
Start: 2017-04-28 | End: 2017-05-10 | Stop reason: HOSPADM

## 2017-04-28 RX ORDER — IBUPROFEN 600 MG/1
600 TABLET ORAL EVERY 6 HOURS PRN
Status: DISCONTINUED | OUTPATIENT
Start: 2017-04-28 | End: 2017-05-10 | Stop reason: HOSPADM

## 2017-04-28 RX ORDER — MIDODRINE HYDROCHLORIDE 5 MG/1
10 TABLET ORAL 3 TIMES DAILY
Status: CANCELLED | OUTPATIENT
Start: 2017-04-28

## 2017-04-28 RX ORDER — GABAPENTIN 100 MG/1
100 CAPSULE ORAL DAILY
Status: DISCONTINUED | OUTPATIENT
Start: 2017-04-29 | End: 2017-05-06

## 2017-04-28 RX ORDER — LEVETIRACETAM 750 MG/1
750 TABLET ORAL EVERY 12 HOURS SCHEDULED
Status: DISCONTINUED | OUTPATIENT
Start: 2017-04-28 | End: 2017-05-10 | Stop reason: HOSPADM

## 2017-04-28 RX ORDER — FAMOTIDINE 20 MG/1
20 TABLET, FILM COATED ORAL 2 TIMES DAILY
Status: CANCELLED | OUTPATIENT
Start: 2017-04-28

## 2017-04-28 RX ORDER — OLANZAPINE 2.5 MG/1
2.5 TABLET ORAL EVERY 8 HOURS PRN
Status: CANCELLED | OUTPATIENT
Start: 2017-04-28

## 2017-04-28 RX ORDER — RISPERIDONE 1 MG/1
1 TABLET, ORALLY DISINTEGRATING ORAL EVERY 8 HOURS PRN
Status: CANCELLED | OUTPATIENT
Start: 2017-04-28

## 2017-04-28 RX ORDER — MIDODRINE HYDROCHLORIDE 5 MG/1
10 TABLET ORAL 3 TIMES DAILY
Status: DISCONTINUED | OUTPATIENT
Start: 2017-04-28 | End: 2017-05-10 | Stop reason: HOSPADM

## 2017-04-28 RX ORDER — PREGABALIN 50 MG/1
50 CAPSULE ORAL 3 TIMES DAILY
Status: CANCELLED | OUTPATIENT
Start: 2017-04-28

## 2017-04-28 RX ORDER — IBUPROFEN 400 MG/1
800 TABLET ORAL EVERY 6 HOURS PRN
Status: DISCONTINUED | OUTPATIENT
Start: 2017-04-28 | End: 2017-04-28 | Stop reason: HOSPADM

## 2017-04-28 RX ORDER — NYSTATIN 100000 [USP'U]/G
1 POWDER TOPICAL 2 TIMES DAILY PRN
Status: CANCELLED | OUTPATIENT
Start: 2017-04-28

## 2017-04-28 RX ORDER — MAGNESIUM HYDROXIDE/ALUMINUM HYDROXICE/SIMETHICONE 120; 1200; 1200 MG/30ML; MG/30ML; MG/30ML
30 SUSPENSION ORAL EVERY 4 HOURS PRN
Status: CANCELLED | OUTPATIENT
Start: 2017-04-28

## 2017-04-28 RX ORDER — TRAZODONE HYDROCHLORIDE 50 MG/1
25 TABLET ORAL
Status: CANCELLED | OUTPATIENT
Start: 2017-04-28

## 2017-04-28 RX ORDER — SENNOSIDES 8.8 MG/5ML
8.8 LIQUID ORAL 2 TIMES DAILY
Status: DISCONTINUED | OUTPATIENT
Start: 2017-04-29 | End: 2017-05-05

## 2017-04-28 RX ORDER — IBUPROFEN 600 MG/1
600 TABLET ORAL EVERY 6 HOURS PRN
Status: DISCONTINUED | OUTPATIENT
Start: 2017-04-28 | End: 2017-04-28 | Stop reason: HOSPADM

## 2017-04-28 RX ORDER — SENNOSIDES 8.8 MG/5ML
8.8 LIQUID ORAL 2 TIMES DAILY
Status: CANCELLED | OUTPATIENT
Start: 2017-04-28

## 2017-04-28 RX ORDER — GABAPENTIN 100 MG/1
100 CAPSULE ORAL DAILY
Status: CANCELLED | OUTPATIENT
Start: 2017-04-29

## 2017-04-28 RX ORDER — OLANZAPINE 10 MG/1
2.5 INJECTION, POWDER, LYOPHILIZED, FOR SOLUTION INTRAMUSCULAR EVERY 8 HOURS PRN
Status: CANCELLED | OUTPATIENT
Start: 2017-04-28

## 2017-04-28 RX ORDER — IBUPROFEN 600 MG/1
600 TABLET ORAL EVERY 4 HOURS PRN
Status: DISCONTINUED | OUTPATIENT
Start: 2017-04-28 | End: 2017-04-28 | Stop reason: HOSPADM

## 2017-04-28 RX ADMIN — IBUPROFEN 600 MG: 600 TABLET ORAL at 08:30

## 2017-04-28 RX ADMIN — LEVETIRACETAM 750 MG: 500 TABLET ORAL at 08:15

## 2017-04-28 RX ADMIN — PREGABALIN 50 MG: 25 CAPSULE ORAL at 22:56

## 2017-04-28 RX ADMIN — PREGABALIN 50 MG: 50 CAPSULE ORAL at 08:15

## 2017-04-28 RX ADMIN — MIDODRINE HYDROCHLORIDE 10 MG: 5 TABLET ORAL at 22:56

## 2017-04-28 RX ADMIN — MIDODRINE HYDROCHLORIDE 10 MG: 5 TABLET ORAL at 16:32

## 2017-04-28 RX ADMIN — SENNOSIDES A AND B 8.8 MG: 415.36 LIQUID ORAL at 16:32

## 2017-04-28 RX ADMIN — PREGABALIN 50 MG: 50 CAPSULE ORAL at 16:32

## 2017-04-28 RX ADMIN — ENOXAPARIN SODIUM 40 MG: 40 INJECTION SUBCUTANEOUS at 08:14

## 2017-04-28 RX ADMIN — MIDODRINE HYDROCHLORIDE 10 MG: 5 TABLET ORAL at 08:15

## 2017-04-28 RX ADMIN — NYSTATIN 1 APPLICATION: 100000 POWDER TOPICAL at 09:23

## 2017-04-28 RX ADMIN — FAMOTIDINE 20 MG: 20 TABLET ORAL at 16:32

## 2017-04-28 RX ADMIN — SENNOSIDES A AND B 8.8 MG: 415.36 LIQUID ORAL at 10:10

## 2017-04-28 RX ADMIN — FAMOTIDINE 20 MG: 20 TABLET ORAL at 08:15

## 2017-04-28 RX ADMIN — LEVETIRACETAM 750 MG: 750 TABLET, FILM COATED ORAL at 22:56

## 2017-04-28 RX ADMIN — LEVOTHYROXINE SODIUM 75 MCG: 75 TABLET ORAL at 05:33

## 2017-04-28 RX ADMIN — GABAPENTIN 100 MG: 100 CAPSULE ORAL at 08:15

## 2017-04-29 PROBLEM — F32.3 MAJOR DEPRESSIVE DISORDER WITH PSYCHOTIC FEATURES (HCC): Status: ACTIVE | Noted: 2017-04-29

## 2017-04-29 PROCEDURE — 86592 SYPHILIS TEST NON-TREP QUAL: CPT | Performed by: PHYSICIAN ASSISTANT

## 2017-04-29 RX ORDER — QUETIAPINE FUMARATE 25 MG/1
50 TABLET, FILM COATED ORAL
Status: DISCONTINUED | OUTPATIENT
Start: 2017-04-29 | End: 2017-04-30

## 2017-04-29 RX ORDER — LORAZEPAM 0.5 MG/1
0.5 TABLET ORAL 3 TIMES DAILY PRN
Status: DISCONTINUED | OUTPATIENT
Start: 2017-04-29 | End: 2017-05-10 | Stop reason: HOSPADM

## 2017-04-29 RX ORDER — IBUPROFEN 400 MG/1
400 TABLET ORAL EVERY 8 HOURS PRN
Status: DISCONTINUED | OUTPATIENT
Start: 2017-04-29 | End: 2017-05-10 | Stop reason: HOSPADM

## 2017-04-29 RX ORDER — IBUPROFEN 200 MG
200 TABLET ORAL EVERY 8 HOURS PRN
Status: DISCONTINUED | OUTPATIENT
Start: 2017-04-29 | End: 2017-05-10 | Stop reason: HOSPADM

## 2017-04-29 RX ORDER — LORAZEPAM 2 MG/ML
1 INJECTION INTRAMUSCULAR EVERY 8 HOURS PRN
Status: DISCONTINUED | OUTPATIENT
Start: 2017-04-29 | End: 2017-05-10 | Stop reason: HOSPADM

## 2017-04-29 RX ADMIN — MIDODRINE HYDROCHLORIDE 10 MG: 5 TABLET ORAL at 21:26

## 2017-04-29 RX ADMIN — Medication 8.8 MG: at 08:34

## 2017-04-29 RX ADMIN — Medication 8.8 MG: at 17:11

## 2017-04-29 RX ADMIN — LEVETIRACETAM 750 MG: 750 TABLET, FILM COATED ORAL at 08:32

## 2017-04-29 RX ADMIN — PREGABALIN 50 MG: 25 CAPSULE ORAL at 16:40

## 2017-04-29 RX ADMIN — GABAPENTIN 100 MG: 100 CAPSULE ORAL at 08:32

## 2017-04-29 RX ADMIN — LEVETIRACETAM 750 MG: 750 TABLET, FILM COATED ORAL at 21:26

## 2017-04-29 RX ADMIN — MIDODRINE HYDROCHLORIDE 10 MG: 5 TABLET ORAL at 08:32

## 2017-04-29 RX ADMIN — PREGABALIN 50 MG: 25 CAPSULE ORAL at 21:26

## 2017-04-29 RX ADMIN — ENOXAPARIN SODIUM 40 MG: 40 INJECTION SUBCUTANEOUS at 08:33

## 2017-04-29 RX ADMIN — FAMOTIDINE 20 MG: 20 TABLET ORAL at 08:32

## 2017-04-29 RX ADMIN — MIDODRINE HYDROCHLORIDE 10 MG: 5 TABLET ORAL at 16:38

## 2017-04-29 RX ADMIN — QUETIAPINE FUMARATE 50 MG: 25 TABLET, FILM COATED ORAL at 21:36

## 2017-04-29 RX ADMIN — LEVOTHYROXINE SODIUM 75 MCG: 75 TABLET ORAL at 06:24

## 2017-04-29 RX ADMIN — FAMOTIDINE 20 MG: 20 TABLET ORAL at 17:11

## 2017-04-29 RX ADMIN — IBUPROFEN 600 MG: 400 TABLET ORAL at 10:16

## 2017-04-29 RX ADMIN — PREGABALIN 50 MG: 25 CAPSULE ORAL at 08:32

## 2017-04-30 PROBLEM — R41.89 COGNITIVE CHANGE: Status: ACTIVE | Noted: 2017-04-30

## 2017-04-30 LAB
ANION GAP SERPL CALCULATED.3IONS-SCNC: 7 MMOL/L (ref 4–13)
BASOPHILS # BLD AUTO: 0.02 THOUSANDS/ΜL (ref 0–0.1)
BASOPHILS NFR BLD AUTO: 1 % (ref 0–1)
BUN SERPL-MCNC: 14 MG/DL (ref 5–25)
CALCIUM SERPL-MCNC: 9.6 MG/DL (ref 8.3–10.1)
CHLORIDE SERPL-SCNC: 108 MMOL/L (ref 100–108)
CO2 SERPL-SCNC: 34 MMOL/L (ref 21–32)
CREAT SERPL-MCNC: 0.66 MG/DL (ref 0.6–1.3)
EOSINOPHIL # BLD AUTO: 0.13 THOUSAND/ΜL (ref 0–0.61)
EOSINOPHIL NFR BLD AUTO: 5 % (ref 0–6)
ERYTHROCYTE [DISTWIDTH] IN BLOOD BY AUTOMATED COUNT: 13.8 % (ref 11.6–15.1)
GFR SERPL CREATININE-BSD FRML MDRD: >60 ML/MIN/1.73SQ M
GLUCOSE SERPL-MCNC: 76 MG/DL (ref 65–140)
HCT VFR BLD AUTO: 28.9 % (ref 34.8–46.1)
HGB BLD-MCNC: 9.3 G/DL (ref 11.5–15.4)
LYMPHOCYTES # BLD AUTO: 1.37 THOUSANDS/ΜL (ref 0.6–4.47)
LYMPHOCYTES NFR BLD AUTO: 56 % (ref 14–44)
MCH RBC QN AUTO: 30.6 PG (ref 26.8–34.3)
MCHC RBC AUTO-ENTMCNC: 32.2 G/DL (ref 31.4–37.4)
MCV RBC AUTO: 95 FL (ref 82–98)
MONOCYTES # BLD AUTO: 0.18 THOUSAND/ΜL (ref 0.17–1.22)
MONOCYTES NFR BLD AUTO: 7 % (ref 4–12)
NEUTROPHILS # BLD AUTO: 0.76 THOUSANDS/ΜL (ref 1.85–7.62)
NEUTS SEG NFR BLD AUTO: 31 % (ref 43–75)
NRBC BLD AUTO-RTO: 0 /100 WBCS
PLATELET # BLD AUTO: 197 THOUSANDS/UL (ref 149–390)
PMV BLD AUTO: 10.8 FL (ref 8.9–12.7)
POTASSIUM SERPL-SCNC: 3.8 MMOL/L (ref 3.5–5.3)
RBC # BLD AUTO: 3.04 MILLION/UL (ref 3.81–5.12)
SODIUM SERPL-SCNC: 149 MMOL/L (ref 136–145)
T4 FREE SERPL-MCNC: 1.15 NG/DL (ref 0.76–1.46)
TSH SERPL DL<=0.05 MIU/L-ACNC: 0.37 UIU/ML (ref 0.36–3.74)
WBC # BLD AUTO: 2.46 THOUSAND/UL (ref 4.31–10.16)

## 2017-04-30 PROCEDURE — 84439 ASSAY OF FREE THYROXINE: CPT | Performed by: PHYSICIAN ASSISTANT

## 2017-04-30 PROCEDURE — 85025 COMPLETE CBC W/AUTO DIFF WBC: CPT | Performed by: PHYSICIAN ASSISTANT

## 2017-04-30 PROCEDURE — 80048 BASIC METABOLIC PNL TOTAL CA: CPT | Performed by: PHYSICIAN ASSISTANT

## 2017-04-30 PROCEDURE — 84479 ASSAY OF THYROID (T3 OR T4): CPT | Performed by: PHYSICIAN ASSISTANT

## 2017-04-30 PROCEDURE — 84443 ASSAY THYROID STIM HORMONE: CPT | Performed by: PHYSICIAN ASSISTANT

## 2017-04-30 RX ORDER — QUETIAPINE FUMARATE 25 MG/1
25 TABLET, FILM COATED ORAL
Status: DISCONTINUED | OUTPATIENT
Start: 2017-04-30 | End: 2017-05-10 | Stop reason: HOSPADM

## 2017-04-30 RX ADMIN — PREGABALIN 50 MG: 25 CAPSULE ORAL at 09:31

## 2017-04-30 RX ADMIN — Medication 8.8 MG: at 17:30

## 2017-04-30 RX ADMIN — PREGABALIN 50 MG: 25 CAPSULE ORAL at 21:32

## 2017-04-30 RX ADMIN — MIDODRINE HYDROCHLORIDE 10 MG: 5 TABLET ORAL at 09:32

## 2017-04-30 RX ADMIN — LEVOTHYROXINE SODIUM 75 MCG: 75 TABLET ORAL at 06:33

## 2017-04-30 RX ADMIN — PREGABALIN 50 MG: 25 CAPSULE ORAL at 17:00

## 2017-04-30 RX ADMIN — FAMOTIDINE 20 MG: 20 TABLET ORAL at 09:32

## 2017-04-30 RX ADMIN — Medication 8.8 MG: at 09:32

## 2017-04-30 RX ADMIN — LEVETIRACETAM 750 MG: 750 TABLET, FILM COATED ORAL at 21:32

## 2017-04-30 RX ADMIN — IBUPROFEN 600 MG: 400 TABLET ORAL at 13:40

## 2017-04-30 RX ADMIN — GABAPENTIN 100 MG: 100 CAPSULE ORAL at 09:32

## 2017-04-30 RX ADMIN — MIDODRINE HYDROCHLORIDE 10 MG: 5 TABLET ORAL at 17:00

## 2017-04-30 RX ADMIN — MIDODRINE HYDROCHLORIDE 10 MG: 5 TABLET ORAL at 21:32

## 2017-04-30 RX ADMIN — ENOXAPARIN SODIUM 40 MG: 40 INJECTION SUBCUTANEOUS at 09:32

## 2017-04-30 RX ADMIN — FAMOTIDINE 20 MG: 20 TABLET ORAL at 17:30

## 2017-04-30 RX ADMIN — LEVETIRACETAM 750 MG: 750 TABLET, FILM COATED ORAL at 09:32

## 2017-04-30 RX ADMIN — QUETIAPINE FUMARATE 25 MG: 25 TABLET, FILM COATED ORAL at 21:34

## 2017-05-01 LAB
RPR SER QL: NORMAL
T3RU NFR SERPL: 34 % (ref 24–39)

## 2017-05-01 RX ADMIN — LEVETIRACETAM 750 MG: 750 TABLET, FILM COATED ORAL at 21:00

## 2017-05-01 RX ADMIN — IBUPROFEN 600 MG: 400 TABLET ORAL at 12:54

## 2017-05-01 RX ADMIN — LEVETIRACETAM 750 MG: 750 TABLET, FILM COATED ORAL at 09:04

## 2017-05-01 RX ADMIN — FAMOTIDINE 20 MG: 20 TABLET ORAL at 18:00

## 2017-05-01 RX ADMIN — ENOXAPARIN SODIUM 40 MG: 40 INJECTION SUBCUTANEOUS at 09:05

## 2017-05-01 RX ADMIN — FAMOTIDINE 20 MG: 20 TABLET ORAL at 09:04

## 2017-05-01 RX ADMIN — GABAPENTIN 100 MG: 100 CAPSULE ORAL at 09:04

## 2017-05-01 RX ADMIN — Medication 8.8 MG: at 09:05

## 2017-05-01 RX ADMIN — PREGABALIN 50 MG: 25 CAPSULE ORAL at 23:16

## 2017-05-01 RX ADMIN — PREGABALIN 50 MG: 25 CAPSULE ORAL at 09:41

## 2017-05-01 RX ADMIN — LEVOTHYROXINE SODIUM 75 MCG: 75 TABLET ORAL at 06:32

## 2017-05-01 RX ADMIN — MIDODRINE HYDROCHLORIDE 10 MG: 5 TABLET ORAL at 09:04

## 2017-05-01 RX ADMIN — PREGABALIN 50 MG: 25 CAPSULE ORAL at 16:00

## 2017-05-01 RX ADMIN — MIDODRINE HYDROCHLORIDE 10 MG: 5 TABLET ORAL at 23:14

## 2017-05-01 RX ADMIN — QUETIAPINE FUMARATE 25 MG: 25 TABLET, FILM COATED ORAL at 23:17

## 2017-05-02 RX ADMIN — LEVOTHYROXINE SODIUM 75 MCG: 75 TABLET ORAL at 05:39

## 2017-05-02 RX ADMIN — FAMOTIDINE 20 MG: 20 TABLET ORAL at 17:17

## 2017-05-02 RX ADMIN — Medication 8.8 MG: at 17:18

## 2017-05-02 RX ADMIN — PREGABALIN 50 MG: 25 CAPSULE ORAL at 09:01

## 2017-05-02 RX ADMIN — PREGABALIN 50 MG: 25 CAPSULE ORAL at 16:19

## 2017-05-02 RX ADMIN — FAMOTIDINE 20 MG: 20 TABLET ORAL at 09:00

## 2017-05-02 RX ADMIN — ENOXAPARIN SODIUM 40 MG: 40 INJECTION SUBCUTANEOUS at 09:00

## 2017-05-02 RX ADMIN — PREGABALIN 50 MG: 25 CAPSULE ORAL at 21:13

## 2017-05-02 RX ADMIN — MIDODRINE HYDROCHLORIDE 10 MG: 5 TABLET ORAL at 16:19

## 2017-05-02 RX ADMIN — MIDODRINE HYDROCHLORIDE 10 MG: 5 TABLET ORAL at 21:13

## 2017-05-02 RX ADMIN — GABAPENTIN 100 MG: 100 CAPSULE ORAL at 09:00

## 2017-05-02 RX ADMIN — LEVETIRACETAM 750 MG: 750 TABLET, FILM COATED ORAL at 12:25

## 2017-05-02 RX ADMIN — MIDODRINE HYDROCHLORIDE 10 MG: 5 TABLET ORAL at 09:00

## 2017-05-02 RX ADMIN — LEVETIRACETAM 750 MG: 750 TABLET, FILM COATED ORAL at 21:13

## 2017-05-02 RX ADMIN — QUETIAPINE FUMARATE 25 MG: 25 TABLET, FILM COATED ORAL at 21:14

## 2017-05-02 RX ADMIN — Medication 8.8 MG: at 09:01

## 2017-05-03 RX ADMIN — ENOXAPARIN SODIUM 40 MG: 40 INJECTION SUBCUTANEOUS at 08:48

## 2017-05-03 RX ADMIN — LEVETIRACETAM 750 MG: 750 TABLET, FILM COATED ORAL at 21:25

## 2017-05-03 RX ADMIN — LEVOTHYROXINE SODIUM 75 MCG: 75 TABLET ORAL at 06:13

## 2017-05-03 RX ADMIN — PREGABALIN 50 MG: 25 CAPSULE ORAL at 08:47

## 2017-05-03 RX ADMIN — FAMOTIDINE 20 MG: 20 TABLET ORAL at 08:47

## 2017-05-03 RX ADMIN — QUETIAPINE FUMARATE 25 MG: 25 TABLET, FILM COATED ORAL at 21:25

## 2017-05-03 RX ADMIN — Medication 8.8 MG: at 08:47

## 2017-05-03 RX ADMIN — Medication 8.8 MG: at 17:25

## 2017-05-03 RX ADMIN — PREGABALIN 50 MG: 25 CAPSULE ORAL at 21:25

## 2017-05-03 RX ADMIN — MIDODRINE HYDROCHLORIDE 10 MG: 5 TABLET ORAL at 17:24

## 2017-05-03 RX ADMIN — PREGABALIN 50 MG: 25 CAPSULE ORAL at 17:24

## 2017-05-03 RX ADMIN — MIDODRINE HYDROCHLORIDE 10 MG: 5 TABLET ORAL at 08:47

## 2017-05-03 RX ADMIN — LEVETIRACETAM 750 MG: 750 TABLET, FILM COATED ORAL at 08:47

## 2017-05-03 RX ADMIN — FAMOTIDINE 20 MG: 20 TABLET ORAL at 17:24

## 2017-05-03 RX ADMIN — MIDODRINE HYDROCHLORIDE 10 MG: 5 TABLET ORAL at 21:25

## 2017-05-03 RX ADMIN — GABAPENTIN 100 MG: 100 CAPSULE ORAL at 08:47

## 2017-05-04 RX ADMIN — PREGABALIN 50 MG: 25 CAPSULE ORAL at 08:52

## 2017-05-04 RX ADMIN — QUETIAPINE FUMARATE 25 MG: 25 TABLET, FILM COATED ORAL at 21:18

## 2017-05-04 RX ADMIN — MIDODRINE HYDROCHLORIDE 10 MG: 5 TABLET ORAL at 21:18

## 2017-05-04 RX ADMIN — LEVETIRACETAM 750 MG: 750 TABLET, FILM COATED ORAL at 21:18

## 2017-05-04 RX ADMIN — MIDODRINE HYDROCHLORIDE 10 MG: 5 TABLET ORAL at 08:52

## 2017-05-04 RX ADMIN — FAMOTIDINE 20 MG: 20 TABLET ORAL at 17:09

## 2017-05-04 RX ADMIN — IBUPROFEN 400 MG: 400 TABLET ORAL at 21:27

## 2017-05-04 RX ADMIN — PREGABALIN 50 MG: 25 CAPSULE ORAL at 17:08

## 2017-05-04 RX ADMIN — LEVOTHYROXINE SODIUM 75 MCG: 75 TABLET ORAL at 05:50

## 2017-05-04 RX ADMIN — MIDODRINE HYDROCHLORIDE 10 MG: 5 TABLET ORAL at 17:09

## 2017-05-04 RX ADMIN — PREGABALIN 50 MG: 25 CAPSULE ORAL at 21:18

## 2017-05-05 ENCOUNTER — APPOINTMENT (INPATIENT)
Dept: RADIOLOGY | Facility: HOSPITAL | Age: 72
DRG: 885 | End: 2017-05-05
Payer: MEDICARE

## 2017-05-05 PROCEDURE — G8978 MOBILITY CURRENT STATUS: HCPCS

## 2017-05-05 PROCEDURE — 70551 MRI BRAIN STEM W/O DYE: CPT

## 2017-05-05 PROCEDURE — 97116 GAIT TRAINING THERAPY: CPT

## 2017-05-05 PROCEDURE — G8979 MOBILITY GOAL STATUS: HCPCS

## 2017-05-05 PROCEDURE — 97163 PT EVAL HIGH COMPLEX 45 MIN: CPT

## 2017-05-05 PROCEDURE — 97110 THERAPEUTIC EXERCISES: CPT

## 2017-05-05 RX ORDER — NYSTATIN 100000 [USP'U]/G
1 POWDER TOPICAL 2 TIMES DAILY
Status: DISCONTINUED | OUTPATIENT
Start: 2017-05-05 | End: 2017-05-10 | Stop reason: HOSPADM

## 2017-05-05 RX ORDER — SENNOSIDES 8.6 MG
1 TABLET ORAL
Status: DISCONTINUED | OUTPATIENT
Start: 2017-05-05 | End: 2017-05-10 | Stop reason: HOSPADM

## 2017-05-05 RX ADMIN — PREGABALIN 50 MG: 25 CAPSULE ORAL at 17:00

## 2017-05-05 RX ADMIN — ENOXAPARIN SODIUM 40 MG: 40 INJECTION SUBCUTANEOUS at 08:45

## 2017-05-05 RX ADMIN — QUETIAPINE FUMARATE 25 MG: 25 TABLET, FILM COATED ORAL at 21:35

## 2017-05-05 RX ADMIN — MIDODRINE HYDROCHLORIDE 10 MG: 5 TABLET ORAL at 08:45

## 2017-05-05 RX ADMIN — PREGABALIN 50 MG: 25 CAPSULE ORAL at 08:45

## 2017-05-05 RX ADMIN — MIDODRINE HYDROCHLORIDE 10 MG: 5 TABLET ORAL at 17:00

## 2017-05-05 RX ADMIN — MIDODRINE HYDROCHLORIDE 10 MG: 5 TABLET ORAL at 21:35

## 2017-05-05 RX ADMIN — LEVETIRACETAM 750 MG: 750 TABLET, FILM COATED ORAL at 08:45

## 2017-05-05 RX ADMIN — FAMOTIDINE 20 MG: 20 TABLET ORAL at 08:45

## 2017-05-05 RX ADMIN — FAMOTIDINE 20 MG: 20 TABLET ORAL at 17:27

## 2017-05-05 RX ADMIN — NYSTATIN 1 APPLICATION: 100000 POWDER TOPICAL at 17:26

## 2017-05-05 RX ADMIN — SENNOSIDES 8.6 MG: 8.6 TABLET, FILM COATED ORAL at 21:34

## 2017-05-05 RX ADMIN — PREGABALIN 50 MG: 25 CAPSULE ORAL at 21:35

## 2017-05-05 RX ADMIN — LEVETIRACETAM 750 MG: 750 TABLET, FILM COATED ORAL at 21:35

## 2017-05-05 RX ADMIN — GABAPENTIN 100 MG: 100 CAPSULE ORAL at 08:45

## 2017-05-05 RX ADMIN — LEVOTHYROXINE SODIUM 75 MCG: 75 TABLET ORAL at 05:22

## 2017-05-06 RX ORDER — GABAPENTIN 100 MG/1
100 CAPSULE ORAL 2 TIMES DAILY
Status: DISCONTINUED | OUTPATIENT
Start: 2017-05-06 | End: 2017-05-10 | Stop reason: HOSPADM

## 2017-05-06 RX ADMIN — FAMOTIDINE 20 MG: 20 TABLET ORAL at 08:30

## 2017-05-06 RX ADMIN — LEVETIRACETAM 750 MG: 750 TABLET, FILM COATED ORAL at 08:30

## 2017-05-06 RX ADMIN — ENOXAPARIN SODIUM 40 MG: 40 INJECTION SUBCUTANEOUS at 08:30

## 2017-05-06 RX ADMIN — PREGABALIN 50 MG: 25 CAPSULE ORAL at 08:30

## 2017-05-06 RX ADMIN — LEVOTHYROXINE SODIUM 75 MCG: 75 TABLET ORAL at 06:19

## 2017-05-06 RX ADMIN — SENNOSIDES 8.6 MG: 8.6 TABLET, FILM COATED ORAL at 21:31

## 2017-05-06 RX ADMIN — IBUPROFEN 600 MG: 400 TABLET ORAL at 00:24

## 2017-05-06 RX ADMIN — MIDODRINE HYDROCHLORIDE 10 MG: 5 TABLET ORAL at 17:00

## 2017-05-06 RX ADMIN — MIDODRINE HYDROCHLORIDE 10 MG: 5 TABLET ORAL at 08:30

## 2017-05-06 RX ADMIN — NYSTATIN 1 APPLICATION: 100000 POWDER TOPICAL at 18:13

## 2017-05-06 RX ADMIN — MIDODRINE HYDROCHLORIDE 10 MG: 5 TABLET ORAL at 21:32

## 2017-05-06 RX ADMIN — IBUPROFEN 600 MG: 400 TABLET ORAL at 12:00

## 2017-05-06 RX ADMIN — GABAPENTIN 100 MG: 100 CAPSULE ORAL at 18:12

## 2017-05-06 RX ADMIN — NYSTATIN 1 APPLICATION: 100000 POWDER TOPICAL at 08:30

## 2017-05-06 RX ADMIN — PREGABALIN 50 MG: 25 CAPSULE ORAL at 17:00

## 2017-05-06 RX ADMIN — LEVETIRACETAM 750 MG: 750 TABLET, FILM COATED ORAL at 21:32

## 2017-05-06 RX ADMIN — QUETIAPINE FUMARATE 25 MG: 25 TABLET, FILM COATED ORAL at 21:32

## 2017-05-06 RX ADMIN — GABAPENTIN 100 MG: 100 CAPSULE ORAL at 08:30

## 2017-05-06 RX ADMIN — PREGABALIN 50 MG: 25 CAPSULE ORAL at 21:32

## 2017-05-06 RX ADMIN — FAMOTIDINE 20 MG: 20 TABLET ORAL at 18:13

## 2017-05-07 RX ADMIN — FAMOTIDINE 20 MG: 20 TABLET ORAL at 17:36

## 2017-05-07 RX ADMIN — LEVETIRACETAM 750 MG: 750 TABLET, FILM COATED ORAL at 21:18

## 2017-05-07 RX ADMIN — NYSTATIN 1 APPLICATION: 100000 POWDER TOPICAL at 17:37

## 2017-05-07 RX ADMIN — MIDODRINE HYDROCHLORIDE 10 MG: 5 TABLET ORAL at 21:18

## 2017-05-07 RX ADMIN — IBUPROFEN 600 MG: 400 TABLET ORAL at 14:27

## 2017-05-07 RX ADMIN — LEVOTHYROXINE SODIUM 75 MCG: 75 TABLET ORAL at 06:07

## 2017-05-07 RX ADMIN — PREGABALIN 50 MG: 25 CAPSULE ORAL at 17:00

## 2017-05-07 RX ADMIN — LEVETIRACETAM 750 MG: 750 TABLET, FILM COATED ORAL at 08:46

## 2017-05-07 RX ADMIN — MIDODRINE HYDROCHLORIDE 10 MG: 5 TABLET ORAL at 08:46

## 2017-05-07 RX ADMIN — PREGABALIN 50 MG: 25 CAPSULE ORAL at 21:18

## 2017-05-07 RX ADMIN — FAMOTIDINE 20 MG: 20 TABLET ORAL at 08:46

## 2017-05-07 RX ADMIN — ENOXAPARIN SODIUM 40 MG: 40 INJECTION SUBCUTANEOUS at 08:46

## 2017-05-07 RX ADMIN — SENNOSIDES 8.6 MG: 8.6 TABLET, FILM COATED ORAL at 21:17

## 2017-05-07 RX ADMIN — GABAPENTIN 100 MG: 100 CAPSULE ORAL at 08:46

## 2017-05-07 RX ADMIN — QUETIAPINE FUMARATE 25 MG: 25 TABLET, FILM COATED ORAL at 21:18

## 2017-05-07 RX ADMIN — NYSTATIN 1 APPLICATION: 100000 POWDER TOPICAL at 08:46

## 2017-05-07 RX ADMIN — PREGABALIN 50 MG: 25 CAPSULE ORAL at 08:46

## 2017-05-07 RX ADMIN — MIDODRINE HYDROCHLORIDE 10 MG: 5 TABLET ORAL at 17:00

## 2017-05-07 RX ADMIN — GABAPENTIN 100 MG: 100 CAPSULE ORAL at 17:35

## 2017-05-08 PROBLEM — F33.2 MAJOR DEPRESSIVE DISORDER, RECURRENT SEVERE WITHOUT PSYCHOTIC FEATURES (HCC): Status: ACTIVE | Noted: 2017-04-29

## 2017-05-08 RX ORDER — MIRTAZAPINE 15 MG/1
15 TABLET, FILM COATED ORAL
Status: DISCONTINUED | OUTPATIENT
Start: 2017-05-08 | End: 2017-05-10 | Stop reason: HOSPADM

## 2017-05-08 RX ADMIN — NYSTATIN 1 APPLICATION: 100000 POWDER TOPICAL at 17:20

## 2017-05-08 RX ADMIN — MIDODRINE HYDROCHLORIDE 10 MG: 5 TABLET ORAL at 17:00

## 2017-05-08 RX ADMIN — LEVETIRACETAM 750 MG: 750 TABLET, FILM COATED ORAL at 21:43

## 2017-05-08 RX ADMIN — IBUPROFEN 600 MG: 400 TABLET ORAL at 02:25

## 2017-05-08 RX ADMIN — GABAPENTIN 100 MG: 100 CAPSULE ORAL at 09:09

## 2017-05-08 RX ADMIN — GABAPENTIN 100 MG: 100 CAPSULE ORAL at 17:19

## 2017-05-08 RX ADMIN — ENOXAPARIN SODIUM 40 MG: 40 INJECTION SUBCUTANEOUS at 09:09

## 2017-05-08 RX ADMIN — MIDODRINE HYDROCHLORIDE 10 MG: 5 TABLET ORAL at 21:44

## 2017-05-08 RX ADMIN — MIDODRINE HYDROCHLORIDE 10 MG: 5 TABLET ORAL at 09:09

## 2017-05-08 RX ADMIN — FAMOTIDINE 20 MG: 20 TABLET ORAL at 17:19

## 2017-05-08 RX ADMIN — PREGABALIN 50 MG: 25 CAPSULE ORAL at 17:00

## 2017-05-08 RX ADMIN — LEVOTHYROXINE SODIUM 75 MCG: 75 TABLET ORAL at 05:36

## 2017-05-08 RX ADMIN — PREGABALIN 50 MG: 25 CAPSULE ORAL at 09:21

## 2017-05-08 RX ADMIN — PREGABALIN 50 MG: 25 CAPSULE ORAL at 21:43

## 2017-05-08 RX ADMIN — MIRTAZAPINE 15 MG: 15 TABLET, FILM COATED ORAL at 21:44

## 2017-05-08 RX ADMIN — NYSTATIN 1 APPLICATION: 100000 POWDER TOPICAL at 09:58

## 2017-05-08 RX ADMIN — SENNOSIDES 8.6 MG: 8.6 TABLET, FILM COATED ORAL at 21:43

## 2017-05-08 RX ADMIN — FAMOTIDINE 20 MG: 20 TABLET ORAL at 09:09

## 2017-05-08 RX ADMIN — QUETIAPINE FUMARATE 25 MG: 25 TABLET, FILM COATED ORAL at 21:44

## 2017-05-08 RX ADMIN — LEVETIRACETAM 750 MG: 750 TABLET, FILM COATED ORAL at 09:09

## 2017-05-09 RX ADMIN — LEVETIRACETAM 750 MG: 750 TABLET, FILM COATED ORAL at 21:52

## 2017-05-09 RX ADMIN — NYSTATIN 1 APPLICATION: 100000 POWDER TOPICAL at 09:37

## 2017-05-09 RX ADMIN — GABAPENTIN 100 MG: 100 CAPSULE ORAL at 09:35

## 2017-05-09 RX ADMIN — MIDODRINE HYDROCHLORIDE 10 MG: 5 TABLET ORAL at 21:52

## 2017-05-09 RX ADMIN — LEVETIRACETAM 750 MG: 750 TABLET, FILM COATED ORAL at 09:35

## 2017-05-09 RX ADMIN — MIDODRINE HYDROCHLORIDE 10 MG: 5 TABLET ORAL at 09:35

## 2017-05-09 RX ADMIN — MIRTAZAPINE 15 MG: 15 TABLET, FILM COATED ORAL at 21:52

## 2017-05-09 RX ADMIN — GABAPENTIN 100 MG: 100 CAPSULE ORAL at 17:44

## 2017-05-09 RX ADMIN — SENNOSIDES 8.6 MG: 8.6 TABLET, FILM COATED ORAL at 21:52

## 2017-05-09 RX ADMIN — PREGABALIN 50 MG: 25 CAPSULE ORAL at 09:34

## 2017-05-09 RX ADMIN — PREGABALIN 50 MG: 25 CAPSULE ORAL at 21:52

## 2017-05-09 RX ADMIN — FAMOTIDINE 20 MG: 20 TABLET ORAL at 09:35

## 2017-05-09 RX ADMIN — PREGABALIN 50 MG: 25 CAPSULE ORAL at 16:13

## 2017-05-09 RX ADMIN — NYSTATIN 1 APPLICATION: 100000 POWDER TOPICAL at 17:44

## 2017-05-09 RX ADMIN — FAMOTIDINE 20 MG: 20 TABLET ORAL at 17:44

## 2017-05-09 RX ADMIN — QUETIAPINE FUMARATE 25 MG: 25 TABLET, FILM COATED ORAL at 21:52

## 2017-05-09 RX ADMIN — MIDODRINE HYDROCHLORIDE 10 MG: 5 TABLET ORAL at 16:13

## 2017-05-09 RX ADMIN — ENOXAPARIN SODIUM 40 MG: 40 INJECTION SUBCUTANEOUS at 09:36

## 2017-05-09 RX ADMIN — LEVOTHYROXINE SODIUM 75 MCG: 75 TABLET ORAL at 05:57

## 2017-05-10 VITALS
BODY MASS INDEX: 20.35 KG/M2 | DIASTOLIC BLOOD PRESSURE: 83 MMHG | OXYGEN SATURATION: 96 % | SYSTOLIC BLOOD PRESSURE: 146 MMHG | WEIGHT: 114.86 LBS | HEART RATE: 75 BPM | RESPIRATION RATE: 16 BRPM | HEIGHT: 63 IN | TEMPERATURE: 97.2 F

## 2017-05-10 RX ORDER — QUETIAPINE FUMARATE 25 MG/1
25 TABLET, FILM COATED ORAL
Qty: 30 TABLET | Refills: 0 | Status: SHIPPED | OUTPATIENT
Start: 2017-05-10 | End: 2018-03-27 | Stop reason: SDUPTHER

## 2017-05-10 RX ORDER — GABAPENTIN 100 MG/1
100 CAPSULE ORAL 2 TIMES DAILY
Qty: 60 CAPSULE | Refills: 0 | Status: SHIPPED | OUTPATIENT
Start: 2017-05-10 | End: 2017-06-09

## 2017-05-10 RX ORDER — MIRTAZAPINE 15 MG/1
15 TABLET, FILM COATED ORAL
Qty: 30 TABLET | Refills: 0 | Status: SHIPPED | OUTPATIENT
Start: 2017-05-10 | End: 2018-03-29

## 2017-05-10 RX ADMIN — LEVOTHYROXINE SODIUM 75 MCG: 75 TABLET ORAL at 06:24

## 2017-05-10 RX ADMIN — GABAPENTIN 100 MG: 100 CAPSULE ORAL at 08:54

## 2017-05-10 RX ADMIN — IBUPROFEN 400 MG: 400 TABLET ORAL at 14:26

## 2017-05-10 RX ADMIN — PREGABALIN 50 MG: 25 CAPSULE ORAL at 08:54

## 2017-05-10 RX ADMIN — ENOXAPARIN SODIUM 40 MG: 40 INJECTION SUBCUTANEOUS at 08:54

## 2017-05-10 RX ADMIN — MIDODRINE HYDROCHLORIDE 10 MG: 5 TABLET ORAL at 16:04

## 2017-05-10 RX ADMIN — LEVETIRACETAM 750 MG: 750 TABLET, FILM COATED ORAL at 08:54

## 2017-05-10 RX ADMIN — FAMOTIDINE 20 MG: 20 TABLET ORAL at 17:04

## 2017-05-10 RX ADMIN — GABAPENTIN 100 MG: 100 CAPSULE ORAL at 17:04

## 2017-05-10 RX ADMIN — FAMOTIDINE 20 MG: 20 TABLET ORAL at 08:54

## 2017-05-10 RX ADMIN — PREGABALIN 50 MG: 25 CAPSULE ORAL at 16:05

## 2017-05-10 RX ADMIN — MIDODRINE HYDROCHLORIDE 10 MG: 5 TABLET ORAL at 08:54

## 2017-05-10 RX ADMIN — NYSTATIN 1 APPLICATION: 100000 POWDER TOPICAL at 08:55

## 2017-05-17 ENCOUNTER — ALLSCRIPTS OFFICE VISIT (OUTPATIENT)
Dept: OTHER | Facility: OTHER | Age: 72
End: 2017-05-17

## 2017-07-07 ENCOUNTER — ALLSCRIPTS OFFICE VISIT (OUTPATIENT)
Dept: OTHER | Facility: OTHER | Age: 72
End: 2017-07-07

## 2017-07-07 DIAGNOSIS — M54.2 CERVICALGIA: ICD-10-CM

## 2017-07-07 DIAGNOSIS — R20.2 PARESTHESIA OF SKIN: ICD-10-CM

## 2017-08-23 DIAGNOSIS — G60.9 HEREDITARY AND IDIOPATHIC NEUROPATHY: ICD-10-CM

## 2017-08-23 DIAGNOSIS — M54.2 CERVICALGIA: ICD-10-CM

## 2017-08-23 DIAGNOSIS — G11.9 HEREDITARY ATAXIA (HCC): ICD-10-CM

## 2017-08-23 DIAGNOSIS — R20.2 PARESTHESIA OF SKIN: ICD-10-CM

## 2017-08-23 DIAGNOSIS — M79.10 MYALGIA: ICD-10-CM

## 2017-08-23 DIAGNOSIS — G89.4 CHRONIC PAIN SYNDROME: ICD-10-CM

## 2017-08-23 DIAGNOSIS — M54.9 DORSALGIA: ICD-10-CM

## 2017-09-15 ENCOUNTER — GENERIC CONVERSION - ENCOUNTER (OUTPATIENT)
Dept: OTHER | Facility: OTHER | Age: 72
End: 2017-09-15

## 2017-10-04 ENCOUNTER — GENERIC CONVERSION - ENCOUNTER (OUTPATIENT)
Dept: OTHER | Facility: OTHER | Age: 72
End: 2017-10-04

## 2017-10-16 ENCOUNTER — GENERIC CONVERSION - ENCOUNTER (OUTPATIENT)
Dept: OTHER | Facility: OTHER | Age: 72
End: 2017-10-16

## 2017-10-20 ENCOUNTER — GENERIC CONVERSION - ENCOUNTER (OUTPATIENT)
Dept: OTHER | Facility: OTHER | Age: 72
End: 2017-10-20

## 2017-10-27 ENCOUNTER — ALLSCRIPTS OFFICE VISIT (OUTPATIENT)
Dept: OTHER | Facility: OTHER | Age: 72
End: 2017-10-27

## 2017-10-28 NOTE — PROGRESS NOTES
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)   4  Anxiety (300 00) (F41 9)   5  Depression (311) (F32 9)    Plan  Anxiety, Depression    · *1 - [facility]  *  Status: Hold For -  Scheduling  Requested for: [id number]  Health Referral Questions : Patient requires Psychiatry assessment/intake      appointment (with MD/DO)  Care Summary provided  : Yes  Ataxia    · *1 -  NEUROLOGY Co-Management  *  Status: Active  Requested for: [id number]  Care Summary provided  : Yes  Pain syndrome, chronic    · Lidocaine 5 % External Patch (Lidoderm); APPLY 1 PATCH TO THE AFFECTED  AREA AND LEAVE IN PLACE FOR 12 HOURS, THEN REMOVE AND LEAVE OFF FOR 12  HOURS   · *1 -  [facility]  *  Status: Active  Requested for:  [id number]  Care Summary provided  : Yes    Discussion/Summary    Long discussion had with patient and her daughter, Walker Awad, regarding her noncompliance with medical therapy and concerns raised by the visiting rehab employees  Patient does not wish to live in a skilled nursing facility, therefore she has been asked to keep her follow-up appointments with specialists (Neurology, Psychiatry, and pain management) as there are concerns that some of her medications may not be adequately titrated  I explained to her that seeing specialists is her best option of continuing to live at home rather than be placed in a skilled nursing facility  Patient is aware that the unwillingness to cooperate/comply that she has displayed over the last several months is making it extremely difficult for Annette to safely care for her at home  Patient and her daughter are aware that I am referring this case to social work and that somebody should be contacting them next week to review the home situation and note any deficits of care   I have renewed patient's prescriptions for Lyrica, meloxicam, and added a Lidoderm patch to her pain control regimen  It was explained to patient and daughter that I do not feel comfortable giving her tramadol given the other medication she is taking and her current ataxia  to follow up with me after she has been seen by Psychiatry, Neurology, and pain management  The patient, patient's family was counseled regarding diagnostic results,-- instructions for management,-- risk factor reductions,-- prognosis,-- patient and family education,-- impressions,-- risks and benefits of treatment options,-- importance of compliance with treatment  total time of encounter was 45 minutes-- and-- 35 minutes was spent counseling  Possible side effects of new medications were reviewed with the patient/guardian today  The treatment plan was reviewed with the patient/guardian   The patient/guardian understands and agrees with the treatment plan      Chief Complaint  Pt here to f/u meds      History of Present Illness  Patient presents with daughter for three-month follow-upthat she is not taking her Lyrica, meloxicam, or tramadol (given to her at last ER visit?)says she is not taking these medications because she is âtoo cheap to fill themcomplains today of chronic diffuse back pain --x-ray ordered at last visit was never completedreports that she is eating and does, occasionally, feel extremely nauseated and like she might throw upnot followed up with neurologist or psychiatrist as discussed at last visitsays she does not know anything about having these followups recommended to Sandrine, states that she is with patient âmost of the timeâ however other relatives take turns staying with her while she is at [employer] patient is often argumentative with regards to doctor visits and following medical recommendationsRehab is currently coming to the house for physical therapyand depression are not adequately controlled per daughter --patient has no comment with regard to her anxiety/depression      Review of Systems    Constitutional: as noted in HPI  Eyes: No complaints of eye pain, no red eyes, no eyesight problems, no discharge, no dry eyes, no itching of eyes  ENT: no complaints of earache, no loss of hearing, no nose bleeds, no nasal discharge, no sore throat, no hoarseness  Cardiovascular: No complaints of slow heart rate, no fast heart rate, no chest pain, no palpitations, no leg claudication, no lower extremity edema  Respiratory: No complaints of shortness of breath, no wheezing, no cough, no SOB on exertion, no orthopnea, no PND  Gastrointestinal: as noted in HPI  Genitourinary: No complaints of dysuria, no incontinence, no pelvic pain, no dysmenorrhea, no vaginal discharge or bleeding  Musculoskeletal: as noted in HPI  Integumentary: No complaints of skin rash or lesions, no itching, no skin wounds, no breast pain or lump  Neurological: as noted in HPI  Psychiatric: as noted in HPI  Endocrine: No complaints of proptosis, no hot flashes, no muscle weakness, no deepening of the voice, no feelings of weakness  Hematologic/Lymphatic: No complaints of swollen glands, no swollen glands in the neck, does not bleed easily, does not bruise easily  Active Problems  1  Acute bronchitis, unspecified organism (466 0) (J20 9)   2  Acute non-recurrent maxillary sinusitis (461 0) (J01 00)   3  Anxiety (300 00) (F41 9)   4  Asthma (493 90) (J45 909)   5  Ataxia (781 3) (R27 0)   6  Back pain (724 5) (M54 9)   7  Cellulitis of left leg (682 6) (L03 116)   8  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)   9  Chronic constipation (564 00) (K59 09)   10  Decreased hearing (389 9) (H91 90)   11  Depression (311) (F32 9)   12  Dizziness (780 4) (R42)   13  Eczema (692 9) (L30 9)   14  Esophageal reflux (530 81) (K21 9)   15  Family Disruption Death Of Family Member (V61 07)   12  Glaucoma (365 9) (H40 9)   17  Hypotension, unspecified hypotension type (458 9) (I95 9)   18  Hypothyroidism (244 9) (E03 9)   19  Insomnia (780 52) (G47 00)   20  Limb pain (729 5) (M79 609)   21  Muscle pain, myofacial (729 1) (M79 1)   22  Neck pain on left side (723 1) (M54 2)   23  Neuropathy, peripheral, idiopathic (356 9) (G60 9)   24  Pain syndrome, chronic (338 4) (G89 4)   25  Paresthesia of left arm (782 0) (R20 2)   26  Peripheral edema (782 3) (R60 9)   27  Pyuria (791 9) (N39 0)   28  Restless legs syndrome (333 94) (G25 81)   29  Screening for genitourinary condition (V81 6) (Z13 89)   30  Screening for neurological condition (V80 09) (Z13 89)   31  Speech disturbance (784 59) (R47 9)   32  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)   33  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)   34  Thoracic back pain (724 1) (M54 6)   35  Unspecified convulsions (780 39) (R56 9)   36  Urinary frequency (788 41) (R35 0)   37  Varicose veins of both lower extremities (454 9) (I83 93)    Past Medical History  1  History of Abdominal pain, RLQ (right lower quadrant) (789 03) (R10 31)   2  Acute upper respiratory infection (465 9) (J06 9)   3  History of Contact dermatitis due to poison ivy (692 6) (L23 7)   4  History of nausea and vomiting (V12 79) (Z87 898)   5  History of Malignant Melanoma Of The Skin (V10 82)   6  History of Thoracic back pain (724 1) (M54 6)    The active problems and past medical history were reviewed and updated today  Surgical History  1  History of Cholecystectomy Laparoscopic   2  History of Colonoscopy (Fiberoptic)   3  History of Diagnostic Esophagogastroduodenoscopy   4  History of Removal Of Lesion    The surgical history was reviewed and updated today  Family History  Mother    1  Family history of Congenital Deafness (V19 2)   2  Family history of Glaucoma   3  Family history of Mother  At Age 80   3  Family history of Stroke Syndrome (V17 1)  Father    5  Family history of Acute Myocardial Infarction (V17 3)   6  Family history of Bilateral Deafness  Sister    9  Family history of Diabetes Mellitus (V18 0)   8  Family history of Glaucoma  Brother    5  Family history of Cerebellar Ataxia   10  Family history of Diabetes Mellitus (V18 0)   11  Family history of Glaucoma  Family History    12  Family history of Father  At Age 67    The family history was reviewed and updated today  Social History   · Alcohol Use (History)   · Educational Level - Completed Associate's Degree   · Family Disruption Death Of Family Member (V61 07)   · Former smoker (R86 85) (W26 980)   · Marital History -    · Retired From Work   · Speech disturbance (784 59) (R47 9)  The social history was reviewed and updated today  The social history was reviewed and is unchanged  Current Meds   1  Cyclobenzaprine HCl - 5 MG Oral Tablet; TAKE 1 TABLET AT BEDTIME AS NEEDED; Therapy: 89SLA4264 to (Hossein Bernal)  Requested for: 09EAH5923; Last   Rx:12Zwp7119 Ordered   2  Gabapentin 100 MG Oral Capsule; TAKE 1 CAPSULE TWICE DAILY; Therapy: 87BBB5235 to (Evaluate:2017)  Requested for: 43CCO0284; Last   Rx:04Pmj8846 Ordered   3  HydrOXYzine HCl - 25 MG Oral Tablet; Take one tablet at bed time; Therapy: (CCBPWNKK:02IIG4391) to Recorded   4  LevETIRAcetam 750 MG Oral Tablet; Take one tab 2 times daily  Requested for:   79MDN8288; Last Rx:73Wat9588 Ordered   5  Levothyroxine Sodium 75 MCG Oral Tablet; TAKE 1 TABLET DAILY AS DIRECTED; Therapy: 61HMT3719 to (YWFFRHAA:15RRI0177)  Requested for: 30KWB7832; Last   Rx:36Eos4304 Ordered   6  Lyrica 50 MG Oral Capsule; TAKE 1 CAPSULE 3 TIMES DAILY; Last Rx:2017   Ordered   7  Meloxicam 15 MG Oral Tablet; take 1 tablet daily as needed; Therapy: 23VEP3442 to (Last Rx:94Xqp3526)  Requested for: 65Ulr5279 Ordered   8  Midodrine HCl - 10 MG Oral Tablet; TAKE 1 TABLET 3 TIMES DAILY;    Therapy: 20TFV4727 to (Evaluate:59Jgx4155)  Requested for: 70Tmg8155; Last   Rx:96Dvb5567 Ordered 9  Mirtazapine 15 MG Oral Tablet; TAKE 1 TABLET AT BEDTIME; Therapy: (HXCLDBXY:46JKP0919) to Recorded   10  Mometasone Furoate 0 1 % External Cream; APPLY TOPICALLY BID AS NEEDED; Therapy: 17LKV5398 to (Last Rx:12Oct2016)  Requested for: 38Bpz8520 Ordered   11  QUEtiapine Fumarate 25 MG Oral Tablet; TAKE 1 TABLET AT BEDTIME; Therapy: 43Fih0805 to (Evaluate:11Jan2018)  Requested for: 35FZI5602; Last    Rx:13Oct2017 Ordered   12  ROPINIRole HCl - 0 5 MG Oral Tablet; TAKE 1 TABLET 3 TIMES DAILY; Therapy: 27Cqp1150 to (Evaluate:11Jan2018)  Requested for: 34CNS5650; Last    Rx:13Oct2017 Ordered    The medication list was reviewed and updated today  Allergies  1  Tylenol with Codeine #3 TABS   2  Penicillins    Vitals  Vital Signs    Recorded: 76AQP5811 12:59PM   Temperature 97 F, Tympanic   Heart Rate 68   Systolic 700   Diastolic 80   Weight 255 lb 2 oz   BMI Calculated 21 24   BSA Calculated 1 52   O2 Saturation 95     Physical Exam    Constitutional   General appearance: Abnormal   chronically ill,-- within normal limits of ideal weight-- and-- appears older than stated age  Eyes   Conjunctiva and lids: No swelling, erythema or discharge  Ears, Nose, Mouth, and Throat   External inspection of ears and nose: Normal     Oropharynx: Normal with no erythema, edema, exudate or lesions  Pulmonary   Respiratory effort: No increased work of breathing or signs of respiratory distress  Auscultation of lungs: Clear to auscultation  Cardiovascular   Auscultation of heart: Normal rate and rhythm, normal S1 and S2, without murmurs  Examination of extremities for edema and/or varicosities: Normal     Abdomen   Abdomen: Non-tender, no masses  Lymphatic   Palpation of lymph nodes in neck: No lymphadenopathy  Musculoskeletal   Gait and station: Abnormal  -- Ataxic gait, patient in wheelchair for visit today  Skin   Skin and subcutaneous tissue: Normal without rashes or lesions      Neurologic Cranial nerves: Cranial nerves 2-12 intact  Psychiatric   Orientation to person, place, and time: Abnormal  -- Patient is loosely oriented to person, place, time --poor short term memory recall  Mood and affect: Abnormal   Mood and Affect: indifferent          Signatures   Electronically signed by : Chelly Andrews DO; Oct 27 2017  4:07PM EST                       (Author)

## 2017-11-15 ENCOUNTER — TRANSCRIBE ORDERS (OUTPATIENT)
Dept: ADMINISTRATIVE | Facility: HOSPITAL | Age: 72
End: 2017-11-15

## 2017-11-15 ENCOUNTER — ALLSCRIPTS OFFICE VISIT (OUTPATIENT)
Dept: OTHER | Facility: OTHER | Age: 72
End: 2017-11-15

## 2017-11-15 DIAGNOSIS — G40.309 GENERALIZED EPILEPSY (HCC): Primary | ICD-10-CM

## 2017-11-17 NOTE — PROGRESS NOTES
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)   2  Restless legs syndrome (333 94) (G25 81)   3  Neuropathy, peripheral, idiopathic (356 9) (G60 9)   4  Seizure   5  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345 00) (G40 309)    Plan   Cerebral ataxia, Neuropathy, peripheral, idiopathic    · ROPINIRole HCl - 1 MG Oral Tablet; TAKE 1 TABLET 3 TIMES DAILY   Rx By: Sloan Hunt; Dispense: 90 Days ; #:270 Tablet; Refill: 3;Cerebral ataxia, Neuropathy, peripheral, idiopathic; JAYASHREE = N; Verified Transmission to [facility] [facility]; Last Updated By: [employer]; 11/15/2017 2:50:01 PM  Generalized nonconvulsive seizure    · Follow-up visit in 4 Months Evaluation and Treatment  Follow-up  Status: Complete Done: 10JKL9358   Ordered; For: Generalized nonconvulsive seizure; Ordered By: Dhiraj Gresham Performed:  Due: 37HIG7919; Last Updated By: Germaine Mcmahon; 11/15/2017 3:15:25 PM   · EEG AWAKE AND ASLEEP; Status:Active; Requested for:14Hwe5816;    Perform:formerly [facility]; Due:04Hjh7923; Last Updated By:Nicole Keane; 11/15/2017 3:22:05 PM;Ordered;nonconvulsive seizure; Ordered By:Meenu Hilario; Lyrica 50 MG Oral Capsule; TAKE 1 CAPSULE 3 TIMES DAILY; Last Rx:64Pjs8143; Status: ACTIVE Ordered Rx By: Dhiraj Gresham; Dispense: 90 Days ; #:270 Capsule; Refill: 0;  For: Cerebral ataxia, Hypothyroidism, Muscle pain, myofacial, Pain syndrome, chronic, Restless legs syndrome; JAYASHREE = N; Call Rx; Last Updated By: Sloan Hunt; 11/15/2017 2:46:06 PM   Discussion/Summary  Discussion Summary: At her last appt pt had a witnessed generalized seizure  Pt was transported and admitted to the hospital  She was seen by Neurology and started on Keppra  She is no longer taking the Keppra and does not know when it was stopped  She has not had any known further seizure activity  Although the patient is alone during the day and has had some falls  A sleep deprived EEG will be ordered at this time   Pt has been out of Lyrica for a few months which she takes for neuropathy  It will be restarted at this time  For RLS she is taking Requip 0 5mg in the morning and 1 5mg at bedtime  We will change the dosing to 1mg 3x daily as she is having symptoms during the day  Pt was advised to follow-up with her PCP re the persistent vomiting  She will follow-up in 4 months  Counseling Documentation With Imm: The patient, patient's family was counseled regarding instructions for management,-- risk factor reductions,-- patient and family education,-- impressions,-- risks and benefits of treatment options,-- importance of compliance with treatment  Chief Complaint  Chief Complaint Free Text Note Form: Pt presents for follow up of memory and RLS      History of Present Illness  HPI: Savanah Landry is a 71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 who presents for follow up  To review, symptoms were first brought to her attention about 7 years ago  In retrospect, she and her  had noted she tended to veer off to the left when walking for at least 10 years  Slurring of speech and hearing loss have occurred over the past 6 years  Both her parents were deaf at early ages  MRI showed age appropriate volume loss and cerebellar volume loss  Genetic testing was negative  Her father walked like her and she has a brother in [place] that walks like her too  She has had a decline in her balance and walking over the past few years  Pt also has a history of RLS  She has been on Mirapex, Neurontin and Cymbalta in the past for her RLS all which led to nausea  her last appt in February she had a seizure in the room and was transported to the hospital  EMS found that pt had a very low BP (70/30)  Upon admission she was found to have a low Hgb and GILBERTO  She was seen by Neurology and started on Keppra  Pt is no longer taking the Keppra and she and her daughter donât remember if she ever took it  She was also started on Midodrine which she is still taking  There have been no witnessed seizures but pt is home alone during the day while her daughter is at work  She was readmitted to the hospital in May after an overdose  continues to have significant difficulty with her balance  She is going to PT but she is not sure it is helping  She does have a walker but does not use it often  She did not bring it to todayâs appt  She has had recent falls  She also can not feel her feet  She has pain in the legs at night, more when trying to sleep  She reports that she had less pain in the legs when she was taking Lyrica  She has been off the Lyrica for at least a few weeks  Pt reports that her swallowing has been stable  She has not had any episodes of choking  She is attending SLP  She also continues to deal with RLS symptoms  She is taking Ropinirole 0 5mg in the AM and 3 tabs at bedtime  She is prescribed 0 5mg 3x daily  She states that her symptoms are bothersome in the afternoon  reports that she has been vomiting at least daily for the past 2 weeks  She states that today is the first day she has not vomited  She was advised to follow-up with her PCP  Pt reports that she continues to follow with Psychiatry  She has been inconsistent with follow-up in the past due to transportation difficulties  her ROS, FH, SH and social history  Review of Systems  Neurological ROS:  Constitutional: fatigue  HEENT: dryness of the eyes,-- hearing loss,-- sore throat,-- hoarseness-- and-- dysphagia  Cardiovascular:  no chest pain or pressure, no palpitations present, the heart rate was not rapid or irregular, no swelling in the arms or legs, no poor circulation  Respiratory: unusual or persistant cough-- and-- shortness of breath with or without exertion  Gastrointestinal: abdominal pain  Genitourinary: incontinence  Musculoskeletal: head/neck/back pain,-- pain while walking-- and-- neck pain and back pain  Integumentary   no masses, no rash, no skin lesions, no livedo reticularis  Psychiatric: depression  Endocrine  no unusual weight loss or gain, no excessive urination, no excessive thirst, no hair loss or gain, no hot or cold intolerance, no menstrual period change or irregularity, no loss of sexual ability or drive, no erection difficulty, no nipple discharge  Hematologic/Lymphatic:  no unusual bleeding, no tendency for easy bruising, no clotting skin or lumps  Neurological General: headache,-- increased sleepiness,-- trouble falling asleep-- and-- waking up at night  Neurological Mental Status: confusion-- and-- memory problems  Neurological Cranial Nerves: slurred speech  Neurological Motor findings include:  no tremor, no twitching, no cramping(pre/post exercise), no atrophy  Neurological Coordination: unsteadiness,-- balance difficulties-- and-- clumsiness  Neurological Sensory: numbness  Neurological Gait: difficulty walking  ROS Reviewed:   ROS reviewed  Active Problems  1  Acute bronchitis, unspecified organism (466 0) (J20 9)   2  Acute non-recurrent maxillary sinusitis (461 0) (J01 00)   3  Anxiety (300 00) (F41 9)   4  Asthma (493 90) (J45 909)   5  Ataxia (781 3) (R27 0)   6  Back pain (724 5) (M54 9)   7  Cellulitis of left leg (682 6) (L03 116)   8  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)   9  Chronic constipation (564 00) (K59 09)   10  Decreased hearing (389 9) (H91 90)   11  Depression (311) (F32 9)   12  Dizziness (780 4) (R42)   13  Eczema (692 9) (L30 9)   14  Esophageal reflux (530 81) (K21 9)   15  Family Disruption Death Of Family Member (V61 07)   12  Glaucoma (365 9) (H40 9)   17  Hypotension, unspecified hypotension type (458 9) (I95 9)   18  Hypothyroidism (244 9) (E03 9)   19  Insomnia (780 52) (G47 00)   20  Limb pain (729 5) (M79 609)   21  Muscle pain, myofacial (729 1) (M79 1)   22  Neck pain on left side (723 1) (M54 2)   23  Neuropathy, peripheral, idiopathic (356 9) (G60 9)   24  Pain syndrome, chronic (338 4) (G89 4)   25  Paresthesia of left arm (782 0) (R20 2)   26  Peripheral edema (782 3) (R60 9)   27  Pyuria (791 9) (N39 0)   28  Restless legs syndrome (333 94) (G25 81)   29  Screening for genitourinary condition (V81 6) (Z13 89)   30  Screening for neurological condition (V80 09) (Z13 89)   31  Speech disturbance (784 59) (R47 9)   32  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)   33  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)   34  Thoracic back pain (724 1) (M54 6)   35  Unspecified convulsions (780 39) (R56 9)   36  Urinary frequency (788 41) (R35 0)   37  Varicose veins of both lower extremities (454 9) (I83 93)    Past Medical History  1  History of Abdominal pain, RLQ (right lower quadrant) (789 03) (R10 31)   2  Acute upper respiratory infection (465 9) (J06 9)   3  History of Contact dermatitis due to poison ivy (692 6) (L23 7)   4  History of nausea and vomiting (V12 79) (Z87 898)   5  History of Malignant Melanoma Of The Skin (V10 82)   6  History of Thoracic back pain (724 1) (M54 6)    Surgical History    1  History of Cholecystectomy Laparoscopic   2  History of Colonoscopy (Fiberoptic)   3  History of Diagnostic Esophagogastroduodenoscopy   4  History of Removal Of Lesion    Family History  Mother    1  Family history of Congenital Deafness (V19 2)   2  Family history of Glaucoma   3  Family history of Mother  At Age 80   3  Family history of Stroke Syndrome (V17 1)  Father    5  Family history of Acute Myocardial Infarction (V17 3)   6  Family history of Bilateral Deafness  Sister    9  Family history of Diabetes Mellitus (V18 0)   8  Family history of Glaucoma  Brother    5  Family history of Cerebellar Ataxia   10  Family history of Diabetes Mellitus (V18 0)   11  Family history of Glaucoma  Family History    12   Family history of Father  At Age 67    Social History     · Alcohol Use (History)   · Educational Level - Completed Associate's Degree   · Family Disruption Death Of Family Member (V61 07)   · Former smoker (B41 48) (W08 116)   · Marital History -    · Retired From Work   · Speech disturbance (784 59) (R47 9)    Current Meds   1  Cyclobenzaprine HCl - 5 MG Oral Tablet; TAKE 1 TABLET AT BEDTIME AS NEEDED; Therapy: 87DYM3560 to (Addison Aaron)  Requested for: 68FEU9895; Last Rx:75Pqh3385 Ordered   2  Gabapentin 100 MG Oral Capsule; TAKE 1 CAPSULE TWICE DAILY; Therapy: 79LBG6017 to (Evaluate:22Jun2017)  Requested for: 18BBN2368; Last Rx:11Lvj4470 Ordered   3  HydrOXYzine HCl - 25 MG Oral Tablet; Take one tablet at bed time; Therapy: (CARGIQWT:61TJC6780) to Recorded   4  LevETIRAcetam 750 MG Oral Tablet; Take one tab 2 times daily  Requested for: 59YTP6240; Last Rx:47Mmt6568 Ordered   5  Levothyroxine Sodium 75 MCG Oral Tablet; TAKE 1 TABLET DAILY AS DIRECTED; Therapy: 53ULS8704 to (TDGYBZIZ:05RDF9269)  Requested for: 72KKZ1872; Last Rx:28Cex6772 Ordered   6  Lidocaine 5 % External Patch; APPLY 1 PATCH TO THE AFFECTED AREA AND LEAVE IN PLACE FOR 12 HOURS, THEN REMOVE AND LEAVE OFF FOR 12 HOURS; Therapy: 14FNT9852 to (Evaluate:55Snb7320)  Requested for: 27Oct2017; Last Rx:93Gkr1770 Ordered   7  Lyrica 50 MG Oral Capsule; TAKE 1 CAPSULE 3 TIMES DAILY; Last Rx:02Jun2017 Ordered   8  Meloxicam 15 MG Oral Tablet; take 1 tablet daily as needed; Therapy: 12BET7771 to (Last Rx:21Ipj0868)  Requested for: 98Xwf6945 Ordered   9  Midodrine HCl - 10 MG Oral Tablet; TAKE 1 TABLET 3 TIMES DAILY; Therapy: 15REM3172 to (Evaluate:16Fzb3395)  Requested for: 28Dnb3727; Last Rx:83Bld9145 Ordered   10  Mirtazapine 15 MG Oral Tablet; TAKE 1 TABLET AT BEDTIME; Therapy: ([id number]) to Recorded   11  Mometasone Furoate 0 1 % External Cream; APPLY TOPICALLY BID AS NEEDED; Therapy: 82PMG0941 to (Last Rx:12Oct2016)  Requested for: 12Oct2016 Ordered   12  PredniSONE 20 MG Oral Tablet; take 2 tablet daily;   Therapy: 80JIT7098 to (Evaluate:71Xpr1041)  Requested for: 81YPB3977; Last  Rx:23Zcx8125 Ordered   13  QUEtiapine Fumarate 25 MG Oral Tablet; TAKE 1 TABLET AT BEDTIME; Therapy: 23Oua4019 to (Evaluate:11Jan2018)  Requested for: 75GTD0433; Last  Rx:13Oct2017 Ordered   14  ROPINIRole HCl - 0 5 MG Oral Tablet; TAKE 1 TABLET 3 TIMES DAILY; Therapy: 38Gyr6728 to (Evaluate:11Jan2018)  Requested for: 49LFI6549; Last  Rx:13Oct2017 Ordered    Allergies    1  Tylenol with Codeine #3 TABS   2  Penicillins    Vitals  Signs   Recorded: 62KCO3303 02:15PM   Heart Rate: 88  Respiration: 16  Systolic: 061  Diastolic: 86  Height: 5 ft 2 in  Weight: 112 lb 1 0 oz  BMI Calculated: 20 5  BSA Calculated: 1 49  O2 Saturation: 98    Physical Exam   Constitutional  General appearance: No acute distress, well appearing and well nourished  Eyes  Ophthalmoscopic examination: Vision is grossly normal  Gross visual field testing by confrontation shows no abnormalities  EOMI in both eyes  Conjunctivae clear  Eyelids normal palpebral fissures equal  Orbits exhibit normal position  No discharge from the eyes  PERRL  Musculoskeletal  Gait and station: Abnormal  -- (Pt unable to arise from a chair)  Muscle strength: Normal strength throughout  Muscle tone: No atrophy, abnormal movements, flaccidity, cogwheeling or spasticity  Involuntary movements: None observed  Neurologic  Orientation to person, place, and time: Normal    Recent and remote memory: Abnormal    Attention span and concentration: Normal thought process and attention span  Language: Abnormal  -- (Slowed somewhat scanning speech   [address] [place] [address] 12/19/14 â 17/30)  2nd cranial nerve: Normal    3rd, 4th, and 6th cranial nerves: Normal    5th cranial nerve: Normal    7th cranial nerve: Normal    8th cranial nerve: Abnormal  -- (decreased hearing R>L)  9th cranial nerve: Normal    11th cranial nerve: Normal    12th cranial nerve: Normal    Reflexes: Abnormal   Deep tendon reflexes: 1+ right biceps,-- 1+ left biceps,-- 0 right triceps,-- 0 left triceps,-- 0 right patella,-- 0 left patella,-- 1+ right ankle jerk-- and-- 1+ left ankle jerk  Superficial/Primitive Reflexes: Babinski reflex absent on the right-- and-- Babinski reflex absent on the left  Attending Note  Collaborating Physician Note: Collaborating Note: I agree with the Advanced Practitioner note   I discussed the case with the Advanced Practitioner and reviewed the AP note      Future Appointments    Date/Time Provider Specialty Site   03/29/2018 03:15 PM Chapincito Manriquez [facility] [address]   12/04/2017 09:30 AM Simran Lizama MD Pain Management [place] [address]   Electronically signed by : Maggie Crawford [facility]; Nov 16 2017  8:57AM EST                       (Author)    Electronically signed by : Abby Musa MD; Nov 16 2017  2:10PM EST                       (Author)

## 2017-11-24 ENCOUNTER — TRANSCRIBE ORDERS (OUTPATIENT)
Dept: NEUROLOGY | Facility: HOSPITAL | Age: 72
End: 2017-11-24

## 2017-11-24 DIAGNOSIS — G40.802 CURSIVE EPILEPSY (HCC): Primary | ICD-10-CM

## 2017-12-04 ENCOUNTER — GENERIC CONVERSION - ENCOUNTER (OUTPATIENT)
Dept: OTHER | Facility: OTHER | Age: 72
End: 2017-12-04

## 2017-12-26 ENCOUNTER — GENERIC CONVERSION - ENCOUNTER (OUTPATIENT)
Dept: OTHER | Facility: OTHER | Age: 72
End: 2017-12-26

## 2017-12-27 ENCOUNTER — GENERIC CONVERSION - ENCOUNTER (OUTPATIENT)
Dept: OTHER | Facility: OTHER | Age: 72
End: 2017-12-27

## 2018-01-09 NOTE — RESULT NOTES
Verified Results  (1) CBC/PLT/DIFF 30PUU1077 01:01PM Directly     Test Name Result Flag Reference   WHITE BLOOD CELL COUNT 7 1 Thousand/uL  3 8-10 8   RED BLOOD CELL COUNT 3 87 Million/uL  3 80-5 10   HEMOGLOBIN 11 3 g/dL L 11 7-15 5   HEMATOCRIT 35 4 %  35 0-45 0   MCV 91 6 fL  80 0-100 0   MCH 29 1 pg  27 0-33 0   MCHC 31 8 g/dL L 32 0-36 0   RDW 12 8 %  11 0-15 0   PLATELET COUNT 984 Thousand/uL  140-400   MPV 8 9 fL  7 5-11 5   ABSOLUTE NEUTROPHILS 5531 cells/uL  7885-1502   ABSOLUTE LYMPHOCYTES 1186 cells/uL  850-3900   ABSOLUTE MONOCYTES 263 cells/uL  200-950   ABSOLUTE EOSINOPHILS 99 cells/uL     ABSOLUTE BASOPHILS 21 cells/uL  0-200   NEUTROPHILS 77 9 %     LYMPHOCYTES 16 7 %     MONOCYTES 3 7 %     EOSINOPHILS 1 4 %     BASOPHILS 0 3 %       (1) COMPREHENSIVE METABOLIC PANEL 79SES8141 23:68UL Directly     Test Name Result Flag Reference   GLUCOSE 93 mg/dL  65-99   Fasting reference interval   UREA NITROGEN (BUN) 21 mg/dL  7-25   CREATININE 0 89 mg/dL  0 60-0 93   For patients >52years of age, the reference limit  for Creatinine is approximately 13% higher for people  identified as -American  eGFR NON-AFR   AMERICAN 66 mL/min/1 73m2  > OR = 60   eGFR AFRICAN AMERICAN 76 mL/min/1 73m2  > OR = 60   BUN/CREATININE RATIO   3-85   NOT APPLICABLE (calc)   SODIUM 141 mmol/L  135-146   POTASSIUM 4 1 mmol/L  3 5-5 3   CHLORIDE 106 mmol/L     CARBON DIOXIDE 28 mmol/L  19-30   CALCIUM 9 0 mg/dL  8 6-10 4   PROTEIN, TOTAL 6 0 g/dL L 6 1-8 1   ALBUMIN 3 6 g/dL  3 6-5 1   GLOBULIN 2 4 g/dL (calc)  1 9-3 7   ALBUMIN/GLOBULIN RATIO 1 5 (calc)  1 0-2 5   BILIRUBIN, TOTAL 0 5 mg/dL  0 2-1 2   ALKALINE PHOSPHATASE 139 U/L H    AST 18 U/L  10-35   ALT 17 U/L  6-29     (Q) TSH, 3RD GENERATION W/REFLEX TO FT4 39ZHI9661 01:01PM Allena Jeremy     Test Name Result Flag Reference   T4, FREE 1 3 ng/dL  0 8-1 8   TSH W/REFLEX TO FT4 5 55 mIU/L H 0 40-4 50     (Q) URINALYSIS REFLEX [id number] 01:01PM Broderick Royalty   REPORT COMMENT:  FASTING:YES     Test Name Result Flag Reference   COLOR RED A YELLOW   APPEARANCE CLOUDY A CLEAR   SPECIFIC GRAVITY 1 016  1 001-1 035   PH 5 5  5 0-8 0   GLUCOSE NEGATIVE  NEGATIVE   BILIRUBIN NEGATIVE  NEGATIVE   KETONES NEGATIVE  NEGATIVE   OCCULT BLOOD 3+ A NEGATIVE   PROTEIN NEGATIVE  NEGATIVE   NITRITE NEGATIVE  NEGATIVE   LEUKOCYTE ESTERASE 2+ A NEGATIVE   WBC 20-40 /HPF A < OR = 5   RBC > OR = 60 /HPF A < OR = 2   SQUAMOUS EPITHELIAL CELLS 10-20 /HPF A < OR = 5   BACTERIA MANY /HPF A NONE SEEN   AMORPHOUS SEDIMENT FEW /HPF  NONE OR FEW   HYALINE CAST 0-1 /LPF A NONE SEEN

## 2018-01-10 NOTE — PROCEDURES
Procedures by ZEV Samuel at 4/27/2017   9:59 AM      Author:  ZEV Samuel Service:  (none) Author Type:  Speech and Language Pathologist     Filed:  4/27/2017  2:14 PM Date of Service:  4/27/2017  9:59 AM Status:  Signed     :  ZEV Romo (Speech and Language Pathologist)         Procedures:       1  FL BARIUM Donald George SPEECH [[id number] (Custom)]                                                      Video Swallow Study      Patient Name: Earlene KAYE Date: 4/27/2017  par  par  Past Medical History  Past Medical History:     Diagnosis  Date    Depression     Disease of thyroid gland     Hypertension     Renal infarct     Seizures     Suicide attempt         Past Surgical History  Past Surgical History:      Procedure  Laterality Date    CHOLECYSTECTOMY           General Information:    71 yo female referred for a VBS by Dr Shayna Celestin for dysphagia to assess for aspiration risk and potential to upgrade liquids  Cognition:  Awake, alert  Cooperative, followed commands  Pt was seen in radiology for a Video Barium Swallow Study, seated in the upright position and viewed laterally with the following consistencies: puree, soft/solid, hard solid, HTL, NTL, thin liquids, whole pill in applesauce  Results are as follows:       Oral Stage:   pt w/ adequate bolus retrieval, good manipulation and transfers w/ puree and liquids (usually 2 transfers w/ liquids)  Pt w/ decreased mastication/oral processing w/ soft/solid and hard solids  Velar elevation observed, but also noted pt w/  nasal regurgitation w/ NTL and thin liquids when using strategies  Increased saliva/drooling noted throughout study  Pharyngeal Stage:   Swallow initiation was timely w/ complete epiglottic inversion and airway closure; decreased tongue base retraction w/ vallecular retention w/ all food consistencies   Silent aspiration was observed before the second swallow of large bolus of  HTL; pt continued w/ penetration w/ chin tuck w/ HTL, but was eliminated w/ use of chin tuck and head turn  No penetration was observed w/ NTL w/ use of chin tuck and head turn strategies  However, silent aspiration occurred w/ thin liquids w/ use of  chin tuck and head turn strategies by cup and straw  Delayed cough response was noted x1 of 2 episodes  Pt was able to swallow whole pill in applesauce w/ strategies w/o difficulty  Esophageal Stage:   briefly assessed, delayed clearance of food and pill from distal esophagus  Assessment Summary: Moderate oral/ Moderate-severe pharyngeal dysphagia w/ decreased oral processing of soft/solids, nasal regurgitation w/ NTL and thin liquids, and observed aspiration w/ thick and thin liquids  Chin tuck and head turn strategy (either side is ok)  eliminated penetration and aspiration w/ thick liquids, but NOT thin liquids  Diagnosis/Prognosis:          Recommendations:    Dysphagia 2 w/ Nectar thick liquids w/ strategies  Cup sips of Nectar thick liquids w/ chin tuck and head turn to either side  Aspiration precautions  meds whole in puree, cut or crush large pills     Will cont to follow for diet tolerance, consistent use of strategies, and monitor for increased risk of aspiration                          Received for:Provider  EPIC   Apr 27 2017  2:15PM [facility] Standard Time

## 2018-01-11 NOTE — MISCELLANEOUS
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)   7  Chronic constipation (564 00) (K59 09)   8  Decreased hearing (389 9) (H91 90)   9  Depression (311) (F32 9)   10  Dizziness (780 4) (R42)   11  Eczema (692 9) (L30 9)   12  Esophageal reflux (530 81) (K21 9)   13  Family Disruption Death Of Family Member (V61 07)   15  Glaucoma (365 9) (H40 9)   15  Hypothyroidism (244 9) (E03 9)   16  Insomnia (780 52) (G47 00)   17  Limb pain (729 5) (M79 609)   18  Muscle pain, myofacial (729 1) (M79 1)   19  Neuropathy, peripheral, idiopathic (356 9) (G60 9)   20  Pain syndrome, chronic (338 4) (G89 4)   21  Peripheral edema (782 3) (R60 9)   22  Pyuria (791 9) (N39 0)   23  Restless legs syndrome (333 94) (G25 81)   24  Screening for genitourinary condition (V81 6) (Z13 89)   25  Screening for neurological condition (V80 09) (Z13 89)   26  Speech disturbance (784 59) (R47 9)   27  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)   28  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)   29  Thoracic back pain (724 1) (M54 6)   30  Urinary frequency (788 41) (R35 0)   31  Varicose veins of both lower extremities (454 9) (I83 93)    Past Medical History    1  History of Abdominal pain, RLQ (right lower quadrant) (789 03) (R10 31)   2  Acute upper respiratory infection (465 9) (J06 9)   3  History of Contact dermatitis due to poison ivy (692 6) (L23 7)   4  History of nausea and vomiting (V12 79) (Z87 898)   5  History of Malignant Melanoma Of The Skin (V10 82)   6  History of Thoracic back pain (724 1) (M54 6)    Surgical History    1  History of Cholecystectomy Laparoscopic   2  History of Colonoscopy (Fiberoptic)   3  History of Diagnostic Esophagogastroduodenoscopy   4  History of Removal Of Lesion    Family History  Mother    1  Family history of Congenital Deafness (V19 2)   2  Family history of Glaucoma   3  Family history of Mother  At Age 80   3  Family history of Stroke Syndrome (V17 1)  Father    5  Family history of Acute Myocardial Infarction (V17 3)   6  Family history of Bilateral Deafness  Sister    9  Family history of Diabetes Mellitus (V18 0)   8  Family history of Glaucoma  Brother    5  Family history of Cerebellar Ataxia   10  Family history of Diabetes Mellitus (V18 0)   11  Family history of Glaucoma  Family History    12  Family history of Father  At Age 67    Social History    · Alcohol Use (History)   · Educational Level - Completed Associate's Degree   · Family Disruption Death Of Family Member (V61 07)   · Former smoker (Q84 31) (H16 393)   · Marital History -    · Retired From Work   · Speech disturbance (784 59) (R47 9)    Current Meds   1  Diclofenac Sodium 1 % Transdermal Gel (Voltaren); APPLY THREE TIMES A DAY AS   DIRECTED; Therapy: 71NZS4145 to (Jeff Martinez)  Requested for: 92SCG5624; Last   Rx:2016 Ordered   2  Doxycycline Hyclate 100 MG Oral Tablet; TAKE 1 TABLET TWICE DAILY UNTIL GONE;   Therapy: 08HXS9826 to (Evaluate:2016)  Requested for: 15QUY9445; Last   Rx:2016 Ordered   3  Furosemide 20 MG Oral Tablet; take 1 tablet daily as needed; Therapy: 54YCV2965 to  Requested for: 59ACX1155 Recorded   4  HydrOXYzine HCl - 25 MG Oral Tablet; TAKE 1 TABLET AT BEDTIME; Therapy: 11RZY7960 to (Evaluate:2017)  Requested for: 07VFN5866; Last   Rx:2016 Ordered   5   Levothyroxine Sodium 75 MCG Oral Tablet; TAKE 1 TABLET DAILY AS DIRECTED; Therapy: 76RIY0442 to (Melissa Edgene)  Requested for: 66NTZ4093; Last   Rx:25Oyc8438 Ordered   6  Meloxicam 15 MG Oral Tablet (Mobic); TAKE 1 TABLET DAILY AS NEEDED; Therapy: 71XCE9329 to (Evaluate:84Hlb6839)  Requested for: 81PUQ6581; Last   Rx:12Oct2016 Ordered   7  Mirtazapine 15 MG Oral Tablet; TAKE 1 TABLET AT BEDTIME; Therapy: 01LXV6509 to (Evaluate:87Akh0695)  Requested for: 91AWP7343; Last   Rx:12Oct2016 Ordered   8  Mometasone Furoate 0 1 % External Cream (Elocon); APPLY TOPICALLY BID AS   NEEDED; Therapy: 02PMI3355 to (Last Rx:12Oct2016)  Requested for: 12Oct2016 Ordered   9  Requip 3 MG Oral Tablet (ROPINIRole HCl); Take 1 tablet daily before bedtime; Therapy: 51TUL7010 to (Evaluate:11Nov2016)  Requested for: 53ELJ8496; Last   Rx:24Cnc1740 Ordered   10  ROPINIRole HCl - 0 5 MG Oral Tablet; TAKE 2 TABLETS BY MOUTH AT BEDTIME, MAY    INCREASE TO 3TABLETS AFTER 1 WEEK IF NO IMPROVEMENT; Therapy: 51Xta8948 to (Evaluate:11Jan2017)  Requested for: 12Ddu1298; Last    Rx:80Ndi4954 Ordered    Allergies    1  Tylenol with Codeine #3 TABS   2  Penicillins    Message   Recorded as Task   Date: 11/18/2016 06:58 PM, Created By: System   Task Name: Hospital [facility]   Assigned To:  Farrah Lopes   Regarding Patient: Funmilayo Velásquez, Status: Active   Comment:    System - 18 Nov 2016 6:58 PM     Patient discharged from hospital   Patient Name: Sadaf Sanchez  Patient YOB: 1945  Discharge Date: 11/18/2016  Facility: [facility]   Electronically signed by : Inés Arellano DO; May  4 2017  3:00PM EST                       (Author)

## 2018-01-12 VITALS
HEART RATE: 78 BPM | RESPIRATION RATE: 14 BRPM | DIASTOLIC BLOOD PRESSURE: 76 MMHG | SYSTOLIC BLOOD PRESSURE: 110 MMHG | WEIGHT: 114.5 LBS | OXYGEN SATURATION: 97 % | HEIGHT: 62 IN | BODY MASS INDEX: 21.07 KG/M2

## 2018-01-12 VITALS
TEMPERATURE: 97.4 F | HEART RATE: 74 BPM | SYSTOLIC BLOOD PRESSURE: 120 MMHG | DIASTOLIC BLOOD PRESSURE: 84 MMHG | OXYGEN SATURATION: 96 %

## 2018-01-13 VITALS
TEMPERATURE: 96.5 F | SYSTOLIC BLOOD PRESSURE: 92 MMHG | OXYGEN SATURATION: 97 % | BODY MASS INDEX: 18.5 KG/M2 | DIASTOLIC BLOOD PRESSURE: 54 MMHG | WEIGHT: 104.44 LBS | HEART RATE: 69 BPM

## 2018-01-13 VITALS
DIASTOLIC BLOOD PRESSURE: 86 MMHG | WEIGHT: 112.06 LBS | HEART RATE: 88 BPM | RESPIRATION RATE: 16 BRPM | BODY MASS INDEX: 20.62 KG/M2 | OXYGEN SATURATION: 98 % | HEIGHT: 62 IN | SYSTOLIC BLOOD PRESSURE: 186 MMHG

## 2018-01-13 NOTE — MISCELLANEOUS
Provider Comments  Provider Comments:   Patient no-showed appointment on 04/13/16; called and spoke to patient's daughter Tyson Rader, we re-scheduled for 09/01/16  Reminder letter mailed to patient's home    -Everardo Meeks      Signatures   Electronically signed by : Mark Dupree, [facility];  Apr 20 2016 12:55PM EST                       (Author)    Electronically signed by : Reina Del Rosario MD; Apr 20 2016  1:54PM EST                       (Co-participant)

## 2018-01-14 VITALS
BODY MASS INDEX: 20.57 KG/M2 | SYSTOLIC BLOOD PRESSURE: 134 MMHG | OXYGEN SATURATION: 95 % | HEART RATE: 68 BPM | DIASTOLIC BLOOD PRESSURE: 80 MMHG | TEMPERATURE: 97 F | WEIGHT: 116.13 LBS

## 2018-01-14 VITALS
TEMPERATURE: 96.5 F | DIASTOLIC BLOOD PRESSURE: 86 MMHG | RESPIRATION RATE: 19 BRPM | SYSTOLIC BLOOD PRESSURE: 132 MMHG | OXYGEN SATURATION: 97 % | HEIGHT: 62 IN | BODY MASS INDEX: 22.45 KG/M2 | WEIGHT: 122 LBS | HEART RATE: 83 BPM

## 2018-01-14 NOTE — PROCEDURES
Procedures by ZEV Hernández at 3/13/2017   3:30 PM      Author:  ZEV Hernández Service:  (none) Author Type:  Speech and Language Pathologist     Filed:  3/13/2017  5:02 PM Date of Service:  3/13/2017  3:30 PM Status:  Signed     :  ZEV Hernández (Speech and Language Pathologist)                                               Video Swallow Study      Patient Name: Addison You  Today's Date: 3/13/2017  par  par  Past Medical History  Past Medical History:     Diagnosis  Date    Seizures      par  Past Surgical History  Past Surgical History:      Procedure  Laterality Date    ESOPHAGOGASTRODUODENOSCOPY N/A 3/10/2017    Procedure: ESOPHAGOGASTRODUODENOSCOPY (EGD); Surgeon: Tariq Nava DO;  Location: [facility]; Service:          Pt initially adm after being found down at home, sz activity & need for intubation 3/3-3/5/17  The pt had been on a soft diet w/ thin liquids & then c/o food sticking, presented w/ a weak cough  Previous VBS:  Unknown, possibly remote past  Current Diet:   Puree, nt (2* possible stricture in the esophagus)  Premorbid diet:  Level 3 w/ thin  Dentition:  natural  O2 requirement:  RA  Oral mech:  Strength and ROM: appears wfl w/ mildly decreased coordination    Vocal Quality/Speech:  Dysarthria, low volume  Cognitive status:  Alert, cooperative    Consistencies administered: Barium laden applesauce, banana,  honey thick via tsp/cup, nectar thick via tsp/cup/straw thin liquids straw/cup  Pt was seated laterally at 90 degrees  Oral stage:    Lip closure: WFL  Mastication: weak, prolonged  Bolus formation: prolonged, not cohesive  Bolus control: poor- piecemeal transfers noted w/ all material  Material tends to push out of the oral cavity during attempts to transfer the bolus  Residue: mild/mod w/ spill after the 1* swallow on each bolus  Pharyngeal stage:    Swallow promptness: mildly delayed for the 1* swallow, 2* swallow      Spill to valleculae: w/ all material  Spill to pyriforms: mild w/ thin  Epiglottic inversion: slow return  Laryngeal rise: reduced  Pharyngeal constriction: decreased, decreased airway protection  Vallecular retention: mild/mod w/ puree, soft solids  Pyriform retention: none noted  PPW coating: none noted  Transient penetration: x1 w/ ht by cup  Penetration: w ht x1, w/ nt during the swallow  Aspiration: possibly after the banana, +w/ thin cup  during the 2* & 3* swallow, nt by cup  Strategies: attempted prep set & chin down, did not eliminate the penetration or aspiration  Pt continues to still have difficulty w/ oral manipulation & transfers  Response to aspiration:  Initially pt was able to tolerate nt by tsp, cup w/o penetration or aspiration  The pt then took thin by cup w/ piecemeal transfer-initial swallow occurred & there was no pentration or aspiration  There was then spill from the oral cavity & the 2* swallow  was delayed & there was aspiration of the oral spill, and then again on the 3rd swallow  The pt does not cough! Cued to cough however she was unable to clear the airway  NT was offered again to the pt by cup & aspiration during the swallow again occured  w/o response  Screening of Esophageal stage:  Pt's esophagus is dilated, mild delayed clearing at the distal esophagus  Summary:  Pt presents w/ mod oral, mod/severe pharyngeal dysphagia devika by poor bolus formation & transfers w/ piecemeal transfers & spill w/ all material   The swallow is mildly delayed w/ decreased airway protection during the swallow & silent aspiration  on thin & nt during the swallow  The pt piecemeal transfers all material & silently aspirates on the 2* swallow w/ nt & thin  Attempted strategies were not effective in eliminating penetration or aspiration  Recommendations:  Diet: puree  Liquids: honey thick Meds:  Strategies: small sips, mult swallows appear to be most effective   Aspiration is silent & her cough to clear is not effective  Upright position  F/u ST tx: yes  FullSupervision  Aspiration Precautions  Reflux Precautions  Consider consult with:   Results reviewed with: pt,nsg  Aspiration precautions posted  Goals:  Pt will tolerate least restrictive diet w/out s/s aspiration or oral/pharyngeal difficulties                     Received for:Provider  EPIC   Mar 13 2017  5:03PM [facility] Standard Time

## 2018-01-15 NOTE — MISCELLANEOUS
History of Present Illness  TCM Communication [facility]: The patient is being contacted for follow-up after hospitalization and [place]  She was hospitalized at [facility]  The date of admission: 2/22/2017, date of discharge: 3/1/2017  Diagnosis: Syncope  She was discharged to home  Medications reviewed and updated today  She scheduled a follow up appointment  Follow-up appointments with other specialists: GI    Symptoms: weakness and fatigue  Counseling was provided to patient's caretaker  I spoke with her VN [facility] and both daughter  Communication performed and completed by bryanna      Active Problems    1  Acute bronchitis, unspecified organism (466 0) (J20 9)   2  Acute non-recurrent maxillary sinusitis (461 0) (J01 00)   3  Anxiety (300 00) (F41 9)   4  Asthma (493 90) (J45 909)   5  Ataxia (781 3) (R27 0)   6  Back pain (724 5) (M54 9)   7  Cellulitis of left leg (682 6) (L03 116)   8  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)   9  Chronic constipation (564 00) (K59 09)   10  Decreased hearing (389 9) (H91 90)   11  Depression (311) (F32 9)   12  Dizziness (780 4) (R42)   13  Eczema (692 9) (L30 9)   14  Esophageal reflux (530 81) (K21 9)   15  Family Disruption Death Of Family Member (V61 07)   12  Glaucoma (365 9) (H40 9)   17  Hypotension, unspecified hypotension type (458 9) (I95 9)   18  Hypothyroidism (244 9) (E03 9)   19  Insomnia (780 52) (G47 00)   20  Limb pain (729 5) (M79 609)   21  Muscle pain, myofacial (729 1) (M79 1)   22  Neuropathy, peripheral, idiopathic (356 9) (G60 9)   23  Pain syndrome, chronic (338 4) (G89 4)   24  Peripheral edema (782 3) (R60 9)   25  Pyuria (791 9) (N39 0)   26  Restless legs syndrome (333 94) (G25 81)   27  Screening for genitourinary condition (V81 6) (Z13 89)   28  Screening for neurological condition (V80 09) (Z13 89)   29  Speech disturbance (784 59) (R47 9)   30  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)   31   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)   32  Thoracic back pain (724 1) (M54 6)   33  Unspecified convulsions (780 39) (R56 9)   34  Urinary frequency (788 41) (R35 0)   35  Varicose veins of both lower extremities (454 9) (I83 93)    Past Medical History    1  History of Abdominal pain, RLQ (right lower quadrant) (789 03) (R10 31)   2  Acute upper respiratory infection (465 9) (J06 9)   3  History of Contact dermatitis due to poison ivy (692 6) (L23 7)   4  History of nausea and vomiting (V12 79) (Z87 898)   5  History of Malignant Melanoma Of The Skin (V10 82)   6  History of Thoracic back pain (724 1) (M54 6)    Surgical History    1  History of Cholecystectomy Laparoscopic   2  History of Colonoscopy (Fiberoptic)   3  History of Diagnostic Esophagogastroduodenoscopy   4  History of Removal Of Lesion    Family History  Mother    1  Family history of Congenital Deafness (V19 2)   2  Family history of Glaucoma   3  Family history of Mother  At Age 80   3  Family history of Stroke Syndrome (V17 1)  Father    5  Family history of Acute Myocardial Infarction (V17 3)   6  Family history of Bilateral Deafness  Sister    9  Family history of Diabetes Mellitus (V18 0)   8  Family history of Glaucoma  Brother    5  Family history of Cerebellar Ataxia   10  Family history of Diabetes Mellitus (V18 0)   11  Family history of Glaucoma  Family History    12  Family history of Father  At Age 67    Social History    · Alcohol Use (History)   · Educational Level - Completed Associate's Degree   · Family Disruption Death Of Family Member (V61 07)   · Former smoker (K34 49) (O13 425)   · Marital History -    · Retired From Work   · Speech disturbance (784 59) (R47 9)    Current Meds   1  Diclofenac Sodium 1 % Transdermal Gel; APPLY THREE TIMES A DAY AS DIRECTED; Therapy: 42UFP0463 to (73 93 30)  Requested for: 51CCN0618; Last   Rx:2016 Ordered   2   Furosemide 20 MG Oral Tablet; take 1 tablet daily as needed; Therapy: 78GVQ0805 to  Requested for: 92MFC8764 Recorded   3  Levothyroxine Sodium 75 MCG Oral Tablet; TAKE 1 TABLET DAILY AS DIRECTED; Therapy: 51CCZ3952 to (Lavinia Monahan)  Requested for: 83YSI2224; Last   Rx:03Smb4157 Ordered   4  Meloxicam 15 MG Oral Tablet; TAKE 1 TABLET DAILY AS NEEDED; Therapy: 14LAI9922 to (Evaluate:71Vpb9053)  Requested for: 11UFG9610; Last   Rx:12Oct2016 Ordered   5  Mometasone Furoate 0 1 % External Cream; APPLY TOPICALLY BID AS NEEDED; Therapy: 86VDL3812 to (Last Rx:12Oct2016)  Requested for: 12Vne4416 Ordered   6  Pregabalin 50 MG CAPS; Therapy: (Recorded:02Mar2017) to Recorded   7  Requip 3 MG Oral Tablet; Take 1 tablet daily before bedtime; Therapy: 00AET2563 to (Evaluate:11Nov2016)  Requested for: 72CWS2874; Last   Rx:91Yij1298 Ordered   8  ROPINIRole HCl - 3 MG Oral Tablet; Therapy: (Recorded:02Mar2017) to Recorded    Allergies    1  Tylenol with Codeine #3 TABS   2  Penicillins    Message   Recorded as Task   Date: 03/01/2017 06:40 PM, Created By: System   Task Name: Hospital [facility]OEY   Assigned To:  Farrah Lopes   Regarding Patient: Herman Zambrano, Status: Active   Comment:    System - 01 Mar 2017 6:40 PM     Patient discharged from hospital   Patient Name: Jose F Jo  Patient YOB: 1945  Discharge Date: 3/1/2017  Facility: [facility] Appointments    Date/Time Provider Specialty Site   03/03/2017 10:30 AM Tariq Drake DO Family Medicine [facility]ILY [facility]     Signatures   Electronically signed by : El Gill DO; May  4 2017  3:00PM EST                       (Author)

## 2018-01-15 NOTE — PROCEDURES
Procedures by Rosemarie Lane MD  at 2017  4:48 PM      Author:  Rosemarie Lane MD Service:  Neurology Author Type:  Physician     Filed:  2017  5:09 PM Date of Service:  2017  4:48 PM Status:  Signed     :  Rosemarie Lane MD (Physician)            ELECTROENCEPHALOGRAM (EEG)      Patient Name:  Fan Drew  MRN: [id number]   :  1945 File #: Ana Marie 17-80   Age: 70 y o  Encounter #: [id number]   Date performed: 2017            Report date: 2017          Study type: Routine EEG    ICD 10 diagnosis: Spells/Fit NOS R56 9    Start time:  End time:      -------------------------------------------------------------------------------------------------------------------   Patient History:  70year old female who presents after having had a syncopal episode while in the Neurology office seeing Dr Jessica Belcher on 17 for symptoms of difficulties at home and the inability to swallow  During the office visit, the patient yelled, became rigid, with a right gaze preference and unresponsivness for approximately 30 seconds  Was very confused after the episode  EMS was called and she was brought to the ER  Routine EEG to r/o seizure  Medications include:   cefazolin 1,000 mg Intravenous Q8H   docusate sodium 100 mg Oral BID   doxycycline hyclate 100 mg Oral Q12H JUDITH   hydrocortisone sodium succinate 50 mg Intravenous Q8H [address]   levothyroxine 75 mcg Oral Early Morning   pantoprazole 40 mg Oral BID AC   polyethylene glycol 17 g Oral Daily   pregabalin 50 mg Oral TID   rOPINIRole 3 mg Oral HS   sodium chloride 250 mL Intravenous Once       -------------------------------------------------------------------------------------------------------------------   Description of Procedure:  ·  32 channel digital recording with electrodes placed according to the International 10-20 system with additional  T1/T2 electrodes, EOG, EKG, and simultaneous  video   The recording was technically satisfactory  -------------------------------------------------------------------------------------------------------------------   Results:  Background Activity:  The recording was performed with the patient awake, drowsy, and asleep  During wakefulness, there were brief runs of poorly regulated/slightly irregular ,  mid amplitude, posteriorly dominant, symmetric 9  Hz activity that  did attenuate with eye opening  Drowsiness and light sleep are characterized by attenuation and irregularity of the background, and the development of irregular intermixed  theta slowing    -------------------------------------------------------------------------------------------------------------------   Activation Procedures:  Hyperventilation was not performed  Stepped photic stimulation between 1-20 cps was performed and did not induce any changes  -------------------------------------------------------------------------------------------------------------------   Abnormal Findings:  Intermittent diffuse irregular mid to high amplitude intermixed theta and delta slowing were noted  Frequent spikes and sharp waves were noted, occurring independently at T3T5 more than at T4T6  No electrographic seizures occurred during this recording       -------------------------------------------------------------------------------------------------------------------  Other Findings: The single lead EKG demonstrated a regular  rhythm     -------------------------------------------------------------------------------------------------------------------  Events:  No patient push button events      -------------------------------------------------------------------------------------------------------------------   EEG Interpretation:   Moderately abnormal 39 minute EEG, due to irregularity of the background rhythm, intermittent diffuse irregular intermixed theta and delta slowing, and frequent  left greater than right temporal spikes and  sharp waves  No electrographic seizures occurred during this recording  Jannette Lawler MD   Medical Director  [address] [place] [address] [facility]  Pager # [phone] Me [phone]    Feb 24 2017  5:10PM [facility] Standard Time

## 2018-01-15 NOTE — PROCEDURES
Procedures by Zuhair Parrish MD at 11/15/2016   4:00 PM      Author:  Zuhair Parrish MD Service:  Neurology Author Type:  Physician     Filed:  11/15/2016  4:27 PM Date of Service:  11/15/2016  4:00 PM Status:  Signed     :  Zuhair Parrish MD (Physician)            ELECTROENCEPHALOGRAM (EEG)      Patient Name:  Jarrett Sun  MRN: [id number]   :  1945 File #: SLT    Age: 79 y o  Encounter #: [id number]   Date performed: 11/15/2016            Report date: 11/15/2016          Study type: Routine EEG    ICD 10 diagnosis: Spells/Fit NOS R56 9    Start time:  End time: 1430     -------------------------------------------------------------------------------------------------------------------   Patient History: This recording was performed in a 79 y o  female  admitted for a possible unwitnessed seizure and altered mental status to estimate seizure recurrence risk  Medications include:   dexamethasone 4 mg Intravenous Q8H   enoxaparin 40 mg Subcutaneous Q24H JUDITH   furosemide 20 mg Oral Daily       -------------------------------------------------------------------------------------------------------------------   Description of Procedure:  ·  32 channel digital recording with electrodes placed according to the International 10-20 system with additional  T1/T2 electrodes, EOG, EKG, and simultaneous  video  The recording was technically satisfactory  -------------------------------------------------------------------------------------------------------------------   EEG Description:    The recording was performed with the patient awake and drowsy  During wakefulness, there were runs of moderately regulated, low to medium amplitude, posteriorly dominant, symmetric  9 cps alpha rhythm that attenuated with eye opening  There were symmetric mildly enhanced diffuse beta activities       With drowsiness, alpha activity attenuated and was replaced by diffusely distributed lower amplitude theta and intermixed beta activities  Features of  stage 2 sleep were not present  During wakefulness and drowsiness there were frequent bursts of medium amplitude sharply contoured 6-8 cps activities in the bilateral temporal regions with shifting predominance that did not typically interrupted ongoing background activities  On a few  occasions there were left temporal sharp waves that were high amplitude and were followed by a delta wave  Activation Procedures:  Hyperventilation was not performed  Stepped photic stimulation between 1-3 0 cps did not induce any changes  Other findings:  Samples of the single channel ECG demonstrated a regular rhythm  Events:   No significant push buttons were activated  -------------------------------------------------------------------------------------------------------------------   EEG Interpretation: This Routine EEG recorded during wakefulness and drowsiness is  abnormal     A few left temporal sharp waves suggest underlying epileptogenic potential and increased seizure risk  Additional bitemporal sharply contoured theta activities do not clearly interrupt ongoing background activities and are not a clear indication of epileptogenic  potential      Enhanced beta activities are often a medication effect due to benzodiazepine or barbiturate administration  Jose Lee MD   [address] [facility] Alexi LOZA    Nov 15 2016  4:28PM [facility] Standard Time

## 2018-01-16 NOTE — RESULT NOTES
Message   Cortisol level is now normal  Repeat ACTH, DHEAS and cortisol level next week again at 8 AM  Please make sure she follows with us in the office with Geraldine in 2-3 weeks  Please make sure you talk to the daughter who is the power of      Verified Results  (1) CORTISOL RANDOM [id number] 12:53PM Arlys Devyn     Test Name Result Flag Reference   CORTISOL, RANDOM 11 2 ug/mL       (1) DHEA-S [id number] 05:24AM Arlys Devyn     Test Name Result Flag Reference   DHEA-SULFATE 11 4 ug/dL L 20 4 - 186  6   Performed at:  [facility] [address]  [id number]  : Hossein Yarbrough MD, Phone:  [phone] ([id number] 05:24AM Arlys Devyn     Test Name Result Flag Reference   ADRENOCORT  TROP 4 6 pg/mL L 7 2 - 63 3   ACTH reference interval for samples collected between 7 and 10 AM   Performed at:  [facility]  [address]  [id number]  : Hossein Yarbrough MD, Phone:  [phone]

## 2018-01-23 ENCOUNTER — GENERIC CONVERSION - ENCOUNTER (OUTPATIENT)
Dept: OTHER | Facility: OTHER | Age: 73
End: 2018-01-23

## 2018-01-23 NOTE — MISCELLANEOUS
Message     Recorded as Task   Date: 12/13/2017 10:05 AM, Created By: Ester Seymour   Task Name: Financial Authorization   Assigned To: Yesy Billy clinical,Team   Regarding Patient: Trixie Saenz, Status: In Progress   Comment:    Tova Pena - 13 Dec 2017 10:05 AM     TASK CREATED  PA for Lidocaine initiated through [employer]  Tova Pena - 13 Dec 2017 10:05 AM     TASK IN PROGRESS   Tova Pena - 13 Dec 2017 3:22 PM     TASK EDITED  Denied, only stating: CaseId:[id number];Product Name:ST: lidocaine 5% patch (Lidoderm and generic) (PA type 2) - MEDICARE (NON-ENHANCED) - SHIV;Status:Denied; Appeal Information: [address] [place] [address] [id number]  Laura Luislov Fax:[phone] WebAddress:WWW  [employer]  Kristin Merles - 13 Dec 2017 3:22 PM     TASK REASSIGNED: Previously Assigned To [address] [place] [address] - 13 Dec 2017 6:20 PM     TASK REASSIGNED: Previously Assigned To Nadeem Lee - 15 Dec 2017 4:52 PM     TASK REPLIED TO: Previously Assigned To Evy Echols to call the company to appeal the decision and they stated that I would have review the letter they sent for this denIrma Jarvis - 18 Dec 2017 7:55 AM     TASK REASSIGNED: Previously Assigned To Bry Joyner - 15 Dec 2017 11:48 AM     TASK EDITED  It should be scanned into her chart under medications  Marisa Melendez - 18 Dec 2017 12:15 PM     TASK REPLIED TO: Previously Assigned To ED cummings,Team  Patient's lidocaine patch was not approved  Therefore, I can suggest that she can buy an over the counter lidocaine cream or I can prescribe her lidocaine cream  But cant guarantee that it will be covered if I prescribe it  Mary Oro - 18 Dec 2017 2:09 PM     TASK EDITED  This wasn't prescribed by by our office, it looks like the PCP did     Marisa Melendez - 18 Dec 2017 2:15 PM     TASK REPLIED TO: Previously Assigned To AlMarisa vega  Please have her follow up with her family doctor, since they prescribed it  Mary Oro - 18 Dec 2017 2:42 PM     TASK EDITED  **Two tasks started on this pt, pls address both when pt calls back  **    LM on home # for pt to c/b and s/w nurse  C/B # and office hrs provided  Mary Oro - 19 Dec 2017 2:16 PM     TASK EDITED  **Two tasks started on this pt, pls address both when pts calls back  **    2nd attempt to reach pt at home #, I also left vm on niece's # to ask pt to call office  Office hrs and c/b # provided  *Pls put call through to nurse when pt calls  *   Mary Oro - 26 Dec 2017 2:43 PM     TASK EDITED  A task was sent to the  in a 2nd task that was started on this pt to send pt a can't reach you letter  Active Problems    1  Acute bronchitis, unspecified organism (466 0) (J20 9)   2  Acute non-recurrent maxillary sinusitis (461 0) (J01 00)   3  Anxiety (300 00) (F41 9)   4  Asthma (493 90) (J45 909)   5  Ataxia (781 3) (R27 0)   6  Back pain (724 5) (M54 9)   7  Cellulitis of left leg (682 6) (L03 116)   8  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)   9  Chronic constipation (564 00) (K59 09)   10  Decreased hearing (389 9) (H91 90)   11  Depression (311) (F32 9)   12  Dizziness (780 4) (R42)   13  Eczema (692 9) (L30 9)   14  Esophageal reflux (530 81) (K21 9)   15  Family Disruption Death Of Family Member (V61 07)   12  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345 00) (G40 309)   17  Glaucoma (365 9) (H40 9)   18  Hypotension, unspecified hypotension type (458 9) (I95 9)   19  Hypothyroidism (244 9) (E03 9)   20  Insomnia (780 52) (G47 00)   21  Limb pain (729 5) (M79 609)   22  Muscle pain, myofacial (729 1) (M79 1)   23  Neck pain on left side (723 1) (M54 2)   24  Neuropathy, peripheral, idiopathic (356 9) (G60 9)   25  Pain syndrome, chronic (338 4) (G89 4)   26  Paresthesia of left arm (782 0) (R20 2)   27   Peripheral edema (782 3) (R60 9)   28  Pyuria (791 9) (N39 0)   29  Restless legs syndrome (333 94) (G25 81)   30  Screening for genitourinary condition (V81 6) (Z13 89)   31  Screening for neurological condition (V80 09) (Z13 89)   32  Seizure   33  Speech disturbance (784 59) (R47 9)   34  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)   35  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)   36  Thoracic back pain (724 1) (M54 6)   37  Unspecified convulsions (780 39) (R56 9)   38  Urinary frequency (788 41) (R35 0)   39  Varicose veins of both lower extremities (454 9) (I83 93)    Current Meds   1  Cyclobenzaprine HCl - 5 MG Oral Tablet; TAKE 1 TABLET AT BEDTIME AS NEEDED; Therapy: 85ZGC1959 to ([id number])  Requested for: 48VNY6823; Last   Rx:40Iep9341 Ordered   2  Diclofenac Sodium 1 % Transdermal Gel (Voltaren); apply two times a day as directed; Therapy: 44JUW5374 to (Christa Getting)  Requested for: 10FQY9486; Last   Rx:65Xka9468 Ordered   3  Flector 1 3 % Transdermal Patch; APPLY PATCH TO AFFECTED AREA TWICE DAILY; Therapy: 61Dwl6733 to (Christa Getting)  Requested for: 21KKJ8172; Last   Rx:36Nlt9913 Ordered   4  HydrOXYzine HCl - 25 MG Oral Tablet; Take one tablet at bed time; Therapy: (QYYGMMHL:44SJC5343) to Recorded   5  LevETIRAcetam 750 MG Oral Tablet; Take one tab 2 times daily  Requested for:   73AHP9355; Last Rx:32Xng0554 Ordered   6  Levothyroxine Sodium 75 MCG Oral Tablet; TAKE 1 TABLET DAILY AS DIRECTED; Therapy: 32QGL7150 to (KDZNMLHQ:02ESZ4413)  Requested for: 56TJB4339; Last   Rx:72Exp7083 Ordered   7  Lidocaine 5 % External Patch (Lidoderm); APPLY 1 PATCH TO THE AFFECTED AREA   AND LEAVE IN PLACE FOR 12 HOURS, THEN REMOVE AND LEAVE OFF FOR 12   HOURS; Therapy: 21BUU9553 to (Evaluate:19Gqy4026)  Requested for: 19FJL2943; Last   Rx:49Zcw2875 Ordered   8  Lyrica 50 MG Oral Capsule; TAKE 1 CAPSULE 3 TIMES DAILY; Last Rx:86Jmn2913   Ordered   9   Meloxicam 15 MG Oral Tablet (Mobic); take 1 tablet daily as needed; Therapy: 29UIH1399 to (Last Rx:36Maz1785)  Requested for: 62Yuk6375 Ordered   10  Midodrine HCl - 10 MG Oral Tablet; TAKE 1 TABLET 3 TIMES DAILY; Therapy: 92QWS6353 to (Evaluate:04Djk1621)  Requested for: 62Njg0243; Last    Rx:34Yza9028 Ordered   11  Mirtazapine 15 MG Oral Tablet; TAKE 1 TABLET AT BEDTIME; Therapy: ([facility] to Recorded   12  Mometasone Furoate 0 1 % External Cream (Elocon); APPLY TOPICALLY BID AS    NEEDED; Therapy: 44HWN0856 to (Last Rx:17Oet7110)  Requested for: 36Pvr4399 Ordered   13  PredniSONE 20 MG Oral Tablet; take 2 tablet daily; Therapy: 28MWW1215 to (Evaluate:98Glh4652)  Requested for: 98FKQ0603; Last    Rx:28Ozo8022 Ordered   14  QUEtiapine Fumarate 25 MG Oral Tablet; TAKE 1 TABLET AT BEDTIME; Therapy: 24Icn9711 to (Evaluate:11Jan2018)  Requested for: 13GVD3290; Last    Rx:98Zkg9415 Ordered   15  ROPINIRole HCl - 1 MG Oral Tablet; TAKE 1 TABLET 3 TIMES DAILY; Therapy: 78Aya6736 to (Evaluate:10Nov2018)  Requested for: 05CTN9898; Last    Rx:22Tgx7068 Ordered    Allergies    1  Tylenol with Codeine #3 TABS   2   Penicillins    Signatures   Electronically signed by : Vince Phillips, ; Dec 26 2017  2:43PM EST                       (Author)

## 2018-01-23 NOTE — MISCELLANEOUS
Message   Recorded as Task   Date: 12/11/2017 02:02 PM, Created By: Nani Jara   Task Name: Miscellaneous   Assigned To: [address] ,Team   Regarding Patient: Dayanna Neal, Status: Active   Comment:    Nani Square - 11 Dec 2017 2:02 PM     TASK CREATED  phone call from patient's niece stating that patient lost rx for morphine and diclofenac, requesting new rxs  Please call patient's niece at [phone]  Mary Oro - 11 Dec 2017 3:03 PM     TASK EDITED  We do not have a release of info form scanned on this pt  There is an Alert not to provide info to daughter Iban Cid  I called the house # to s/w pt and Annette answered  Iban Cid said the # I called was her cell #  She provided [phone] as the pt's home #  I LMOM for pt to c/b  I left vm on the niece's number as listed in task that we don't have a release of info form on file and we need the pt to c/b regarding message left by niece  I will update in IDX with pt's home #  *Radha Healy gave pt printed scripts on 12/4/17 for Flector Patch and Diclofenac Gel, not a  Morphine script  *   Mary Oro - 13 Dec 2017 11:53 AM     TASK EDITED  2nd vm left on pt's home # [phone] that we are awaiting a c/b from her regarding a message her niece left about lost RXs  There is no release of info form on file, need to discuss message left by niece with pt when she calls back  See SARAH below about Itzel Roberson gave to pt on 12/4/17  Mary Oro - 18 Dec 2017 2:41 PM     TASK EDITED  **Two tasks started on this pt, pls address both when pt calls back  **    LM on home # for pt to c/b and s/w nurse  C/B # and office hrs provided  Mary Oro - 19 Dec 2017 2:14 PM     TASK EDITED  **Two tasks started on lizzette pt, pls address both when pt calls back  **    2nd attempt to reach pt on home number, I also left vm on neice's # to have pt call office back  C/B # and office hrs proivded  *Pls put call through to nurse when pt c/b  * Mary Oro - 26 Dec 2017 2:42 PM     TASK EDITED  Pls send a can't reach you letter  Sourav Fink - 27 Dec 2017 9:47 AM     TASK EDITED  letter sent        Active Problems    1  Acute bronchitis, unspecified organism (466 0) (J20 9)   2  Acute non-recurrent maxillary sinusitis (461 0) (J01 00)   3  Anxiety (300 00) (F41 9)   4  Asthma (493 90) (J45 909)   5  Ataxia (781 3) (R27 0)   6  Back pain (724 5) (M54 9)   7  Cellulitis of left leg (682 6) (L03 116)   8  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)   9  Chronic constipation (564 00) (K59 09)   10  Decreased hearing (389 9) (H91 90)   11  Depression (311) (F32 9)   12  Dizziness (780 4) (R42)   13  Eczema (692 9) (L30 9)   14  Esophageal reflux (530 81) (K21 9)   15  Family Disruption Death Of Family Member (V61 07)   12  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345 00) (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)   17  Glaucoma (365 9) (H40 9)   18  Hypotension, unspecified hypotension type (458 9) (I95 9)   19  Hypothyroidism (244 9) (E03 9)   20  Insomnia (780 52) (G47 00)   21  Limb pain (729 5) (M79 609)   22  Muscle pain, myofacial (729 1) (M79 1)   23  Neck pain on left side (723 1) (M54 2)   24  Neuropathy, peripheral, idiopathic (356 9) (G60 9)   25  Pain syndrome, chronic (338 4) (G89 4)   26  Paresthesia of left arm (782 0) (R20 2)   27  Peripheral edema (782 3) (R60 9)   28  Pyuria (791 9) (N39 0)   29  Restless legs syndrome (333 94) (G25 81)   30  Screening for genitourinary condition (V81 6) (Z13 89)   31  Screening for neurological condition (V80 09) (Z13 89)   32  Seizure   33  Speech disturbance (784 59) (R47 9)   34  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)   35  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)   36  Thoracic back pain (724 1) (M54 6)   37  Unspecified convulsions (780 39) (R56 9)   38  Urinary frequency (788 41) (R35 0)   39  Varicose veins of both lower extremities (454 9) (I83 93)    Current Meds   1   Cyclobenzaprine HCl - 5 MG Oral Tablet; TAKE 1 TABLET AT BEDTIME AS NEEDED; Therapy: 87OUB8443 to (Rashawn Nap)  Requested for: 67TXO0541; Last   Rx:62Wwc5017 Ordered   2  Diclofenac Sodium 1 % Transdermal Gel (Voltaren); apply two times a day as directed; Therapy: 29HYZ6447 to (Ala Hash)  Requested for: 99DNA9371; Last   Rx:28Qcy0391 Ordered   3  Flector 1 3 % Transdermal Patch; APPLY PATCH TO AFFECTED AREA TWICE DAILY; Therapy: 26Dzo6595 to (Ala Hash)  Requested for: 30GQP1207; Last   Rx:97Drl0210 Ordered   4  HydrOXYzine HCl - 25 MG Oral Tablet; Take one tablet at bed time; Therapy: (GHGFQKZI:64ZKU4864) to Recorded   5  LevETIRAcetam 750 MG Oral Tablet; Take one tab 2 times daily  Requested for:   17KLS3457; Last Rx:99Tjc1374 Ordered   6  Levothyroxine Sodium 75 MCG Oral Tablet; TAKE 1 TABLET DAILY AS DIRECTED; Therapy: 87QJC3133 to (WNASOCKE:85GBX6055)  Requested for: 76UIO2591; Last   Rx:74Wcu6629 Ordered   7  Lidocaine 5 % External Patch (Lidoderm); APPLY 1 PATCH TO THE AFFECTED AREA   AND LEAVE IN PLACE FOR 12 HOURS, THEN REMOVE AND LEAVE OFF FOR 12   HOURS; Therapy: 51TTK1479 to (Evaluate:52Kpa7855)  Requested for: 91EEE0363; Last   Rx:21Qzw5892 Ordered   8  Lyrica 50 MG Oral Capsule; TAKE 1 CAPSULE 3 TIMES DAILY; Last Rx:98Ahm3732   Ordered   9  Meloxicam 15 MG Oral Tablet (Mobic); take 1 tablet daily as needed; Therapy: 42HTS0305 to (Last Rx:53Ary2869)  Requested for: 71Cnh5470 Ordered   10  Midodrine HCl - 10 MG Oral Tablet; TAKE 1 TABLET 3 TIMES DAILY; Therapy: 74DEM7815 to (Evaluate:55Xfx7095)  Requested for: 32Jck6093; Last    Rx:44Qnu2095 Ordered   11  Mirtazapine 15 MG Oral Tablet; TAKE 1 TABLET AT BEDTIME; Therapy: (QNHSWOCT:55FDP6376) to Recorded   12  Mometasone Furoate 0 1 % External Cream (Elocon); APPLY TOPICALLY BID AS    NEEDED; Therapy: 80FVV3522 to (Last Rx:12Oct2016)  Requested for: 12Oct2016 Ordered   13   PredniSONE 20 MG Oral Tablet; take 2 tablet daily; Therapy: 62JYX1648 to (Evaluate:97Wuw8198)  Requested for: 39JTY9732; Last    Rx:81Tev8845 Ordered   14  QUEtiapine Fumarate 25 MG Oral Tablet; TAKE 1 TABLET AT BEDTIME --FUTURE    REFILLS SHOULD COME FROM [facility];    Therapy: 44Zst6286 to (Last Rx:66Tvr9514)  Requested for: 11Bhz8914 Ordered   15  ROPINIRole HCl - 1 MG Oral Tablet; TAKE 1 TABLET 3 TIMES DAILY; Therapy: 11Hch8408 to (Evaluate:90Poc9215)  Requested for: 50WCR6066; Last    Rx:50Ioe1908 Ordered    Allergies    1  Tylenol with Codeine #3 TABS   2   Penicillins    Signatures   Electronically signed by : Rissa Forrest, ; Dec 27 2017  9:47AM EST                       (Author)

## 2018-01-24 VITALS
HEART RATE: 80 BPM | WEIGHT: 114 LBS | BODY MASS INDEX: 20.98 KG/M2 | RESPIRATION RATE: 16 BRPM | SYSTOLIC BLOOD PRESSURE: 112 MMHG | DIASTOLIC BLOOD PRESSURE: 70 MMHG | HEIGHT: 62 IN

## 2018-01-24 NOTE — RESULT NOTES
Message   Recorded as Task   Date: 01/19/2018 01:31 PM, Created By: Sixto Lan   Task Name: Miscellaneous   Assigned To: Madeline 53   Regarding Patient: Sonia Dowd, Status: In Progress   Comment:    Chio Bauman - 19 Jan 2018 1:31 PM     TASK CREATED  Pt's sister Evangelina Colby called stating pt's ins  will not cover her flector patches  She said pt's back has been really bad  They are asking what the doctor can do for her  Pls call Evangelina Bark at [phone] or pt at [phone]  Mary Oro - 19 Jan 2018 3:10 PM     TASK EDITED  I s/w pharmacist at [facility] they never received RX for flector patch but he ran it through for me and it comes up needing a PA  Pls do PA for flector patches ordered by Tray Sheppard on 12/4/17  ID# [id number]  ph # [phone]      S/W Evangelina Lasha, I asked where they took the RX, she said Teri Varela mailed it to [facility] her mail order pharmacy  Told her that it needs a PA done, our office will contact them once approval obtained, can take 3 business days  Advised to use the diclofenac cream that Marisa prescribed in the meantime  [place] Pato - 23 Jan 2018 9:16 AM     TASK EDITED  Pt sent script to [facility] mail order pharmacy   Called [facility] needs PA put into [facility] for approval   Juan Luis Whyte - 23 Jan 2018 9:17 AM     TASK EDITED        Signatures   Electronically signed by : Fina Daniel, ; Jan 23 2018  1:32PM EST                       (Author)

## 2018-02-26 DIAGNOSIS — F33.2 MAJOR DEPRESSIVE DISORDER, RECURRENT SEVERE WITHOUT PSYCHOTIC FEATURES (HCC): ICD-10-CM

## 2018-02-26 RX ORDER — MELOXICAM 15 MG/1
1 TABLET ORAL DAILY PRN
COMMUNITY
Start: 2016-10-12 | End: 2018-02-27 | Stop reason: SDUPTHER

## 2018-02-27 DIAGNOSIS — G89.29 OTHER CHRONIC PAIN: Primary | ICD-10-CM

## 2018-02-27 RX ORDER — MELOXICAM 15 MG/1
15 TABLET ORAL DAILY PRN
Qty: 90 TABLET | Refills: 0 | Status: CANCELLED | OUTPATIENT
Start: 2018-02-27

## 2018-02-27 RX ORDER — MELOXICAM 15 MG/1
15 TABLET ORAL DAILY PRN
Qty: 90 TABLET | Refills: 0 | Status: SHIPPED | OUTPATIENT
Start: 2018-02-27 | End: 2018-03-12 | Stop reason: SDUPTHER

## 2018-02-27 RX ORDER — QUETIAPINE FUMARATE 25 MG/1
25 TABLET, FILM COATED ORAL
Qty: 90 TABLET | Refills: 0 | OUTPATIENT
Start: 2018-02-27

## 2018-03-05 RX ORDER — HYDROXYZINE HYDROCHLORIDE 25 MG/1
TABLET, FILM COATED ORAL
COMMUNITY
End: 2018-06-04 | Stop reason: SDUPTHER

## 2018-03-12 DIAGNOSIS — G89.29 OTHER CHRONIC PAIN: ICD-10-CM

## 2018-03-18 RX ORDER — MELOXICAM 15 MG/1
TABLET ORAL
Qty: 90 TABLET | Refills: 0 | Status: SHIPPED | OUTPATIENT
Start: 2018-03-18 | End: 2018-04-25 | Stop reason: SDUPTHER

## 2018-03-27 DIAGNOSIS — F33.2 MAJOR DEPRESSIVE DISORDER, RECURRENT SEVERE WITHOUT PSYCHOTIC FEATURES (HCC): ICD-10-CM

## 2018-03-27 PROBLEM — G40.309 GENERALIZED NONCONVULSIVE SEIZURE (HCC): Status: ACTIVE | Noted: 2017-11-15

## 2018-03-27 RX ORDER — QUETIAPINE FUMARATE 25 MG/1
TABLET, FILM COATED ORAL
Qty: 90 TABLET | Refills: 0 | Status: SHIPPED | OUTPATIENT
Start: 2018-03-27 | End: 2018-07-17 | Stop reason: HOSPADM

## 2018-03-29 ENCOUNTER — TELEPHONE (OUTPATIENT)
Dept: NEUROLOGY | Facility: CLINIC | Age: 73
End: 2018-03-29

## 2018-03-29 ENCOUNTER — OFFICE VISIT (OUTPATIENT)
Dept: NEUROLOGY | Facility: CLINIC | Age: 73
End: 2018-03-29
Payer: MEDICARE

## 2018-03-29 VITALS
HEART RATE: 67 BPM | BODY MASS INDEX: 22.68 KG/M2 | DIASTOLIC BLOOD PRESSURE: 82 MMHG | SYSTOLIC BLOOD PRESSURE: 129 MMHG | WEIGHT: 124 LBS

## 2018-03-29 DIAGNOSIS — G60.9 IDIOPATHIC PERIPHERAL NEUROPATHY: ICD-10-CM

## 2018-03-29 DIAGNOSIS — G11.8 SPINOCEREBELLAR ATAXIA (HCC): Primary | ICD-10-CM

## 2018-03-29 DIAGNOSIS — G40.309 GENERALIZED NONCONVULSIVE SEIZURE (HCC): ICD-10-CM

## 2018-03-29 DIAGNOSIS — G25.81 RLS (RESTLESS LEGS SYNDROME): ICD-10-CM

## 2018-03-29 PROCEDURE — 99215 OFFICE O/P EST HI 40 MIN: CPT | Performed by: PHYSICIAN ASSISTANT

## 2018-03-29 RX ORDER — BUPROPION HYDROCHLORIDE 300 MG/1
300 TABLET ORAL DAILY
COMMUNITY
End: 2018-06-04 | Stop reason: SDUPTHER

## 2018-03-29 RX ORDER — PRAMIPEXOLE DIHYDROCHLORIDE 0.25 MG/1
0.5 TABLET ORAL
Qty: 90 TABLET | Refills: 4 | Status: SHIPPED | OUTPATIENT
Start: 2018-03-29

## 2018-03-29 RX ORDER — PREGABALIN 50 MG/1
50 CAPSULE ORAL 3 TIMES DAILY
Qty: 270 CAPSULE | Refills: 0 | Status: SHIPPED | OUTPATIENT
Start: 2018-03-29 | End: 2018-08-30 | Stop reason: SDUPTHER

## 2018-03-29 NOTE — ASSESSMENT & PLAN NOTE
Lyrica will be continued as it has been helpful to treat the pain associated with the pt's neuropathy

## 2018-03-29 NOTE — PROGRESS NOTES
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     Pt has had a few falls  She is requesting physical therapy in the home as she does not drive  An order will be placed  Relevant Orders    Ambulatory Referral to [address] [place] [address] Cristi Ramírez    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[facility] )    Relevant Medications    pregabalin (LYRICA) 50 mg capsule      Other Visit Diagnoses     Idiopathic peripheral neuropathy        Relevant Medications    pregabalin (LYRICA) 50 mg capsule    RLS (restless legs syndrome)        Relevant Medications    pramipexole (MIRAPEX) 0 25 mg tablet             Subjective:    HPI    Shanique Haney is a 71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 who presents for follow up  To review, symptoms were first brought to her attention about 7 years ago  In retrospect, she and her  had noted she tended to veer off to the left when walking for at least 10 years  Slurring of speech and hearing loss have occurred over the past 6 years  Both her parents were deaf at early ages  MRI showed age appropriate volume loss and cerebellar volume loss  Genetic testing was negative  Her father walked like her and she has a brother in [place] that walks like her too  She has had a decline in her balance and walking over the past few years  Pt also has a history of RLS   She has been on Mirapex, Neurontin and Cymbalta in the past for her RLS all which led to nausea  In February 2017, she had a seizure in the room and was transported to the hospital  EMS found that pt had a very low BP (70/30)  Upon admission she was found to have a low Hgb and GILBERTO  She was seen by Neurology and started on Keppra  Pt is no longer taking the Keppra cant remember if she ever took it  She was also started on Midodrine which she is still taking  There have been no witnessed seizures but pt is home alone during the day while her daughter is at work  She was readmitted to the hospital in April 2017after an overdose  She continues to have significant difficulty with her balance  She is using her walker  She did fall 2 weeks ago but denies significant injury  She also can not feel her feet and has numbness in her hands  The pain she reported at her last appt has improved with adding Lyrica back  Her speech and swallowin have remained stable  She reports that she had less pain in the legs when she was taking Lyrica  RLS continues to be a problem  She states that Requip has not been helping  She has taken her sister's Mirapex and it helped much more  She is not currently driving  She is interested in therapy at home to help decrease falls  She no longer follows with Psychiatry  The following portions of the patient's history were reviewed and updated as appropriate: allergies, current medications, past family history, past medical history, past social history, past surgical history and problem list          Objective:    Blood pressure 129/82, pulse 67, weight 56 2 kg (124 lb)  Physical Exam   Constitutional: She appears well-developed and well-nourished  Eyes: EOM are normal  Pupils are equal, round, and reactive to light  Neurological:   Reflex Scores:       Tricep reflexes are 0 on the right side and 0 on the left side  Bicep reflexes are 1+ on the right side and 1+ on the left side  Patellar reflexes are 0 on the right side and 0 on the left side  Achilles reflexes are 1+ on the right side and 1+ on the left side  Vitals reviewed  Neurological Exam    Mental Status  The patient is alert and oriented to person, place, time, and situation  She has dysarthria  Her language is fluent with no aphasia  Cranial Nerves    CN II: The patient's visual acuity and visual fields are normal   CN III, IV, VI: The patient's pupils are equally round and reactive to light and ocular movements are normal   CN V: The patient has normal facial sensation  CN VII:  The patient has symmetric facial movement  CN VIII:  The patient's hearing is normal   CN IX, X: The patient has symmetric palate movement and normal gag reflex  CN XI: The patient's shoulder shrug strength is normal   CN XII: The patient's tongue is midline without atrophy or fasciculations  Reflexes  Deep tendon reflexes are 2+ and symmetric except as noted  Right                     Left  Biceps                                   1+                         1+  Triceps                                  0                         0  Patellar                                 0                         0  Achilles                                1+                         1+    Gait and Coordination   She has a wide stance  Uses walker  Ataxic gait  ROS:    Review of Systems   Constitutional: Negative for appetite change and fever  HENT: Negative  Negative for hearing loss, tinnitus, trouble swallowing and voice change  Eyes: Negative  Negative for photophobia and pain  Respiratory: Negative  Negative for shortness of breath  Cardiovascular: Negative  Negative for palpitations  Gastrointestinal: Negative  Negative for nausea and vomiting  Endocrine: Negative  Negative for cold intolerance and heat intolerance  Genitourinary: Negative  Negative for dysuria, frequency and urgency     Musculoskeletal: Positive for back pain and gait problem  Negative for myalgias and neck pain  Skin: Negative  Negative for rash  Allergic/Immunologic: Negative  Neurological: Positive for seizures and numbness  Negative for dizziness, tremors, syncope, facial asymmetry, speech difficulty, light-headedness and headaches  Hematological: Negative  Does not bruise/bleed easily  Psychiatric/Behavioral: Negative for confusion, hallucinations and sleep disturbance  The patient is nervous/anxious

## 2018-03-29 NOTE — ASSESSMENT & PLAN NOTE
Pt has had a few falls  She is requesting physical therapy in the home as she does not drive  An order will be placed

## 2018-03-29 NOTE — PATIENT INSTRUCTIONS
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[address] )  Pt has had a few falls  She is requesting physical therapy in the home as she does not drive  An order will be placed  Neuropathy, peripheral, idiopathic  Lyrica will be continued as it has been helpful to treat the pain associated with the pt's neuropathy  Restless leg syndrome  Ropinirole has not been effective and will be stopped  She will be started on Pramipexole 0 5mg at bedtime  She will follow-up in 4 months

## 2018-03-29 NOTE — ASSESSMENT & PLAN NOTE
Ropinirole has not been effective and will be stopped  She will be started on Pramipexole 0 5mg at bedtime  She will follow-up in 4 months

## 2018-04-24 NOTE — TELEPHONE ENCOUNTER
Home nurse calls to tell us that pt was found to be alone @home today, pt lives w/daughter & daughter does work during the day  Typically pt's sister or another daughter comes over during the day  Pt has not had any falls that the PT tech is aware of; pt does appear to be getting much stronger, balance is better, is compliant w/using walker  PT thinks pt is not eating as much as she should be, PT did call meals [employer] wheels to see if that is an option; pt has to agree to it so we will a/w an update re this     PT is calling us as an FYI, there is an order for her to call us if pt is found alone, "she does not want pt to fall through the cracks"

## 2018-04-25 DIAGNOSIS — G89.29 OTHER CHRONIC PAIN: ICD-10-CM

## 2018-04-26 RX ORDER — MELOXICAM 15 MG/1
15 TABLET ORAL DAILY PRN
Qty: 90 TABLET | Refills: 0 | Status: SHIPPED | OUTPATIENT
Start: 2018-04-26 | End: 2018-07-17 | Stop reason: HOSPADM

## 2018-05-11 ENCOUNTER — TELEPHONE (OUTPATIENT)
Dept: NEUROLOGY | Facility: CLINIC | Age: 73
End: 2018-05-11

## 2018-05-11 DIAGNOSIS — R52 PAIN: Primary | ICD-10-CM

## 2018-05-11 RX ORDER — LIDOCAINE 50 MG/G
1 PATCH TOPICAL DAILY
Qty: 30 PATCH | Refills: 1 | Status: SHIPPED | OUTPATIENT
Start: 2018-05-11

## 2018-05-11 NOTE — TELEPHONE ENCOUNTER
I put in for her  Script printed out as we do not have pharmacy to set out to  Will call patient's daughter and mail script to her

## 2018-05-11 NOTE — TELEPHONE ENCOUNTER
Pt's daughter called asking if you can prescribe the lidocaine patches for pt, she no longer goes to PCP because she did not like them  States that PCP stated that she "didn't need them"  Per pt's daughter she has been without them for 2 weeks, and she does notice a difference and that the patch really helps her  She does not currently have a PCP

## 2018-05-11 NOTE — TELEPHONE ENCOUNTER
[facility]  Per Dr Guy Cabello please call into proper pharm   please

## 2018-05-18 ENCOUNTER — TELEPHONE (OUTPATIENT)
Dept: NEUROLOGY | Facility: CLINIC | Age: 73
End: 2018-05-18

## 2018-05-18 ENCOUNTER — DOCUMENTATION (OUTPATIENT)
Dept: NEUROLOGY | Facility: CLINIC | Age: 73
End: 2018-05-18

## 2018-05-18 NOTE — TELEPHONE ENCOUNTER
pt's daughter called and states that lidocaine patches were denied by insurance  [phone]  spoke to pharm and verifed this    [id number]  [id number]  [id number]  [id number]  PA completed on [facility]

## 2018-06-01 ENCOUNTER — OFFICE VISIT (OUTPATIENT)
Dept: FAMILY MEDICINE CLINIC | Facility: OTHER | Age: 73
End: 2018-06-01
Payer: MEDICARE

## 2018-06-01 VITALS
BODY MASS INDEX: 23.19 KG/M2 | WEIGHT: 126 LBS | HEIGHT: 62 IN | DIASTOLIC BLOOD PRESSURE: 82 MMHG | OXYGEN SATURATION: 98 % | HEART RATE: 74 BPM | SYSTOLIC BLOOD PRESSURE: 120 MMHG | TEMPERATURE: 97 F

## 2018-06-01 DIAGNOSIS — M25.551 PAIN OF RIGHT HIP JOINT: ICD-10-CM

## 2018-06-01 DIAGNOSIS — M54.9 MID BACK PAIN ON RIGHT SIDE: ICD-10-CM
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
## 2018-06-01 DIAGNOSIS — R11.0 NAUSEA: ICD-10-CM

## 2018-06-01 DIAGNOSIS — R82.81 PYURIA: ICD-10-CM

## 2018-06-01 DIAGNOSIS — M54.50 ACUTE RIGHT-SIDED LOW BACK PAIN WITHOUT SCIATICA: ICD-10-CM

## 2018-06-01 LAB
SL AMB  POCT GLUCOSE, UA: NEGATIVE
SL AMB LEUKOCYTE ESTERASE,UA: 125
SL AMB POCT BILIRUBIN,UA: NEGATIVE
SL AMB POCT BLOOD,UA: NEGATIVE
SL AMB POCT CLARITY,UA: NORMAL
SL AMB POCT COLOR,UA: YELLOW
SL AMB POCT KETONES,UA: NEGATIVE
SL AMB POCT NITRITE,UA: POSITIVE
SL AMB POCT PH,UA: 6
SL AMB POCT SPECIFIC GRAVITY,UA: 1.02
SL AMB POCT URINE PROTEIN: NEGATIVE
SL AMB POCT UROBILINOGEN: 0.2

## 2018-06-01 PROCEDURE — 87077 CULTURE AEROBIC IDENTIFY: CPT | Performed by: NURSE PRACTITIONER

## 2018-06-01 PROCEDURE — 87086 URINE CULTURE/COLONY COUNT: CPT | Performed by: NURSE PRACTITIONER

## 2018-06-01 PROCEDURE — 99214 OFFICE O/P EST MOD 30 MIN: CPT | Performed by: FAMILY MEDICINE

## 2018-06-01 PROCEDURE — 81002 URINALYSIS NONAUTO W/O SCOPE: CPT | Performed by: FAMILY MEDICINE

## 2018-06-01 PROCEDURE — 87186 SC STD MICRODIL/AGAR DIL: CPT | Performed by: NURSE PRACTITIONER

## 2018-06-01 RX ORDER — ONDANSETRON HYDROCHLORIDE 8 MG/1
8 TABLET, FILM COATED ORAL EVERY 8 HOURS PRN
Qty: 20 TABLET | Refills: 0 | Status: SHIPPED | OUTPATIENT
Start: 2018-06-01

## 2018-06-01 RX ORDER — ROPINIROLE 1 MG/1
1 TABLET, FILM COATED ORAL 3 TIMES DAILY
COMMUNITY
Start: 2017-09-18 | End: 2018-07-17 | Stop reason: HOSPADM

## 2018-06-01 RX ORDER — OXYCODONE HYDROCHLORIDE 5 MG/1
5 TABLET ORAL EVERY 4 HOURS PRN
Qty: 20 TABLET | Refills: 0 | Status: ON HOLD | OUTPATIENT
Start: 2018-06-01 | End: 2018-07-17

## 2018-06-01 NOTE — PROGRESS NOTES
Assessment/Plan:           Problem List Items Addressed This Visit     Fall - Primary    Relevant Orders    XR spine lumbar minimum 4 views non injury    XR sacrum and coccyx    XR hip/pelv 2-3 vws right if performed    XR ribs right w pa chest min 3 views    Pain of right hip joint    Relevant Medications    oxyCODONE (ROXICODONE) 5 mg immediate release tablet-->potential AE's discussed  Patient has codeine allergy, but denies previous issues with other opioids; PDMP queried and appropriate    Other Relevant Orders    XR hip/pelv 2-3 vws right if performed      Other Visit Diagnoses     Mid back pain on right side        Relevant Medications    oxyCODONE (ROXICODONE) 5 mg immediate release tablet    Other Relevant Orders    XR ribs right w pa chest min 3 views    POCT urine dip (Completed)-->no hematuria; 2+ leuks, positive nit only    Acute right-sided low back pain without sciatica        Relevant Medications    oxyCODONE (ROXICODONE) 5 mg immediate release tablet    Other Relevant Orders    XR spine lumbar minimum 4 views non injury    XR sacrum and coccyx    Nausea  --Call/ER for worsening, associated vomiting      Relevant Medications          ondansetron 8 mg tablet  Pyuria        Relevant Orders    Urine culture          Warm compresses, Aleve, topical lidocaine (has at home)  Oxycodone for breakthrough pain  Will call later today with test results  Continue fall precautions encouraged  Subjective:      Patient ID: Blu Alexander is a 67 y o  female  67year old with history of chronic thoracic/lower back pain, cerebral ataxia, seizures, peripheral neuropathy, speech disturbance, depression, insomnia  Previously seen by Dr Miller He  Here with daughter for complaints of fall  Was walking in [place] last night, using walker  Bent over to pick something up  Lost her balance  Fell backwards against door, striking right lower back, then slid down to floor  Did not hit head or lose consciousness  Able to get back on her feet without assistance  Daughter was in house at the time, but was asleep and did not witness incident  Patient denies any preceding dizziness, CP, palpitations, acute weakness, seizure activity/tremors, incontinence, worsening of her baseline ataxia  Since the incident she has been experiencing constant, "sharp" pain right mid/lower back + sacrum  This pain is significantly worse than her baseline lower back pain  Some increase with movement, weight bearing  No increase with deep breath  Rates 9/10 at present  Aleve, lidocaine not helping much  No other analgesics  Denies any additional injuries including LE/UE pain, increased neck pain above baseline  Some nausea, which she attributes to pain, but no vomiting or headache  No appreciable abdominal pain  No hematuria  No dyspnea  She has otherwise been her normal self today, with no worsening ataxia, weakness, speech changes  No confusion or somnolence  No further falls  Had previous fall about a year ago  PT has been working with her twice a week which has helped with her balance/strength  Had UTI 2 weeks ago which was treated with antibiotic  No further symptoms  Lives with daughter  The following portions of the patient's history were reviewed and updated as appropriate: allergies, current medications, past family history, past medical history, past social history, past surgical history and problem list     Review of Systems   Constitutional: Negative for fatigue and fever  HENT: Negative for sore throat  Eyes: Negative for visual disturbance  Respiratory: Negative for shortness of breath  Cardiovascular: Negative for chest pain and palpitations  Gastrointestinal: Positive for nausea  Negative for abdominal pain and vomiting  Genitourinary: Negative for dysuria and hematuria  Musculoskeletal:        Per HPI   Neurological: Negative for dizziness, tremors, syncope and headaches  Psychiatric/Behavioral: Negative for agitation and confusion  Objective:      /82 (BP Location: Left arm, Patient Position: Sitting, Cuff Size: Adult)   Pulse 74   Temp (!) 97 °F (36 1 °C) (Tympanic)   Ht 5' 1 5" (1 562 m)   Wt 57 2 kg (126 lb)   SpO2 98%   BMI 23 42 kg/m²          Physical Exam   Constitutional: She is oriented to person, place, and time  She appears well-developed and well-nourished  No distress  Appears comfortable, seated and with movement  HENT:   Head: Normocephalic and atraumatic  Mouth/Throat: Oropharynx is clear and moist    Eyes: Pupils are equal, round, and reactive to light  Neck: Normal range of motion  Neck supple  Cardiovascular: Normal rate  Pulmonary/Chest: Effort normal and breath sounds normal  She has no rales  She exhibits no tenderness  Breathing non-labored  RR=16  Abdominal: Soft  Bowel sounds are normal  She exhibits no mass  There is no tenderness  There is no rebound and no guarding  Musculoskeletal:   Diffuse right mid-->lower back tenderness with superficial 4 in long transverse abrasion right lumbar region  No axial tenderness except overlying sacrum  Right lower posterior rib tenderness, but no palpable or visualized abnormality  No bruising  No CVA tenderness  Mild tenderness right iliac crest + lateral hip with no bruising or visual abnormality  Normal right hip ROM, with mild increase in lower back pain at limits of external rotation only  No C-spine or T-spine tenderness  No UE + LE tenderness including knees  Steady, but cautious gait, using walker  Neurological: She is alert and oriented to person, place, and time  Skin:   Per MUSCULO   Psychiatric: She has a normal mood and affect   Her behavior is normal  Judgment and thought content normal

## 2018-06-03 LAB — BACTERIA UR CULT: ABNORMAL

## 2018-06-04 DIAGNOSIS — F41.8 DEPRESSION WITH ANXIETY: Primary | ICD-10-CM

## 2018-06-04 RX ORDER — HYDROXYZINE HYDROCHLORIDE 25 MG/1
25 TABLET, FILM COATED ORAL DAILY
Qty: 90 TABLET | Refills: 0 | Status: SHIPPED | OUTPATIENT
Start: 2018-06-04 | End: 2018-07-17 | Stop reason: HOSPADM

## 2018-06-04 RX ORDER — BUPROPION HYDROCHLORIDE 300 MG/1
300 TABLET ORAL DAILY
Qty: 90 TABLET | Refills: 0 | Status: SHIPPED | OUTPATIENT
Start: 2018-06-04 | End: 2018-07-28 | Stop reason: SDUPTHER

## 2018-06-05 ENCOUNTER — TELEPHONE (OUTPATIENT)
Dept: FAMILY MEDICINE CLINIC | Facility: OTHER | Age: 73
End: 2018-06-05

## 2018-06-05 DIAGNOSIS — N30.00 ACUTE CYSTITIS WITHOUT HEMATURIA: Primary | ICD-10-CM

## 2018-06-05 RX ORDER — SULFAMETHOXAZOLE AND TRIMETHOPRIM 800; 160 MG/1; MG/1
1 TABLET ORAL EVERY 12 HOURS SCHEDULED
Qty: 14 TABLET | Refills: 0 | Status: SHIPPED | OUTPATIENT
Start: 2018-06-05 | End: 2018-06-12

## 2018-06-05 NOTE — TELEPHONE ENCOUNTER
I spoke Tayla Bernard  She said X-ray appointment is Friday   She also  mentioned Sonia Ricci still on a lot of pain

## 2018-06-07 NOTE — TELEPHONE ENCOUNTER
I did give her some pain medication  I have been waiting on the x-ray results since I saw her last week     She should go to the ER if her pain gets worse  Thanks

## 2018-06-12 ENCOUNTER — TELEPHONE (OUTPATIENT)
Dept: FAMILY MEDICINE CLINIC | Facility: OTHER | Age: 73
End: 2018-06-12

## 2018-06-12 DIAGNOSIS — N94.9 ADNEXAL CYST: Primary | ICD-10-CM

## 2018-06-12 DIAGNOSIS — R91.1 NODULE OF LEFT LUNG: ICD-10-CM

## 2018-06-12 NOTE — TELEPHONE ENCOUNTER
Patient lost to followup  Orders placed for CT chest and US pelvis per radiology recommendations -- please inform pt    Thanks!   Evelyn Kennedy, DO

## 2018-06-18 DIAGNOSIS — E03.9 HYPOTHYROIDISM, UNSPECIFIED TYPE: Primary | ICD-10-CM

## 2018-06-18 RX ORDER — LEVOTHYROXINE SODIUM 0.07 MG/1
TABLET ORAL
Qty: 30 TABLET | Refills: 3 | Status: SHIPPED | OUTPATIENT
Start: 2018-06-18 | End: 2018-06-21 | Stop reason: SDUPTHER

## 2018-06-20 ENCOUNTER — TELEPHONE (OUTPATIENT)
Dept: NEUROLOGY | Facility: CLINIC | Age: 73
End: 2018-06-20

## 2018-06-20 NOTE — TELEPHONE ENCOUNTER
Received call from insurance, Lidocaine PA "partially approved", stating there is potential it can be approved, however we need to send the information to [employer]: [phone]  We can send the appeal there  Anything additional you would like us to add to the appeal? This is what the PA stated: "this is a continuation of therapy    pt has been using patches since oct 2017 and has had significant pain relief while using "

## 2018-06-21 DIAGNOSIS — E03.9 HYPOTHYROIDISM, UNSPECIFIED TYPE: ICD-10-CM

## 2018-06-21 RX ORDER — LEVOTHYROXINE SODIUM 0.07 MG/1
75 TABLET ORAL DAILY
Qty: 30 TABLET | Refills: 3 | Status: SHIPPED | OUTPATIENT
Start: 2018-06-21 | End: 2018-06-28 | Stop reason: SDUPTHER

## 2018-06-22 NOTE — TELEPHONE ENCOUNTER
Letter generated and faxed along with last office note, and last PA request  Also faxed to central faxing

## 2018-06-28 DIAGNOSIS — E03.9 HYPOTHYROIDISM, UNSPECIFIED TYPE: ICD-10-CM

## 2018-06-28 RX ORDER — LEVOTHYROXINE SODIUM 0.07 MG/1
75 TABLET ORAL DAILY
Qty: 30 TABLET | Refills: 3 | Status: SHIPPED | OUTPATIENT
Start: 2018-06-28

## 2018-07-03 ENCOUNTER — HOSPITAL ENCOUNTER (INPATIENT)
Facility: HOSPITAL | Age: 73
LOS: 14 days | Discharge: NURSING, REHAB OR LONG-TERM CARE | DRG: 682 | End: 2018-07-17
Attending: EMERGENCY MEDICINE | Admitting: INTERNAL MEDICINE
Payer: MEDICARE

## 2018-07-03 ENCOUNTER — APPOINTMENT (INPATIENT)
Dept: RADIOLOGY | Facility: HOSPITAL | Age: 73
DRG: 682 | End: 2018-07-03
Payer: MEDICARE

## 2018-07-03 ENCOUNTER — APPOINTMENT (EMERGENCY)
Dept: RADIOLOGY | Facility: HOSPITAL | Age: 73
DRG: 682 | End: 2018-07-03
Payer: MEDICARE

## 2018-07-03 DIAGNOSIS — F41.8 DEPRESSION WITH ANXIETY: ICD-10-CM

## 2018-07-03 DIAGNOSIS — K59.00 CONSTIPATION, UNSPECIFIED CONSTIPATION TYPE: ICD-10-CM

## 2018-07-03 DIAGNOSIS — I95.9 HYPOTENSION, UNSPECIFIED HYPOTENSION TYPE: ICD-10-CM

## 2018-07-03 DIAGNOSIS — G93.40 ENCEPHALOPATHY ACUTE: ICD-10-CM

## 2018-07-03 DIAGNOSIS — G60.9 NEUROPATHY, PERIPHERAL, IDIOPATHIC: ICD-10-CM

## 2018-07-03 DIAGNOSIS — I48.91 NEW ONSET ATRIAL FIBRILLATION (HCC): ICD-10-CM

## 2018-07-03 DIAGNOSIS — S32.009A LUMBAR TRANSVERSE PROCESS FRACTURE, CLOSED, INITIAL ENCOUNTER (HCC): ICD-10-CM

## 2018-07-03 DIAGNOSIS — M62.82 RHABDOMYOLYSIS: Primary | ICD-10-CM

## 2018-07-03 DIAGNOSIS — I48.0 PAROXYSMAL ATRIAL FIBRILLATION (HCC): ICD-10-CM

## 2018-07-03 DIAGNOSIS — T14.8XXA TRAUMATIC ECCHYMOSIS OF MULTIPLE SITES: ICD-10-CM

## 2018-07-03 DIAGNOSIS — R74.01 TRANSAMINITIS: ICD-10-CM

## 2018-07-03 DIAGNOSIS — M54.9 MID BACK PAIN ON RIGHT SIDE: ICD-10-CM

## 2018-07-03 DIAGNOSIS — S06.5X9A SUBDURAL HEMATOMA (HCC): ICD-10-CM

## 2018-07-03 DIAGNOSIS — Z91.89 HISTORY OF SUICIDAL TENDENCIES: ICD-10-CM

## 2018-07-03 DIAGNOSIS — N17.9 AKI (ACUTE KIDNEY INJURY) (HCC): ICD-10-CM

## 2018-07-03 DIAGNOSIS — E86.0 DEHYDRATION: ICD-10-CM

## 2018-07-03 DIAGNOSIS — L89.159 SACRAL DECUBITUS ULCER: ICD-10-CM

## 2018-07-03 DIAGNOSIS — M25.551 PAIN OF RIGHT HIP JOINT: ICD-10-CM

## 2018-07-03 DIAGNOSIS — M54.50 ACUTE RIGHT-SIDED LOW BACK PAIN WITHOUT SCIATICA: ICD-10-CM

## 2018-07-03 PROBLEM — R82.81 PYURIA: Status: ACTIVE | Noted: 2018-07-03

## 2018-07-03 PROBLEM — T14.90XA TRAUMA: Status: ACTIVE | Noted: 2018-07-03

## 2018-07-03 LAB
ALBUMIN SERPL BCP-MCNC: 2.6 G/DL (ref 3.5–5)
ALP SERPL-CCNC: 135 U/L (ref 46–116)
ALT SERPL W P-5'-P-CCNC: 91 U/L (ref 12–78)
AMPHETAMINES SERPL QL SCN: NEGATIVE
ANION GAP SERPL CALCULATED.3IONS-SCNC: 12 MMOL/L (ref 4–13)
APAP SERPL-MCNC: <2 UG/ML (ref 10–30)
APTT PPP: 25 SECONDS (ref 24–36)
AST SERPL W P-5'-P-CCNC: 193 U/L (ref 5–45)
ATRIAL RATE: 300 BPM
BACTERIA UR QL AUTO: ABNORMAL /HPF
BARBITURATES UR QL: NEGATIVE
BASOPHILS # BLD AUTO: 0 THOUSANDS/ΜL (ref 0–0.1)
BASOPHILS NFR BLD AUTO: 0 % (ref 0–1)
BENZODIAZ UR QL: NEGATIVE
BILIRUB SERPL-MCNC: 2.29 MG/DL (ref 0.2–1)
BILIRUB UR QL STRIP: NEGATIVE
BUN SERPL-MCNC: 63 MG/DL (ref 5–25)
CALCIUM SERPL-MCNC: 7.6 MG/DL (ref 8.3–10.1)
CHLORIDE SERPL-SCNC: 110 MMOL/L (ref 100–108)
CK MB SERPL-MCNC: 1.5 % (ref 0–2.5)
CK MB SERPL-MCNC: 93.1 NG/ML (ref 0–5)
CK SERPL-CCNC: 6213 U/L (ref 26–192)
CLARITY UR: ABNORMAL
CLARITY, POC: NORMAL
CO2 SERPL-SCNC: 20 MMOL/L (ref 21–32)
COCAINE UR QL: NEGATIVE
COLOR UR: YELLOW
COLOR, POC: YELLOW
CREAT SERPL-MCNC: 2.18 MG/DL (ref 0.6–1.3)
EOSINOPHIL # BLD AUTO: 0.01 THOUSAND/ΜL (ref 0–0.61)
EOSINOPHIL NFR BLD AUTO: 0 % (ref 0–6)
ERYTHROCYTE [DISTWIDTH] IN BLOOD BY AUTOMATED COUNT: 14.5 % (ref 11.6–15.1)
ETHANOL SERPL-MCNC: <3 MG/DL (ref 0–3)
GFR SERPL CREATININE-BSD FRML MDRD: 22 ML/MIN/1.73SQ M
GLUCOSE SERPL-MCNC: 76 MG/DL (ref 65–140)
GLUCOSE UR STRIP-MCNC: ABNORMAL MG/DL
HCT VFR BLD AUTO: 34.5 % (ref 34.8–46.1)
HGB BLD-MCNC: 11.2 G/DL (ref 11.5–15.4)
HGB UR QL STRIP.AUTO: ABNORMAL
HYALINE CASTS #/AREA URNS LPF: ABNORMAL /LPF
IMM GRANULOCYTES # BLD AUTO: 0.01 THOUSAND/UL (ref 0–0.2)
IMM GRANULOCYTES NFR BLD AUTO: 0 % (ref 0–2)
INR PPP: 1.04 (ref 0.86–1.17)
KETONES UR STRIP-MCNC: NEGATIVE MG/DL
LACTATE SERPL-SCNC: 1.4 MMOL/L (ref 0.5–2)
LEUKOCYTE ESTERASE UR QL STRIP: ABNORMAL
LYMPHOCYTES # BLD AUTO: 0.53 THOUSANDS/ΜL (ref 0.6–4.47)
LYMPHOCYTES NFR BLD AUTO: 8 % (ref 14–44)
MCH RBC QN AUTO: 29.9 PG (ref 26.8–34.3)
MCHC RBC AUTO-ENTMCNC: 32.5 G/DL (ref 31.4–37.4)
MCV RBC AUTO: 92 FL (ref 82–98)
METHADONE UR QL: NEGATIVE
MONOCYTES # BLD AUTO: 0.21 THOUSAND/ΜL (ref 0.17–1.22)
MONOCYTES NFR BLD AUTO: 3 % (ref 4–12)
NEUTROPHILS # BLD AUTO: 5.65 THOUSANDS/ΜL (ref 1.85–7.62)
NEUTS SEG NFR BLD AUTO: 89 % (ref 43–75)
NITRITE UR QL STRIP: NEGATIVE
NON-SQ EPI CELLS URNS QL MICRO: ABNORMAL /HPF
NRBC BLD AUTO-RTO: 0 /100 WBCS
NT-PROBNP SERPL-MCNC: 3523 PG/ML
OPIATES UR QL SCN: NEGATIVE
PCP UR QL: NEGATIVE
PH UR STRIP.AUTO: 7 [PH] (ref 4.5–8)
PLATELET # BLD AUTO: 102 THOUSANDS/UL (ref 149–390)
PMV BLD AUTO: 11.1 FL (ref 8.9–12.7)
POTASSIUM SERPL-SCNC: 4.3 MMOL/L (ref 3.5–5.3)
PROT SERPL-MCNC: 5.7 G/DL (ref 6.4–8.2)
PROT UR STRIP-MCNC: ABNORMAL MG/DL
PROTHROMBIN TIME: 13.7 SECONDS (ref 11.8–14.2)
QRS AXIS: -31 DEGREES
QRSD INTERVAL: 96 MS
QT INTERVAL: 332 MS
QTC INTERVAL: 401 MS
RBC # BLD AUTO: 3.75 MILLION/UL (ref 3.81–5.12)
RBC #/AREA URNS AUTO: ABNORMAL /HPF
SALICYLATES SERPL-MCNC: <3 MG/DL (ref 3–20)
SODIUM SERPL-SCNC: 142 MMOL/L (ref 136–145)
SP GR UR STRIP.AUTO: 1.02 (ref 1–1.03)
T WAVE AXIS: 108 DEGREES
T4 FREE SERPL-MCNC: 1.48 NG/DL (ref 0.76–1.46)
THC UR QL: NEGATIVE
TROPONIN I SERPL-MCNC: 0.03 NG/ML
TSH SERPL DL<=0.05 MIU/L-ACNC: 0.13 UIU/ML (ref 0.36–3.74)
UROBILINOGEN UR QL STRIP.AUTO: 0.2 E.U./DL
VENTRICULAR RATE: 88 BPM
WBC # BLD AUTO: 6.41 THOUSAND/UL (ref 4.31–10.16)
WBC #/AREA URNS AUTO: ABNORMAL /HPF

## 2018-07-03 PROCEDURE — 85025 COMPLETE CBC W/AUTO DIFF WBC: CPT | Performed by: EMERGENCY MEDICINE

## 2018-07-03 PROCEDURE — 84443 ASSAY THYROID STIM HORMONE: CPT | Performed by: EMERGENCY MEDICINE

## 2018-07-03 PROCEDURE — 84439 ASSAY OF FREE THYROXINE: CPT | Performed by: EMERGENCY MEDICINE

## 2018-07-03 PROCEDURE — 93005 ELECTROCARDIOGRAM TRACING: CPT

## 2018-07-03 PROCEDURE — 93010 ELECTROCARDIOGRAM REPORT: CPT | Performed by: INTERNAL MEDICINE

## 2018-07-03 PROCEDURE — 84484 ASSAY OF TROPONIN QUANT: CPT | Performed by: EMERGENCY MEDICINE

## 2018-07-03 PROCEDURE — 71250 CT THORAX DX C-: CPT

## 2018-07-03 PROCEDURE — 81001 URINALYSIS AUTO W/SCOPE: CPT

## 2018-07-03 PROCEDURE — 85730 THROMBOPLASTIN TIME PARTIAL: CPT | Performed by: EMERGENCY MEDICINE

## 2018-07-03 PROCEDURE — 82553 CREATINE MB FRACTION: CPT | Performed by: EMERGENCY MEDICINE

## 2018-07-03 PROCEDURE — 72125 CT NECK SPINE W/O DYE: CPT

## 2018-07-03 PROCEDURE — 80329 ANALGESICS NON-OPIOID 1 OR 2: CPT | Performed by: EMERGENCY MEDICINE

## 2018-07-03 PROCEDURE — 74176 CT ABD & PELVIS W/O CONTRAST: CPT

## 2018-07-03 PROCEDURE — 36415 COLL VENOUS BLD VENIPUNCTURE: CPT | Performed by: EMERGENCY MEDICINE

## 2018-07-03 PROCEDURE — 80320 DRUG SCREEN QUANTALCOHOLS: CPT | Performed by: EMERGENCY MEDICINE

## 2018-07-03 PROCEDURE — 99223 1ST HOSP IP/OBS HIGH 75: CPT | Performed by: GENERAL PRACTICE

## 2018-07-03 PROCEDURE — 70450 CT HEAD/BRAIN W/O DYE: CPT

## 2018-07-03 PROCEDURE — 82550 ASSAY OF CK (CPK): CPT | Performed by: EMERGENCY MEDICINE

## 2018-07-03 PROCEDURE — 96360 HYDRATION IV INFUSION INIT: CPT

## 2018-07-03 PROCEDURE — 80307 DRUG TEST PRSMV CHEM ANLYZR: CPT | Performed by: EMERGENCY MEDICINE

## 2018-07-03 PROCEDURE — 99285 EMERGENCY DEPT VISIT HI MDM: CPT

## 2018-07-03 PROCEDURE — 83880 ASSAY OF NATRIURETIC PEPTIDE: CPT | Performed by: EMERGENCY MEDICINE

## 2018-07-03 PROCEDURE — 80053 COMPREHEN METABOLIC PANEL: CPT | Performed by: EMERGENCY MEDICINE

## 2018-07-03 PROCEDURE — 87186 SC STD MICRODIL/AGAR DIL: CPT

## 2018-07-03 PROCEDURE — 83605 ASSAY OF LACTIC ACID: CPT | Performed by: EMERGENCY MEDICINE

## 2018-07-03 PROCEDURE — 96361 HYDRATE IV INFUSION ADD-ON: CPT

## 2018-07-03 PROCEDURE — 87086 URINE CULTURE/COLONY COUNT: CPT

## 2018-07-03 PROCEDURE — 71045 X-RAY EXAM CHEST 1 VIEW: CPT

## 2018-07-03 PROCEDURE — 85610 PROTHROMBIN TIME: CPT | Performed by: EMERGENCY MEDICINE

## 2018-07-03 RX ORDER — LEVOTHYROXINE SODIUM 0.05 MG/1
50 TABLET ORAL DAILY
Status: DISCONTINUED | OUTPATIENT
Start: 2018-07-04 | End: 2018-07-03

## 2018-07-03 RX ORDER — SODIUM CHLORIDE 9 MG/ML
150 INJECTION, SOLUTION INTRAVENOUS CONTINUOUS
Status: DISCONTINUED | OUTPATIENT
Start: 2018-07-03 | End: 2018-07-05

## 2018-07-03 RX ORDER — BUPROPION HYDROCHLORIDE 150 MG/1
300 TABLET ORAL DAILY
Status: DISCONTINUED | OUTPATIENT
Start: 2018-07-04 | End: 2018-07-06

## 2018-07-03 RX ORDER — OXYCODONE HYDROCHLORIDE 5 MG/1
5 TABLET ORAL EVERY 4 HOURS PRN
Status: DISCONTINUED | OUTPATIENT
Start: 2018-07-03 | End: 2018-07-06

## 2018-07-03 RX ORDER — MIDODRINE HYDROCHLORIDE 5 MG/1
10 TABLET ORAL 3 TIMES DAILY
Status: DISCONTINUED | OUTPATIENT
Start: 2018-07-03 | End: 2018-07-17 | Stop reason: HOSPADM

## 2018-07-03 RX ORDER — GABAPENTIN 100 MG/1
100 CAPSULE ORAL 2 TIMES DAILY
Status: DISCONTINUED | OUTPATIENT
Start: 2018-07-03 | End: 2018-07-06

## 2018-07-03 RX ORDER — ONDANSETRON 2 MG/ML
4 INJECTION INTRAMUSCULAR; INTRAVENOUS EVERY 6 HOURS PRN
Status: DISCONTINUED | OUTPATIENT
Start: 2018-07-03 | End: 2018-07-17 | Stop reason: HOSPADM

## 2018-07-03 RX ORDER — LIDOCAINE 50 MG/G
1 PATCH TOPICAL DAILY
Status: DISCONTINUED | OUTPATIENT
Start: 2018-07-04 | End: 2018-07-17 | Stop reason: HOSPADM

## 2018-07-03 RX ORDER — PREGABALIN 50 MG/1
50 CAPSULE ORAL 3 TIMES DAILY
Status: DISCONTINUED | OUTPATIENT
Start: 2018-07-03 | End: 2018-07-06

## 2018-07-03 RX ORDER — OXYCODONE HYDROCHLORIDE 5 MG/1
2.5 TABLET ORAL EVERY 4 HOURS PRN
Status: DISCONTINUED | OUTPATIENT
Start: 2018-07-03 | End: 2018-07-06

## 2018-07-03 RX ORDER — QUETIAPINE FUMARATE 25 MG/1
25 TABLET, FILM COATED ORAL
Status: DISCONTINUED | OUTPATIENT
Start: 2018-07-03 | End: 2018-07-06

## 2018-07-03 RX ORDER — HYDROXYZINE HYDROCHLORIDE 25 MG/1
25 TABLET, FILM COATED ORAL
Status: DISCONTINUED | OUTPATIENT
Start: 2018-07-03 | End: 2018-07-06

## 2018-07-03 RX ORDER — ROPINIROLE 1 MG/1
1 TABLET, FILM COATED ORAL 3 TIMES DAILY
Status: DISCONTINUED | OUTPATIENT
Start: 2018-07-03 | End: 2018-07-06

## 2018-07-03 RX ORDER — LEVOTHYROXINE SODIUM 0.07 MG/1
75 TABLET ORAL
Status: DISCONTINUED | OUTPATIENT
Start: 2018-07-04 | End: 2018-07-06

## 2018-07-03 RX ADMIN — SODIUM CHLORIDE 1000 ML: 0.9 INJECTION, SOLUTION INTRAVENOUS at 16:08

## 2018-07-03 RX ADMIN — SODIUM CHLORIDE 1000 ML: 0.9 INJECTION, SOLUTION INTRAVENOUS at 18:42

## 2018-07-03 RX ADMIN — PREGABALIN 50 MG: 25 CAPSULE ORAL at 21:54

## 2018-07-03 RX ADMIN — SODIUM CHLORIDE 150 ML/HR: 0.9 INJECTION, SOLUTION INTRAVENOUS at 21:42
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
## 2018-07-03 NOTE — ED PROVIDER NOTES
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  07/04/18 0009

## 2018-07-04 LAB
ALBUMIN SERPL BCP-MCNC: 2.3 G/DL (ref 3.5–5)
ALP SERPL-CCNC: 127 U/L (ref 46–116)
ALT SERPL W P-5'-P-CCNC: 83 U/L (ref 12–78)
ANION GAP SERPL CALCULATED.3IONS-SCNC: 8 MMOL/L (ref 4–13)
AST SERPL W P-5'-P-CCNC: 147 U/L (ref 5–45)
BILIRUB SERPL-MCNC: 1.98 MG/DL (ref 0.2–1)
BUN SERPL-MCNC: 62 MG/DL (ref 5–25)
CALCIUM SERPL-MCNC: 7.4 MG/DL (ref 8.3–10.1)
CHLORIDE SERPL-SCNC: 116 MMOL/L (ref 100–108)
CK MB SERPL-MCNC: 1.1 % (ref 0–2.5)
CK MB SERPL-MCNC: 47.8 NG/ML (ref 0–5)
CK SERPL-CCNC: 4526 U/L (ref 26–192)
CO2 SERPL-SCNC: 22 MMOL/L (ref 21–32)
CREAT SERPL-MCNC: 1.68 MG/DL (ref 0.6–1.3)
ERYTHROCYTE [DISTWIDTH] IN BLOOD BY AUTOMATED COUNT: 14.5 % (ref 11.6–15.1)
GFR SERPL CREATININE-BSD FRML MDRD: 30 ML/MIN/1.73SQ M
GLUCOSE SERPL-MCNC: 65 MG/DL (ref 65–140)
HCT VFR BLD AUTO: 30.1 % (ref 34.8–46.1)
HGB BLD-MCNC: 9.7 G/DL (ref 11.5–15.4)
MAGNESIUM SERPL-MCNC: 2 MG/DL (ref 1.6–2.6)
MCH RBC QN AUTO: 30.2 PG (ref 26.8–34.3)
MCHC RBC AUTO-ENTMCNC: 32.2 G/DL (ref 31.4–37.4)
MCV RBC AUTO: 94 FL (ref 82–98)
PHOSPHATE SERPL-MCNC: 3.4 MG/DL (ref 2.3–4.1)
PLATELET # BLD AUTO: 81 THOUSANDS/UL (ref 149–390)
PMV BLD AUTO: 11.9 FL (ref 8.9–12.7)
POTASSIUM SERPL-SCNC: 4.2 MMOL/L (ref 3.5–5.3)
PROCALCITONIN SERPL-MCNC: 0.52 NG/ML
PROT SERPL-MCNC: 5.2 G/DL (ref 6.4–8.2)
RBC # BLD AUTO: 3.21 MILLION/UL (ref 3.81–5.12)
SODIUM SERPL-SCNC: 146 MMOL/L (ref 136–145)
WBC # BLD AUTO: 4.74 THOUSAND/UL (ref 4.31–10.16)

## 2018-07-04 PROCEDURE — 83735 ASSAY OF MAGNESIUM: CPT | Performed by: GENERAL PRACTICE

## 2018-07-04 PROCEDURE — G8988 SELF CARE GOAL STATUS: HCPCS

## 2018-07-04 PROCEDURE — 80053 COMPREHEN METABOLIC PANEL: CPT | Performed by: GENERAL PRACTICE

## 2018-07-04 PROCEDURE — 82553 CREATINE MB FRACTION: CPT | Performed by: GENERAL PRACTICE

## 2018-07-04 PROCEDURE — 85027 COMPLETE CBC AUTOMATED: CPT | Performed by: GENERAL PRACTICE

## 2018-07-04 PROCEDURE — 92610 EVALUATE SWALLOWING FUNCTION: CPT

## 2018-07-04 PROCEDURE — G8979 MOBILITY GOAL STATUS: HCPCS

## 2018-07-04 PROCEDURE — 99232 SBSQ HOSP IP/OBS MODERATE 35: CPT | Performed by: INTERNAL MEDICINE

## 2018-07-04 PROCEDURE — G8987 SELF CARE CURRENT STATUS: HCPCS

## 2018-07-04 PROCEDURE — G8978 MOBILITY CURRENT STATUS: HCPCS

## 2018-07-04 PROCEDURE — 97167 OT EVAL HIGH COMPLEX 60 MIN: CPT

## 2018-07-04 PROCEDURE — 84145 PROCALCITONIN (PCT): CPT | Performed by: GENERAL PRACTICE

## 2018-07-04 PROCEDURE — 82550 ASSAY OF CK (CPK): CPT | Performed by: GENERAL PRACTICE

## 2018-07-04 PROCEDURE — 84100 ASSAY OF PHOSPHORUS: CPT | Performed by: GENERAL PRACTICE

## 2018-07-04 PROCEDURE — 97163 PT EVAL HIGH COMPLEX 45 MIN: CPT

## 2018-07-04 RX ADMIN — SODIUM CHLORIDE 150 ML/HR: 0.9 INJECTION, SOLUTION INTRAVENOUS at 03:16

## 2018-07-04 RX ADMIN — ENOXAPARIN SODIUM 30 MG: 30 INJECTION SUBCUTANEOUS at 16:50

## 2018-07-04 RX ADMIN — SODIUM CHLORIDE 150 ML/HR: 0.9 INJECTION, SOLUTION INTRAVENOUS at 23:35

## 2018-07-04 RX ADMIN — SODIUM CHLORIDE 150 ML/HR: 0.9 INJECTION, SOLUTION INTRAVENOUS at 16:23

## 2018-07-04 RX ADMIN — HYDROMORPHONE HYDROCHLORIDE 0.2 MG: 1 INJECTION, SOLUTION INTRAMUSCULAR; INTRAVENOUS; SUBCUTANEOUS at 21:51

## 2018-07-04 RX ADMIN — LIDOCAINE 1 PATCH: 50 PATCH CUTANEOUS at 16:50

## 2018-07-04 RX ADMIN — SODIUM CHLORIDE 150 ML/HR: 0.9 INJECTION, SOLUTION INTRAVENOUS at 09:27

## 2018-07-04 NOTE — ASSESSMENT & PLAN NOTE
POA  2/2 dehydration and rhabdo  Will give IVF and check BMP in AM  Will hold off on renal consult for now - if Cr not improving with IVF, can consult in AM

## 2018-07-04 NOTE — ED NOTES
IV was noted to be removed, a new IV will be placed once pt returns from CT as they are here now to take the pt and do not need an IV for testing        Jennifer Drummond RN  07/03/18 2032

## 2018-07-04 NOTE — PLAN OF CARE
Problem: SLP ADULT - SWALLOWING, IMPAIRED  Goal: Initial SLP swallow eval performed  Outcome: Completed Date Met: 07/04/18
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
## 2018-07-04 NOTE — PROGRESS NOTES
Tried administering medications to patient  Patient states she has no issues at home taking pills and that she takes two at a time whole with water  The first two pills were given and patient immediately coughed  Patient was able to swallow the pills  When asked if the pills caused her to cough, she was not sure  Gave patient a little bit more water and she coughed again  Suction was set up in the room and patient was suctioned  Called Vivi SOTO to notify  For now keep NPO and speech eval was ordered  Will continue to monitor pt

## 2018-07-04 NOTE — ASSESSMENT & PLAN NOTE
Per POA, at b/l, pt AAOx3, although she does have speech impediment  UDS pending - pt has hx of suicide attempt  Likely 2/2 uremia - will give IVF
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
## 2018-07-04 NOTE — ED NOTES
Daughter Nan Lewis, stated that the pt can sign- ASL, and often does with her and her sister Subhash Gong, the pts sister is reported to be scheduled to visit the pt tomorrow        Liliana Perez RN  07/03/18 2030

## 2018-07-04 NOTE — ASSESSMENT & PLAN NOTE
Will decrease saline 100 cc/hr  Patient will need input and output monitoring closely  BMP daily as well as CPK daily   No acidosis
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
## 2018-07-05 ENCOUNTER — APPOINTMENT (INPATIENT)
Dept: RADIOLOGY | Facility: HOSPITAL | Age: 73
DRG: 682 | End: 2018-07-05
Payer: MEDICARE

## 2018-07-05 LAB
ALBUMIN SERPL BCP-MCNC: 2.4 G/DL (ref 3.5–5)
ALP SERPL-CCNC: 156 U/L (ref 46–116)
ALT SERPL W P-5'-P-CCNC: 82 U/L (ref 12–78)
ANION GAP SERPL CALCULATED.3IONS-SCNC: 9 MMOL/L (ref 4–13)
ANISOCYTOSIS BLD QL SMEAR: PRESENT
AST SERPL W P-5'-P-CCNC: 103 U/L (ref 5–45)
BACTERIA UR CULT: ABNORMAL
BASOPHILS # BLD MANUAL: 0.05 THOUSAND/UL (ref 0–0.1)
BASOPHILS NFR MAR MANUAL: 1 % (ref 0–1)
BILIRUB SERPL-MCNC: 1.34 MG/DL (ref 0.2–1)
BUN SERPL-MCNC: 50 MG/DL (ref 5–25)
CALCIUM SERPL-MCNC: 8.1 MG/DL (ref 8.3–10.1)
CHLORIDE SERPL-SCNC: 119 MMOL/L (ref 100–108)
CK MB SERPL-MCNC: 1.6 % (ref 0–2.5)
CK MB SERPL-MCNC: 19 NG/ML (ref 0–5)
CK SERPL-CCNC: 1158 U/L (ref 26–192)
CO2 SERPL-SCNC: 20 MMOL/L (ref 21–32)
CREAT SERPL-MCNC: 1.07 MG/DL (ref 0.6–1.3)
EOSINOPHIL # BLD MANUAL: 0.05 THOUSAND/UL (ref 0–0.4)
EOSINOPHIL NFR BLD MANUAL: 1 % (ref 0–6)
ERYTHROCYTE [DISTWIDTH] IN BLOOD BY AUTOMATED COUNT: 14.9 % (ref 11.6–15.1)
GFR SERPL CREATININE-BSD FRML MDRD: 52 ML/MIN/1.73SQ M
GLUCOSE SERPL-MCNC: 51 MG/DL (ref 65–140)
HCT VFR BLD AUTO: 31.9 % (ref 34.8–46.1)
HGB BLD-MCNC: 9.8 G/DL (ref 11.5–15.4)
LYMPHOCYTES # BLD AUTO: 0.38 THOUSAND/UL (ref 0.6–4.47)
LYMPHOCYTES # BLD AUTO: 8 % (ref 14–44)
MCH RBC QN AUTO: 29.5 PG (ref 26.8–34.3)
MCHC RBC AUTO-ENTMCNC: 30.7 G/DL (ref 31.4–37.4)
MCV RBC AUTO: 96 FL (ref 82–98)
METAMYELOCYTES NFR BLD MANUAL: 3 % (ref 0–1)
MONOCYTES # BLD AUTO: 0.1 THOUSAND/UL (ref 0–1.22)
MONOCYTES NFR BLD: 2 % (ref 4–12)
NEUTROPHILS # BLD MANUAL: 4.05 THOUSAND/UL (ref 1.85–7.62)
NEUTS SEG NFR BLD AUTO: 85 % (ref 43–75)
NRBC BLD AUTO-RTO: 0 /100 WBCS
PLATELET # BLD AUTO: 80 THOUSANDS/UL (ref 149–390)
PLATELET BLD QL SMEAR: ABNORMAL
PMV BLD AUTO: 11.6 FL (ref 8.9–12.7)
POTASSIUM SERPL-SCNC: 4.3 MMOL/L (ref 3.5–5.3)
PROT SERPL-MCNC: 5.6 G/DL (ref 6.4–8.2)
RBC # BLD AUTO: 3.32 MILLION/UL (ref 3.81–5.12)
RBC MORPH BLD: PRESENT
SODIUM SERPL-SCNC: 148 MMOL/L (ref 136–145)
WBC # BLD AUTO: 4.77 THOUSAND/UL (ref 4.31–10.16)

## 2018-07-05 PROCEDURE — 99232 SBSQ HOSP IP/OBS MODERATE 35: CPT | Performed by: INTERNAL MEDICINE

## 2018-07-05 PROCEDURE — 80053 COMPREHEN METABOLIC PANEL: CPT | Performed by: INTERNAL MEDICINE

## 2018-07-05 PROCEDURE — 82550 ASSAY OF CK (CPK): CPT | Performed by: INTERNAL MEDICINE

## 2018-07-05 PROCEDURE — 92526 ORAL FUNCTION THERAPY: CPT

## 2018-07-05 PROCEDURE — 85007 BL SMEAR W/DIFF WBC COUNT: CPT | Performed by: INTERNAL MEDICINE

## 2018-07-05 PROCEDURE — 82553 CREATINE MB FRACTION: CPT | Performed by: INTERNAL MEDICINE

## 2018-07-05 PROCEDURE — 71046 X-RAY EXAM CHEST 2 VIEWS: CPT

## 2018-07-05 PROCEDURE — 85027 COMPLETE CBC AUTOMATED: CPT | Performed by: INTERNAL MEDICINE

## 2018-07-05 RX ORDER — SODIUM CHLORIDE 9 MG/ML
100 INJECTION, SOLUTION INTRAVENOUS CONTINUOUS
Status: DISCONTINUED | OUTPATIENT
Start: 2018-07-05 | End: 2018-07-05

## 2018-07-05 RX ORDER — DEXTROSE MONOHYDRATE 50 MG/ML
75 INJECTION, SOLUTION INTRAVENOUS CONTINUOUS
Status: DISCONTINUED | OUTPATIENT
Start: 2018-07-05 | End: 2018-07-06

## 2018-07-05 RX ADMIN — HYDROXYZINE HYDROCHLORIDE 25 MG: 25 TABLET, FILM COATED ORAL at 22:50

## 2018-07-05 RX ADMIN — PREGABALIN 50 MG: 25 CAPSULE ORAL at 16:48

## 2018-07-05 RX ADMIN — LIDOCAINE 1 PATCH: 50 PATCH CUTANEOUS at 08:41

## 2018-07-05 RX ADMIN — PREGABALIN 50 MG: 25 CAPSULE ORAL at 22:50

## 2018-07-05 RX ADMIN — ROPINIROLE 1 MG: 1 TABLET, FILM COATED ORAL at 16:48

## 2018-07-05 RX ADMIN — SODIUM CHLORIDE 150 ML/HR: 0.9 INJECTION, SOLUTION INTRAVENOUS at 06:00

## 2018-07-05 RX ADMIN — OXYCODONE HYDROCHLORIDE 2.5 MG: 5 TABLET ORAL at 09:27

## 2018-07-05 RX ADMIN — QUETIAPINE FUMARATE 25 MG: 25 TABLET ORAL at 22:50

## 2018-07-05 RX ADMIN — MIDODRINE HYDROCHLORIDE 10 MG: 5 TABLET ORAL at 09:28

## 2018-07-05 RX ADMIN — SODIUM CHLORIDE 100 ML/HR: 0.9 INJECTION, SOLUTION INTRAVENOUS at 09:23

## 2018-07-05 RX ADMIN — GABAPENTIN 100 MG: 100 CAPSULE ORAL at 18:33

## 2018-07-05 RX ADMIN — GABAPENTIN 100 MG: 100 CAPSULE ORAL at 09:31

## 2018-07-05 RX ADMIN — DEXTROSE 75 ML/HR: 5 SOLUTION INTRAVENOUS at 16:34

## 2018-07-05 RX ADMIN — ROPINIROLE 1 MG: 1 TABLET, FILM COATED ORAL at 09:27

## 2018-07-05 RX ADMIN — MIDODRINE HYDROCHLORIDE 10 MG: 5 TABLET ORAL at 16:48

## 2018-07-05 RX ADMIN — MIDODRINE HYDROCHLORIDE 10 MG: 5 TABLET ORAL at 22:50

## 2018-07-05 RX ADMIN — HYDROMORPHONE HYDROCHLORIDE 0.2 MG: 1 INJECTION, SOLUTION INTRAMUSCULAR; INTRAVENOUS; SUBCUTANEOUS at 05:09

## 2018-07-05 RX ADMIN — ENOXAPARIN SODIUM 30 MG: 30 INJECTION SUBCUTANEOUS at 08:43

## 2018-07-05 RX ADMIN — PREGABALIN 50 MG: 25 CAPSULE ORAL at 09:27

## 2018-07-05 RX ADMIN — SODIUM CHLORIDE 100 ML/HR: 0.9 INJECTION, SOLUTION INTRAVENOUS at 15:04

## 2018-07-05 RX ADMIN — ROPINIROLE 1 MG: 1 TABLET, FILM COATED ORAL at 22:50

## 2018-07-05 RX ADMIN — BUPROPION HYDROCHLORIDE 300 MG: 150 TABLET, FILM COATED, EXTENDED RELEASE ORAL at 09:27

## 2018-07-05 RX ADMIN — CEFTRIAXONE 1000 MG: 1 INJECTION, POWDER, FOR SOLUTION INTRAMUSCULAR; INTRAVENOUS at 08:51
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
## 2018-07-05 NOTE — ASSESSMENT & PLAN NOTE
Per POA, at b/l, pt AAOx3, although she does have speech impediment  UDS pending - pt has hx of suicide attempt  Likely 2/2 uremia - improved with iv fluids  Kept NPO, now may have dysphagia diet

## 2018-07-05 NOTE — PLAN OF CARE
Problem: Potential for Falls  Goal: Patient will remain free of falls  INTERVENTIONS:  - Assess patient frequently for physical needs  -  Identify cognitive and physical deficits and behaviors that affect risk of falls    -  [place] fall precautions as indicated by assessment   - Educate patient/family on patient safety including physical limitations  - Instruct patient to call for assistance with activity based on assessment  - Modify environment to reduce risk of injury  - Consider OT/PT consult to assist with strengthening/mobility   Outcome: Progressing      Problem: Prexisting or High Potential for Compromised Skin Integrity  Goal: Skin integrity is maintained or improved  INTERVENTIONS:  - Identify patients at risk for skin breakdown  - Assess and monitor skin integrity  - Assess and monitor nutrition and hydration status  - Monitor labs (i e  albumin)  - Assess for incontinence   - Turn and reposition patient  - Assist with mobility/ambulation  - Relieve pressure over bony prominences  - Avoid friction and shearing  - Provide appropriate hygiene as needed including keeping skin clean and dry  - Evaluate need for skin moisturizer/barrier cream  - Collaborate with interdisciplinary team (i e  Nutrition, Rehabilitation, etc )   - Patient/family teaching   Outcome: Progressing      Problem: PAIN - ADULT  Goal: Verbalizes/displays adequate comfort level or baseline comfort level  Interventions:  - Encourage patient to monitor pain and request assistance  - Assess pain using appropriate pain scale  - Administer analgesics based on type and severity of pain and evaluate response  - Implement non-pharmacological measures as appropriate and evaluate response  - Consider cultural and social influences on pain and pain management  - Notify physician/advanced practitioner if interventions unsuccessful or patient reports new pain   Outcome: Progressing      Problem: INFECTION - ADULT  Goal: Absence or prevention of progression during hospitalization  INTERVENTIONS:  - Assess and monitor for signs and symptoms of infection  - Monitor lab/diagnostic results  - Monitor all insertion sites, i e  indwelling lines, tubes, and drains  - Monitor endotracheal (as able) and nasal secretions for changes in amount and color  - [place] appropriate cooling/warming therapies per order  - Administer medications as ordered  - Instruct and encourage patient and family to use good hand hygiene technique  - Identify and instruct in appropriate isolation precautions for identified infection/condition   Outcome: Progressing    Goal: Absence of fever/infection during neutropenic period  INTERVENTIONS:  - Monitor WBC  - Implement neutropenic guidelines   Outcome: Progressing      Problem: SAFETY ADULT  Goal: Maintain or return to baseline ADL function  INTERVENTIONS:  -  Assess patient's ability to carry out ADLs; assess patient's baseline for ADL function and identify physical deficits which impact ability to perform ADLs (bathing, care of mouth/teeth, toileting, grooming, dressing, etc )  - Assess/evaluate cause of self-care deficits   - Assess range of motion  - Assess patient's mobility; develop plan if impaired  - Assess patient's need for assistive devices and provide as appropriate  - Encourage maximum independence but intervene and supervise when necessary  ¯ Involve family in performance of ADLs  ¯ Assess for home care needs following discharge   ¯ Request OT consult to assist with ADL evaluation and planning for discharge  ¯ Provide patient education as appropriate   Outcome: Progressing    Goal: Maintain or return mobility status to optimal level  INTERVENTIONS:  - Assess patient's baseline mobility status (ambulation, transfers, stairs, etc )    - Identify cognitive and physical deficits and behaviors that affect mobility  - Identify mobility aids required to assist with transfers and/or ambulation (gait belt, sit-to-stand, lift, walker, cane, etc )  - [place] fall precautions as indicated by assessment  - Record patient progress and toleration of activity level on Mobility SBAR; progress patient to next Phase/Stage  - Instruct patient to call for assistance with activity based on assessment  - Request Rehabilitation consult to assist with strengthening/weightbearing, etc    Outcome: Progressing      Problem: Knowledge Deficit  Goal: Patient/family/caregiver demonstrates understanding of disease process, treatment plan, medications, and discharge instructions  Complete learning assessment and assess knowledge base  Interventions:  - Provide teaching at level of understanding  - Provide teaching via preferred learning methods   Outcome: Progressing      Problem: Nutrition/Hydration-ADULT  Goal: Nutrient/Hydration intake appropriate for improving, restoring or maintaining nutritional needs  Monitor and assess patient's nutrition/hydration status for malnutrition (ex- brittle hair, bruises, dry skin, pale skin and conjunctiva, muscle wasting, smooth red tongue, and disorientation)  Collaborate with interdisciplinary team and initiate plan and interventions as ordered  Monitor patient's weight and dietary intake as ordered or per policy  Utilize nutrition screening tool and intervene per policy  Determine patient's food preferences and provide high-protein, high-caloric foods as appropriate       INTERVENTIONS:  - Monitor oral intake, urinary output, labs, and treatment plans  - Assess nutrition and hydration status and recommend course of action  - Evaluate amount of meals eaten  - Assist patient with eating if necessary   - Allow adequate time for meals  - Recommend/ encourage appropriate diets, oral nutritional supplements, and vitamin/mineral supplements  - Order, calculate, and assess calorie counts as needed  - Recommend, monitor, and adjust tube feedings and TPN/PPN based on assessed needs  - Assess need for intravenous fluids  - Provide specific nutrition/hydration education as appropriate  - Include patient/family/caregiver in decisions related to nutrition   Outcome: Progressing

## 2018-07-05 NOTE — ASSESSMENT & PLAN NOTE
Will decrease saline 100 cc/hr  Patient will need input and output monitoring closely  BMP daily as well as CPK daily, still greater than 1000, has to be less than 1000  encourage po intake  No acidosis

## 2018-07-05 NOTE — SPEECH THERAPY NOTE
Speech/Language Pathology Progress Note     Patient Name: Dodie Aguilar  TXBCR'A Date: 7/5/2018     Subjective:  "I don't usually eat breakfast but I was hungry "     Objective:  Pt seen for an additional agnostic dysphagia tx session bedside  She was fully alert & able to feed herslef      Masha Taveras was assessed c banana, toast, 3ozs honey thick & 2 of nectar thick liquid  Pt asked to use only straw for liquids this session  Re: oral skills, she prsented with functional retrieval from straw  Mastication was prolonged with incoordinate/ataxic like oral movements noted  Swallow initiation appeared mildly delayed)  Laryngeal rise was good by palpation  No cough was noted c banana, toast or HTL  Pt was again trained to use SMALL SIPS, neck flexion (& slight head turn)  Moderately frequent cough persisted despite attempted use of strategies  I reviewed noted s/s aspiration c pt & available diet  We both agreed upon Level 2 dysphagia diet c (honey) thick liquids for now  CXR clear at this time & CT of chest did not reveal infiltrates (presume pt has been aspirating at least intermittently with liquids & "tolerating" same  Assessment:  Pt tolerated soft food & HTL despite her neurological process  Significant cough persisted with nectar thick liquid  Pt reported being on regular textured food (eg pizza, sandwiches) and thin liquid at home  I presume pt has been experiencing some aspiration c liquids & mixed food/liquid materials       Plan/Recommendations:  Consider Level 2 Dysphagia diet, honey thick liquids, meds in applesauce (OK whole if small, cut if large)  Diagnostic tx to continue to maximize safety of intake & continue assessment as medical status improves
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
## 2018-07-05 NOTE — SOCIAL WORK
CM contacted [facility] to inquire if there was a  assigned to Pt  CM spoke with Supervisor, Bernadette Espino [phone] who reported no one has been assigned yet  Glory Michael reported they will be in contact once assigned  CM awaiting call
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
## 2018-07-05 NOTE — PLAN OF CARE
Problem: SLP ADULT - SWALLOWING, IMPAIRED  Goal: Advance to least restrictive diet without signs or symptoms of aspiration for planned discharge setting  See evaluation for individualized goals    Outcome: Progressing  Recommend:  Level 2 dysphagia diet, honey thick liquids (for now) and medications in applesauce (cut or crush if large)

## 2018-07-05 NOTE — SPEECH THERAPY NOTE
Speech/Language Pathology Progress Note    Patient Name: Forrest Blake  NLSBS'T Date: 7/5/2018     Subjective:  "Nice to see you  How is your daughter?"    Objective:  Pt is known to me from dysphagia tx in spring 2017  Diagnostic dysphagia tx conducted bedside after review of remote and recent reports, as well as after interview  Pt reported transitioning to regular textured food and thin liquid over time once home from [facility]  Adrianna Schmidt was assessed c 4ozs applesauce (with & without small meds), 4 of honey thick liquid and 3 of nectar thick liquid  Re: oral skills, Adrianna Schmidt was able to retrieve material from tsp/straw and transfer c incoordinate/ataxic like oral movements noted  Swallow initiation appeared variable (prompt to mod delayed)  Laryngeal rise was good by palpation  Cough noted x1 with puree & with 1st tsp of hhoney thick liquid (on both occasions, pt noted to be slightly hyperextending neck)  Pt was trained to use neck flexion (& slight head turn) as had been previously trained (p last VBS)  Pt presented c only cough x1 c additional 4ozs of HTL  When assessed c NTL by tsp, cup & straw, pt presented with moderately frequent cough despite attempted use of strategies (small sip, prep set & neck flexion which pt did not maintain)  Cough remained "wet" after reflexive cough  Multiple attempts at volitional cough eventually improved BS  Pt asked for additional soft foods/liquids (food ordered for additional assessment to follow)  Assessment:  Pt tolerated 4oz servings of puree & HTL c rare cough  Significant cough noted with nectar thick liquid  Pt reported being on regular textured food (eg pizza, sandwiches) and thin liquid at home & she desires trial of soft food ASAP    Plan/Recommendations: Additional food ordered & diagnostic tx to continue

## 2018-07-05 NOTE — NUTRITION
07/05/18 1417   Recommendations/Interventions   Nutrition Recommendations Other (specify)  (advance diet to level 2 dysphagia, with HTL per speech therapy's recs  Add honeyshake supplement TID   Monitor electrolytes and weight  )

## 2018-07-06 ENCOUNTER — APPOINTMENT (INPATIENT)
Dept: RADIOLOGY | Facility: HOSPITAL | Age: 73
DRG: 682 | End: 2018-07-06
Payer: MEDICARE

## 2018-07-06 PROBLEM — S06.5X9A SUBDURAL HEMATOMA (HCC): Status: ACTIVE | Noted: 2018-07-06

## 2018-07-06 PROBLEM — N30.00 ACUTE CYSTITIS WITHOUT HEMATURIA: Status: ACTIVE | Noted: 2018-07-03

## 2018-07-06 PROBLEM — R79.89 ELEVATED BRAIN NATRIURETIC PEPTIDE (BNP) LEVEL: Status: ACTIVE | Noted: 2018-07-06

## 2018-07-06 LAB
ALBUMIN SERPL BCP-MCNC: 2.1 G/DL (ref 3.5–5)
ALP SERPL-CCNC: 141 U/L (ref 46–116)
ALT SERPL W P-5'-P-CCNC: 64 U/L (ref 12–78)
AMMONIA PLAS-SCNC: 30 UMOL/L (ref 11–35)
ANION GAP SERPL CALCULATED.3IONS-SCNC: 3 MMOL/L (ref 4–13)
ANION GAP SERPL CALCULATED.3IONS-SCNC: 7 MMOL/L (ref 4–13)
APTT PPP: 36 SECONDS (ref 24–36)
ARTERIAL PATENCY WRIST A: YES
AST SERPL W P-5'-P-CCNC: 46 U/L (ref 5–45)
BASE EXCESS BLDA CALC-SCNC: -3.3 MMOL/L
BASE EXCESS BLDA CALC-SCNC: -4.6 MMOL/L
BASOPHILS # BLD MANUAL: 0 THOUSAND/UL (ref 0–0.1)
BASOPHILS NFR MAR MANUAL: 0 % (ref 0–1)
BILIRUB SERPL-MCNC: 0.59 MG/DL (ref 0.2–1)
BUN SERPL-MCNC: 32 MG/DL (ref 5–25)
BUN SERPL-MCNC: 37 MG/DL (ref 5–25)
CALCIUM SERPL-MCNC: 8.4 MG/DL (ref 8.3–10.1)
CALCIUM SERPL-MCNC: 8.6 MG/DL (ref 8.3–10.1)
CHLORIDE SERPL-SCNC: 116 MMOL/L (ref 100–108)
CHLORIDE SERPL-SCNC: 116 MMOL/L (ref 100–108)
CK MB SERPL-MCNC: 10.4 NG/ML (ref 0–5)
CK MB SERPL-MCNC: 3.2 % (ref 0–2.5)
CK SERPL-CCNC: 326 U/L (ref 26–192)
CO2 SERPL-SCNC: 22 MMOL/L (ref 21–32)
CO2 SERPL-SCNC: 25 MMOL/L (ref 21–32)
CREAT SERPL-MCNC: 0.77 MG/DL (ref 0.6–1.3)
CREAT SERPL-MCNC: 0.93 MG/DL (ref 0.6–1.3)
EOSINOPHIL # BLD MANUAL: 0 THOUSAND/UL (ref 0–0.4)
EOSINOPHIL NFR BLD MANUAL: 0 % (ref 0–6)
ERYTHROCYTE [DISTWIDTH] IN BLOOD BY AUTOMATED COUNT: 15.1 % (ref 11.6–15.1)
ERYTHROCYTE [DISTWIDTH] IN BLOOD BY AUTOMATED COUNT: 15.2 % (ref 11.6–15.1)
GFR SERPL CREATININE-BSD FRML MDRD: 62 ML/MIN/1.73SQ M
GFR SERPL CREATININE-BSD FRML MDRD: 77 ML/MIN/1.73SQ M
GIANT PLATELETS BLD QL SMEAR: PRESENT
GLUCOSE SERPL-MCNC: 101 MG/DL (ref 65–140)
GLUCOSE SERPL-MCNC: 115 MG/DL (ref 65–140)
GLUCOSE SERPL-MCNC: 174 MG/DL (ref 65–140)
GLUCOSE SERPL-MCNC: 185 MG/DL (ref 65–140)
GLUCOSE SERPL-MCNC: 79 MG/DL (ref 65–140)
GLUCOSE SERPL-MCNC: 90 MG/DL (ref 65–140)
GLUCOSE SERPL-MCNC: 99 MG/DL (ref 65–140)
HCO3 BLDA-SCNC: 20.7 MMOL/L (ref 22–28)
HCO3 BLDA-SCNC: 21.3 MMOL/L (ref 22–28)
HCT VFR BLD AUTO: 24.3 % (ref 34.8–46.1)
HCT VFR BLD AUTO: 29.2 % (ref 34.8–46.1)
HGB BLD-MCNC: 7.7 G/DL (ref 11.5–15.4)
HGB BLD-MCNC: 9 G/DL (ref 11.5–15.4)
INR PPP: 1.1 (ref 0.86–1.17)
LACTATE SERPL-SCNC: 0.8 MMOL/L (ref 0.5–2)
LACTATE SERPL-SCNC: 1 MMOL/L (ref 0.5–2)
LYMPHOCYTES # BLD AUTO: 0.6 THOUSAND/UL (ref 0.6–4.47)
LYMPHOCYTES # BLD AUTO: 20 % (ref 14–44)
MAGNESIUM SERPL-MCNC: 1.7 MG/DL (ref 1.6–2.6)
MCH RBC QN AUTO: 29.6 PG (ref 26.8–34.3)
MCH RBC QN AUTO: 30.1 PG (ref 26.8–34.3)
MCHC RBC AUTO-ENTMCNC: 30.8 G/DL (ref 31.4–37.4)
MCHC RBC AUTO-ENTMCNC: 31.7 G/DL (ref 31.4–37.4)
MCV RBC AUTO: 95 FL (ref 82–98)
MCV RBC AUTO: 96 FL (ref 82–98)
METAMYELOCYTES NFR BLD MANUAL: 2 % (ref 0–1)
MONOCYTES # BLD AUTO: 0 THOUSAND/UL (ref 0–1.22)
MONOCYTES NFR BLD: 0 % (ref 4–12)
NASAL CANNULA: 2
NASAL CANNULA: 4
NEUTROPHILS # BLD MANUAL: 2.33 THOUSAND/UL (ref 1.85–7.62)
NEUTS BAND NFR BLD MANUAL: 4 % (ref 0–8)
NEUTS SEG NFR BLD AUTO: 74 % (ref 43–75)
NRBC BLD AUTO-RTO: 1 /100 WBCS
NT-PROBNP SERPL-MCNC: 7028 PG/ML
O2 CT BLDA-SCNC: 11.2 ML/DL (ref 16–23)
O2 CT BLDA-SCNC: 11.4 ML/DL (ref 16–23)
OXYHGB MFR BLDA: 91.7 % (ref 94–97)
OXYHGB MFR BLDA: 94.1 % (ref 94–97)
PCO2 BLDA: 36.4 MM HG (ref 36–44)
PCO2 BLDA: 38.8 MM HG (ref 36–44)
PH BLDA: 7.34 [PH] (ref 7.35–7.45)
PH BLDA: 7.39 [PH] (ref 7.35–7.45)
PLATELET # BLD AUTO: 56 THOUSANDS/UL (ref 149–390)
PLATELET # BLD AUTO: 76 THOUSANDS/UL (ref 149–390)
PLATELET BLD QL SMEAR: ABNORMAL
PMV BLD AUTO: 10.9 FL (ref 8.9–12.7)
PMV BLD AUTO: 11.3 FL (ref 8.9–12.7)
PO2 BLDA: 67.1 MM HG (ref 75–129)
PO2 BLDA: 73.7 MM HG (ref 75–129)
POTASSIUM SERPL-SCNC: 3.6 MMOL/L (ref 3.5–5.3)
POTASSIUM SERPL-SCNC: 4.4 MMOL/L (ref 3.5–5.3)
PROCALCITONIN SERPL-MCNC: 0.11 NG/ML
PROT SERPL-MCNC: 5.3 G/DL (ref 6.4–8.2)
PROTHROMBIN TIME: 14.3 SECONDS (ref 11.8–14.2)
RBC # BLD AUTO: 2.56 MILLION/UL (ref 3.81–5.12)
RBC # BLD AUTO: 3.04 MILLION/UL (ref 3.81–5.12)
SODIUM SERPL-SCNC: 144 MMOL/L (ref 136–145)
SODIUM SERPL-SCNC: 145 MMOL/L (ref 136–145)
SPECIMEN SOURCE: ABNORMAL
SPECIMEN SOURCE: ABNORMAL
TOTAL CELLS COUNTED SPEC: 100
TROPONIN I SERPL-MCNC: <0.02 NG/ML
WBC # BLD AUTO: 2.87 THOUSAND/UL (ref 4.31–10.16)
WBC # BLD AUTO: 2.99 THOUSAND/UL (ref 4.31–10.16)

## 2018-07-06 PROCEDURE — 4A133B1 MONITORING OF ARTERIAL PRESSURE, PERIPHERAL, PERCUTANEOUS APPROACH: ICD-10-PCS | Performed by: HOSPITALIST

## 2018-07-06 PROCEDURE — 36600 WITHDRAWAL OF ARTERIAL BLOOD: CPT

## 2018-07-06 PROCEDURE — 85007 BL SMEAR W/DIFF WBC COUNT: CPT | Performed by: NURSE PRACTITIONER

## 2018-07-06 PROCEDURE — 70450 CT HEAD/BRAIN W/O DYE: CPT

## 2018-07-06 PROCEDURE — 87040 BLOOD CULTURE FOR BACTERIA: CPT | Performed by: NURSE PRACTITIONER

## 2018-07-06 PROCEDURE — 83605 ASSAY OF LACTIC ACID: CPT | Performed by: NURSE PRACTITIONER

## 2018-07-06 PROCEDURE — 84484 ASSAY OF TROPONIN QUANT: CPT | Performed by: NURSE PRACTITIONER

## 2018-07-06 PROCEDURE — 82948 REAGENT STRIP/BLOOD GLUCOSE: CPT

## 2018-07-06 PROCEDURE — 85027 COMPLETE CBC AUTOMATED: CPT | Performed by: NURSE PRACTITIONER

## 2018-07-06 PROCEDURE — 4A133J1 MONITORING OF ARTERIAL PULSE, PERIPHERAL, PERCUTANEOUS APPROACH: ICD-10-PCS | Performed by: HOSPITALIST

## 2018-07-06 PROCEDURE — 85610 PROTHROMBIN TIME: CPT | Performed by: NURSE PRACTITIONER

## 2018-07-06 PROCEDURE — 99223 1ST HOSP IP/OBS HIGH 75: CPT | Performed by: NEUROLOGICAL SURGERY

## 2018-07-06 PROCEDURE — 03HY32Z INSERTION OF MONITORING DEVICE INTO UPPER ARTERY, PERCUTANEOUS APPROACH: ICD-10-PCS | Performed by: HOSPITALIST

## 2018-07-06 PROCEDURE — 82140 ASSAY OF AMMONIA: CPT | Performed by: NURSE PRACTITIONER

## 2018-07-06 PROCEDURE — 82553 CREATINE MB FRACTION: CPT | Performed by: NURSE PRACTITIONER

## 2018-07-06 PROCEDURE — 83735 ASSAY OF MAGNESIUM: CPT | Performed by: NURSE PRACTITIONER

## 2018-07-06 PROCEDURE — 36620 INSERTION CATHETER ARTERY: CPT | Performed by: NURSE PRACTITIONER

## 2018-07-06 PROCEDURE — 82805 BLOOD GASES W/O2 SATURATION: CPT | Performed by: NURSE PRACTITIONER

## 2018-07-06 PROCEDURE — 99233 SBSQ HOSP IP/OBS HIGH 50: CPT | Performed by: INTERNAL MEDICINE

## 2018-07-06 PROCEDURE — 71045 X-RAY EXAM CHEST 1 VIEW: CPT

## 2018-07-06 PROCEDURE — 80048 BASIC METABOLIC PNL TOTAL CA: CPT | Performed by: NURSE PRACTITIONER

## 2018-07-06 PROCEDURE — 82550 ASSAY OF CK (CPK): CPT | Performed by: NURSE PRACTITIONER

## 2018-07-06 PROCEDURE — 80053 COMPREHEN METABOLIC PANEL: CPT | Performed by: NURSE PRACTITIONER

## 2018-07-06 PROCEDURE — 83880 ASSAY OF NATRIURETIC PEPTIDE: CPT | Performed by: NURSE PRACTITIONER

## 2018-07-06 PROCEDURE — 85730 THROMBOPLASTIN TIME PARTIAL: CPT | Performed by: NURSE PRACTITIONER

## 2018-07-06 PROCEDURE — 84145 PROCALCITONIN (PCT): CPT | Performed by: NURSE PRACTITIONER

## 2018-07-06 RX ORDER — ALBUMIN (HUMAN) 12.5 G/50ML
50 SOLUTION INTRAVENOUS ONCE
Status: COMPLETED | OUTPATIENT
Start: 2018-07-06 | End: 2018-07-06

## 2018-07-06 RX ORDER — MAGNESIUM SULFATE HEPTAHYDRATE 40 MG/ML
2 INJECTION, SOLUTION INTRAVENOUS ONCE
Status: COMPLETED | OUTPATIENT
Start: 2018-07-06 | End: 2018-07-06

## 2018-07-06 RX ORDER — POTASSIUM CHLORIDE 14.9 MG/ML
20 INJECTION INTRAVENOUS ONCE
Status: COMPLETED | OUTPATIENT
Start: 2018-07-06 | End: 2018-07-06

## 2018-07-06 RX ORDER — FENTANYL CITRATE 50 UG/ML
25 INJECTION, SOLUTION INTRAMUSCULAR; INTRAVENOUS ONCE
Status: COMPLETED | OUTPATIENT
Start: 2018-07-06 | End: 2018-07-06

## 2018-07-06 RX ORDER — SODIUM CHLORIDE, SODIUM LACTATE, POTASSIUM CHLORIDE, CALCIUM CHLORIDE 600; 310; 30; 20 MG/100ML; MG/100ML; MG/100ML; MG/100ML
100 INJECTION, SOLUTION INTRAVENOUS CONTINUOUS
Status: DISCONTINUED | OUTPATIENT
Start: 2018-07-06 | End: 2018-07-06

## 2018-07-06 RX ORDER — ALBUMIN, HUMAN INJ 5% 5 %
12.5 SOLUTION INTRAVENOUS ONCE
Status: COMPLETED | OUTPATIENT
Start: 2018-07-06 | End: 2018-07-06

## 2018-07-06 RX ORDER — SACCHAROMYCES BOULARDII 250 MG
250 CAPSULE ORAL 2 TIMES DAILY
Status: DISCONTINUED | OUTPATIENT
Start: 2018-07-06 | End: 2018-07-06

## 2018-07-06 RX ADMIN — ALBUMIN HUMAN 50 G: 0.25 SOLUTION INTRAVENOUS at 11:39

## 2018-07-06 RX ADMIN — SODIUM CHLORIDE, SODIUM LACTATE, POTASSIUM CHLORIDE, AND CALCIUM CHLORIDE 100 ML/HR: .6; .31; .03; .02 INJECTION, SOLUTION INTRAVENOUS at 06:43

## 2018-07-06 RX ADMIN — PHENYLEPHRINE HYDROCHLORIDE 25 MCG/MIN: 10 INJECTION INTRAVENOUS at 16:58

## 2018-07-06 RX ADMIN — METRONIDAZOLE 500 MG: 500 INJECTION, SOLUTION INTRAVENOUS at 04:43

## 2018-07-06 RX ADMIN — ALBUMIN HUMAN 12.5 G: 0.05 INJECTION, SOLUTION INTRAVENOUS at 05:51

## 2018-07-06 RX ADMIN — VANCOMYCIN HYDROCHLORIDE 750 MG: 750 INJECTION, SOLUTION INTRAVENOUS at 05:40

## 2018-07-06 RX ADMIN — CEFEPIME HYDROCHLORIDE 2000 MG: 2 INJECTION, POWDER, FOR SOLUTION INTRAVENOUS at 05:31

## 2018-07-06 RX ADMIN — LIDOCAINE 1 PATCH: 50 PATCH CUTANEOUS at 09:04

## 2018-07-06 RX ADMIN — CEFEPIME HYDROCHLORIDE 2000 MG: 2 INJECTION, POWDER, FOR SOLUTION INTRAVENOUS at 16:48

## 2018-07-06 RX ADMIN — POTASSIUM CHLORIDE 20 MEQ: 200 INJECTION, SOLUTION INTRAVENOUS at 11:15

## 2018-07-06 RX ADMIN — METRONIDAZOLE 500 MG: 500 INJECTION, SOLUTION INTRAVENOUS at 20:45

## 2018-07-06 RX ADMIN — DEXTROSE 75 ML/HR: 5 SOLUTION INTRAVENOUS at 03:30

## 2018-07-06 RX ADMIN — SODIUM CHLORIDE, SODIUM LACTATE, POTASSIUM CHLORIDE, AND CALCIUM CHLORIDE 100 ML/HR: .6; .31; .03; .02 INJECTION, SOLUTION INTRAVENOUS at 09:04

## 2018-07-06 RX ADMIN — VANCOMYCIN HYDROCHLORIDE 750 MG: 750 INJECTION, SOLUTION INTRAVENOUS at 16:49

## 2018-07-06 RX ADMIN — METRONIDAZOLE 500 MG: 500 INJECTION, SOLUTION INTRAVENOUS at 12:28

## 2018-07-06 RX ADMIN — MIDODRINE HYDROCHLORIDE 10 MG: 5 TABLET ORAL at 21:08

## 2018-07-06 RX ADMIN — MAGNESIUM SULFATE HEPTAHYDRATE 2 G: 40 INJECTION, SOLUTION INTRAVENOUS at 09:03

## 2018-07-06 RX ADMIN — SODIUM CHLORIDE, SODIUM LACTATE, POTASSIUM CHLORIDE, AND CALCIUM CHLORIDE 1000 ML: .6; .31; .03; .02 INJECTION, SOLUTION INTRAVENOUS at 09:04

## 2018-07-06 RX ADMIN — ENOXAPARIN SODIUM 40 MG: 40 INJECTION SUBCUTANEOUS at 09:12

## 2018-07-06 RX ADMIN — FENTANYL CITRATE 25 MCG: 50 INJECTION, SOLUTION INTRAMUSCULAR; INTRAVENOUS at 20:17

## 2018-07-06 RX ADMIN — POTASSIUM CHLORIDE 20 MEQ: 200 INJECTION, SOLUTION INTRAVENOUS at 09:03

## 2018-07-06 RX ADMIN — SODIUM CHLORIDE, SODIUM LACTATE, POTASSIUM CHLORIDE, AND CALCIUM CHLORIDE 250 ML: .6; .31; .03; .02 INJECTION, SOLUTION INTRAVENOUS at 04:32

## 2018-07-06 NOTE — PROCEDURES
Arterial Line Insertion  Date/Time: 7/6/2018 3:50 PM  Performed by: Bret Espinosa by: Laureano Duggan     Patient location:  Bedside  Other Assisting Provider: Yes (comment) (Eric Rich)    Consent:     Consent obtained:  Emergent situation  Universal protocol:     Immediately prior to procedure a time out was called: yes      Patient identity confirmed:  Arm band  Indications:     Indications: multiple ABGs and continuous blood pressure monitoring    Pre-procedure details:     Skin preparation:  Chlorhexidine    Preparation: Patient was prepped and draped in sterile fashion    Anesthesia (see MAR for exact dosages): Anesthesia method:  None  Procedure details:     Location / Tip of Catheter:  Radial    Laterality:  Left    Filippo's test performed: no      Needle gauge:  18 G    Placement technique:  Ultrasound guided    Number of attempts:  2    Successful placement: yes      Transducer: waveform confirmed    Post-procedure details:     Post-procedure:  Secured with tape, sterile dressing applied and sutured    CMS:  Normal    Patient tolerance of procedure:   Tolerated well, no immediate complications

## 2018-07-06 NOTE — SEPSIS NOTE
Sepsis Note   Savanah Landry 67 y o  female MRN: [id number]  Unit/Bed#: [facility] 917-01 Encounter: [id number]            Initial Sepsis Screening     Row Name 07/06/18 0603                Is the patient's history suggestive of a new or worsening infection? (!)  Yes (Proceed)  -CY        Suspected source of infection pneumonia;urinary tract infection  -CY        Are two or more of the following signs & symptoms of infection both present and new to the patient? (!)  Yes (Proceed)  -CY        Indicate SIRS criteria Hypothermia < 36C (96 8F); Altered mental status;Leukopenia (WBC < 4000 IJL)  -CY        If the answer is yes to both questions, suspicion of sepsis is present  --        If severe sepsis is present AND tissue hypoperfusion perists in the hour after fluid resuscitation or lactate > 4, the patient meets criteria for SEPTIC SHOCK  --        Are any of the following organ dysfunction criteria present within 6 hours of suspected infection and SIRS criteria that are NOT considered to be chronic conditions? No  -CY        Organ dysfunction SBP < 90 mmHg  -CY        Date of presentation of severe sepsis 07/06/18  -CY        Time of presentation of severe sepsis 0445  -CY        Tissue hypoperfusion persists in the hour after crystalloid fluid administration, evidenced, by either: --   BP improved after IV albumin and IV antibiotic  -CY        Was hypotension present within one hour of the conclusion of crystalloid fluid administration?  No  -CY        Date of presentation of septic shock  --        Time of presentation of septic shock  --          User Key  (r) = Recorded By, (t) = Taken By, (c) = Cosigned By    [address] Name Provider Type    [address]  Nurse Practitioner

## 2018-07-06 NOTE — PROGRESS NOTES
Transfer/Accept - Critical Care   Ellen Mohs 67 y o  female MRN: [id number]  Unit/Bed#: [facility] 917-01 Encounter: [id number]    Reason for Transfer / Chief Complaint: Altered mental status    History of Present Illness:  Ellen Mohs is a 67 y o  female who presents on 7/3 with a complaint of being found down covered in excrement after being found by her daughter  She has a past medical history of cerebral ataxia, depression, hypothyroidism, history of suicide attempt, hypotension on midodrine, and seizure disorder  She had an elevated CK on arrival and was fluid resuscitated for rhabdomyolysis  She had an acute kidney injury as well with question of possible UTI  She was started on ceftriaxone and a possibly new transverse lumbar fracture was elevuated by trauma with medical management being decided on  Overnight on , she had worsening mental status and progressive hypotension and hypothermia  This morning, critical care was called to evaluate the patient for unresponsiveness  She is arousable to noxious stimulli, has labored breathing, and is hypothermic to 92 rectally  History obtained from chart review and unobtainable from patient due to mental status  Past Medical History:  Past Medical History:   Diagnosis Date    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[facility] [address] )     Depression     Disease of thyroid gland     History of suicide attempt     Daughter reports 4 5 years ago pt was depressed/SI attempt after partner    Hypertension     Hypothyroidism     Melanoma ([facility] [address] )     malignant - of the skin    Renal infarct ([facility] [address] [facility]     Seizures ([facility] [address] )     Suicide attempt [facility])        Past Surgical History:  Past Surgical History:   Procedure Laterality Date    CHOLECYSTECTOMY      laparoscopic    COLONOSCOPY      fiberoptic    ESOPHAGOGASTRODUODENOSCOPY N/A 3/10/2017    Procedure: ESOPHAGOGASTRODUODENOSCOPY (EGD); Surgeon: Jimy Cox DO;  Location: [facility];   Service:     SKIN LESION EXCISION      melanoma back Past Family History:  Family History   Problem Relation Age of Onset    Heart disease Father     Heart attack Father         acute MI    Deafness Father         bilateral due to meningitis age 15    Deafness Mother         congenital    Glaucoma Mother     Stroke Mother     Diabetes Sister     Glaucoma Sister     Ataxia Brother         cerebral - possible    Diabetes Brother     Glaucoma Brother        Social History:  History   Smoking Status    Never Smoker   Smokeless Tobacco    Former User     Comment: former smoker - social-quit years ago     History   Alcohol Use No     Comment: wine rarely     History   Drug Use No     Marital Status:   Exercise History: Unknown    Home Medications:   Prior to Admission medications    Medication Sig Start Date End Date Taking?  Authorizing Provider   buPROPion (WELLBUTRIN XL) 300 mg 24 hr tablet Take 1 tablet (300 mg total) by mouth daily 6/4/18   Farrah Lopes DO   gabapentin (NEURONTIN) 100 mg capsule Take 1 capsule by mouth 2 (two) times a day for 30 days 5/10/17 6/9/17  Allan Wyatt MD   hydrOXYzine HCL (ATARAX) 25 mg tablet Take 1 tablet (25 mg total) by mouth daily 6/4/18   Jami Fontanez DO   levothyroxine 75 mcg tablet Take 1 tablet (75 mcg total) by mouth daily 6/28/18   JESSY De Paz   lidocaine (LIDODERM) 5 % Place 1 patch on the skin daily Remove & Discard patch within 12 hours or as directed by MD 5/11/18   Donal Polanco MD   meloxicam (MOBIC) 15 mg tablet Take 1 tablet (15 mg total) by mouth daily as needed for moderate pain 4/26/18   Temo Sequeira MD   midodrine (PROAMATINE) 10 MG tablet Take 10 mg by mouth 3 (three) times a day    Historical Provider, MD   mirtazapine (REMERON) 15 mg tablet Take 1 tablet by mouth daily at bedtime for 30 days 5/10/17 3/29/18  Allan Wyatt MD   ondansetron (ZOFRAN) 8 mg tablet Take 1 tablet (8 mg total) by mouth every 8 (eight) hours as needed for nausea or vomiting 6/1/18 JESSY Cabrera   oxyCODONE (ROXICODONE) 5 mg immediate release tablet Take 1 tablet (5 mg total) by mouth every 4 (four) hours as needed for moderate pain Max Daily Amount: 30 mg 6/1/18   JESSY Cabrera   pramipexole (MIRAPEX) 0 25 mg tablet Take 2 tablets (0 5 mg total) by mouth daily at bedtime 3/29/18   Jaida Workman PA-C   pregabalin (LYRICA) 50 mg capsule Take 1 capsule (50 mg total) by mouth 3 (three) times a day 3/29/18   Jaida Workman PA-C   QUEtiapine (SEROquel) 25 mg tablet TAKE 1 TABLET AT BEDTIME (FUTURE REFILLS SHOULD COME FROM [facility]) 3/27/18   JESSY Cabrera   rOPINIRole (REQUIP) 1 mg tablet Take 1 tablet by mouth 3 (three) times a day 9/18/17   Historical Provider, MD       Inpatient Medications:  Scheduled Meds:  Current Facility-Administered Medications:  buPROPion 300 mg Oral Daily Khanh Velasquez DO    cefepime 2,000 mg Intravenous Q12H JESSY Moss Last Rate: 2,000 mg (07/06/18 0531)   dextrose 75 mL/hr Intravenous Continuous Tia Carrizales MD Last Rate: Stopped (07/06/18 0429)   enoxaparin 30 mg Subcutaneous Daily Khanh Velasquez DO    gabapentin 100 mg Oral BID Khanh Velasquez DO    HYDROmorphone 0 2 mg Intravenous Q6H PRN Fredrick Freeman PA-C    hydrOXYzine HCL 25 mg Oral HS Khanh Velasquez DO    levothyroxine 75 mcg Oral Early Morning Fredrick Freeman PA-C    lidocaine 1 patch Transdermal Daily Khanh Velasquez DO    metroNIDAZOLE 500 mg Intravenous Q8H JESSY Moss Last Rate: 500 mg (07/06/18 0443)   midodrine 10 mg Oral TID Khanh Velasquez DO    ondansetron 4 mg Intravenous Q6H PRN Khanh Velasquez DO    oxyCODONE 2 5 mg Oral Q4H PRN Khanh Velasquez DO    oxyCODONE 5 mg Oral Q4H PRN Khanh Velasquez DO    pneumococcal 13-valent conjugate vaccine 0 5 mL Intramuscular Prior to discharge Khanh Velasquez     pregabalin 50 mg Oral TID Khanh Velasquez,     QUEtiapine 25 mg Oral HS Khanh Velasquez, DO    rOPINIRole 1 mg Oral TID Khanh Velasquez, DO    saccharomyces boulardii 250 mg Oral BID CuJESSY Hall    vancomycin 15 mg/kg Intravenous Q12H Cudano Cruz CRNP Last Rate: 750 mg (18 0540)     Continuous Infusions:  dextrose 75 mL/hr Last Rate: Stopped (18 0429)     PRN Meds:    HYDROmorphone 0 2 mg Q6H PRN   ondansetron 4 mg Q6H PRN   oxyCODONE 2 5 mg Q4H PRN   oxyCODONE 5 mg Q4H PRN   pneumococcal 13-valent conjugate vaccine 0 5 mL Prior to discharge       Allergies: Allergies   Allergen Reactions    Acetaminophen     Acetaminophen-Codeine GI Intolerance    Codeine     Penicillins        ROS:   Review of Systems   Unable to perform ROS: Mental status change       Vitals:  Vitals:    18 0402 18 0407 18 0530 18 0602   BP: (!) 87/55 (!) 86/50 91/57 100/62   BP Location: Right arm  Left arm Right arm   Pulse: 58  56 56   Resp: 18  18 16   Temp: (!) 92 9 °F (33 8 °C)  (!) 92 9 °F (33 8 °C)    TempSrc: Rectal  Rectal    SpO2: 96%  (!) 87% 96%   Weight:       Height:         Temperature:   Temp (24hrs), Av 4 °F (35 2 °C), Min:92 9 °F (33 8 °C), Max:97 9 °F (36 6 °C)    Current Temperature: (!) 92 9 °F (33 8 °C)    Weights:   IBW: 50 1 kg  Body mass index is 22 74 kg/m²  Hemodynamic Monitoring:  N/A     Non-Invasive/Invasive Ventilation Settings:  Respiratory    Lab Data (Last 4 hours)    None         O2/Vent Data (Last 4 hours)    None              No results found for: PHART, GTF7TPK, PO2ART, FUK1ULS, W6SBTFHX, BEART, SOURCE  SpO2: SpO2: 96 %, SpO2 Activity: SpO2 Activity: At Rest, SpO2 Device: O2 Device: None (Room air)     Physical Exam:  Physical Exam   Constitutional: She appears lethargic  She appears cachectic  She appears ill  She appears distressed  Nasal cannula in place  Eyes: Conjunctivae are normal  Pupils are equal, round, and reactive to light  Neck: JVD present  No tracheal deviation present  Cardiovascular: Normal rate and regular rhythm      Pulses:       Radial pulses are 2+ on the right side, and 2+ on the left side  Pulmonary/Chest: She is in respiratory distress  She has rhonchi  She has rales  Abdominal: Soft  Bowel sounds are normal  She exhibits no distension  There is no tenderness  Genitourinary:   Genitourinary Comments: Elena in place with yellow urine   Musculoskeletal: She exhibits deformity (Multiple abrasions, bilateral lower extremities)  She exhibits no edema or tenderness  Neurological: She appears lethargic  GCS eye subscore is 2  GCS verbal subscore is 3  GCS motor subscore is 5  Patient localizes to noxious stimuli briskly with left upper extremity, sluggish with right  Withdraws with bilateral lower extremities   Skin: There is erythema (bilateral lower extremities)         Labs:    Results from last 7 days  Lab Units 07/06/18  0433 07/05/18  0510 07/04/18  0449 07/03/18  1922   WBC Thousand/uL 2 99* 4 77 4 74 6 41   HEMOGLOBIN g/dL 9 0* 9 8* 9 7* 11 2*   HEMATOCRIT % 29 2* 31 9* 30 1* 34 5*   PLATELETS Thousands/uL 76* 80* 81* 102*   NEUTROS PCT %  --   --   --  89*   MONOS PCT %  --   --   --  3*   MONO PCT MAN %  --  2*  --   --       Results from last 7 days  Lab Units 07/06/18  0433 07/05/18  0510 07/04/18  0449   SODIUM mmol/L 144 148* 146*   POTASSIUM mmol/L 3 6 4 3 4 2   CHLORIDE mmol/L 116* 119* 116*   CO2 mmol/L 25 20* 22   BUN mg/dL 37* 50* 62*   CREATININE mg/dL 0 93 1 07 1 68*   CALCIUM mg/dL 8 4 8 1* 7 4*   TOTAL PROTEIN g/dL 5 3* 5 6* 5 2*   BILIRUBIN TOTAL mg/dL 0 59 1 34* 1 98*   ALK PHOS U/L 141* 156* 127*   ALT U/L 64 82* 83*   AST U/L 46* 103* 147*   GLUCOSE RANDOM mg/dL 115 51* 65       Results from last 7 days  Lab Units 07/06/18  0433 07/04/18  0449   MAGNESIUM mg/dL 1 7 2 0       Results from last 7 days  Lab Units 07/04/18  0449   PHOSPHORUS mg/dL 3 4        Results from last 7 days  Lab Units 07/06/18  0433 07/03/18  1723   INR  1 10 1 04   PTT seconds 36 25       Results from last 7 days  Lab Units 07/06/18  0433 07/03/18  1723   LACTIC ACID mmol/L 1 0 1 4 0  Lab Value Date/Time   TROPONINI 0 03 2018 1723   TROPONINI <0 02 2017 0442   TROPONINI 0 02 2017 1256   TROPONINI <0 02 2017 1119   TROPONINI 0 03 2017 0050   TROPONINI 0 02 2017 2123   TROPONINI <0 02 2016 1154       Imagin/5 CXR- Increased vascular congestion versus possible right infiltrate, my interpretation  7/3 CT Head- No acute intracranial abnormality  7/3 CT Cervical spine- No cervical spine fracture or traumatic malalignment  7/3 CT C/A/P- Acute right L1/L2/L3 transverse process fractures, no acute intra-abdominal trauma, large amount of stool throughout the colon suggesting constipation, congenital malrotation without evidence of bowel obstruction I have personally reviewed pertinent reports  and I have personally reviewed pertinent films in PACS  EK/3 NSR This was personally reviewed by myself  Micro:  Lab Results   Component Value Date    BLOODCX No Growth After 5 Days  2017    BLOODCX No Growth After 5 Days  2017    BLOODCX No Growth After 5 Days  2017    BLOODCX No Growth After 5 Days  2017    URINECX >100,000 cfu/ml Staphylococcus coagulase negative (A) 2018    URINECX >100,000 cfu/ml Escherichia coli (A) 2018    URINECX >100,000 cfu/ml Escherichia coli 2017    URINECX <10,000 cfu/ml Mixed Contaminants X2 2017       ______________________________________________________________________    Assessment and Plan:   1  Acute encephalopathy, likely toxic/metabolic  2  Sepsis  3  UTI  4  Rhabdomyolysis- resolving  5  Anemia, likely of acute disease  6  Thrombocytopenia  7  Transaminitis- resolving  8  Chronic hypotension  9  History of cerebral ataxia  10  History of seizure disorder  11   Elevated BNP    Neuro: Hold all sedating medications, frequent neurologic examinations, follow results of ammonia/ABG, repeat CT Head, if remaining encephalopathic question possible post-ictal state versus subclinical seizures    CV: Close hemodynamic monitoring, trend troponin, repeat EKG, ECHO from 2017 demonstrates EF 50%, may need to repeat, if unable to take midodrine, may need to start peripheral phenylephrine    Lung: Encourage pulmonary hygiene as patient participates, O2 for sats greater than 94%    GI: NPO except for medications, if unable to swallow, may need NGT    F/E/N: Gentle fluid resuscitation, replete electrolytes, NPO    : Close intake and output, daily weights, trend serum creatinine    ID: Day 1 of broad spectrum antibiotics, procalcitonin negative, urine culture growing back coag negative staph    Heme: Continue Lovenox for now, follow hemoglobin and platelets closely    Endo: Follow blood sugars q2    Msk/Skin: Frequent turning and repositioning    Disposition: Stepdown 1    Counseling / Coordination of Care  Total Critical Care time spent 35 minutes excluding procedures, teaching and family updates  ______________________________________________________________________    VTE Pharmacologic Prophylaxis: Enoxaparin (Lovenox)  VTE Mechanical Prophylaxis: sequential compression device    Invasive lines and devices: Invasive Devices     Peripheral Intravenous Line            Peripheral IV 07/03/18 Ventral (anterior) Arm 2 days    Peripheral IV 07/06/18 Right Antecubital less than 1 day          Drain            Urethral Catheter Non-latex 16 Fr  1 day                Code Status: Level 1 - Full Code  POA:    POLST:      Given critical illness, patient length of stay will require greater than two midnights  Portions of the record may have been created with voice recognition software  Occasional wrong word or "sound a like" substitutions may have occurred due to the inherent limitations of voice recognition software  Read the chart carefully and recognize, using context, where substitutions have occurred        JESSY Gann Asa    Consults

## 2018-07-06 NOTE — PROGRESS NOTES
RN reported BP 86/50, temperature 92 9, pulse 58, respiration rate 18, SpO2 96% on O 2 via NC  Patient has moist nonproductive cough  CXR done at 1800 yesterday evening showed vascular congestion, cannot rule out left upper lobe pneumonia to me  Patient is currently on IV Rocephin for UTI  Reviewed urine culture done on admission which showed >100,000 cfu/ml Staphylococcus coagulase negative (A), resistant to cephazolin, susceptible to vancomycin  Saw patient at bedside  Patient lethargic, unable to follow commands  Lungs sounds rales throughout, + JVD b/l  Will change antibiotic to IV Vanco, cefepime, Flagyl to cover for both UTI and possible HCAP  Albumin 5% 12 5gm iv x 1  Check CBC with diff, CMP, Mag, lactic acid, procalcitonin, proBNP stat  Will continue D5 water at 75ml/hr  Alcon resendez ordered  Update  Blood work showed Na 144,AST 46,total ,proBNP 7028,WBC 2 99,H/H 9/29 2,PLT 76,procalcitonin 0 11  Consulted CC for lethargy,sepsis,hypotension

## 2018-07-06 NOTE — SOCIAL WORK
CM attempted to contact Darrian Thomas, [phone] to inquire about assigned   No answer  Left VM

## 2018-07-06 NOTE — CONSULTS
Consultation - Neurosurgery   Miroslava Boswell 67 y o  female MRN: [id number]  Unit/Bed#: MICU 03 Encounter: [id number]      Inpatient consult to Neurosurgery  Consult performed by: Eulalia Bustos ordered by: Yris Rubio          Assessment/Plan               Assessment:  1  Minimal SAH/SDH in the posterior falx cerebri and  right posterior tentorium  2  History of acute metabolic/toxic encephalopathy-multifactorial  3  Status post repair mildly 2/2 fall- resolving  4  Hypotension/hypothermia and radicular pain and probably due to sepsis with  MRSE, UTI and possible aspiration pneumonia  5  Improved GILBERTO  6  History cerebellar ataxia with multiple  falls  7  Pancytopenia  8  Protein calorie malnutrition      Plan:   · Exam: GCS 14, slightly confused, oriented to place, and person, but disoriented to time  CN 2-12 are grossly intact  PERRL, conjugate and pupils 2-3 mm bilaterally  Slight right drift, finger to nose normal, good  ( 4/4) symmetrically  Motor strength 5/5  Good  bilat 4/4  Sensation to LT normal throughout  DTR +3 throughout , no clonus and [place] is negative  · Images:  CT head done on 07/06/2018 demonstrates a 3 mm subacute subdural hemorrhage identified within the right hemisphere superficial the frontal and temporal lobes; mild chronic microangiopathy change within the brain parenchyma  · Repeat CT head on Sunday ( 07/08/2018)  · Labs:  Hgb= 7 7 ; aPT= 36; PT= 14 3 ;INR= 1 10  · Pain control: Per primary team  · DVT ppx: SCDs  · Hold off AC/AP  · Hold off pharmacological DVT ppx until  repeat CT head shows stable/resolving hematoma   · Seizure ppx: recommend keppra for two weeks  · Activity: bed rest  · Medical Mx:  Per primary team  · Neurocheck:  Q1 hour  · HOB:>30 degrees  · SBP:<160  · NSx  Following by neuromonitoring, repeat CT head on Sunday ( 07/08/2018)  If stable sign off  No AC/AP  Call for concern or problem          HPI: Miroslava Boswell is a 67y o  year old female with significant medical history of rhabdomyolysis 2/2 fall, GILBERTO, metabolic/toxic encephalopathy, hypothermia, Hypotension, cerebral ataxia, depression, protein calorie malnutrition, probable aspiration pneumonia/UTI sepsis, on Antibiotics   Nsx consulted  after head CT demonstrates small subacute SAH/SDH along falx cerbri following patient's AMS  Patient fell 3 days ago, but CT head at the moment didn't show ICH  Patient is slightly confused, so Hx is obtained from the daughter and EMR  Patient denies Hx of previous stroke, denies seizure, LOC, bleeding disorder or taking anticoagulant meds  Review of Systems   Constitutional: Negative for chills  Hypothermia   HENT: Positive for voice change  NPO, dysphonic   Eyes: Negative for photophobia and visual disturbance  Respiratory: Negative for cough and chest tightness  Cardiovascular: Negative for chest pain and leg swelling  Gastrointestinal: Negative for diarrhea and nausea  Genitourinary: Elena cath   Musculoskeletal: Positive for gait problem  Negative for back pain, neck pain and neck stiffness  Use walker for walking   Neurological: Positive for speech difficulty and weakness  Negative for tremors, seizures, syncope, facial asymmetry, numbness and headaches  Generalized weakness with deconditioning and critically sick   Hematological: Bruises/bleeds easily  Psychiatric/Behavioral: Positive for confusion and decreased concentration  The patient is nervous/anxious  Historical Information   Past Medical History:   Diagnosis Date    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[facility] [address] )     Depression     Disease of thyroid gland     History of suicide attempt     Daughter reports 4 5 years ago pt was depressed/SI attempt after partner       Hypertension     Hypothyroidism     Melanoma ([address] )     malignant - of the skin    Renal infarct ([address]     Seizures ([address] )     Suicide attempt [facility])      Past Surgical History: Procedure Laterality Date    CHOLECYSTECTOMY      laparoscopic    COLONOSCOPY      fiberoptic    ESOPHAGOGASTRODUODENOSCOPY N/A 3/10/2017    Procedure: ESOPHAGOGASTRODUODENOSCOPY (EGD); Surgeon: Ortiz Santamaria DO;  Location: [facility];   Service:     SKIN LESION EXCISION      melanoma back     History   Alcohol Use No     Comment: wine rarely     History   Drug Use No     History   Smoking Status    Never Smoker   Smokeless Tobacco    Former User     Comment: former smoker - social-quit years ago     Family History   Problem Relation Age of Onset    Heart disease Father     Heart attack Father         acute MI    Deafness Father         bilateral due to meningitis age 15    Deafness Mother         congenital    Glaucoma Mother     Stroke Mother     Diabetes Sister     Glaucoma Sister     Ataxia Brother         cerebral - possible    Diabetes Brother     Glaucoma Brother        Meds/Allergies   all current active meds have been reviewed and current meds:   Current Facility-Administered Medications   Medication Dose Route Frequency    cefepime (MAXIPIME) 2,000 mg in dextrose 5 % 50 mL IVPB  2,000 mg Intravenous Q12H    lidocaine (LIDODERM) 5 % patch 1 patch  1 patch Transdermal Daily    metroNIDAZOLE (FLAGYL) IVPB (premix) 500 mg  500 mg Intravenous Q8H    midodrine (PROAMATINE) tablet 10 mg  10 mg Oral TID    ondansetron (ZOFRAN) injection 4 mg  4 mg Intravenous Q6H PRN    phenylephrine (CAN-SYNEPHRINE) 50 mg (STANDARD CONCENTRATION) in sodium chloride 0 9% 250 mL   mcg/min Intravenous Titrated    pneumococcal 13-valent conjugate vaccine (PREVNAR-13) IM injection 0 5 mL  0 5 mL Intramuscular Prior to discharge    vancomycin (VANCOCIN) IVPB (premix) 750 mg  15 mg/kg Intravenous Q12H     Allergies   Allergen Reactions    Acetaminophen     Acetaminophen-Codeine GI Intolerance    Codeine     Penicillins        Objective   I/O       07/04 0701 - 07/05 0700 07/05 0701 - 07/06 0700 07/06 0701 - 07/07 0700    P  O  0 50     I V  (mL/kg)  890 (14 4) 330 (5 3)    IV Piggyback  125 1100    Total Intake(mL/kg) 0 (0) 1065 (17 2) 1430 (23 1)    Urine (mL/kg/hr) 1230 (0 9) 525 (0 4) 230 (0 4)    Total Output 1230 525 230    Net -1230 +540 +1200           Unmeasured Stool Occurrence 1 x 2 x           Physical Exam   Constitutional:   malnourished   HENT:   Head: Normocephalic  Eyes: EOM are normal  Pupils are equal, round, and reactive to light  Neck: Normal range of motion  Cardiovascular: Normal rate and regular rhythm  Pulmonary/Chest: Effort normal and breath sounds normal    Musculoskeletal: Normal range of motion  Neurological: She has a normal Finger-Nose-Finger Test  GCS eye subscore is 4  GCS verbal subscore is 4  GCS motor subscore is 6  Reflex Scores:       Tricep reflexes are 3+ on the right side and 3+ on the left side  Bicep reflexes are 3+ on the right side and 3+ on the left side  Brachioradialis reflexes are 3+ on the right side and 3+ on the left side  Patellar reflexes are 3+ on the right side and 3+ on the left side  Achilles reflexes are 3+ on the right side and 3+ on the left side  A&OX2, disoriented to time     Neurologic Exam     Mental Status   Oriented to person  Oriented to place  Oriented to country, city and area  Disoriented to year, month and date  Attention: decreased  Concentration: decreased  Speech: (Dysphonic, but coherent)  Level of consciousness: alert  Knowledge: poor  Able to name object  Cranial Nerves     CN II   Visual acuity: normal  Right visual field deficit: none    CN III, IV, VI   Pupils are equal, round, and reactive to light  Extraocular motions are normal    Right pupil: Size: 2 mm  Shape: regular  Reactivity: brisk  Left pupil: Size: 2 mm  Shape: regular  Reactivity: brisk     Nystagmus: none     CN V   Right facial sensation deficit: none  Left facial sensation deficit: none    CN VII   Right facial weakness: none  Left facial weakness: none    CN IX, X   CN IX normal      CN XI   CN XI normal      CN XII   CN XII normal      Motor Exam   Overall muscle tone: normal  Right arm tone: normal  Left arm tone: normal  Right arm pronator drift: present  Left arm pronator drift: absent  Right leg tone: normal  Left leg tone: normal    Sensory Exam   Right arm light touch: normal  Proprioception normal    Pinprick normal      Gait, Coordination, and Reflexes     Coordination   Finger to nose coordination: normal    Reflexes   Right brachioradialis: 3+  Left brachioradialis: 3+  Right biceps: 3+  Left biceps: 3+  Right triceps: 3+  Left triceps: 3+  Right patellar: 3+  Left patellar: 3+  Right achilles: 3+  Left achilles: 3+  Right : 3+  Left : 3+  Right plantar: normal  Left plantar: normal  Right Mccullough: absent  Left Mccullough: absent  Right ankle clonus: absent  Left ankle clonus: absent      Vitals:Blood pressure (!) 83/53, pulse 66, temperature (!) 95 °F (35 °C), resp  rate 16, height 5' 2" (1 575 m), weight 61 9 kg (136 lb 7 4 oz), SpO2 97 %  ,Body mass index is 24 96 kg/m²       Lab Results:     Results from last 7 days  Lab Units 07/06/18  0433 07/05/18  0510 07/04/18  0449 07/03/18  1922   WBC Thousand/uL 2 99* 4 77 4 74 6 41   HEMOGLOBIN g/dL 9 0* 9 8* 9 7* 11 2*   HEMATOCRIT % 29 2* 31 9* 30 1* 34 5*   PLATELETS Thousands/uL 76* 80* 81* 102*   NEUTROS PCT %  --   --   --  89*   MONOS PCT %  --   --   --  3*   MONO PCT MAN % 0* 2*  --   --        Results from last 7 days  Lab Units 07/06/18  0433 07/05/18  0510 07/04/18  0449   SODIUM mmol/L 144 148* 146*   POTASSIUM mmol/L 3 6 4 3 4 2   CHLORIDE mmol/L 116* 119* 116*   CO2 mmol/L 25 20* 22   BUN mg/dL 37* 50* 62*   CREATININE mg/dL 0 93 1 07 1 68*   CALCIUM mg/dL 8 4 8 1* 7 4*   TOTAL PROTEIN g/dL 5 3* 5 6* 5 2*   BILIRUBIN TOTAL mg/dL 0 59 1 34* 1 98*   ALK PHOS U/L 141* 156* 127*   ALT U/L 64 82* 83*   AST U/L 46* 103* 147*   GLUCOSE RANDOM mg/dL 115 51* 65       Results from last 7 days  Lab Units 07/06/18  0433 07/04/18  0449   MAGNESIUM mg/dL 1 7 2 0       Results from last 7 days  Lab Units 07/04/18  0449   PHOSPHORUS mg/dL 3 4       Results from last 7 days  Lab Units 07/06/18  0433 07/03/18  1723   INR  1 10 1 04   PTT seconds 36 25     No results found for: TROPONINT  ABG:  Lab Results   Component Value Date    PHART 7 344 (L) 07/06/2018    WJM7ERQ 38 8 07/06/2018    PO2ART 67 1 (L) 07/06/2018    ALL8LZT 20 7 (L) 07/06/2018    BEART -4 6 07/06/2018    SOURCE Radial, Right 07/06/2018       Imaging Studies: I have personally reviewed pertinent reports  and I have personally reviewed pertinent films in PACS    EKG, Pathology, and Other Studies: I have personally reviewed pertinent reports  and I have personally reviewed pertinent films in PACS    VTE Prophylaxis: Hold off Pharmacological DVT ppx until after Repeat CT head ( 07/08/2018)    Code Status: Level 1 - Full Code  Advance Directive and Living Will: Yes    Power of :    POLST:      Counseling / Coordination of Care  I spent 20 minutes with the patient

## 2018-07-07 ENCOUNTER — APPOINTMENT (INPATIENT)
Dept: RADIOLOGY | Facility: HOSPITAL | Age: 73
DRG: 682 | End: 2018-07-07
Payer: MEDICARE

## 2018-07-07 ENCOUNTER — APPOINTMENT (INPATIENT)
Dept: NON INVASIVE DIAGNOSTICS | Facility: HOSPITAL | Age: 73
DRG: 682 | End: 2018-07-07
Payer: MEDICARE

## 2018-07-07 LAB
ALBUMIN SERPL BCP-MCNC: 2.7 G/DL (ref 3.5–5)
ALP SERPL-CCNC: 317 U/L (ref 46–116)
ALT SERPL W P-5'-P-CCNC: 71 U/L (ref 12–78)
ANION GAP SERPL CALCULATED.3IONS-SCNC: 4 MMOL/L (ref 4–13)
ANION GAP SERPL CALCULATED.3IONS-SCNC: 9 MMOL/L (ref 4–13)
AST SERPL W P-5'-P-CCNC: 62 U/L (ref 5–45)
ATRIAL RATE: 129 BPM
BILIRUB SERPL-MCNC: 0.77 MG/DL (ref 0.2–1)
BUN SERPL-MCNC: 21 MG/DL (ref 5–25)
BUN SERPL-MCNC: 25 MG/DL (ref 5–25)
CALCIUM SERPL-MCNC: 8.3 MG/DL (ref 8.3–10.1)
CALCIUM SERPL-MCNC: 8.6 MG/DL (ref 8.3–10.1)
CHLORIDE SERPL-SCNC: 111 MMOL/L (ref 100–108)
CHLORIDE SERPL-SCNC: 116 MMOL/L (ref 100–108)
CO2 SERPL-SCNC: 24 MMOL/L (ref 21–32)
CO2 SERPL-SCNC: 24 MMOL/L (ref 21–32)
CREAT SERPL-MCNC: 0.82 MG/DL (ref 0.6–1.3)
CREAT SERPL-MCNC: 0.89 MG/DL (ref 0.6–1.3)
ERYTHROCYTE [DISTWIDTH] IN BLOOD BY AUTOMATED COUNT: 15.4 % (ref 11.6–15.1)
GFR SERPL CREATININE-BSD FRML MDRD: 65 ML/MIN/1.73SQ M
GFR SERPL CREATININE-BSD FRML MDRD: 72 ML/MIN/1.73SQ M
GLUCOSE SERPL-MCNC: 107 MG/DL (ref 65–140)
GLUCOSE SERPL-MCNC: 178 MG/DL (ref 65–140)
GLUCOSE SERPL-MCNC: 85 MG/DL (ref 65–140)
GLUCOSE SERPL-MCNC: 90 MG/DL (ref 65–140)
GLUCOSE SERPL-MCNC: 91 MG/DL (ref 65–140)
GLUCOSE SERPL-MCNC: 92 MG/DL (ref 65–140)
GLUCOSE SERPL-MCNC: 92 MG/DL (ref 65–140)
HCT VFR BLD AUTO: 26.4 % (ref 34.8–46.1)
HGB BLD-MCNC: 8.4 G/DL (ref 11.5–15.4)
MAGNESIUM SERPL-MCNC: 1.9 MG/DL (ref 1.6–2.6)
MAGNESIUM SERPL-MCNC: 2.1 MG/DL (ref 1.6–2.6)
MCH RBC QN AUTO: 30.3 PG (ref 26.8–34.3)
MCHC RBC AUTO-ENTMCNC: 31.8 G/DL (ref 31.4–37.4)
MCV RBC AUTO: 95 FL (ref 82–98)
PHOSPHATE SERPL-MCNC: 2.3 MG/DL (ref 2.3–4.1)
PLATELET # BLD AUTO: 72 THOUSANDS/UL (ref 149–390)
PMV BLD AUTO: 11.5 FL (ref 8.9–12.7)
POTASSIUM SERPL-SCNC: 3.7 MMOL/L (ref 3.5–5.3)
POTASSIUM SERPL-SCNC: 4.1 MMOL/L (ref 3.5–5.3)
PROCALCITONIN SERPL-MCNC: 0.12 NG/ML
PROT SERPL-MCNC: 5.4 G/DL (ref 6.4–8.2)
QRS AXIS: -11 DEGREES
QRSD INTERVAL: 104 MS
QT INTERVAL: 308 MS
QTC INTERVAL: 452 MS
RBC # BLD AUTO: 2.77 MILLION/UL (ref 3.81–5.12)
SODIUM SERPL-SCNC: 144 MMOL/L (ref 136–145)
SODIUM SERPL-SCNC: 144 MMOL/L (ref 136–145)
T WAVE AXIS: 94 DEGREES
TROPONIN I SERPL-MCNC: 0.04 NG/ML
TROPONIN I SERPL-MCNC: 0.06 NG/ML
VANCOMYCIN TROUGH SERPL-MCNC: 17.8 UG/ML (ref 10–20)
VENTRICULAR RATE: 129 BPM
WBC # BLD AUTO: 5.37 THOUSAND/UL (ref 4.31–10.16)

## 2018-07-07 PROCEDURE — 84484 ASSAY OF TROPONIN QUANT: CPT | Performed by: NURSE PRACTITIONER

## 2018-07-07 PROCEDURE — 82948 REAGENT STRIP/BLOOD GLUCOSE: CPT

## 2018-07-07 PROCEDURE — 93005 ELECTROCARDIOGRAM TRACING: CPT

## 2018-07-07 PROCEDURE — 84100 ASSAY OF PHOSPHORUS: CPT | Performed by: NURSE PRACTITIONER

## 2018-07-07 PROCEDURE — 99233 SBSQ HOSP IP/OBS HIGH 50: CPT | Performed by: INTERNAL MEDICINE

## 2018-07-07 PROCEDURE — 93306 TTE W/DOPPLER COMPLETE: CPT | Performed by: INTERNAL MEDICINE

## 2018-07-07 PROCEDURE — 93010 ELECTROCARDIOGRAM REPORT: CPT | Performed by: INTERNAL MEDICINE

## 2018-07-07 PROCEDURE — 92526 ORAL FUNCTION THERAPY: CPT

## 2018-07-07 PROCEDURE — 80053 COMPREHEN METABOLIC PANEL: CPT | Performed by: NURSE PRACTITIONER

## 2018-07-07 PROCEDURE — 83735 ASSAY OF MAGNESIUM: CPT | Performed by: NURSE PRACTITIONER

## 2018-07-07 PROCEDURE — 80048 BASIC METABOLIC PNL TOTAL CA: CPT | Performed by: NURSE PRACTITIONER

## 2018-07-07 PROCEDURE — 84145 PROCALCITONIN (PCT): CPT | Performed by: INTERNAL MEDICINE

## 2018-07-07 PROCEDURE — 80202 ASSAY OF VANCOMYCIN: CPT | Performed by: INTERNAL MEDICINE

## 2018-07-07 PROCEDURE — 85027 COMPLETE CBC AUTOMATED: CPT | Performed by: NURSE PRACTITIONER

## 2018-07-07 PROCEDURE — 93306 TTE W/DOPPLER COMPLETE: CPT

## 2018-07-07 RX ORDER — POTASSIUM CHLORIDE 14.9 MG/ML
20 INJECTION INTRAVENOUS ONCE
Status: COMPLETED | OUTPATIENT
Start: 2018-07-07 | End: 2018-07-07

## 2018-07-07 RX ORDER — DILTIAZEM HYDROCHLORIDE 5 MG/ML
INJECTION INTRAVENOUS
Status: COMPLETED
Start: 2018-07-07 | End: 2018-07-07

## 2018-07-07 RX ORDER — METOPROLOL TARTRATE 5 MG/5ML
5 INJECTION INTRAVENOUS ONCE
Status: COMPLETED | OUTPATIENT
Start: 2018-07-07 | End: 2018-07-07

## 2018-07-07 RX ORDER — FENTANYL CITRATE 50 UG/ML
25 INJECTION, SOLUTION INTRAMUSCULAR; INTRAVENOUS ONCE
Status: COMPLETED | OUTPATIENT
Start: 2018-07-07 | End: 2018-07-07

## 2018-07-07 RX ORDER — DILTIAZEM HYDROCHLORIDE 5 MG/ML
15 INJECTION INTRAVENOUS ONCE
Status: COMPLETED | OUTPATIENT
Start: 2018-07-07 | End: 2018-07-07

## 2018-07-07 RX ORDER — PREGABALIN 50 MG/1
50 CAPSULE ORAL 3 TIMES DAILY
Status: DISCONTINUED | OUTPATIENT
Start: 2018-07-07 | End: 2018-07-17 | Stop reason: HOSPADM

## 2018-07-07 RX ORDER — FENTANYL CITRATE 50 UG/ML
25 INJECTION, SOLUTION INTRAMUSCULAR; INTRAVENOUS EVERY 2 HOUR PRN
Status: DISCONTINUED | OUTPATIENT
Start: 2018-07-07 | End: 2018-07-08

## 2018-07-07 RX ORDER — MAGNESIUM SULFATE HEPTAHYDRATE 40 MG/ML
2 INJECTION, SOLUTION INTRAVENOUS ONCE
Status: COMPLETED | OUTPATIENT
Start: 2018-07-07 | End: 2018-07-07

## 2018-07-07 RX ORDER — FUROSEMIDE 10 MG/ML
20 INJECTION INTRAMUSCULAR; INTRAVENOUS ONCE
Status: COMPLETED | OUTPATIENT
Start: 2018-07-07 | End: 2018-07-07

## 2018-07-07 RX ADMIN — PREGABALIN 50 MG: 50 CAPSULE ORAL at 15:46

## 2018-07-07 RX ADMIN — DILTIAZEM HYDROCHLORIDE 15 MG: 5 INJECTION INTRAVENOUS at 14:18

## 2018-07-07 RX ADMIN — LIDOCAINE 1 PATCH: 50 PATCH CUTANEOUS at 09:12

## 2018-07-07 RX ADMIN — FENTANYL CITRATE 25 MCG: 50 INJECTION, SOLUTION INTRAMUSCULAR; INTRAVENOUS at 04:07

## 2018-07-07 RX ADMIN — VANCOMYCIN HYDROCHLORIDE 750 MG: 750 INJECTION, SOLUTION INTRAVENOUS at 05:50

## 2018-07-07 RX ADMIN — PREGABALIN 50 MG: 50 CAPSULE ORAL at 20:24

## 2018-07-07 RX ADMIN — FENTANYL CITRATE 25 MCG: 50 INJECTION INTRAMUSCULAR; INTRAVENOUS at 00:57

## 2018-07-07 RX ADMIN — MIDODRINE HYDROCHLORIDE 10 MG: 5 TABLET ORAL at 20:24

## 2018-07-07 RX ADMIN — CEFEPIME HYDROCHLORIDE 2000 MG: 2 INJECTION, POWDER, FOR SOLUTION INTRAVENOUS at 05:08

## 2018-07-07 RX ADMIN — MAGNESIUM SULFATE HEPTAHYDRATE 2 G: 40 INJECTION, SOLUTION INTRAVENOUS at 15:46

## 2018-07-07 RX ADMIN — FENTANYL CITRATE 25 MCG: 50 INJECTION, SOLUTION INTRAMUSCULAR; INTRAVENOUS at 06:17

## 2018-07-07 RX ADMIN — FUROSEMIDE 20 MG: 10 INJECTION, SOLUTION INTRAMUSCULAR; INTRAVENOUS at 12:34

## 2018-07-07 RX ADMIN — VANCOMYCIN HYDROCHLORIDE 750 MG: 750 INJECTION, SOLUTION INTRAVENOUS at 18:00

## 2018-07-07 RX ADMIN — DILTIAZEM HYDROCHLORIDE 5 MG/HR: 5 INJECTION INTRAVENOUS at 16:39

## 2018-07-07 RX ADMIN — POTASSIUM CHLORIDE 20 MEQ: 200 INJECTION, SOLUTION INTRAVENOUS at 17:10

## 2018-07-07 RX ADMIN — METOPROLOL TARTRATE 5 MG: 5 INJECTION, SOLUTION INTRAVENOUS at 14:07

## 2018-07-07 RX ADMIN — METOPROLOL TARTRATE 5 MG: 5 INJECTION, SOLUTION INTRAVENOUS at 13:58

## 2018-07-07 RX ADMIN — POTASSIUM CHLORIDE 20 MEQ: 200 INJECTION, SOLUTION INTRAVENOUS at 15:46

## 2018-07-07 RX ADMIN — MIDODRINE HYDROCHLORIDE 10 MG: 5 TABLET ORAL at 15:46

## 2018-07-07 RX ADMIN — METRONIDAZOLE 500 MG: 500 INJECTION, SOLUTION INTRAVENOUS at 04:28

## 2018-07-07 NOTE — PROGRESS NOTES
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[address] )    Anemia    Hypotension    Hypothermia    Rhabdomyolysis    Transaminitis    Thrombocytopenia (HCC)    Neuropathy, peripheral, idiopathic    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[facility] [address] )    GILBERTO (acute kidney injury) ([facility] [address] )    Lumbar transverse process fracture (HCC)    Acute cystitis without hematuria    Elevated brain natriuretic peptide (BNP) level    Subdural hematoma (HCC)  Resolved Problems:    * No resolved hospital problems  *      Consultants: Neurosurgery    History of Present Illness:  Patient is a 60-year-old female presenting on 07/03 with complaint of being found down converted extremity for approximately 3 days  She has a past medical history of cerebral ataxia, depression, hypothyroidism, history of suicide attempt, hypotension on midodrine, and seizure disorder  She had an elevated CK on arrival with an acute kidney injury and was fluid resuscitated for rhabdomyolysis  She also had a possible UTI and was started on ceftriaxone  She had new transverse lumbar fractures which were evaluated by the trauma team and felt that medical management was appropriate  Overnight on 07/06 she had worsening mental status and progressive hypotension and hypothermia  She was transferred to the critical care unit for closer monitoring and resuscitation  Summary of clinical course:  She had a normal CT scan of her head on the date of admission and a CT scan was repeated prior to transfer to the critical care unit  This to menstruated a small right frontal subdural hematoma    Neurosurgery was consulted and recommended a repeat CT scan tomorrow  She remained encephalopathic with a waxing and waning mental status, hypothermia requiring a Simone Hugger, and hypotension requiring placement of arterial line and peripheral phenylephrine for several hours  Over the course of the night her mental status improved  Her antibiotics were initially broadened out to cefepime/vancomycin/Flagyl  Her only culture results are greater than 100,000 coag-negative staph in her urine  Her antibiotics were narrowed to vancomycin only today  She was seen by the speech therapy team who felt that she was only appropriate for meds crushed in applesauce  Her mental status has improved significantly and at this point she is felt to be stable for transition to a step-down 2 level of care  Of note she is not on DVT prophylaxis or aspirin until her repeat CT scan of her head on 07/08  She was felt to be mildly volume overloaded and was given 20 mg of intravenous Lasix today with her echocardiogram demonstrating grade 2 diastolic dysfunction        Recent or scheduled procedures:   7/7 ECHO- EF 65% grade 2 diastolic dysfunction, elft atrium mildly dilated, mild to moderate mitral regurgitation, moderate to severe tricuspid regurugitation with moderate plumonary hypertension, IVC dilated with blunted respirophasic changes  7/6 CXR- Bilateral perihilar opacities, small right pleural effusion  7/6 CT Head- Small, thin, early subacute subdural hemorrhage within the right hemisphere superficial to the frontal and temporal lobes measuring less than 3 mm in size, small amount of subarachnoid or subdual hemorrhage in the posterior falx cerebri and posterior right tentorium, mild chronic microangiopathic change  7/6 CT Cervical spine- No cervical spine fracture or traumatic malalignment  7/3 CT C/A/P- Acute right L1-L3 transverse process fractures, large amount of stool throughout the colon, congenital malrotation without evidence of bowel obstruction    Outstanding/pending diagnostics:   7/8 Repeat head CT    Cultures:   7/6 Blood x2- No growth to date  7/3 Urine- >100,000 coag-negative staph       Mobilization Plan: Out of bed with assistance    Nutrition Plan: NPO except meds, speech following    Discharge Plan:   Patient should be ready for discharge to rehab after PT/OT evaluation, completion of antibiotics    Initial Physical Therapy Recommendations: Pending  Initial Occupational Therapy Recommendations: Pending  Initial /Plan: Following, area of again involved    Home medications that are not reordered and reason why:   Wellbutrin, gabapentin, Atarax, Remeron, oxycodone, Seroquel, Requip- Significantly altered mental status, Lyrica re-added today as it is a seizure control medication     Specific Diagnosis Plan:    Sepsis: Source: UTI, Antibiotics: Vancomycin, Length:  5 days    Spoke with Dr Loco Ozuna  regarding transfer  Please call [phone] with any questions or concerns  Portions of the record may have been created with voice recognition software  Occasional wrong word or "sound a like" substitutions may have occurred due to the inherent limitations of voice recognition software  Read the chart carefully and recognize, using context, where substitutions have occurred      JESSY Joseph

## 2018-07-07 NOTE — PROGRESS NOTES
Progress Note - Critical Care   Lexus Guido 67 y o  female MRN: [id number]  Unit/Bed#: MICU 03 Encounter: [id number]    Attending Physician: Srinivasa Gutiérrez MD  ______________________________________________________________________  Assessment and Plan:   1  Acute encephalopathy  2  Subdural hematoma  3  Sepsis  4  UTI  5  Rhabdomyolysis- resolving  6  Pancytopenia  7  Chronic hypotension on midodrine  8  Hypothyroidism  9  L1-L3 transverse process fractures  10  Chronic opioid dependence  11  Protein calorie malnutrition  12  Cerebral ataxia with frequent falls    Neuro: Close neurologic monitoring, as mental status improves, slowly introduce home medications, can continue low dose opioids for pain control, appreciate neurosurgery recommendations, follow-up CT scan in AM tomorrow    CV: Close hemodynamic monitoring, can use phenylephrine until able to resume home midodrine, pending ECHO, would favor gentle diuresis    Pulm: Encourage good pulmonary hygiene    GI: If unable to pass speech, will need to consider enteral access, LFTs trending upwards, unclear etiology    : Closely follow intake and output, daily weights, trend serum creatinine, remove mcrae catheter    F/E/N: Pending speech evaluation, replete electrolytes as needed    ID: Day 3 of vancomycin/cefepime/Flagyl, if blood cultures negative, would treat UTI with vancomycin for 5 days    Heme: No anticoagulation/DVT prophylaxis until repeat CT tomorrow morning    Endo: Resume home levothyroxine when able to take oral, follow blood sugars q6 while NPO     Msk/Skin: Out of bed as tolerated    Disposition: ICU while intermittently requiring pressors    Code Status: Level 1 - Full Code    Counseling / Coordination of Care  Total Critical Care time spent 31 minutes excluding procedures, teaching and family updates      ______________________________________________________________________    Chief Complaint: "I'm doing pretty good"    24 Hour Events: Patient is a 67year old female presenting on 7/3 after being found down for a period of 3 days  She has a past medical history of cerebral ataxia, depression, hypothyroidism, history of suicide attempt, hypotension on midodrine, and seizure disorder  She was treated for rhabdo and UTI however became obtunded on  necessitating transfer to the critical care unit  Sedating medications held, antibiotics broadened and she was started on peripheral phenylephrine  Overnight, her mental status improved and she had severe pain from her restless legs so low dose fentanyl was started  ______________________________________________________________________    Physical Exam:   Gen: Awake, alert, chronically ill-appearing  Neuro: GCS 14 E4 V4 M6, responds to request in all 4 extremities, strength 3/5 globally, mild dysarthria  HEENT: Atraumatic, PERRL, conjugate gazem trachea midline, no JVD  CV: Regular rate and rhythm  Pulm: Rales bilaterally  GI: Abdomen soft, non-tender, non-distended with bowel sounds present  : Elena in place with yellow urine  MSK: Mild edema throughout all extremities  Skin: Multiple wounds bilateral lower extremities in various stages of healing  ______________________________________________________________________  Vitals:    18 0300 18 0400 18 0500 18 0546   BP:       BP Location:       Pulse: 70 70 68 76   Resp: 18 18 15 (!) 24   Temp: 98 2 °F (36 8 °C) 98 2 °F (36 8 °C) 98 2 °F (36 8 °C) 98 2 °F (36 8 °C)   TempSrc:  Rectal     SpO2: 93% 91% 95% (!) 87%   Weight:       Height:           Temperature:   Temp (24hrs), Av 1 °F (36 2 °C), Min:93 2 °F (34 °C), Max:99 °F (37 2 °C)    Current Temperature: 98 2 °F (36 8 °C)  Weights:   IBW: 50 1 kg    Body mass index is 24 96 kg/m²    Weight (last 2 days)     Date/Time   Weight    18 0629  61 9 (136 47)            Hemodynamic Monitoring:  N/A     Non-Invasive/Invasive Ventilation Settings:  Respiratory    Lab Data (Last 4 hours)    None         O2/Vent Data (Last 4 hours)    None              Lab Results   Component Value Date    PHART 7 386 07/06/2018    FSX8COW 36 4 07/06/2018    PO2ART 73 7 (L) 07/06/2018    YNI2JJS 21 3 (L) 07/06/2018    BEART -3 3 07/06/2018    SOURCE Line, Arterial 07/06/2018     SpO2: SpO2: 91 %, SpO2 Activity: SpO2 Activity: At Rest, SpO2 Device: O2 Device: Nasal cannula  Intake and Outputs:  I/O       07/05 0701 - 07/06 0700 07/06 0701 - 07/07 0700    P  O  50     I V  (mL/kg) 890 (14 4) 1128 1 (18 2)    IV Piggyback 125 1850    Total Intake(mL/kg) 1065 (17 2) 2978 1 (48 1)    Urine (mL/kg/hr) 525 (0 4) 970 (0 7)    Total Output 525 970    Net +540 +2008 1          Unmeasured Stool Occurrence 2 x         UOP: 50/hour     Nutrition:        Diet Orders            Start     Ordered    07/06/18 0754  Diet NPO  Diet effective now     Question Answer Comment   Diet Type NPO    RD to adjust diet per protocol? Yes        07/06/18 0753        Labs:     Results from last 7 days  Lab Units 07/07/18  0430 07/06/18  1616 07/06/18  0433 07/05/18  0510  07/03/18  1922   WBC Thousand/uL 5 37 2 87* 2 99* 4 77  < > 6 41   HEMOGLOBIN g/dL 8 4* 7 7* 9 0* 9 8*  < > 11 2*   HEMATOCRIT % 26 4* 24 3* 29 2* 31 9*  < > 34 5*   PLATELETS Thousands/uL 72* 56* 76* 80*  < > 102*   NEUTROS PCT %  --   --   --   --   --  89*   MONOS PCT %  --   --   --   --   --  3*   MONO PCT MAN %  --   --  0* 2*  --   --    < > = values in this interval not displayed      Results from last 7 days  Lab Units 07/07/18  0430 07/06/18  1616 07/06/18  0433 07/05/18  0510   SODIUM mmol/L 144 145 144 148*   POTASSIUM mmol/L 4 1 4 4 3 6 4 3   CHLORIDE mmol/L 116* 116* 116* 119*   CO2 mmol/L 24 22 25 20*   BUN mg/dL 25 32* 37* 50*   CREATININE mg/dL 0 82 0 77 0 93 1 07   CALCIUM mg/dL 8 3 8 6 8 4 8 1*   TOTAL PROTEIN g/dL 5 4*  --  5 3* 5 6*   BILIRUBIN TOTAL mg/dL 0 77  --  0 59 1 34*   ALK PHOS U/L 317*  --  141* 156*   ALT U/L 71  --  64 82*   AST U/L 62*  -- 46* 103*   GLUCOSE RANDOM mg/dL 85 79 115 51*       Results from last 7 days  Lab Units 18  0430 18  0433 18  0449   MAGNESIUM mg/dL 2 1 1 7 2 0     Lab Results   Component Value Date    PHOS 2 3 2018    PHOS 3 4 2018    PHOS 3 2 2017        Results from last 7 days  Lab Units 18  0433 18  1723   INR  1 10 1 04   PTT seconds 36 25       0  Lab Value Date/Time   TROPONINI <0 02 2018 0838   TROPONINI 0 03 2018 1723   TROPONINI <0 02 2017 0442   TROPONINI 0 02 2017 1256   TROPONINI <0 02 2017 1119   TROPONINI 0 03 2017 0050   TROPONINI 0 02 2017 2123   TROPONINI <0 02 2016 1154       Results from last 7 days  Lab Units 18  1616 18  0433 18  1723   LACTIC ACID mmol/L 0 8 1 0 1 4     ABG:  Lab Results   Component Value Date    PHART 7 386 2018    SLX4UIM 36 4 2018    PO2ART 73 7 (L) 2018    XIO3SHB 21 3 (L) 2018    BEART -3 3 2018    SOURCE Line, Arterial 2018       Imagin/6 CXR- Bilateral perihilar opacities, small right pleural effusion   CT Head- Small, thin, early subacute subdural hemorrhage identified within the right hemisphere superficial to the frontal and temporal lobes measuring less than 3 mm in size without mass effect or shift, small amount of subarachnoid or sundural hemorrhage layering along the posterior falx and right posterior tentorium, mild chronic microangiopathic change within the brain I have personally reviewed pertinent reports  and I have personally reviewed pertinent films in PACS    EKG: Review of telemetry demonstrates sinus rhythm    Micro:  Lab Results   Component Value Date    BLOODCX No Growth After 5 Days  2017    BLOODCX No Growth After 5 Days  2017    BLOODCX No Growth After 5 Days  2017    BLOODCX No Growth After 5 Days   2017    URINECX >100,000 cfu/ml Staphylococcus coagulase negative (A) 2018 URINECX >100,000 cfu/ml Escherichia coli (A) 06/01/2018    URINECX >100,000 cfu/ml Escherichia coli 04/19/2017    URINECX <10,000 cfu/ml Mixed Contaminants X2 04/19/2017       Allergies: Allergies   Allergen Reactions    Acetaminophen     Acetaminophen-Codeine GI Intolerance    Codeine     Penicillins        Medications:   Scheduled Meds:  Current Facility-Administered Medications:  cefepime 2,000 mg Intravenous Q12H JESSY Moss Last Rate: Stopped (07/07/18 0547)   fentanyl citrate (PF) 25 mcg Intravenous Q2H PRN Gwyndolyn Cabot, MD    lidocaine 1 patch Transdermal Daily Darrian Golden,     metroNIDAZOLE 500 mg Intravenous Q8H JESSY Moss Last Rate: Stopped (07/07/18 0507)   midodrine 10 mg Oral TID Darrian Golden, DO    ondansetron 4 mg Intravenous Q6H PRN Darrian Golden,     phenylephine  mcg/min Intravenous Titrated Florchelo Hernández CRNP Last Rate: Stopped (07/06/18 2109)   pneumococcal 13-valent conjugate vaccine 0 5 mL Intramuscular Prior to discharge Darrian Golden,     vancomycin 15 mg/kg Intravenous Q12H JESSY Moss Last Rate: 750 mg (07/07/18 0550)     Continuous Infusions:  phenylephine  mcg/min Last Rate: Stopped (07/06/18 2109)     PRN Meds:    fentanyl citrate (PF) 25 mcg Q2H PRN   ondansetron 4 mg Q6H PRN   pneumococcal 13-valent conjugate vaccine 0 5 mL Prior to discharge       VTE Pharmacologic Prophylaxis: Reason for no pharmacologic prophylaxis SDH  VTE Mechanical Prophylaxis: sequential compression device    Invasive lines and devices: Invasive Devices     Peripheral Intravenous Line            Peripheral IV 07/03/18 Ventral (anterior); Left Arm 3 days    Peripheral IV 07/06/18 Right Antecubital 1 day          Arterial Line            Arterial Line 07/06/18 Radial less than 1 day          Drain            Urethral Catheter Non-latex 16 Fr  2 days                     Portions of the record may have been created with voice recognition software    Occasional wrong word or "sound a like" substitutions may have occurred due to the inherent limitations of voice recognition software  Read the chart carefully and recognize, using context, where substitutions have occurred      JESSY Donnelly

## 2018-07-07 NOTE — SPEECH THERAPY NOTE
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[facility]    Cellulitis of left lower extremity    Syncope    Anemia    Abdominal pain    Fall    Hypotension    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[facility]    Restless leg syndrome    Depression with anxiety    Hypothyroid    GERD (gastroesophageal reflux disease)    Hypernatremia    Bilateral renal infarcts    Hypotension    Hypothermia    Encephalopathy    Esophageal achalasia    Overdose    Rhabdomyolysis    Transaminitis    Thrombocytopenia (HCC)    Urinary tract infection    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 ([address]    Suicide attempt ([facility]    Headache    Protein calorie malnutrition ([facility]    Major depressive disorder, recurrent severe without psychotic features ([facility]    Cognitive change    Neuropathy, peripheral, idiopathic    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[address] )    Pain of right hip joint    GILBERTO (acute kidney injury) ([facility]    Lumbar transverse process fracture (HCC)    Acute cystitis without hematuria    Elevated brain natriuretic peptide (BNP) level    Subdural hematoma (HCC)        Past Medical History  Past Medical History:   Diagnosis Date    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[facility]     Depression     Disease of thyroid gland     History of suicide attempt     Daughter reports 4 5 years ago pt was depressed/SI attempt after partner       Hypertension     Hypothyroidism     Melanoma ([facility]     malignant - of the skin    Renal infarct ([facility]     Seizures ([facility]     Suicide attempt [facility])         Past Surgical History  Past Surgical History:   Procedure Laterality Date    CHOLECYSTECTOMY      laparoscopic    COLONOSCOPY      fiberoptic    ESOPHAGOGASTRODUODENOSCOPY N/A 3/10/2017    Procedure: ESOPHAGOGASTRODUODENOSCOPY (EGD); Surgeon: Sharon Everett DO;  Location: [facility]; Service:     SKIN LESION EXCISION      melanoma back         Subjective:  Pt  was sleeping, but was easily aroused and cooperative c trials of PO  Current Diet: NPO    Objective:  Pt  was transferred to MICU after rapid response and concern of possible aspiration  ST trialed tsp  bites of puree and demonstrated adequate labial seal slowed manipulation and transfer of the bolus  No s/s or difficulties were noted initially c trial of puree  ST also trialed tsp  sips of HT liquid and pt  was symptomatic  Pt  was advised to trial chin down c every swallow, but coughing was not eliminated  ST switched back to tsps  of puree and pt  continued to demonstrate a delayed cough and wet voice  Pt  was symptomatic on increased amts  of puree and HT liquid, but is able to tolerate puree and HT in small amts  Pt 's wet voice was not noted before trials of PO   ST instructed pt  to cough, resulting in temporary relief  Laryngeal rise was weak c all consistencies  Assessment:  Pt  was symptomatic on puree and HT liquid  Plan/Recommendations:  1  Keep pt  NPO except for meds   2  Meds crushed c applesauce  2   ST will f/u tomorrow to assess for any changes/recommendations in diet

## 2018-07-08 ENCOUNTER — APPOINTMENT (INPATIENT)
Dept: RADIOLOGY | Facility: HOSPITAL | Age: 73
DRG: 682 | End: 2018-07-08
Payer: MEDICARE

## 2018-07-08 PROBLEM — I48.0 PAROXYSMAL ATRIAL FIBRILLATION (HCC): Status: ACTIVE | Noted: 2018-07-08

## 2018-07-08 PROBLEM — N17.9 AKI (ACUTE KIDNEY INJURY) (HCC): Status: RESOLVED | Noted: 2018-07-03 | Resolved: 2018-07-08

## 2018-07-08 PROBLEM — I95.9 HYPOTENSION: Status: RESOLVED | Noted: 2017-02-22 | Resolved: 2018-07-08

## 2018-07-08 PROBLEM — D69.6 THROMBOCYTOPENIA (HCC): Status: RESOLVED | Noted: 2017-04-20 | Resolved: 2018-07-08

## 2018-07-08 PROBLEM — M62.82 RHABDOMYOLYSIS: Status: RESOLVED | Noted: 2017-04-19 | Resolved: 2018-07-08

## 2018-07-08 PROBLEM — I50.30 DIASTOLIC CHF (HCC): Status: ACTIVE | Noted: 2018-07-08

## 2018-07-08 PROBLEM — T68.XXXA HYPOTHERMIA: Status: RESOLVED | Noted: 2017-03-03 | Resolved: 2018-07-08

## 2018-07-08 LAB
ALBUMIN SERPL BCP-MCNC: 2.7 G/DL (ref 3.5–5)
ALP SERPL-CCNC: 285 U/L (ref 46–116)
ALT SERPL W P-5'-P-CCNC: 58 U/L (ref 12–78)
ANION GAP SERPL CALCULATED.3IONS-SCNC: 7 MMOL/L (ref 4–13)
AST SERPL W P-5'-P-CCNC: 28 U/L (ref 5–45)
BILIRUB DIRECT SERPL-MCNC: 0.27 MG/DL (ref 0–0.2)
BILIRUB SERPL-MCNC: 0.8 MG/DL (ref 0.2–1)
BUN SERPL-MCNC: 19 MG/DL (ref 5–25)
CALCIUM SERPL-MCNC: 8.3 MG/DL (ref 8.3–10.1)
CHLORIDE SERPL-SCNC: 109 MMOL/L (ref 100–108)
CO2 SERPL-SCNC: 27 MMOL/L (ref 21–32)
CREAT SERPL-MCNC: 0.85 MG/DL (ref 0.6–1.3)
ERYTHROCYTE [DISTWIDTH] IN BLOOD BY AUTOMATED COUNT: 15.1 % (ref 11.6–15.1)
GFR SERPL CREATININE-BSD FRML MDRD: 69 ML/MIN/1.73SQ M
GLUCOSE SERPL-MCNC: 101 MG/DL (ref 65–140)
GLUCOSE SERPL-MCNC: 120 MG/DL (ref 65–140)
GLUCOSE SERPL-MCNC: 81 MG/DL (ref 65–140)
HCT VFR BLD AUTO: 28.1 % (ref 34.8–46.1)
HGB BLD-MCNC: 8.9 G/DL (ref 11.5–15.4)
MCH RBC QN AUTO: 29.8 PG (ref 26.8–34.3)
MCHC RBC AUTO-ENTMCNC: 31.7 G/DL (ref 31.4–37.4)
MCV RBC AUTO: 94 FL (ref 82–98)
PLATELET # BLD AUTO: 101 THOUSANDS/UL (ref 149–390)
PMV BLD AUTO: 11.1 FL (ref 8.9–12.7)
POTASSIUM SERPL-SCNC: 3.5 MMOL/L (ref 3.5–5.3)
PROT SERPL-MCNC: 5.8 G/DL (ref 6.4–8.2)
RBC # BLD AUTO: 2.99 MILLION/UL (ref 3.81–5.12)
SODIUM SERPL-SCNC: 143 MMOL/L (ref 136–145)
WBC # BLD AUTO: 7.89 THOUSAND/UL (ref 4.31–10.16)

## 2018-07-08 PROCEDURE — 85027 COMPLETE CBC AUTOMATED: CPT | Performed by: NURSE PRACTITIONER

## 2018-07-08 PROCEDURE — 70450 CT HEAD/BRAIN W/O DYE: CPT

## 2018-07-08 PROCEDURE — 92526 ORAL FUNCTION THERAPY: CPT

## 2018-07-08 PROCEDURE — 80076 HEPATIC FUNCTION PANEL: CPT | Performed by: NURSE PRACTITIONER

## 2018-07-08 PROCEDURE — 99222 1ST HOSP IP/OBS MODERATE 55: CPT | Performed by: INTERNAL MEDICINE

## 2018-07-08 PROCEDURE — 80048 BASIC METABOLIC PNL TOTAL CA: CPT | Performed by: NURSE PRACTITIONER

## 2018-07-08 PROCEDURE — 99233 SBSQ HOSP IP/OBS HIGH 50: CPT | Performed by: INTERNAL MEDICINE

## 2018-07-08 PROCEDURE — 99232 SBSQ HOSP IP/OBS MODERATE 35: CPT | Performed by: NEUROLOGICAL SURGERY

## 2018-07-08 PROCEDURE — 82948 REAGENT STRIP/BLOOD GLUCOSE: CPT

## 2018-07-08 RX ORDER — OXYCODONE HYDROCHLORIDE 5 MG/1
5 TABLET ORAL EVERY 6 HOURS PRN
Status: DISCONTINUED | OUTPATIENT
Start: 2018-07-08 | End: 2018-07-17 | Stop reason: HOSPADM

## 2018-07-08 RX ORDER — POTASSIUM CHLORIDE 20 MEQ/1
20 TABLET, EXTENDED RELEASE ORAL
Status: COMPLETED | OUTPATIENT
Start: 2018-07-08 | End: 2018-07-08

## 2018-07-08 RX ORDER — GABAPENTIN 100 MG/1
100 CAPSULE ORAL 2 TIMES DAILY
Status: DISCONTINUED | OUTPATIENT
Start: 2018-07-08 | End: 2018-07-17 | Stop reason: HOSPADM

## 2018-07-08 RX ADMIN — METOPROLOL TARTRATE 12.5 MG: 25 TABLET ORAL at 22:51

## 2018-07-08 RX ADMIN — VANCOMYCIN HYDROCHLORIDE 750 MG: 750 INJECTION, SOLUTION INTRAVENOUS at 05:42

## 2018-07-08 RX ADMIN — LIDOCAINE 1 PATCH: 50 PATCH CUTANEOUS at 08:00

## 2018-07-08 RX ADMIN — METOPROLOL TARTRATE 12.5 MG: 25 TABLET ORAL at 12:43

## 2018-07-08 RX ADMIN — POTASSIUM CHLORIDE 20 MEQ: 1500 TABLET, EXTENDED RELEASE ORAL at 16:13

## 2018-07-08 RX ADMIN — MIDODRINE HYDROCHLORIDE 10 MG: 5 TABLET ORAL at 08:02

## 2018-07-08 RX ADMIN — MIDODRINE HYDROCHLORIDE 10 MG: 5 TABLET ORAL at 16:13

## 2018-07-08 RX ADMIN — GABAPENTIN 100 MG: 100 CAPSULE ORAL at 17:29

## 2018-07-08 RX ADMIN — POTASSIUM CHLORIDE 20 MEQ: 1500 TABLET, EXTENDED RELEASE ORAL at 08:03

## 2018-07-08 RX ADMIN — PREGABALIN 50 MG: 50 CAPSULE ORAL at 20:20

## 2018-07-08 RX ADMIN — MIDODRINE HYDROCHLORIDE 10 MG: 5 TABLET ORAL at 20:20

## 2018-07-08 RX ADMIN — POTASSIUM CHLORIDE 20 MEQ: 1500 TABLET, EXTENDED RELEASE ORAL at 12:43

## 2018-07-08 RX ADMIN — PREGABALIN 50 MG: 50 CAPSULE ORAL at 08:03

## 2018-07-08 RX ADMIN — GABAPENTIN 100 MG: 100 CAPSULE ORAL at 11:00

## 2018-07-08 RX ADMIN — VANCOMYCIN HYDROCHLORIDE 750 MG: 750 INJECTION, SOLUTION INTRAVENOUS at 16:27

## 2018-07-08 RX ADMIN — ENOXAPARIN SODIUM 40 MG: 40 INJECTION SUBCUTANEOUS at 11:01

## 2018-07-08 RX ADMIN — PREGABALIN 50 MG: 50 CAPSULE ORAL at 16:13

## 2018-07-08 NOTE — SPEECH THERAPY NOTE
Speech Language/Pathology    Speech/Language Pathology Progress Note    Patient Name: Lorin Varela  OQAHJ'F Date: 7/8/2018     Subjective:   Patient seen at bedside  Initially asleep but easily awakened  Cooperative throughout session    Objective:  Medical chart reviewed for updates  Patient transferred out of medical intensive care unit today  Per chart, patient is afebrile and breath sounds reported as clear  Aware of results 7/8 Head CT "Stable right subdural hemorrhage and hemorrhage layering along the right tentorium  No large delayed intracranial hemorrhage"  7/6 Chest XRAY:" Bilateral perihilar opacities likely representing pulmonary edema  Small right pleural effusion "  Discussed with patient plan to complete video swallow evaluation on 7/9  Therapeutic feeding trials conducted with pureed via 3/4 tsp X 12 trials and honey thick liquids via tsp X 11 trials  Patient was initially cued to utilize chin tuck with head turn (patient turned her head to the right)  Patient was fed by SLP  Patient had mild prolonged bolus manipulation  Swallow initiation timing was suspected to range from fairly prompt to 5-7 seconds delayed  Patient had throat clear X1  Vocal quality remained clear during PO trials (without wet or gurgly quality)  Patient's vocal quality remained mildly harsh and patient has presence of severe dysarthria  Discussed plan with patient to complete video swallow evaluation to further assess swallow function and further directions for treatment based upon findings      Assessment:  Patient presents with known oropharyngeal dysphagia  Patient participated well in therapy session and was able to follow compensatory strategies during PO trials  Patient did not present with overt s/s of aspiration (except throat clear X1) during PO trials however patient known to have fluctuating status and at known risk of aspiration with PO intake      Plan/Recommendations:  Please maintain NPO status with short term alternate nutrition per physician  Patient is receiving meds crushed as able in applesauce   Please provide oral care as well as maintain aspiration precautions   Complete video swallow evaluation on 7/9 based upon patient's medical status; Continued dysphagia related therapy

## 2018-07-08 NOTE — CONSULTS
Consultation - Cardiology   Savanah Landry 67 y o  female MRN: [id number]  [facility]: [id number]      Assessment:  Principal Problem:    Encephalopathy acute  Active Problems:    Hypothyroidism    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[address] )    Anemia    Hypotension    Transaminitis    Thrombocytopenia (HCC)    Neuropathy, peripheral, idiopathic    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[facility] [address] )    Lumbar transverse process fracture (HCC)    Acute cystitis without hematuria    Elevated brain natriuretic peptide (BNP) level    Subdural hematoma (HCC)    Paroxysmal atrial fibrillation ([facility] [address] )    Plan:  1  New onset Atrial fibrillation with RVR   - this is in the setting of encephalopathy , UTI and SDH  - patient converted back into sinus rhythm yesterday with IV Cardizem and maintain sinus rhythm  - echo reviewed-LVEF 55% with no regional wall motion abnormalities  Concentric LVH  Grade 2 diastolic dysfunction  Mildly dilated left atrium  Mild-to-moderate MR, moderate to severe TR with PA systolic pressure of 58 suggestive of moderate pulmonary hypertension   - patient reports symptoms of palpitations for the past 2 months and also had a CT in March 2017 with peripheral wedge-shaped infarcts in bilateral kidneys raising the possibility of renal infarcts  - CHADVASC-2, if renal infarcts then 4, however patient has frequent falls with subdural hematoma  Risks of anticoagulation outweigh the benefits  She also reports frequent falls when not using her walker  - mild troponin elevation in the setting of AFib with RVR  - will start low dose metoprolol 12 5mg twice daily, need to be cautious with AV pilo agents given hypotension requiring Midodrine  2  Diastolic heart failure  - mild volume overload- iatrogenic, chest xray with bilateral perihilar opacities- appears more related to pneumonia than vascular congestion  -net negative 3 5 overnight  3  Hypotension on Midodrine  - on midodrine 10 mg t i d      4  Hypothyroidism    5  Altered mental status secondary to fall leading to a subdural hematoma, UTI    6  Normocytic anemia    History of Present Illness   Physician Requesting Consult: Kelli Mcpherson DO  Reason for Consult / Principal Problem:  AFib with RVR  HPI: Edward Hernandes is a 67y o  year old female with past medical history significant for cerebral ataxia, seizure disorder, depression with history of suicide attempt, hypothyroidism, hypotension on midodrine, chronic back pain presented to the ED on 07/03 after he was found at home covered in urine and feces and bruises  Was also found to have GILBERTO with mild rhabdomyolysis, UTI  He apparently fell out of the bed on 6/30  On EMS arrival, patient had slurred speech and was found to be hypoglycemic with a blood sugar of 56  Initial workup in the ED with a CT head showed no intracranial abnormality, CT spine showed acute transverse process fractures of L1-L2 L3 on the right  On 07/06, patient was found to to have worsening mental status and found to be hypotensive with chest x-ray showing possibility of left upper lobe pneumonia  Antibiotic coverage was broadened  Repeat CT head showed small subacute subdural hematoma on the right superficial to the frontal and temporal lobes which was present on prior on 07/03  She was started on phenylephrine to maintain blood pressure  Patient then went into rapid atrial fibrillation with RVR yesterday after known requiring Cardizem and subsequently converted to sinus rhythm around 5:30 p m  Crystal Henao Cardiology consult is being requested for management of atrial fibrillation  On my evaluation today, patient denies any acute symptoms  She denied any symptoms of chest pain or pressure or palpitations or shortness of breath yesterday when she had this episode of AFib  However she reports symptoms of palpitations and shortness of breath on and off especially at night for the past 2 months    No prior history of atrial fibrillation or prior cardiac history of arrhythmias heart failure or CAD  Patient was seen by us in 2017 for bradycardia when she presented for change in mental status and found altered on the floor  A CT scan then showed peripheral wedge-shaped hypodensities in the upper lobe raising the possibility of renal infarcts versus contusion  EKG yesterday-AFib with RVR  Nonspecific ST T wave abnormality  Left anterior fascicular block  Telemetry-AFib with RVR yesterday and converted to sinus rhythm around 5:25 p m  Daljit Winslow Remains sinus rhythm since then  Inpatient consult to Cardiology     Performed by  Dolly Leonard by Yris Rubio              Review of Systems:  Review of Systems   Constitutional: Positive for activity change  Negative for appetite change, chills, diaphoresis, fatigue and fever  HENT: Negative for congestion  Respiratory: Negative for cough, chest tightness, shortness of breath and wheezing  Cardiovascular: Positive for palpitations  Negative for chest pain and leg swelling  Gastrointestinal: Negative for abdominal pain, diarrhea, nausea and vomiting  Genitourinary: Negative for difficulty urinating and dysuria  Musculoskeletal: Positive for back pain  Skin: Negative for color change and rash  Neurological: Negative for dizziness, syncope, facial asymmetry, weakness, light-headedness, numbness and headaches  Psychiatric/Behavioral: Positive for confusion  14 systems reviewed and negative with the exception of the above and the following    Historical Information   Past Medical History:   Diagnosis Date    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[facility] [address] )     Depression     Disease of thyroid gland     History of suicide attempt     Daughter reports 4 5 years ago pt was depressed/SI attempt after partner       Hypertension     Hypothyroidism     Melanoma ([facility] [address] )     malignant - of the skin    Renal infarct ([address] )     Seizures ([address] )     Suicide attempt [facility])      Past Surgical History:   Procedure Laterality Date    CHOLECYSTECTOMY      laparoscopic    COLONOSCOPY      fiberoptic    ESOPHAGOGASTRODUODENOSCOPY N/A 3/10/2017    Procedure: ESOPHAGOGASTRODUODENOSCOPY (EGD); Surgeon: Tariq Nava DO;  Location: [facility]; Service:     SKIN LESION EXCISION      melanoma back     History   Alcohol Use No     Comment: wine rarely     History   Drug Use No     History   Smoking Status    Never Smoker   Smokeless Tobacco    Former User     Comment: former smoker - social-quit years ago     Family History: non-contributory    Meds/Allergies   all current active meds have been reviewed  Allergies   Allergen Reactions    Acetaminophen     Acetaminophen-Codeine GI Intolerance    Codeine     Penicillins Other (See Comments)     Unknown reaction; however, has tolerated multiple Cephalosporins including Cefazolin, Cephalexin, Ceftriaxone, & Cefepime       Objective   Vitals: Blood pressure 142/74, pulse 60, temperature 97 6 °F (36 4 °C), temperature source Oral, resp  rate 17, height 5' 2" (1 575 m), weight 57 6 kg (126 lb 15 8 oz), SpO2 99 %  , Body mass index is 23 23 kg/m² , Orthostatic Blood Pressures      Most Recent Value   Blood Pressure  142/74 filed at 07/07/2018 1400   Patient Position - Orthostatic VS  Lying filed at 07/06/2018 0602            Intake/Output Summary (Last 24 hours) at 07/08/18 0809  Last data filed at 07/08/18 0542   Gross per 24 hour   Intake           463 21 ml   Output             4110 ml   Net         -3646 79 ml       Invasive Devices     Peripheral Intravenous Line            Peripheral IV 07/03/18 Ventral (anterior); Left Arm 4 days    Peripheral IV 07/06/18 Right Antecubital 2 days          Arterial Line            Arterial Line 07/06/18 Radial 1 day          Drain            Urethral Catheter Non-latex 16 Fr  3 days                    Physical Exam:  Physical Exam   Constitutional: No distress     HENT:   Head: Normocephalic and atraumatic  Eyes: Conjunctivae are normal  No scleral icterus  Neck: Neck supple  No JVD present  Cardiovascular: Normal rate, regular rhythm, normal heart sounds and intact distal pulses  Exam reveals no gallop and no friction rub  No murmur heard  Pulmonary/Chest: Effort normal  She has no wheezes  She has rales  Crackles in the lower 3rd of the lung fields bilaterally   Abdominal: Soft  Bowel sounds are normal  There is no tenderness  Musculoskeletal: Normal range of motion  She exhibits no edema  Neurological: She is alert  Skin: Skin is warm and dry             Lab Results:     Lab Results   Component Value Date    CKTOTAL 326 (H) 07/06/2018    CKTOTAL 1,158 (H) 07/05/2018    CKTOTAL 4,526 (H) 07/04/2018    CKMBINDEX 3 2 (H) 07/06/2018    CKMBINDEX 1 6 07/05/2018    CKMBINDEX 1 1 07/04/2018    TROPONINI 0 04 07/07/2018    TROPONINI 0 06 (H) 07/07/2018    TROPONINI <0 02 07/06/2018       Lab Results   Component Value Date    GLUCOSE 81 07/08/2018    CALCIUM 8 3 07/08/2018     07/08/2018    K 3 5 07/08/2018    CO2 27 07/08/2018     (H) 07/08/2018    BUN 19 07/08/2018    CREATININE 0 85 07/08/2018       Lab Results   Component Value Date    WBC 7 89 07/08/2018    HGB 8 9 (L) 07/08/2018    HCT 28 1 (L) 07/08/2018    MCV 94 07/08/2018     (L) 07/08/2018       Lab Results   Component Value Date    CHOL 180 02/09/2014     Lab Results   Component Value Date    HDL 56 02/09/2014     Lab Results   Component Value Date    LDLCALC 105 (H) 02/09/2014     Lab Results   Component Value Date    TRIG 94 02/09/2014       Lab Results   Component Value Date    ALT 58 07/08/2018    AST 28 07/08/2018         Results from last 7 days  Lab Units 07/06/18  0433   INR  1 10       Cardiac testing:   Results for orders placed during the hospital encounter of 07/03/18   Echo complete with contrast if indicated    Narrative [address] [facility] [address] [facility]  [phone]    Transthoracic Echocardiogram  2D, M-mode, Doppler, and Color Doppler    Study date:  2018    Patient: Delio Elizabeth  MR number: [id number]  Account number: [de-identified]  : 1945  Age: 67 years  Gender: Female  Status: Inpatient  Location: Bedside  Height: 62 in  Weight: 136 lb  BP: 91/ 55 mmHg    Indications: Heart failure    Diagnoses: I50 9 - Heart failure, unspecified    Sonographer:  HARLEY Collins  Interpreting Physician:  Rosalee Fitzgerald DO  Primary Physician:  Sherrill Dodson  Referring Physician:  Saima Hartley MD  Group:  [facility]    SUMMARY    LEFT VENTRICLE:  Systolic function was normal  Ejection fraction was estimated to be 55 %  There were no regional wall motion abnormalities  Wall thickness was mildly increased  Concentric hypertrophy was present  Features were consistent with a pseudonormal left ventricular filling pattern, with concomitant abnormal relaxation and increased filling pressure (grade 2 diastolic dysfunction)  Doppler parameters were consistent with high ventricular filling pressure  RIGHT VENTRICLE:  The size was normal   Systolic function was normal     LEFT ATRIUM:  The atrium was mildly dilated  MITRAL VALVE:  There was mild to moderate regurgitation  TRICUSPID VALVE:  There was moderate to severe regurgitation  Estimated peak PA pressure was 58 mmHg  The findings suggest moderate pulmonary hypertension  IVC, HEPATIC VEINS:  The inferior vena cava was dilated  Respirophasic changes were blunted (less than 50% variation)  HISTORY: PRIOR HISTORY: seizures, hypothyroid, depression Risk factors: hypertension  PROCEDURE: The procedure was performed at the bedside  This was a routine study  The transthoracic approach was used  The study included complete 2D imaging, M-mode, complete spectral Doppler, and color Doppler  The heart rate was 73 bpm,  at the start of the study  Image quality was adequate  LEFT VENTRICLE: Size was normal  Systolic function was normal  Ejection fraction was estimated to be 55 %  There were no regional wall motion abnormalities  Wall thickness was mildly increased  Concentric hypertrophy was present  DOPPLER:  Features were consistent with a pseudonormal left ventricular filling pattern, with concomitant abnormal relaxation and increased filling pressure (grade 2 diastolic dysfunction)  Doppler parameters were consistent with high ventricular  filling pressure  RIGHT VENTRICLE: The size was normal  Systolic function was normal  Wall thickness was normal     LEFT ATRIUM: The atrium was mildly dilated  RIGHT ATRIUM: Size was normal     MITRAL VALVE: Valve structure was normal  There was normal leaflet separation  DOPPLER: The transmitral velocity was within the normal range  There was no evidence for stenosis  There was mild to moderate regurgitation  AORTIC VALVE: The valve was trileaflet  Leaflets exhibited normal cuspal separation and sclerosis  DOPPLER: Transaortic velocity was within the normal range  There was no evidence for stenosis  There was no regurgitation  TRICUSPID VALVE: The valve structure was normal  There was normal leaflet separation  DOPPLER: The transtricuspid velocity was within the normal range  There was no evidence for stenosis  There was moderate to severe regurgitation  Pulmonary artery systolic pressure was moderately increased  Estimated peak PA pressure was 58 mmHg  The findings suggest moderate pulmonary hypertension  PULMONIC VALVE: Leaflets exhibited normal thickness, no calcification, and normal cuspal separation  DOPPLER: The transpulmonic velocity was within the normal range  There was trace regurgitation  PERICARDIUM: There was no pericardial effusion  The pericardium was normal in appearance  AORTA: The root exhibited normal size  SYSTEMIC VEINS: IVC: The inferior vena cava was dilated  Respirophasic changes were blunted (less than 50% variation)  SYSTEM MEASUREMENT TABLES    2D  %FS: 27 11 %  Ao Diam: 3 25 cm  EDV(Teich): 88 28 ml  EF(Teich): 53 03 %  ESV(Teich): 41 47 ml  IVSd: 1 17 cm  LA Area: 13 94 cm2  LA Diam: 3 55 cm  LVEDV MOD A4C: 51 99 ml  LVEF MOD A4C: 47 75 %  LVESV MOD A4C: 27 17 ml  LVIDd: 4 41 cm  LVIDs: 3 22 cm  LVLd A4C: 6 26 cm  LVLs A4C: 6 06 cm  LVPWd: 1 1 cm  RA Area: 14 33 cm2  RVIDd: 3 12 cm  SV MOD A4C: 24 82 ml  SV(Teich): 46 82 ml    CW  TR Vmax: 3 47 m/s  TR maxP 23 mmHg    MM  TAPSE: 2 05 cm    PW  E': 0 06 m/s  E/E': 16 57  MV A Olivier: 0 73 m/s  MV Dec Socorro: 5 94 m/s2  MV DecT: 177 31 ms  MV E Olivier: 1 05 m/s  MV E/A Ratio: 1 43  MV PHT: 51 42 ms  MVA By PHT: 4 28 cm2    [facility] Echocardi[facility] Laboratory    Prepared and electronically signed by    Marly Finley DO  Signed 2018 13:06:47       No results found for this or any previous visit  No procedure found  No results found for this or any previous visit  Imaging: I have personally reviewed pertinent reports  Ct Chest Abdomen Pelvis Wo Contrast    Result Date: 7/3/2018  Narrative: CT CHEST, ABDOMEN AND PELVIS WITHOUT IV CONTRAST INDICATION:   Chest and abdominal pain and trauma  COMPARISON: 3/3/2017  TECHNIQUE: CT examination of the chest, abdomen and pelvis was performed without intravenous contrast   Axial, sagittal, and coronal 2D reformatted images were created from the source data and submitted for interpretation  Radiation dose length product (DLP) for this visit:  785 mGy-cm   This examination, like all CT scans performed in the [facility], was performed utilizing techniques to minimize radiation dose exposure, including the use of iterative reconstruction and automated exposure control  Enteric contrast was administered  FINDINGS: CHEST LUNGS:  Calcified granuloma in the right upper lobe  Minimal subsegmental atelectasis at the lung bases  No pulmonary contusion, laceration or pneumothorax  There is no tracheal or endobronchial lesion  PLEURA:  No hemothorax  HEART/GREAT VESSELS:  Mild cardiomegaly  No thoracic aortic aneurysm  MEDIASTINUM AND MARV:  No hematoma  CHEST WALL AND LOWER NECK:   No evidence of soft tissue contusion or hematoma  ABDOMEN LIVER/BILIARY TREE:  No hepatic contusion or laceration  GALLBLADDER:  Cholecystectomy  SPLEEN:  No splenic contusion or laceration  PANCREAS:  Unremarkable  ADRENAL GLANDS:  Unremarkable  KIDNEYS/URETERS:  No renal contusion or laceration  No obstructive uropathy  STOMACH AND BOWEL:  Colon is located in the left abdomen  Duodenum does not cross the midline, suggesting congenital malrotation  Large amount stool throughout the colon suggests constipation  No evidence of bowel contusion or perforation  APPENDIX:  No findings to suggest appendicitis  ABDOMINOPELVIC CAVITY:  No ascites or free intraperitoneal air  No lymphadenopathy  VESSELS:  Unremarkable for patient's age  PELVIS REPRODUCTIVE ORGANS:  Unremarkable for patient's age  URINARY BLADDER:  Markedly distended bladder without evidence of cystitis or hematoma  ABDOMINAL WALL/INGUINAL REGIONS:  Unremarkable  OSSEOUS STRUCTURES:  Acute right L1, L2 and L3 transverse process fractures  No evidence of thoracic spine or pelvic fracture  Chronic left anterior 3rd and 4th and posterior 11th rib fractures  Lumbar dextroscoliosis and severe disc degenerative change  L3 hemangioma noted  Impression: 1  Acute right L1, L2 and L3 transverse process fractures  No acute intra-abdominal trauma  2   Large amount of stool throughout the colon suggesting constipation  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  3   No acute chest trauma  Workstation performed: [id number]     Xr Chest Portable    Result Date: 7/6/2018  Narrative: CHEST INDICATION:   rhoncorous  COMPARISON:  7/5/2018   EXAM PERFORMED/VIEWS:  XR CHEST PORTABLE FINDINGS: Cardiomediastinal silhouette appears unremarkable  Bilateral perihilar opacities are seen likely representing pulmonary edema  Small right pleural effusion is present  Osseous structures appear within normal limits for patient age  Impression: Bilateral perihilar opacities likely representing pulmonary edema  Small right pleural effusion  Workstation performed: [id number]     Xr Chest Portable    Result Date: 7/4/2018  Narrative: CHEST INDICATION:   r/o aspiration  COMPARISON:  April 19, 2017, March 4, 2017 EXAM PERFORMED/VIEWS:  XR CHEST PORTABLE FINDINGS: The heart mediastinum are stable  Atherosclerotic changes again seen throughout the aorta  Heart is normal in size  The lungs are clear  No pneumothorax or pleural effusion  Osseous structures appear within normal limits for patient age  Impression: No acute cardiopulmonary disease  Workstation performed: [id number]     Xr Chest Pa & Lateral    Result Date: 7/6/2018  Narrative: CHEST INDICATION:   sob  COMPARISON:  7/3/2018 EXAM PERFORMED/VIEWS:  XR CHEST PA & LATERAL Images: 3 FINDINGS: Cardiomediastinal silhouette appears unremarkable  No pneumothorax  Blunting of the left costophrenic angle suggesting small effusion  Bilateral airspace disease extending from the hilar regions into the periphery of the lung fields  There is central pulmonary vascular congestion  Osseous structures appear within normal limits for patient age  Impression: Central pulmonary vascular congestion with bilateral airspace  Findings most suggestive of moderate pulmonary edema  Superimposed infection not excluded  Small left-sided effusion  Workstation performed: [id number]     Ct Head Wo Contrast    Result Date: 7/8/2018  Narrative: CT BRAIN - WITHOUT CONTRAST INDICATION:   Intracranial hemorrhage IIntracranial hemorrhage  COMPARISON:  CT of the head on July 6, 2018 at 0733 hours  TECHNIQUE:  CT examination of the brain was performed    In addition to axial images, coronal 2D reformatted images were created and submitted for interpretation  Radiation dose length product (DLP) for this visit:  1044 62 mGy-cm   This examination, like all CT scans performed in the [facility], was performed utilizing techniques to minimize radiation dose exposure, including the use of iterative reconstruction and automated exposure control  IMAGE QUALITY:  Diagnostic  FINDINGS: PARENCHYMA:  Stable thin right subdural hemorrhage and hemorrhage layering along the right tentorium  No large delayed intracranial hemorrhage  There are low attenuation changes in the periventricular white matter which are likely due to chronic small vessel ischemic changes  No midline shift or mass effect  VENTRICLES AND EXTRA-AXIAL SPACES:  Stable  VISUALIZED ORBITS AND PARANASAL SINUSES:  Stable  CALVARIUM AND EXTRACRANIAL SOFT TISSUES:  Chronic nasal bone fractures  Impression: Stable right subdural hemorrhage and hemorrhage layering along the right tentorium  No large delayed intracranial hemorrhage  Workstation performed: [id number]     Ct Head Wo Contrast    Result Date: 7/6/2018  Narrative: CT BRAIN - WITHOUT CONTRAST INDICATION:   Decreased alertness  COMPARISON:  7/3/2018 TECHNIQUE:  CT examination of the brain was performed  In addition to axial images, coronal 2D reformatted images were created and submitted for interpretation  Radiation dose length product (DLP) for this visit:  1111 84 mGy-cm   This examination, like all CT scans performed in the [facility], was performed utilizing techniques to minimize radiation dose exposure, including the use of iterative reconstruction and automated exposure control  IMAGE QUALITY:  Diagnostic  FINDINGS: PARENCHYMA:  There is a small thin early subacute subdural hemorrhage identified within the right hemisphere peripheral to the frontal and temporal lobe measuring approximately 3 mm from the inner table of the calvarium  There is a small amount of hyperdensity seen layering along the right posterior falx cerebri and the posterior tentorium consistent with small amount of subarachnoid or subdural blood  This was not present on the prior exam  There is no parenchymal hemorrhage identified  Scattered areas of decreased attenuation suggestive of chronic microangiopathy  Normal basilar cisterns  Brainstem and cerebellum demonstrate normal density  VENTRICLES AND EXTRA-AXIAL SPACES:  Normal for the patient's age  VISUALIZED ORBITS AND PARANASAL SINUSES:  Unremarkable  CALVARIUM AND EXTRACRANIAL SOFT TISSUES:  Normal      Impression: There is a small, thin, early subacute subdural hemorrhage identified within the right hemisphere superficial to the frontal and temporal lobes measuring less than 3 mm in size without mass effect or shift  In retrospect this was present on the prior exam dated 7/3/2018  Small amount of subarachnoid or subdural hemorrhage is now seen layering along the posterior falx cerebri and right posterior tentorium  Mild chronic microangiopathic change within the brain parenchyma  I personally discussed this study with Johnson Espino on 7/6/2018 at 10:43 AM  Workstation performed: [id number]     Ct Head Without Contrast    Result Date: 7/3/2018  Narrative: CT BRAIN - WITHOUT CONTRAST INDICATION:   Headache and head trauma  COMPARISON:  4/19/2017  TECHNIQUE:  CT examination of the brain was performed  In addition to axial images, coronal 2D reformatted images were created and submitted for interpretation  Radiation dose length product (DLP) for this visit:  1135 mGy-cm   This examination, like all CT scans performed in the [facility], was performed utilizing techniques to minimize radiation dose exposure, including the use of iterative reconstruction and automated exposure control  IMAGE QUALITY:  Diagnostic   FINDINGS: PARENCHYMA:  Periventricular hypoattenuating foci consistent with microangiopathic disease  No acute intracranial hemorrhage or mass effect  VENTRICLES AND EXTRA-AXIAL SPACES:  No hydrocephalus or extra-axial collection VISUALIZED ORBITS AND PARANASAL SINUSES:  No retro-orbital hematoma  Bilateral enophthalmos  CALVARIUM AND EXTRACRANIAL SOFT TISSUES:  No acute calvarial fracture  No soft tissue hematoma     Impression: No acute intracranial abnormality  Workstation performed: [id number]     Ct Cervical Spine Without Contrast    Result Date: 7/3/2018  Narrative: CT CERVICAL SPINE - WITHOUT CONTRAST INDICATION:   Neck pain and trauma  COMPARISON: None  TECHNIQUE:  CT examination of the cervical spine was performed without intravenous contrast   Contiguous axial images were obtained  Sagittal and coronal reconstructions were performed  Radiation dose length product (DLP) for this visit:  216 mGy-cm   This examination, like all CT scans performed in the [facility], was performed utilizing techniques to minimize radiation dose exposure, including the use of iterative reconstruction and automated exposure control  IMAGE QUALITY:  Diagnostic  FINDINGS: ALIGNMENT:  Normal alignment of the cervical spine  No subluxation  VERTEBRAL BODIES:  No acute fracture  DEGENERATIVE CHANGES:  Severe disc and facet degenerative change at C4-5 without significant central canal stenosis     Mild to moderate left neural foraminal narrowing  PREVERTEBRAL AND PARASPINAL SOFT TISSUES:  No soft tissue swelling or hematoma  THORACIC INLET:  Normal      Impression: No cervical spine fracture or traumatic malalignment    Workstation performed: [id number]

## 2018-07-08 NOTE — PROGRESS NOTES
Progress Note - Critical Care   Magi Castro 67 y o  female MRN: [id number]  Unit/Bed#: MICU 03 Encounter: [id number]    Attending Physician: Madelaine Jaime, DO  ______________________________________________________________________  Assessment and Plan:   1  Acute encephalopathy- improving  2  Subdural hematoma  3  Paroxysmal atrial fibrillation  4  UTI  5  Pancytopenia  6  Chronic hypotension  7  Hypothyroidism  8  L1-L3 transverse process fractures  9  Chronic opioid dependence  10  Protein calorie malnutrition  11  Cerebral ataxia with frequent falls  12  History of seizure disorder    Neuro: Close neurologic monitoring, continue Lyrica for seizure control, consider slowly re-adding home medications, transition fentanyl back to PRN oxycodone, CT stable, follow-up with neurosurgery    CV: Close hemodynamic monitoring, continue midodrine at present, follow-up with cardiology consult, may need to redose Lasix, use lower dose today    Pulm: Encourage good pulmonary hygiene    GI: Begin bowel regimen, transaminitis improving    : Close intake and output, daily weights, trend serum creatinine    F/E/N: Pending speech evaluation to advance diet, replete electrolytes as needed    ID: Day 3 of vancomycin, plan for 5 day course, follow temperature and white count    Heme: Holding anticoagulation/DVT prophylaxis presently, follow-up with neurosurgery    Endo: Follow blood sugars q6 while NPO, continue home levothyroxine     Msk/Skin: Out of bed as tolerated, wound care to abrasions    Disposition: Improving    Code Status: Level 1 - Full Code    Counseling / Coordination of Care  Total time spent today 25 minutes  Greater than 50% of total time was spent with the patient and / or family counseling and / or coordination of care   A description of the counseling / coordination of care: explanation of afib and continued ICU stay  ______________________________________________________________________    Chief Complaint: "I'm doing really well, I feel so much better"    24 Hour Events: Went in to rapid atrial fibrillation yesterday afternoon requiring diltiazem infusion, converted to NSR overnight and diltiazem held, diuresed 4L yesterday  ______________________________________________________________________    Physical Exam:   Gen: Awake, alert, pleasant, cooperative  Neuro: GCS 15, moves all extremities, strength 4/5 and equal bilaterally  HEENT: Atraumatic, PERRL, conjugate gaze, trachea midline, no JVD, trachea midline  CV: Regular rate and rhythm  Pulm: Lungs clear to auscultation  GI: Abdomen soft, non-tender, non-distended with bowel sounds present  : Elena in place with yellow urine  MSK: Various abrasions and wounds lower extremity in different stages of healing, mild peripheral edema  Skin: Warm, dry  ______________________________________________________________________  Vitals:    18 0300 18 0400 18 0500 18 0551   BP:       BP Location:       Pulse: (!) 52 60 60 60   Resp: 16 16 15 17   Temp:  97 6 °F (36 4 °C)     TempSrc:  Oral     SpO2: 97% 94% 99% 99%   Weight:    57 6 kg (126 lb 15 8 oz)   Height:           Temperature:   Temp (24hrs), Av 2 °F (36 8 °C), Min:97 2 °F (36 2 °C), Max:99 °F (37 2 °C)    Current Temperature: 97 6 °F (36 4 °C)  Weights:   IBW: 50 1 kg    Body mass index is 23 23 kg/m²    Weight (last 2 days)     Date/Time   Weight    18 0551  57 6 (126 99)    18 0629  61 9 (136 47)            Hemodynamic Monitoring:  N/A     Non-Invasive/Invasive Ventilation Settings:  Respiratory    Lab Data (Last 4 hours)    None         O2/Vent Data (Last 4 hours)    None              No results found for: PHART, JMN0UHO, PO2ART, BOI7YXM, D7JJILPR, BEART, SOURCE  SpO2: SpO2: 99 %, SpO2 Activity: SpO2 Activity: At Rest, SpO2 Device: O2 Device: Nasal cannula  Intake and Outputs:  I/O       701 -  07 -  07    I V  (mL/kg) 1132 5 (18 3) 63 2 (1 1)    IV Piggyback 1850 550    Total Intake(mL/kg) 2982 5 (48 2) 613 2 (10 6)    Urine (mL/kg/hr) 1120 (0 8) 4110 (3)    Total Output 1120 4110    Net +1862 5 -3496 8              UOP: 170/hour     Nutrition:        Diet Orders            Start     Ordered    07/07/18 1614  Diet NPO; Sips with meds  Diet effective now     Comments:  Meds in applesauce please   Question Answer Comment   Diet Type NPO    NPO Except: Sips with meds    RD to adjust diet per protocol? Yes        07/07/18 1613        Labs:     Results from last 7 days  Lab Units 07/07/18  0430 07/06/18  1616 07/06/18  0433 07/05/18  0510  07/03/18  1922   WBC Thousand/uL 5 37 2 87* 2 99* 4 77  < > 6 41   HEMOGLOBIN g/dL 8 4* 7 7* 9 0* 9 8*  < > 11 2*   HEMATOCRIT % 26 4* 24 3* 29 2* 31 9*  < > 34 5*   PLATELETS Thousands/uL 72* 56* 76* 80*  < > 102*   NEUTROS PCT %  --   --   --   --   --  89*   MONOS PCT %  --   --   --   --   --  3*   MONO PCT MAN %  --   --  0* 2*  --   --    < > = values in this interval not displayed  Results from last 7 days  Lab Units 07/08/18  0438 07/07/18  1407 07/07/18  0430  07/06/18  0433   SODIUM mmol/L 143 144 144  < > 144   POTASSIUM mmol/L 3 5 3 7 4 1  < > 3 6   CHLORIDE mmol/L 109* 111* 116*  < > 116*   CO2 mmol/L 27 24 24  < > 25   BUN mg/dL 19 21 25  < > 37*   CREATININE mg/dL 0 85 0 89 0 82  < > 0 93   CALCIUM mg/dL 8 3 8 6 8 3  < > 8 4   TOTAL PROTEIN g/dL 5 8*  --  5 4*  --  5 3*   BILIRUBIN TOTAL mg/dL 0 80  --  0 77  --  0 59   ALK PHOS U/L 285*  --  317*  --  141*   ALT U/L 58  --  71  --  64   AST U/L 28  --  62*  --  46*   GLUCOSE RANDOM mg/dL 81 91 85  < > 115   < > = values in this interval not displayed      Results from last 7 days  Lab Units 07/07/18  1407 07/07/18  0430 07/06/18  0433   MAGNESIUM mg/dL 1 9 2 1 1 7     Lab Results   Component Value Date    PHOS 2 3 07/07/2018    PHOS 3 4 07/04/2018    PHOS 3 2 04/26/2017        Results from last 7 days  Lab Units 07/06/18  0433 07/03/18  1723 INR  1 10 1 04   PTT seconds 36 25       0  Lab Value Date/Time   TROPONINI 0 04 2018 1644   TROPONINI 0 06 (H) 2018 1407   TROPONINI <0 02 2018 0838   TROPONINI 0 03 2018 1723   TROPONINI <0 02 2017 0442   TROPONINI 0 02 2017 1256   TROPONINI <0 02 2017 1119   TROPONINI 0 03 2017 0050   TROPONINI 0 02 2017 2123   TROPONINI <0 02 2016 1154       Results from last 7 days  Lab Units 18  1616 18  0433 18  1723   LACTIC ACID mmol/L 0 8 1 0 1 4     ABG:  Lab Results   Component Value Date    PHART 7 386 2018    GTV5FYN 36 4 2018    PO2ART 73 7 (L) 2018    FMG6DJA 21 3 (L) 2018    BEART -3 3 2018    SOURCE Line, Arterial 2018       Imagin/8 CT Head- Stable right subdural hemorrhage and layering along the right tentorium   ECHO- EF 13% grade 2 diastolic dysfunction, left atrium mildly dilated, mild to moderate mitral regurgitation, moderate to severe tricuspid regurgitation suggesting moderate pulmonary hypertension, IVC dilated with blunted respirophasic changes I have personally reviewed pertinent reports  EKG: Review of telemetry demonstrates normal sinus rhythm    Micro:  Lab Results   Component Value Date    BLOODCX No Growth at 24 hrs  2018    BLOODCX No Growth at 24 hrs  2018    BLOODCX No Growth After 5 Days  2017    URINECX >100,000 cfu/ml Staphylococcus coagulase negative (A) 2018    URINECX >100,000 cfu/ml Escherichia coli (A) 2018    URINECX >100,000 cfu/ml Escherichia coli 2017    URINECX <10,000 cfu/ml Mixed Contaminants X2 2017       Allergies:    Allergies   Allergen Reactions    Acetaminophen     Acetaminophen-Codeine GI Intolerance    Codeine     Penicillins Other (See Comments)     Unknown reaction; however, has tolerated multiple Cephalosporins including Cefazolin, Cephalexin, Ceftriaxone, & Cefepime       Medications:   Scheduled Meds:  Current Facility-Administered Medications:  diltiazem 1-15 mg/hr Intravenous Titrated JESSY Whitaker Last Rate: Stopped (07/08/18 0024)   fentanyl citrate (PF) 25 mcg Intravenous Q2H PRN Prudjoe Lopez MD    lidocaine 1 patch Transdermal Daily Chantale Sciara, DO    midodrine 10 mg Oral TID Chantale Sciara, DO    ondansetron 4 mg Intravenous Q6H PRN Chantale Sciara, DO    pneumococcal 13-valent conjugate vaccine 0 5 mL Intramuscular Prior to discharge Chantale Sciara, DO    potassium chloride 20 mEq Oral TID With Meals JESSY Whitaker    pregabalin 50 mg Oral TID JESSY Whitaker    vancomycin 15 mg/kg Intravenous Q12H JESSY Whitaker Last Rate: 750 mg (07/08/18 0542)     Continuous Infusions:  diltiazem 1-15 mg/hr Last Rate: Stopped (07/08/18 0024)     PRN Meds:    fentanyl citrate (PF) 25 mcg Q2H PRN   ondansetron 4 mg Q6H PRN   pneumococcal 13-valent conjugate vaccine 0 5 mL Prior to discharge       VTE Pharmacologic Prophylaxis: Reason for no pharmacologic prophylaxis SDH  VTE Mechanical Prophylaxis: sequential compression device    Invasive lines and devices: Invasive Devices     Peripheral Intravenous Line            Peripheral IV 07/03/18 Ventral (anterior); Left Arm 4 days    Peripheral IV 07/06/18 Right Antecubital 2 days          Arterial Line            Arterial Line 07/06/18 Radial 1 day          Drain            Urethral Catheter Non-latex 16 Fr  3 days                     Portions of the record may have been created with voice recognition software  Occasional wrong word or "sound a like" substitutions may have occurred due to the inherent limitations of voice recognition software  Read the chart carefully and recognize, using context, where substitutions have occurred      JESSY Whitaker

## 2018-07-08 NOTE — PROGRESS NOTES
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[facility] [address] )    Anemia    Transaminitis    UTI (urinary tract infection)    Neuropathy, peripheral, idiopathic    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[facility] [address] )    Lumbar transverse process fracture (HCC)    Elevated brain natriuretic peptide (BNP) level    Subdural hematoma (HCC)    Paroxysmal atrial fibrillation (HCC)    Diastolic CHF ([facility] [address] )  Resolved Problems:    Encephalopathy acute    Hypotension    Hypothermia    Rhabdomyolysis    Thrombocytopenia (HCC)    GILBERTO (acute kidney injury) [facility])      Consultants: Cardiology, neurosurgery- following peripherally    History of Present Illness: Patient is a 79-year-old female presenting on 07/03 with complaint of being found down converted extremity for approximately 3 days  She has a past medical history of cerebral ataxia, depression, hypothyroidism, history of suicide attempt, hypotension on midodrine, and seizure disorder  She had an elevated CK on arrival with an acute kidney injury and was fluid resuscitated for rhabdomyolysis  She also had a possible UTI and was started on ceftriaxone  She had new transverse lumbar fractures which were evaluated by the trauma team and felt that medical management was appropriate  Overnight on 07/06 she had worsening mental status and progressive hypotension and hypothermia  She was transferred to the critical care unit for closer monitoring and resuscitation  Summary of clinical course: She had a normal CT scan of her head on the date of admission and a CT scan was repeated prior to transfer to the critical care unit  This to menstruated a small right frontal subdural hematoma  Neurosurgery was consulted and recommended a repeat CT scan tomorrow  She remained encephalopathic with a waxing and waning mental status, hypothermia requiring a Simone Hugger, and hypotension requiring placement of arterial line and peripheral phenylephrine for several hours  Over the course of the night her mental status improved  Her antibiotics were initially broadened out to cefepime/vancomycin/Flagyl  Her only culture results are greater than 100,000 coag-negative staph in her urine  Her antibiotics were narrowed to vancomycin only today  She was seen by the speech therapy team who felt that she was only appropriate for meds crushed in applesauce  Her mental status has improved significantly and at this point she is felt to be stable for transition to a step-down 2 level of care  Of note she is not on DVT prophylaxis or aspirin until her repeat CT scan of her head on 07/08  She was felt to be mildly volume overloaded and was given 20 mg of intravenous Lasix today with her echocardiogram demonstrating grade 2 diastolic dysfunction  Later on 7/7, the patient went in to atrial fibrillation with rapid ventricular response  She was started on Cardizem and spontaneously converted to normal sinus rhythm overnight with discontinuation of the infusion  Cardiology was asked to see the patient and recommended against anticoagulation secondary to frequent falls and subdural hematoma        Recent or scheduled procedures:   7/7 ECHO- EF 09% grade 2 diastolic dysfunction, elft atrium mildly dilated, mild to moderate mitral regurgitation, moderate to severe tricuspid regurugitation with moderate plumonary hypertension, IVC dilated with blunted respirophasic changes  7/6 CXR- Bilateral perihilar opacities, small right pleural effusion  7/6 CT Head- Small, thin, early subacute subdural hemorrhage within the right hemisphere superficial to the frontal and temporal lobes measuring less than 3 mm in size, small amount of subarachnoid or subdual hemorrhage in the posterior falx cerebri and posterior right tentorium, mild chronic microangiopathic change  7/6 CT Cervical spine- No cervical spine fracture or traumatic malalignment  7/3 CT C/A/P- Acute right L1-L3 transverse process fractures, large amount of stool throughout the colon, congenital malrotation without evidence of bowel obstruction    Outstanding/pending diagnostics:   PT/OT, speech recommendations, cardiology recommendations    Cultures:   7/6 Blood x2- No growth to date  7/3 Urine- >100,000 coag-negative staph       Mobilization Plan: Out of bed with assistance    Nutrition Plan: NPO pending speech    Discharge Plan:   Patient should be ready for discharge to rehab on after PT/OT recommendations, antibiotic completion, cardiology recommendations    Initial Physical Therapy Recommendations: Pending  Initial Occupational Therapy Recommendations: Pending  Initial /Plan: Following, area of aging involved    Home medications that are not reordered and reason why:   Wellbutrin, Atarax, Remeron, Mirapex, Requip- Profound encephalopathy     Specific Diagnosis Plan:    Sepsis: Source: UTI, Antibiotics: Vancomycin, Length:  5 days    Need for Infectious Disease consult: No  Heart Failure:  Cardiology consult placed  Diuresis plan: Pending cardiology    Spoke with Dr Pao Reyes  regarding transfer  Please call [phone] with any questions or concerns  Portions of the record may have been created with voice recognition software  Occasional wrong word or "sound a like" substitutions may have occurred due to the inherent limitations of voice recognition software  Read the chart carefully and recognize, using context, where substitutions have occurred      JESSY Whitaker

## 2018-07-08 NOTE — PROGRESS NOTES
Progress Note - Neurosurgery   Isaac Ac 67 y o  female MRN: [id number]  Unit/Bed#: MICU 03 Encounter: [id number]    Assessment/Plan:    · 67year old woman with frequent falls  · R convexity SDH with tent component  · Stable on f/u Ct done today  · Pharm VTE ppx would be acceptable, but would not restart anti-platelet or therapeutic anticoagulation  · Patient with frequent falls, so AC/AP may not be advisable regardless  · Should have f/u CT scan of head in 2 weeks to assess evolution - ordered  Should follow-up with neurosurgery outpatient to review that study  CT ordered, and D/C instructions completed  · Call with questions  History:    Chief Complaint: Altered MS, confusion    Subjective: Atrial fib event yesterday, converted to SR with diltiazem infusion    all current active meds have been reviewed, current meds:   Current Facility-Administered Medications   Medication Dose Route Frequency    diltiazem (CARDIZEM) 125 mg in sodium chloride 0 9 % 125 mL infusion  1-15 mg/hr Intravenous Titrated    fentanyl citrate (PF) 100 MCG/2ML 25 mcg  25 mcg Intravenous Q2H PRN    lidocaine (LIDODERM) 5 % patch 1 patch  1 patch Transdermal Daily    midodrine (PROAMATINE) tablet 10 mg  10 mg Oral TID    ondansetron (ZOFRAN) injection 4 mg  4 mg Intravenous Q6H PRN    pneumococcal 13-valent conjugate vaccine (PREVNAR-13) IM injection 0 5 mL  0 5 mL Intramuscular Prior to discharge    potassium chloride (K-DUR,KLOR-CON) CR tablet 20 mEq  20 mEq Oral TID With Meals    pregabalin (LYRICA) capsule 50 mg  50 mg Oral TID    vancomycin (VANCOCIN) IVPB (premix) 750 mg  15 mg/kg Intravenous Q12H      and PTA meds:     Prior to Admission Medications   Prescriptions Last Dose Informant Patient Reported? Taking?    QUEtiapine (SEROquel) 25 mg tablet   No No   Sig: TAKE 1 TABLET AT BEDTIME (FUTURE REFILLS SHOULD COME FROM PSYCH)   buPROPion (WELLBUTRIN XL) 300 mg 24 hr tablet   No No   Sig: Take 1 tablet (300 mg total) by mouth daily   gabapentin (NEURONTIN) 100 mg capsule   No No   Sig: Take 1 capsule by mouth 2 (two) times a day for 30 days   hydrOXYzine HCL (ATARAX) 25 mg tablet   No No   Sig: Take 1 tablet (25 mg total) by mouth daily   levothyroxine 75 mcg tablet   No No   Sig: Take 1 tablet (75 mcg total) by mouth daily   lidocaine (LIDODERM) 5 %   No No   Sig: Place 1 patch on the skin daily Remove & Discard patch within 12 hours or as directed by MD   meloxicam (MOBIC) 15 mg tablet   No No   Sig: Take 1 tablet (15 mg total) by mouth daily as needed for moderate pain   midodrine (PROAMATINE) 10 MG tablet   Yes No   Sig: Take 10 mg by mouth 3 (three) times a day   mirtazapine (REMERON) 15 mg tablet   No No   Sig: Take 1 tablet by mouth daily at bedtime for 30 days   ondansetron (ZOFRAN) 8 mg tablet   No No   Sig: Take 1 tablet (8 mg total) by mouth every 8 (eight) hours as needed for nausea or vomiting   oxyCODONE (ROXICODONE) 5 mg immediate release tablet   No No   Sig: Take 1 tablet (5 mg total) by mouth every 4 (four) hours as needed for moderate pain Max Daily Amount: 30 mg   pramipexole (MIRAPEX) 0 25 mg tablet   No No   Sig: Take 2 tablets (0 5 mg total) by mouth daily at bedtime   pregabalin (LYRICA) 50 mg capsule   No No   Sig: Take 1 capsule (50 mg total) by mouth 3 (three) times a day   rOPINIRole (REQUIP) 1 mg tablet   Yes No   Sig: Take 1 tablet by mouth 3 (three) times a day      Facility-Administered Medications: None       Objective:     Exam:     Vitals: Blood pressure 142/74, pulse 60, temperature 97 6 °F (36 4 °C), temperature source Oral, resp  rate 17, height 5' 2" (1 575 m), weight 57 6 kg (126 lb 15 8 oz), SpO2 99 %  ,Body mass index is 23 23 kg/m²      Physical Exam    Neurologic Exam      MDM:    Lab Results:      Results from last 7 days  Lab Units 07/07/18  0430 07/06/18  1616 07/06/18  0433 07/05/18  0510  07/03/18  1922   WBC Thousand/uL 5 37 2 87* 2 99* 4 77  < > 6 41   HEMOGLOBIN g/dL 8 4* 7 7* 9  0* 9 8*  < > 11 2*   HEMATOCRIT % 26 4* 24 3* 29 2* 31 9*  < > 34 5*   PLATELETS Thousands/uL 72* 56* 76* 80*  < > 102*   NEUTROS PCT %  --   --   --   --   --  89*   MONOS PCT %  --   --   --   --   --  3*   MONO PCT MAN %  --   --  0* 2*  --   --    < > = values in this interval not displayed  Results from last 7 days  Lab Units 07/08/18  0438 07/07/18  1407 07/07/18  0430  07/06/18  0433   SODIUM mmol/L 143 144 144  < > 144   POTASSIUM mmol/L 3 5 3 7 4 1  < > 3 6   CHLORIDE mmol/L 109* 111* 116*  < > 116*   CO2 mmol/L 27 24 24  < > 25   BUN mg/dL 19 21 25  < > 37*   CREATININE mg/dL 0 85 0 89 0 82  < > 0 93   CALCIUM mg/dL 8 3 8 6 8 3  < > 8 4   TOTAL PROTEIN g/dL 5 8*  --  5 4*  --  5 3*   BILIRUBIN TOTAL mg/dL 0 80  --  0 77  --  0 59   ALK PHOS U/L 285*  --  317*  --  141*   ALT U/L 58  --  71  --  64   AST U/L 28  --  62*  --  46*   GLUCOSE RANDOM mg/dL 81 91 85  < > 115   < > = values in this interval not displayed  Results from last 7 days  Lab Units 07/06/18  0433 07/03/18  1723   INR  1 10 1 04   PTT seconds 36 25       Imaging:    Ct Head Wo Contrast    Result Date: 7/8/2018  Narrative: CT BRAIN - WITHOUT CONTRAST INDICATION:   Intracranial hemorrhage IIntracranial hemorrhage  COMPARISON:  CT of the head on July 6, 2018 at 0733 hours  TECHNIQUE:  CT examination of the brain was performed  In addition to axial images, coronal 2D reformatted images were created and submitted for interpretation  Radiation dose length product (DLP) for this visit:  1044 62 mGy-cm   This examination, like all CT scans performed in the [facility], was performed utilizing techniques to minimize radiation dose exposure, including the use of iterative reconstruction and automated exposure control  IMAGE QUALITY:  Diagnostic  FINDINGS: PARENCHYMA:  Stable thin right subdural hemorrhage and hemorrhage layering along the right tentorium  No large delayed intracranial hemorrhage   There are low attenuation changes in the periventricular white matter which are likely due to chronic small vessel ischemic changes  No midline shift or mass effect  VENTRICLES AND EXTRA-AXIAL SPACES:  Stable  VISUALIZED ORBITS AND PARANASAL SINUSES:  Stable  CALVARIUM AND EXTRACRANIAL SOFT TISSUES:  Chronic nasal bone fractures  Impression: Stable right subdural hemorrhage and hemorrhage layering along the right tentorium  No large delayed intracranial hemorrhage  Workstation performed: [id number]     Ct Head Wo Contrast    Result Date: 7/6/2018  Narrative: CT BRAIN - WITHOUT CONTRAST INDICATION:   Decreased alertness  COMPARISON:  7/3/2018 TECHNIQUE:  CT examination of the brain was performed  In addition to axial images, coronal 2D reformatted images were created and submitted for interpretation  Radiation dose length product (DLP) for this visit:  1111 84 mGy-cm   This examination, like all CT scans performed in the [facility], was performed utilizing techniques to minimize radiation dose exposure, including the use of iterative reconstruction and automated exposure control  IMAGE QUALITY:  Diagnostic  FINDINGS: PARENCHYMA:  There is a small thin early subacute subdural hemorrhage identified within the right hemisphere peripheral to the frontal and temporal lobe measuring approximately 3 mm from the inner table of the calvarium  There is a small amount of hyperdensity seen layering along the right posterior falx cerebri and the posterior tentorium consistent with small amount of subarachnoid or subdural blood  This was not present on the prior exam  There is no parenchymal hemorrhage identified  Scattered areas of decreased attenuation suggestive of chronic microangiopathy  Normal basilar cisterns  Brainstem and cerebellum demonstrate normal density  VENTRICLES AND EXTRA-AXIAL SPACES:  Normal for the patient's age  VISUALIZED ORBITS AND PARANASAL SINUSES:  Unremarkable   CALVARIUM AND EXTRACRANIAL SOFT TISSUES:  Normal      Impression: There is a small, thin, early subacute subdural hemorrhage identified within the right hemisphere superficial to the frontal and temporal lobes measuring less than 3 mm in size without mass effect or shift  In retrospect this was present on the prior exam dated 7/3/2018  Small amount of subarachnoid or subdural hemorrhage is now seen layering along the posterior falx cerebri and right posterior tentorium  Mild chronic microangiopathic change within the brain parenchyma  I personally discussed this study with Kana Shields on 7/6/2018 at 10:43 AM  Workstation performed: [id number]     Ct Head Without Contrast    Result Date: 7/3/2018  Narrative: CT BRAIN - WITHOUT CONTRAST INDICATION:   Headache and head trauma  COMPARISON:  4/19/2017  TECHNIQUE:  CT examination of the brain was performed  In addition to axial images, coronal 2D reformatted images were created and submitted for interpretation  Radiation dose length product (DLP) for this visit:  1135 mGy-cm   This examination, like all CT scans performed in the [facility], was performed utilizing techniques to minimize radiation dose exposure, including the use of iterative reconstruction and automated exposure control  IMAGE QUALITY:  Diagnostic  FINDINGS: PARENCHYMA:  Periventricular hypoattenuating foci consistent with microangiopathic disease  No acute intracranial hemorrhage or mass effect  VENTRICLES AND EXTRA-AXIAL SPACES:  No hydrocephalus or extra-axial collection VISUALIZED ORBITS AND PARANASAL SINUSES:  No retro-orbital hematoma  Bilateral enophthalmos  CALVARIUM AND EXTRACRANIAL SOFT TISSUES:  No acute calvarial fracture  No soft tissue hematoma     Impression: No acute intracranial abnormality  Workstation performed: [id number]         Imaging Studies: I have personally reviewed pertinent reports     and I have personally reviewed pertinent films in PACS

## 2018-07-09 ENCOUNTER — TELEPHONE (OUTPATIENT)
Dept: NEUROLOGY | Facility: CLINIC | Age: 73
End: 2018-07-09

## 2018-07-09 ENCOUNTER — APPOINTMENT (INPATIENT)
Dept: RADIOLOGY | Facility: HOSPITAL | Age: 73
DRG: 682 | End: 2018-07-09
Payer: MEDICARE

## 2018-07-09 PROBLEM — R33.9 URINARY RETENTION: Status: ACTIVE | Noted: 2018-07-09

## 2018-07-09 LAB
ANION GAP SERPL CALCULATED.3IONS-SCNC: 6 MMOL/L (ref 4–13)
BUN SERPL-MCNC: 16 MG/DL (ref 5–25)
CALCIUM SERPL-MCNC: 8.3 MG/DL (ref 8.3–10.1)
CHLORIDE SERPL-SCNC: 109 MMOL/L (ref 100–108)
CO2 SERPL-SCNC: 28 MMOL/L (ref 21–32)
CREAT SERPL-MCNC: 0.72 MG/DL (ref 0.6–1.3)
ERYTHROCYTE [DISTWIDTH] IN BLOOD BY AUTOMATED COUNT: 15.1 % (ref 11.6–15.1)
GFR SERPL CREATININE-BSD FRML MDRD: 84 ML/MIN/1.73SQ M
GLUCOSE SERPL-MCNC: 170 MG/DL (ref 65–140)
GLUCOSE SERPL-MCNC: 217 MG/DL (ref 65–140)
GLUCOSE SERPL-MCNC: 84 MG/DL (ref 65–140)
GLUCOSE SERPL-MCNC: 84 MG/DL (ref 65–140)
GLUCOSE SERPL-MCNC: 85 MG/DL (ref 65–140)
HCT VFR BLD AUTO: 29.6 % (ref 34.8–46.1)
HGB BLD-MCNC: 9.3 G/DL (ref 11.5–15.4)
MCH RBC QN AUTO: 29.8 PG (ref 26.8–34.3)
MCHC RBC AUTO-ENTMCNC: 31.4 G/DL (ref 31.4–37.4)
MCV RBC AUTO: 95 FL (ref 82–98)
PLATELET # BLD AUTO: 125 THOUSANDS/UL (ref 149–390)
PMV BLD AUTO: 11.4 FL (ref 8.9–12.7)
POTASSIUM SERPL-SCNC: 3.9 MMOL/L (ref 3.5–5.3)
RBC # BLD AUTO: 3.12 MILLION/UL (ref 3.81–5.12)
SODIUM SERPL-SCNC: 143 MMOL/L (ref 136–145)
WBC # BLD AUTO: 9 THOUSAND/UL (ref 4.31–10.16)

## 2018-07-09 PROCEDURE — 85027 COMPLETE CBC AUTOMATED: CPT | Performed by: NURSE PRACTITIONER

## 2018-07-09 PROCEDURE — 97110 THERAPEUTIC EXERCISES: CPT

## 2018-07-09 PROCEDURE — 92611 MOTION FLUOROSCOPY/SWALLOW: CPT

## 2018-07-09 PROCEDURE — 74230 X-RAY XM SWLNG FUNCJ C+: CPT

## 2018-07-09 PROCEDURE — 80048 BASIC METABOLIC PNL TOTAL CA: CPT | Performed by: NURSE PRACTITIONER

## 2018-07-09 PROCEDURE — 97535 SELF CARE MNGMENT TRAINING: CPT

## 2018-07-09 PROCEDURE — 82948 REAGENT STRIP/BLOOD GLUCOSE: CPT

## 2018-07-09 PROCEDURE — 99232 SBSQ HOSP IP/OBS MODERATE 35: CPT | Performed by: NURSE PRACTITIONER

## 2018-07-09 RX ORDER — METOPROLOL TARTRATE 5 MG/5ML
5 INJECTION INTRAVENOUS ONCE
Status: COMPLETED | OUTPATIENT
Start: 2018-07-09 | End: 2018-07-09

## 2018-07-09 RX ADMIN — MIDODRINE HYDROCHLORIDE 10 MG: 5 TABLET ORAL at 17:40

## 2018-07-09 RX ADMIN — VANCOMYCIN HYDROCHLORIDE 750 MG: 750 INJECTION, SOLUTION INTRAVENOUS at 18:07

## 2018-07-09 RX ADMIN — PREGABALIN 50 MG: 50 CAPSULE ORAL at 20:32

## 2018-07-09 RX ADMIN — LIDOCAINE 1 PATCH: 50 PATCH CUTANEOUS at 12:27

## 2018-07-09 RX ADMIN — OXYCODONE HYDROCHLORIDE 5 MG: 5 TABLET ORAL at 13:54

## 2018-07-09 RX ADMIN — PREGABALIN 50 MG: 50 CAPSULE ORAL at 17:43

## 2018-07-09 RX ADMIN — GABAPENTIN 100 MG: 100 CAPSULE ORAL at 17:39

## 2018-07-09 RX ADMIN — ENOXAPARIN SODIUM 40 MG: 40 INJECTION SUBCUTANEOUS at 12:26

## 2018-07-09 RX ADMIN — MIDODRINE HYDROCHLORIDE 10 MG: 5 TABLET ORAL at 20:32

## 2018-07-09 RX ADMIN — VANCOMYCIN HYDROCHLORIDE 750 MG: 750 INJECTION, SOLUTION INTRAVENOUS at 04:04

## 2018-07-09 RX ADMIN — PREGABALIN 50 MG: 50 CAPSULE ORAL at 12:27

## 2018-07-09 RX ADMIN — MIDODRINE HYDROCHLORIDE 10 MG: 5 TABLET ORAL at 12:27

## 2018-07-09 RX ADMIN — METOPROLOL TARTRATE 12.5 MG: 25 TABLET ORAL at 12:14

## 2018-07-09 RX ADMIN — METOPROLOL TARTRATE 12.5 MG: 25 TABLET ORAL at 22:57

## 2018-07-09 RX ADMIN — GABAPENTIN 100 MG: 100 CAPSULE ORAL at 12:26

## 2018-07-09 RX ADMIN — METOPROLOL TARTRATE 5 MG: 5 INJECTION, SOLUTION INTRAVENOUS at 01:32

## 2018-07-09 RX ADMIN — OXYCODONE HYDROCHLORIDE 5 MG: 5 TABLET ORAL at 03:44

## 2018-07-09 NOTE — ASSESSMENT & PLAN NOTE
· 7/7 ECHO- EF 28% grade 2 diastolic dysfunction, elft atrium mildly dilated, mild to moderate mitral regurgitation, moderate to severe tricuspid regurugitation with moderate plumonary hypertension, IVC dilated with blunted respirophasic changes  · Patient appears compensated at this time  Did get IV Lasix while in the ICU    · Continue beta-blocker

## 2018-07-09 NOTE — SOCIAL WORK
BEKAH received a phone call from Titi Carlos [phone] with [facility]  As per Umu Ellison she will be at the hospital to see pt today  CM also informed Umu Ellison pt daughter - Collin Nunez is pt POA (copy is in Media section)  BEKAH provided Yanelis with her contact information  Umu Ellison also requested a capacity eval is completed  BEKAH Sanders Texted BEKAH VC to inform her of same

## 2018-07-09 NOTE — ASSESSMENT & PLAN NOTE
· Patient's rate is controlled at this time  · Anticoagulation will not be started at this time    · Cardiology input appreciated

## 2018-07-09 NOTE — ASSESSMENT & PLAN NOTE
· Coag-negative staph was isolated  · Continue 5 days of vancomycin  · Currently on day 4/5  · Clinically stable

## 2018-07-09 NOTE — ASSESSMENT & PLAN NOTE
· Appreciate neurosurgery input  · Repeat CT scan on July 8, 2018 showed that there was a stable subdural hemorrhage within the right hemisphere  · No neuro surgical intervention is required in the hospital   · DVT prophylaxis has been approved however anticoagulation has not been approved  · She will need to have outpatient follow-up with Neurosurgery with the preceding CT scan

## 2018-07-09 NOTE — RESTORATIVE TECHNICIAN NOTE
Restorative Specialist Mobility Note       Activity: Chair     Assistive Device: Other (Comment) (HHA x 2)

## 2018-07-09 NOTE — SOCIAL WORK
CM t/c with Andrey Martin [phone] from [address] [place] [address] on Aging confirmed that she will come to [facility] to see pt today  Decision on d/c pending capacity evaluation  Per Korina Bonner, pt has denied any abuse/neglect  If pt found to have capacity, there are no interventions they can offer pt  CM following

## 2018-07-09 NOTE — CASE MANAGEMENT
Continued Stay Review    Date:   18 ICU LEVEL OF CARE    Vital Signs:            Vitals:     18 0300 18 0400 18 0500 18 0546   BP:           BP Location:           Pulse: 70 70 68 76   Resp: 18 18 15 (!) 24   Temp: 98 2 °F (36 8 °C) 98 2 °F (36 8 °C) 98 2 °F (36 8 °C) 98 2 °F (36 8 °C)   TempSrc:   Rectal       SpO2: 93% 91% 95% (!) 87%   Weight:           Height:                    Temperature:   Temp (24hrs), Av 1 °F (36 2 °C), Min:93 2 °F (34 °C), Max:99 °F (37 2 °C)   Current Temperature: 98 2 °F (36 8 °C)    Weights:   IBW: 50 1 kg    Body mass index is 24 96 kg/m²  Weight (last 2 days)      Date/Time   Weight     18 0629   61 9 (136 47)     Intake and Outputs:        I/O        701 -  07 -  0700     P  O  50       I V  (mL/kg) 890 (14 4) 1128 1 (18 2)     IV Piggyback 125 1850     Total Intake(mL/kg) 1065 (17 2) 2978 1 (48 1)     Urine (mL/kg/hr) 525 (0 4) 970 (0 7)     Total Output 525 970     Net +540 +2008  1               Unmeasured Stool Occurrence 2 x       URINE  OP: 50/hour       NPO    Continuous IV Infusions:    IVF phenylephrine (CAN-SYNEPHRINE) 50 mg (STANDARD CONCENTRATION) in sodium chloride 0 9% 250 mL TITRATE MAP > 65    IVF diltiazem (CARDIZEM) 125 mg in sodium chloride 0 9 % 125 mL infusion TITRATE HR < 110      Medications:   Scheduled Meds:   Current Facility-Administered Medications:  enoxaparin 40 mg Subcutaneous Q24H [facility] JESSY Monahan    gabapentin 100 mg Oral BID JESSY Monahan    lidocaine 1 patch Transdermal Daily Dwight Cheng DO    metoprolol tartrate 12 5 mg Oral Q12H [facility] Gretta Galloway MD    midodrine 10 mg Oral TID Dwight Chneg DO    ondansetron 4 mg Intravenous Q6H PRN Dwight Cheng DO    oxyCODONE 5 mg Oral Q6H PRN JESSY Donnelly    pneumococcal 13-valent conjugate vaccine 0 5 mL Intramuscular Prior to discharge Dwight Cheng,     pregabalin 50 mg Oral TID Sally Hein JESSY Olivarez    vancomycin 15 mg/kg Intravenous Q12H JESSY Tejada Last Rate: 750 mg (07/09/18 0404)       PRN Meds:      IV fentanyl 25 mcg q2hrs prn given x 2/ 24 hrs      ondansetron    oxyCODONE 5 mg q6hrs prn given x 2/     pneumococcal 13-valent conjugate vaccine      LABS/Diagnostic Results:   CBC  Results from last 7 days  Lab Units 07/07/18  0430 07/06/18  1616 07/06/18  0433 07/05/18  0510   07/03/18  1922   WBC Thousand/uL 5 37 2 87* 2 99* 4 77  < > 6 41   HEMOGLOBIN g/dL 8 4* 7 7* 9 0* 9 8*  < > 11 2*   HEMATOCRIT % 26 4* 24 3* 29 2* 31 9*  < > 34 5*   PLATELETS Thousands/uL 72* 56* 76* 80*  < > 102*   NEUTROS PCT %  --   --   --   --   --  89*   MONOS PCT %  --   --   --   --   --  3*   MONO PCT MAN %  --   --  0* 2*  --   --      CMP  Results from last 7 days  Lab Units 07/07/18  0430 07/06/18  1616 07/06/18  0433 07/05/18  0510   SODIUM mmol/L 144 145 144 148*   POTASSIUM mmol/L 4 1 4 4 3 6 4 3   CHLORIDE mmol/L 116* 116* 116* 119*   CO2 mmol/L 24 22 25 20*   BUN mg/dL 25 32* 37* 50*   CREATININE mg/dL 0 82 0 77 0 93 1 07   CALCIUM mg/dL 8 3 8 6 8 4 8 1*   TOTAL PROTEIN g/dL 5 4*  --  5 3* 5 6*   BILIRUBIN TOTAL mg/dL 0 77  --  0 59 1 34*   ALK PHOS U/L 317*  --  141* 156*   ALT U/L 71  --  64 82*   AST U/L 62*  --  46* 103*   GLUCOSE RANDOM mg/dL 85 79 115 51*         Results from last 7 days  Lab Units 07/07/18  0430 07/06/18  0433 07/04/18  0449   MAGNESIUM mg/dL 2 1 1 7 2 0            Lab Results   Component Value Date     PHOS 2 3 07/07/2018     PHOS 3 4 07/04/2018     PHOS 3 2 04/26/2017       Results from last 7 days  Lab Units 07/06/18  0433 07/03/18  1723   INR   1 10 1 04   PTT seconds 36 25      Lab Value Date/Time   TROPONINI <0 02 07/06/2018 0838     Results from last 7 days  Lab Units 07/06/18  1616 07/06/18  0433 07/03/18  1723   LACTIC ACID mmol/L 0 8 1 0 1 4      ABG:  Lab Results   Component Value Date     PHART 7 386 07/06/2018     [id number] 36 4 07/06/2018     PO2ART 73 7 (L) 07/06/2018     [id number] 21 3 (L) 07/06/2018     BEART -3 3 07/06/2018     SOURCE Line, Arterial 07/06/2018      URINECX >100,000 cfu/ml Staphylococcus coagulase negative (A) 07/03/2018       CHEST X RAY -   Bilateral perihilar opacities, small right pleural effusion    CT HEAD -  Small, thin, early subacute subdural hemorrhage identified within the right hemisphere superficial to the frontal and temporal lobes measuring less than 3 mm in size without mass effect or shift, small amount of subarachnoid or sundural hemorrhage layering along the posterior falx and right posterior tentorium, mild chronic microangiopathic change within the brain I have personally reviewed pertinent reports  and I have personally reviewed pertinent films in PACS     EKG/ Telemetry  (Per MD note):  Sinus Rhythm    Age/Sex: 67 y o  female       Assessment and Plan:   1  Acute encephalopathy  2  Subdural hematoma  3  Sepsis  4  UTI  5  Rhabdomyolysis- resolving  6  Pancytopenia  7  Chronic hypotension on midodrine  8  Hypothyroidism  9  L1-L3 transverse process fractures  10  Chronic opioid dependence  11  Protein calorie malnutrition  12   Cerebral ataxia with frequent falls     Neuro: Close neurologic monitoring, as mental status improves, slowly introduce home medications, can continue low dose opioids for pain control, appreciate neurosurgery recommendations, follow-up CT scan in AM tomorrow     CV: Close hemodynamic monitoring, can use phenylephrine until able to resume home midodrine, pending ECHO, would favor gentle diuresis     Pulm: Encourage good pulmonary hygiene     GI: If unable to pass speech, will need to consider enteral access, LFTs trending upwards, unclear etiology     : Closely follow intake and output, daily weights, trend serum creatinine, remove mcrae catheter     F/E/N: Pending speech evaluation, replete electrolytes as needed     ID: Day 3 of vancomycin/cefepime/Flagyl, if blood cultures negative, would treat UTI with vancomycin for 5 days     Heme: No anticoagulation/DVT prophylaxis until repeat CT tomorrow morning     Endo: Resume home levothyroxine when able to take oral, follow blood sugars q6 while NPO                Msk/Skin: Out of bed as tolerated     Disposition: ICU while intermittently requiring pressors     24 Hour Events: Patient is a 67year old female presenting on 7/3 after being found down for a period of 3 days  She has a past medical history of cerebral ataxia, depression, hypothyroidism, history of suicide attempt, hypotension on midodrine, and seizure disorder  She was treated for rhabdo and UTI however became obtunded on 7/6 necessitating transfer to the critical care unit  Sedating medications held, antibiotics broadened and she was started on peripheral phenylephrine  Overnight, her mental status improved and she had severe pain from her restless legs so low dose fentanyl was started          Discharge Plan:  [facility]   Treatment/Interventions Functional transfer training;LE strengthening/ROM; Therapeutic exercise;Equipment eval/education; Bed mobility;Gait training;OT;Spoke to nursing   PT Frequency Other (Comment)  (3-5X/WK)   Recommendation   Recommendation Short-term skilled PT         CASE MANAGEMENT FOLLOWING CLOSELY FOR ALL DISCHARGE NEEDS

## 2018-07-09 NOTE — OCCUPATIONAL THERAPY NOTE
Occupational Therapy Treatment Note      Jarrett Cortesa    2018    Patient Active Problem List   Diagnosis    Hypothyroidism    Depression    History of suicidal tendencies    [facility] insufficiency [facility]    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[address]    Cellulitis of left lower extremity    Syncope    Anemia    Abdominal pain    Fall    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[address]    Restless leg syndrome    Depression with anxiety    Hypothyroid    GERD (gastroesophageal reflux disease)    Hypernatremia    Bilateral renal infarcts    Hypotension    Encephalopathy    Esophageal achalasia    Overdose    Acute encephalopathy    Transaminitis    UTI (urinary tract infection)    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 ([address]    Su[facility] attempt [facility]    Headache    Protein calorie malnutrition ([address]    Major depressive disorder, recurrent severe without psychotic features ([address]    Cognitive change    Neuropathy, peripheral, idiopathic    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[address]    Pain of right hip joint    Lumbar transverse process fracture (HCC)    Acute cystitis without hematuria    Elevated brain natriuretic peptide (BNP) level    Subdural hematoma (HCC)    Paroxysmal atrial fibrillation (HCC)    Diastolic CHF ([address]    Urinary retention       Past Medical History:   Diagnosis Date    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     Depression     Disease of thyroid gland     History of suicide attempt     Daughter reports 4 5 years ago pt was depressed/SI attempt after partner    Hypertension     Hypothyroidism     Melanoma ([facility]     malignant - of the skin    Renal infarct ([facility]     Seizures ([facility] )     Suicide attempt [facility])        Past Surgical History:   Procedure Laterality Date    CHOLECYSTECTOMY      laparoscopic    COLONOSCOPY      fiberoptic    ESOPHAGOGASTRODUODENOSCOPY N/A 3/10/2017    Procedure: ESOPHAGOGASTRODUODENOSCOPY (EGD); Surgeon: Tariq Nava DO;  Location: [facility];   Service:     SKIN LESION EXCISION      melanoma back        07/09/18 1838   Restrictions/Precautions   Weight Bearing Precautions Per Order No   Other Precautions Cognitive; Chair Alarm;Telemetry;O2;Fall Risk;Pain   Lifestyle   Autonomy Pt reports she is I w/ ADLS, has A for IADLS (sister drives her places), and is Mod I w/ transfers and functional mobility (needs further clarification)   Reciprocal Relationships Pt lives w/ dght per chart review; has 2 dghts however   Service to Others Pt does not work   Intrinsic Gratification Pt reports enjoying sitting out on her back porch   Pain Assessment   Pain Assessment 0-10   Pain Score 6   Pain Type Acute pain;Chronic pain   Pain Location Leg   Pain Orientation Bilateral   Pain Descriptors Discomfort;Aching   Pain Frequency Constant/continuous   Pain Onset Ongoing   Clinical Progression Not changed   Patient's Stated Pain Goal No pain   Hospital Pain Intervention(s) Repositioned; Emotional support   Diversional Activities Television   Response to Interventions tolerated well; made comfortable end of session   ADL   Grooming Assistance 4  Minimal Assistance   Grooming Deficit Setup;Supervision/safety; Increased time to complete;Wash/dry face; Wash/dry hands; Teeth care;Brushing hair; Other (Comment)  (deodorant)   Grooming Comments Pt completed grooming while seated in chair w/ setup  pt required Min VC to initiate task and for safety  Pt able to wash/dry face and hands w/ setup of rag  Pt able to complete oral care w/ increased time to open/close caps  Pt able to brush teeth and use mouthwash w/ VC to spit into basin  Increased time required to brush teeth 2* to decreased fine motor skills and coordination  Pt applied deodorant to bilateral arm pits w/ Min A for safety and gown management  UB Bathing Assistance 4  Minimal Assistance   UB Bathing Deficit Setup; Increased time to complete;Right arm;Left arm; Chest   UB Bathing Comments Pt completed UB bathing while seated in chair w/ setup  pt able to bathe R and L arm, and chest w/ setup of rag and supervision for safety  UB Dressing Assistance 4  Minimal Assistance   UB Dressing Deficit Setup;Supervision/safety;Verbal cueing; Increased time to complete; Thread RUE; Thread LUE;Pull around back; Fasteners   UB Dressing Comments Pt completed UB dressing while seated in chair w/ setup  Pt able to thread bilateral UE's into and out of gown  Pt required assist to pull gown around back and manage ties and buttons   Bed Mobility   Supine to Sit 4  Minimal assistance   Additional items Assist x 1; Increased time required;Verbal cues   Sit to Supine 4  Minimal assistance   Additional items Assist x 1; Increased time required;Verbal cues   Additional Comments Pt went from supine to sit in recliner w/ Min A x1 for mild force production into sitting  pt required Min A for unsupported sitting balance/trunk control  While in seated position pt complained of increased  pain in bilateral legs and asked to be reclined back for remainder of session  Coordination   Fine Motor Able to open/close small caps including toothpaste and deodorant lids   In Hand Manipulation Able to manipulate toothpaste/deodorant bottle/toothbrush w/ increased time   Cognition   Overall Cognitive Status Impaired   Arousal/Participation Responsive; Cooperative   Attention Within functional limits   Orientation Level Oriented X4   Memory Decreased recall of precautions;Decreased short term memory   Following Commands Follows one step commands without difficulty   Comments Pt is pleasant and cooperative; pt has speech impediment at baseline and requires increased time to respond/understand   Pt requires Min VC for safety during functional tasks   Activity Tolerance   Activity Tolerance Patient limited by fatigue;Patient limited by pain   Medical Staff Made Aware RN   Assessment   Assessment Patient participated in Skilled OT session 7/9/18 with interventions consisting of ADL re training with the use of correct body mechnaics, safety awareness and fall prevention techniques, therapeutic exercise to: increase functional use of BUEs, increase BUE muscle strength ,  therapeutic activities to: increase activity tolerance, increase dynamic sit/ stand balance during functional activity , increase postural control, increase trunk control and increase OOB/ sitting tolerance   Patient agreeable to OT treatment session, upon arrival patient was found seated OOB to Recliner  In comparison to previous session, patient with improvements in grooming, UB bathing and dressing, and activity tolerance   Patient requiring frequent re direction, verbal cues for safety, verbal cues for correct technique, cognitive assistance to anticipate next step, one step directives and frequent rest periods  Patient continues to be functioning below baseline level, occupational performance remains limited secondary to factors listed above and increased risk for falls and injury  From OT standpoint, recommendation at time of d/c would be Short Term Rehab when medically stable  Patient to benefit from continued Occupational Therapy treatment while in the hospital to address deficits as defined above and maximize level of functional independence with ADLs and functional mobility  Plan   Treatment Interventions ADL retraining;Functional transfer training;UE strengthening/ROM; Endurance training;Cognitive reorientation;Patient/family training;Equipment evaluation/education; Fine motor coordination activities; Compensatory technique education;Continued evaluation; Energy conservation; Activityengagement   Goal Expiration Date 07/14/18   Treatment Day 1   OT Frequency 3-5x/wk   Recommendation   OT Discharge Recommendation Short Term Rehab   OT - OK to Discharge Yes  (when medically stable)   Barthel Index   Feeding 5   Bathing 0   Grooming Score 0   Dressing Score 5   Bladder Score 5   Bowels Score 5   Toilet Use Score 5   Transfers (Bed/Chair) Score 5 Mobility (Level Surface) Score 0   Stairs Score 0   Barthel Index Score 30   Modified Maggie Scale   Modified [place] Scale 4       Dionne Hidalgo MOT, OTR/L

## 2018-07-09 NOTE — PLAN OF CARE
Problem: PHYSICAL THERAPY ADULT  Goal: Performs mobility at highest level of function for planned discharge setting  See evaluation for individualized goals  Treatment/Interventions: Functional transfer training, LE strengthening/ROM, Therapeutic exercise, Equipment eval/education, Bed mobility, Gait training, OT, Spoke to nursing  Equipment Recommended: Mary Angelo (RW)       See flowsheet documentation for full assessment, interventions and recommendations  Outcome: Progressing  Prognosis: Fair  Problem List: Decreased strength, Decreased endurance, Impaired balance, Decreased mobility, Decreased cognition, Pain, Decreased skin integrity  Assessment: Pt refused functional mobility today  Encouraged however pt reports pain with BLE  Per nurse, pt received pain meds  Pt states "I will try to stand/walk tomorrow "  Pt agreeable to complete seated exercise  Pt would benefit from continued physical therapy to maximize functional mobility  Barriers to Discharge: Decreased caregiver support     Recommendation: Short-term skilled PT     PT - OK to Discharge: (S) Yes (TO STR WHEN MED COURTNEY )    See flowsheet documentation for full assessment

## 2018-07-09 NOTE — PHYSICAL THERAPY NOTE
Physical Therapy Progress Note     07/09/18 1523   Pain Assessment   Pain Assessment 0-10   Pain Score 6   Pain Type Acute pain   Pain Location Leg   Pain Orientation Bilateral   Restrictions/Precautions   Weight Bearing Precautions Per Order No   Braces or Orthoses C/S Collar   Other Precautions Chair Alarm;Cognitive;Pain; Fall Risk;Telemetry   General   Chart Reviewed Yes   Family/Caregiver Present No   Cognition   Overall Cognitive Status Impaired   Arousal/Participation Alert; Responsive   Subjective   Subjective Pt is sitting in bedside recliner  Declines functional activity  Agrees to seated exercise  Transfers   Sit to Stand Unable to assess   Ambulation/Elevation   Gait pattern Not tested   Balance   Static Sitting Fair -   Activity Tolerance   Activity Tolerance Patient limited by pain   Nurse [facility] to see per RN [facility]   Exercises   Quad Sets Sitting;25 reps;AROM; Bilateral   Glute Sets Sitting;25 reps;AROM; Bilateral   Hip Flexion Sitting;25 reps;AROM; Bilateral   Knee AROM Sitting;25 reps;AROM; Bilateral   Ankle Pumps Sitting;25 reps;AROM; Bilateral   Assessment   Prognosis Fair   Problem List Decreased strength;Decreased endurance; Impaired balance;Decreased mobility; Decreased cognition;Pain;Decreased skin integrity   Assessment Pt refused functional mobility today  Encouraged however pt reports pain with BLE  Per nurse, pt received pain meds  Pt states "I will try to stand/walk tomorrow "  Pt agreeable to complete seated exercise  Pt would benefit from continued physical therapy to maximize functional mobility  Barriers to Discharge Decreased caregiver support   Goals   Patient Goals None stated   LTG Expiration Date 07/18/18   Treatment Day 1   Plan   Treatment/Interventions Functional transfer training;LE strengthening/ROM; Therapeutic exercise; Endurance training;Cognitive reorientation;Patient/family training;Bed mobility;Gait training   Progress Slow progress, decreased activity tolerance   PT Frequency (3-5x/week)   Recommendation   Recommendation Short-term skilled PT   Equipment Recommended Walker  (RW)     Shirline Hypericum, PTA

## 2018-07-09 NOTE — ASSESSMENT & PLAN NOTE
· Attempted to mobilize patient out of bed to urinate and patient was still unable    · Urinary retention protocol has been implemented

## 2018-07-09 NOTE — PROCEDURES
Video Swallow Study      Patient Name: Lorin Varela  SXMQG'[id number] Date: 2018        Past Medical History  Past Medical History:   Diagnosis Date    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     Depression     Disease of thyroid gland     History of suicide attempt     Daughter reports 4 5 years ago pt was depressed/SI attempt after partner    Hypertension     Hypothyroidism     Melanoma ([facility] [facility] )     malignant - of the skin    Renal infarct ([facility] [facility]     Seizures ([facility] [facility]     Suicide attempt [facility])         Past Surgical History  Past Surgical History:   Procedure Laterality Date    CHOLECYSTECTOMY      laparoscopic    COLONOSCOPY      fiberoptic    ESOPHAGOGASTRODUODENOSCOPY N/A 3/10/2017    Procedure: ESOPHAGOGASTRODUODENOSCOPY (EGD); Surgeon: Nirav Guthrie DO;  Location:  GI LAB; Service:     SKIN LESION EXCISION      melanoma back         Summary:  Pt presents w/ mod oropharyngeal dysphagia devika by poorly controlled bolus, spill prior to the swallow at times into the airway  The pt is unable to fully complete & perform the strategies or when prompted cannot contain material in the time it takes her to get into the position  She tends to throw her head back when trying to eat or drink  There is a large amt of silent aspiration on nt by cup, aspiration of nt by tsp  This occurred during & w/o the strategy  She is unable to cough out the aspirated material         Recommendations:  Diet: puree  Liquids: ht by tsp or cup  Meds: crush in puree  Strategies: head neutral  Upright position  F/u ST tx: yes  Therapy Prognosis: guarded  Prognosis considerations: medical condition  Full Supervision  Aspiration Precautions  Reflux Precautions  Aspiration precautions posted  Goals:  Pt will tolerate least restrictive diet w/out s/s aspiration or oral/pharyngeal difficulties        Previous VBS:  Several previous VBSs, all w/ varied results depending on medical condition last one 4/2017-Moderate oral/ Moderate-severe pharyngeal dysphagia w/ decreased oral processing of soft/solids, nasal regurgitation w/ NTL and thin liquids, and observed aspiration w/ thick and thin liquids  Chin tuck and head turn strategy (either side is ok)  eliminated penetration and aspiration w/ thick liquids, but NOT thin liquids  Current Diet:   NPO  Premorbid diet:  Pt states thin drinks, pizza, regular food  She is able to at some times, perform & state the given strategy but was unable to independently do so today  Dentition:  natural  O2 requirement:  NC  Oral mech:  Strength and ROM:   Vocal Quality/Speech:  Baseline dysarthria  Cognitive status:  Alert, very [place] today??, needs max cues to follow & answer    Consistencies administered: Barium laden applesauce, banana,  honey thick, nectar thick, thin liquids,   Liquids were administered by cup and tsp  Pt was seated laterally at 90 degrees  Oral stage:  Poor coordination of the bolus w/ transfers  Pt   Lip closure: fair, pt throws her head back w/ each cup sip  Mastication: slow  Bolus formation: reduced,   Bolus control: reduced control & coordination of the bolus, noted   Transfer: reduced  Residue: mild lingual residue    Pharyngeal stage:    Swallow promptness: varied, at times when fully positioned into a chin tuck & verbally cued  she was able to promptly swallow, other times there was mod delay  Spill to valleculae: w/all  Spill to pyriforms:  Epiglottic inversion: inverts  Laryngeal rise: fair superior movement, reduced anterior movement  Pharyngeal constriction: fair to mildly reduced  Vallecular retention: mild at times  Pyriform retention: -  PPW coating: trace coating  Epiglottic undercoat:-  Penetration:  Aspiration: prior to the swallow w/ tsp thin, during the swallow w/ tsp nt-small amt, during the swallow w/ spill from the oral cavity w/ nt by cup-this was a LARGE amt   Strategies: pt had to be physically put into a chin tuck  When she attempts to use the strategy w/ verbal cue she loses the bolus prior to the swallow & it enters the airway  Response to aspiration: SILENT      Screening of Esophageal stage:    Poor peristalsis through entire esophagus, it is full w/ min movement    Noted poorly clearing even w/ initial tsps puree

## 2018-07-09 NOTE — PLAN OF CARE
Problem: OCCUPATIONAL THERAPY ADULT  Goal: Performs self-care activities at highest level of function for planned discharge setting  See evaluation for individualized goals  Treatment Interventions: ADL retraining, Functional transfer training, UE strengthening/ROM, Endurance training, Cognitive reorientation, Patient/family training, Equipment evaluation/education, Compensatory technique education, Continued evaluation, Energy conservation, Activityengagement          See flowsheet documentation for full assessment, interventions and recommendations  Outcome: Progressing  Limitation: Decreased ADL status, Decreased UE ROM, Decreased UE strength, Decreased Safe judgement during ADL, Decreased cognition, Decreased endurance, Decreased self-care trans, Decreased high-level ADLs  Prognosis: Fair  Assessment: Patient participated in Skilled OT session 7/9/18 with interventions consisting of ADL re training with the use of correct body mechnaics, safety awareness and fall prevention techniques, therapeutic exercise to: increase functional use of BUEs, increase BUE muscle strength ,  therapeutic activities to: increase activity tolerance, increase dynamic sit/ stand balance during functional activity , increase postural control, increase trunk control and increase OOB/ sitting tolerance   Patient agreeable to OT treatment session, upon arrival patient was found seated OOB to Recliner  In comparison to previous session, patient with improvements in grooming, UB bathing and dressing, and activity tolerance   Patient requiring frequent re direction, verbal cues for safety, verbal cues for correct technique, cognitive assistance to anticipate next step, one step directives and frequent rest periods  Patient continues to be functioning below baseline level, occupational performance remains limited secondary to factors listed above and increased risk for falls and injury     From OT standpoint, recommendation at time of d/c would be Short Term Rehab when medically stable  Patient to benefit from continued Occupational Therapy treatment while in the hospital to address deficits as defined above and maximize level of functional independence with ADLs and functional mobility       OT Discharge Recommendation: Short Term Rehab  OT - OK to Discharge: Yes (when medically stable)      Comments: Dionne Hidalgo MOT, OTR/L
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
## 2018-07-09 NOTE — PROGRESS NOTES
Rounded with Doc Corbin, will hold oral meds this a m  Pt going for barium swallow   Will continue to monitor
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
## 2018-07-10 ENCOUNTER — DOCUMENTATION (OUTPATIENT)
Dept: NEUROSURGERY | Facility: CLINIC | Age: 73
End: 2018-07-10

## 2018-07-10 PROBLEM — F33.41 RECURRENT MAJOR DEPRESSIVE DISORDER, IN PARTIAL REMISSION (HCC): Status: ACTIVE | Noted: 2018-07-10

## 2018-07-10 LAB
GLUCOSE SERPL-MCNC: 119 MG/DL (ref 65–140)
GLUCOSE SERPL-MCNC: 121 MG/DL (ref 65–140)
GLUCOSE SERPL-MCNC: 124 MG/DL (ref 65–140)

## 2018-07-10 PROCEDURE — 99232 SBSQ HOSP IP/OBS MODERATE 35: CPT | Performed by: NURSE PRACTITIONER

## 2018-07-10 PROCEDURE — 0T9B70Z DRAINAGE OF BLADDER WITH DRAINAGE DEVICE, VIA NATURAL OR ARTIFICIAL OPENING: ICD-10-PCS | Performed by: HOSPITALIST

## 2018-07-10 PROCEDURE — 82948 REAGENT STRIP/BLOOD GLUCOSE: CPT

## 2018-07-10 PROCEDURE — 99222 1ST HOSP IP/OBS MODERATE 55: CPT | Performed by: PSYCHIATRY & NEUROLOGY

## 2018-07-10 RX ADMIN — PREGABALIN 50 MG: 50 CAPSULE ORAL at 20:48

## 2018-07-10 RX ADMIN — ENOXAPARIN SODIUM 40 MG: 40 INJECTION SUBCUTANEOUS at 09:35

## 2018-07-10 RX ADMIN — GABAPENTIN 100 MG: 100 CAPSULE ORAL at 20:48

## 2018-07-10 RX ADMIN — MIDODRINE HYDROCHLORIDE 10 MG: 5 TABLET ORAL at 09:35

## 2018-07-10 RX ADMIN — MIDODRINE HYDROCHLORIDE 10 MG: 5 TABLET ORAL at 20:48

## 2018-07-10 RX ADMIN — LIDOCAINE 1 PATCH: 50 PATCH CUTANEOUS at 09:36

## 2018-07-10 RX ADMIN — VANCOMYCIN HYDROCHLORIDE 750 MG: 750 INJECTION, SOLUTION INTRAVENOUS at 05:55

## 2018-07-10 RX ADMIN — OXYCODONE HYDROCHLORIDE 5 MG: 5 TABLET ORAL at 04:52

## 2018-07-10 RX ADMIN — GABAPENTIN 100 MG: 100 CAPSULE ORAL at 09:35

## 2018-07-10 RX ADMIN — PREGABALIN 50 MG: 50 CAPSULE ORAL at 09:33

## 2018-07-10 RX ADMIN — METOPROLOL TARTRATE 12.5 MG: 25 TABLET ORAL at 23:48

## 2018-07-10 RX ADMIN — PREGABALIN 50 MG: 50 CAPSULE ORAL at 16:43

## 2018-07-10 RX ADMIN — MIDODRINE HYDROCHLORIDE 10 MG: 5 TABLET ORAL at 16:43

## 2018-07-10 RX ADMIN — METOPROLOL TARTRATE 12.5 MG: 25 TABLET ORAL at 10:53

## 2018-07-10 NOTE — PLAN OF CARE
Present in room with NP Tim Cevallos to discuss plan of care for today to discontinue telemetry and discontinue IV Vancomycin  Patient awaiting placement  No questions or concerns from patient at this time

## 2018-07-10 NOTE — ASSESSMENT & PLAN NOTE
· Attempted to mobilize patient out of bed to urinate and patient was still unable so mcrae was placed 7/10/2018 at 0100  · Will attempt voiding trial tomorrow

## 2018-07-10 NOTE — ASSESSMENT & PLAN NOTE
· Dr Joseline English from trauma - recs conservative mgmt w/ pain control  · No surgical intervention  · Continue physical therapy

## 2018-07-10 NOTE — ASSESSMENT & PLAN NOTE
· Coag-negative staph was isolated  · Continue 5 days of vancomycin  · Currently on day 5/5 and then will discontinue  · Clinically stable

## 2018-07-10 NOTE — DISCHARGE INSTRUCTIONS
Neurosurgery discharge instructions  Do not take any blood thinning medications (ie  No Advil  No motrin  No ibuprofen  No Aleve  No Aspirin  No fishoil  No heparin  No antiplatelet / no anticoagulation medication)  Refrain from activity that increases chance of trauma to head or falls  Recommend you take fall precaution  No strenuous activity or sports  Return to hospital Emergency Room if you experience worsening / new headache, nausea/vomiting, speech/vision change, seizure, confusion / mental status change, weakness, or other neurological changes

## 2018-07-10 NOTE — PROGRESS NOTES
Progress Note - Edward Hernandes 1945, 67 y o  female MRN: [id number]    Unit/Bed#: -01 Encounter: [id number]    Primary Care Provider: Rosibel Peters DO   Date and time admitted to hospital: 7/3/2018  4:04 PM        * Acute cystitis without hematuria   Assessment & Plan    · Coag-negative staph was isolated  · Continue 5 days of vancomycin  · Currently on day 5/5 and then will discontinue  · Clinically stable        Acute encephalopathy   Assessment & Plan    · Due to infection, falls, rhabdo  · Improved and now appears to be at baseline  · Due to patient's reported psychiatric history, we will need to get a psychiatrist evaluation as well as a capacity evaluation as she will require nursing home short-term rehab placement after this hospitalization  Diastolic CHF ([facility] [address] )   Assessment & Plan    · 7/7 ECHO- EF 62% grade 2 diastolic dysfunction, elft atrium mildly dilated, mild to moderate mitral regurgitation, moderate to severe tricuspid regurugitation with moderate plumonary hypertension, IVC dilated with blunted respirophasic changes  · Patient appears compensated at this time  Did get IV Lasix while in the ICU  · Continue beta-blocker        Subdural hematoma [facility])   Assessment & Plan    · Appreciate neurosurgery input  · Repeat CT scan on July 8, 2018 showed that there was a stable subdural hemorrhage within the right hemisphere  · No neuro surgical intervention is required in the hospital   · DVT prophylaxis has been approved however anticoagulation has not been approved  · She will need to have outpatient follow-up with Neurosurgery with the preceding CT scan          Lumbar transverse process fracture [facility])   Assessment & Plan    · Dr Patrizia Ramirez from trauma - recs conservative mgmt w/ pain control  · No surgical intervention  · Continue physical therapy         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[facility] [address] )   Assessment & Plan    · Stable on lyrica and neurontin        Spinocerebellar ataxia [facility])   Assessment & Plan    · outpt neuro f/u  · PT/OT evals - pt reportedly having falls - needs STR, case management is following        Urinary retention   Assessment & Plan    · Attempted to mobilize patient out of bed to urinate and patient was still unable so mcrae was placed 7/10/2018 at 0100  · Will attempt voiding trial tomorrow        Paroxysmal atrial fibrillation ([address] )   Assessment & Plan    · Patient's rate is controlled at this time  · Anticoagulation will not be started at this time  · Cardiology input appreciated          VTE Pharmacologic Prophylaxis:   Pharmacologic: Enoxaparin (Lovenox)  Mechanical VTE Prophylaxis in Place: Yes    Patient Centered Rounds: I have performed bedside rounds with nursing staff today  Discussions with Specialists or Other Care Team Provider:  Primary RN    Education and Discussions with Family / Patient:  Patient's daughter    Time Spent for Care: 20 minutes  More than 50% of total time spent on counseling and coordination of care as described above  Current Length of Stay: 7 day(s)    Current Patient Status: Inpatient   Certification Statement: The patient will continue to require additional inpatient hospital stay due to Urinary tract infection and severe deconditioning  Discharge Plan / Estimated Discharge Date:  Possibly within the next 24 hours if the discharge disposition can be arranged      Code Status: Level 1 - Full Code      Subjective:   Offers no complaints of chest pain or shortness of breath  Still willing to go to rehab at discharge  No overnight events  Objective:     Vitals:   Temp (24hrs), Av 4 °F (36 9 °C), Min:97 6 °F (36 4 °C), Max:99 °F (37 2 °C)    HR:  [73-78] 75  Resp:  [16-18] 18  BP: (103-152)/(56-81) 103/56  SpO2:  [90 %-95 %] 95 %  Body mass index is 22 35 kg/m²  Input and Output Summary (last 24 hours):        Intake/Output Summary (Last 24 hours) at 07/10/18 1128  Last data filed at 07/10/18 0655   Gross per 24 hour   Intake              270 ml   Output             1000 ml   Net             -730 ml       Physical Exam:     Physical Exam   Constitutional: No distress  HENT:   Head: Normocephalic  Eyes: Pupils are equal, round, and reactive to light  Neck: Normal range of motion  Neck supple  Cardiovascular: Normal rate, regular rhythm and normal heart sounds  No murmur heard  Pulmonary/Chest: Effort normal and breath sounds normal  No respiratory distress  Abdominal: Soft  Bowel sounds are normal    Musculoskeletal: Normal range of motion  She exhibits no edema  Neurological: She is alert  Skin: Skin is warm and dry  Psychiatric: She has a normal mood and affect  Her behavior is normal    Tearful during talks of going to a rehab       Additional Data:     Labs:      Results from last 7 days  Lab Units 07/09/18  0516  07/06/18  0433  07/03/18  1922   WBC Thousand/uL 9 00  < > 2 99*  < > 6 41   HEMOGLOBIN g/dL 9 3*  < > 9 0*  < > 11 2*   HEMATOCRIT % 29 6*  < > 29 2*  < > 34 5*   PLATELETS Thousands/uL 125*  < > 76*  < > 102*   NEUTROS PCT %  --   --   --   --  89*   LYMPHS PCT %  --   --   --   --  8*   LYMPHO PCT %  --   --  20  < >  --    MONOS PCT %  --   --   --   --  3*   MONO PCT MAN %  --   --  0*  < >  --    EOS PCT %  --   --   --   --  0   EOSINO PCT MANUAL %  --   --  0  < >  --    < > = values in this interval not displayed  Results from last 7 days  Lab Units 07/09/18  0516 07/08/18  0438   SODIUM mmol/L 143 143   POTASSIUM mmol/L 3 9 3 5   CHLORIDE mmol/L 109* 109*   CO2 mmol/L 28 27   BUN mg/dL 16 19   CREATININE mg/dL 0 72 0 85   CALCIUM mg/dL 8 3 8 3   TOTAL PROTEIN g/dL  --  5 8*   BILIRUBIN TOTAL mg/dL  --  0 80   ALK PHOS U/L  --  285*   ALT U/L  --  58   AST U/L  --  28   GLUCOSE RANDOM mg/dL 85 81       Results from last 7 days  Lab Units 07/06/18  0433   INR  1 10       * I Have Reviewed All Lab Data Listed Above          Recent Cultures (last 7 days):       Results from last 7 days  Lab Units 07/06/18  0434 07/03/18 2001   BLOOD CULTURE  No Growth After 4 Days  No Growth After 4 Days  --    URINE CULTURE   --  >100,000 cfu/ml Staphylococcus coagulase negative*       Last 24 Hours Medication List:     Current Facility-Administered Medications:  enoxaparin 40 mg Subcutaneous Q24H [facility] Ramos LiterJESSY    gabapentin 100 mg Oral BID JESSY Urban    lidocaine 1 patch Transdermal Daily Rebecca Arabia, DO    metoprolol tartrate 12 5 mg Oral Q12H [facility] Myron Pascual MD    midodrine 10 mg Oral TID Rebecca Arabia, DO    ondansetron 4 mg Intravenous Q6H PRN Rebecca Delacruz, DO    oxyCODONE 5 mg Oral Q6H PRN JESSY Gerard    pneumococcal 13-valent conjugate vaccine 0 5 mL Intramuscular Prior to discharge Rebecca Arabia, DO    pregabalin 50 mg Oral TID JESSY Gerard    vancomycin 15 mg/kg Intravenous Q12H JESSY Gerard Last Rate: 750 mg (07/10/18 0555)        Today, Patient Was Seen By: JESSY Quinones    ** Please Note: Dragon 360 Dictation voice to text software may have been used in the creation of this document   **

## 2018-07-10 NOTE — ASSESSMENT & PLAN NOTE
· 7/7 ECHO- EF 56% grade 2 diastolic dysfunction, elft atrium mildly dilated, mild to moderate mitral regurgitation, moderate to severe tricuspid regurugitation with moderate plumonary hypertension, IVC dilated with blunted respirophasic changes  · Patient appears compensated at this time  Did get IV Lasix while in the ICU    · Continue beta-blocker

## 2018-07-10 NOTE — ASSESSMENT & PLAN NOTE
· Due to infection, falls, rhabdo  · Improved and now appears to be at baseline  · Due to patient's reported psychiatric history, we will need to get a psychiatrist evaluation as well as a capacity evaluation as she will require nursing home short-term rehab placement after this hospitalization

## 2018-07-10 NOTE — SPEECH THERAPY NOTE
Speech Language/Pathology    Speech/Language Pathology Progress Note    Patient Name: Magi Castro  DWLHU'P Date: 7/10/2018     Problem List  Patient Active Problem List   Diagnosis    Hypothyroidism    Depression    History of suicidal tendencies    Adrenal insufficiency [facility] [address] [facility]    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[facility] [address] [facility]    Cellulitis of left lower extremity    Syncope    Anemia    Abdominal pain    Fall    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[facility] [address] )    Restless leg syndrome    Depression with anxiety    Hypothyroid    GERD (gastroesophageal reflux disease)    Hypernatremia    Bilateral renal infarcts    Hypotension    Encephalopathy    Esophageal achalasia    Overdose    Acute encephalopathy    Transaminitis    UTI (urinary tract infection)    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 ([address] )    Suicide attempt ([facility] )    Headache    Protein calorie malnutrition ([facility] [address] [facility]    Major depressive disorder, recurrent severe without psychotic features ([facility] [address] )    Cognitive change    Neuropathy, peripheral, idiopathic    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[address] )    Pain of right hip joint    Lumbar transverse process fracture (HCC)    Acute cystitis without hematuria    Elevated brain natriuretic peptide (BNP) level    Subdural hematoma (HCC)    Paroxysmal atrial fibrillation (HCC)    Diastolic CHF ([address] )    Urinary retention    Recurrent major depressive disorder, in partial remission ([address] )        Past Medical History  Past Medical History:   Diagnosis Date    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[facility] [address] )     Depression     Disease of thyroid gland     History of suicide attempt     Daughter reports 4 5 years ago pt was depressed/SI attempt after partner       Hypertension     Hypothyroidism     Melanoma ([facility] [address] )     malignant - of the skin    Renal infarct ([facility] [address] )     Seizures ([facility] [address] )     Suicide attempt [facility])         Past Surgical History  Past Surgical History:   Procedure Laterality Date    CHOLECYSTECTOMY      laparoscopic    COLONOSCOPY fiberoptic    ESOPHAGOGASTRODUODENOSCOPY N/A 3/10/2017    Procedure: ESOPHAGOGASTRODUODENOSCOPY (EGD); Surgeon: Sharon Everett DO;  Location: [facility]; Service:     SKIN LESION EXCISION      melanoma back         Subjective:  Pt  was sleeping in chair, but was easily aroused  Current Diet: Puree w/ HT liquid    Objective:  Trialed tsp , straw, and cup sips of HT  Pt  needed verbal cues initially to tilt head down when swallowing, but assistance was faded and pt  independently tilted head down during swallows  Pt  appeared to have decent control and transfer of bolus when rate was controlled for her  No s/s or other difficulties were noted  Assessment:  Pt  tolerated tsp , straw, and cup sips of HT liquid  Plan/Recommendations:  1  Continue on current diet of puree and HT liquid for now  2   Recommended repeat VBS at a later date, once pt 's condition improves
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
## 2018-07-10 NOTE — ASSESSMENT & PLAN NOTE
· outpt neuro f/u  · PT/OT evals - pt reportedly having falls - needs STR, case management is following

## 2018-07-11 LAB
BACTERIA BLD CULT: NORMAL
BACTERIA BLD CULT: NORMAL
GLUCOSE SERPL-MCNC: 126 MG/DL (ref 65–140)
GLUCOSE SERPL-MCNC: 185 MG/DL (ref 65–140)

## 2018-07-11 PROCEDURE — 99232 SBSQ HOSP IP/OBS MODERATE 35: CPT | Performed by: NURSE PRACTITIONER

## 2018-07-11 PROCEDURE — 82948 REAGENT STRIP/BLOOD GLUCOSE: CPT

## 2018-07-11 RX ORDER — POLYETHYLENE GLYCOL 3350 17 G/17G
17 POWDER, FOR SOLUTION ORAL 2 TIMES DAILY
Status: DISCONTINUED | OUTPATIENT
Start: 2018-07-11 | End: 2018-07-17 | Stop reason: HOSPADM

## 2018-07-11 RX ADMIN — LIDOCAINE 1 PATCH: 50 PATCH CUTANEOUS at 09:25

## 2018-07-11 RX ADMIN — METOPROLOL TARTRATE 12.5 MG: 25 TABLET ORAL at 22:50

## 2018-07-11 RX ADMIN — MIDODRINE HYDROCHLORIDE 10 MG: 5 TABLET ORAL at 21:32

## 2018-07-11 RX ADMIN — ENOXAPARIN SODIUM 40 MG: 40 INJECTION SUBCUTANEOUS at 09:25

## 2018-07-11 RX ADMIN — PREGABALIN 50 MG: 50 CAPSULE ORAL at 09:25

## 2018-07-11 RX ADMIN — POLYETHYLENE GLYCOL 3350 17 G: 17 POWDER, FOR SOLUTION ORAL at 10:20

## 2018-07-11 RX ADMIN — BISACODYL 5 MG: 5 TABLET, COATED ORAL at 10:20

## 2018-07-11 RX ADMIN — OXYCODONE HYDROCHLORIDE 5 MG: 5 TABLET ORAL at 09:44

## 2018-07-11 RX ADMIN — PREGABALIN 50 MG: 50 CAPSULE ORAL at 21:32

## 2018-07-11 RX ADMIN — GABAPENTIN 100 MG: 100 CAPSULE ORAL at 09:25

## 2018-07-11 RX ADMIN — POLYETHYLENE GLYCOL 3350 17 G: 17 POWDER, FOR SOLUTION ORAL at 17:08

## 2018-07-11 RX ADMIN — MIDODRINE HYDROCHLORIDE 10 MG: 5 TABLET ORAL at 09:25

## 2018-07-11 RX ADMIN — MIDODRINE HYDROCHLORIDE 10 MG: 5 TABLET ORAL at 17:09

## 2018-07-11 RX ADMIN — GABAPENTIN 100 MG: 100 CAPSULE ORAL at 17:08

## 2018-07-11 RX ADMIN — PREGABALIN 50 MG: 50 CAPSULE ORAL at 17:08

## 2018-07-11 RX ADMIN — OXYCODONE HYDROCHLORIDE 5 MG: 5 TABLET ORAL at 01:59
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
## 2018-07-11 NOTE — PROGRESS NOTES
Progress Note - Cayden Ramos 1945, 67 y o  female MRN: [id number]    Unit/Bed#: -01 Encounter: [id number]    Primary Care Provider: Kathy Garcia DO   Date and time admitted to hospital: 7/3/2018  4:04 PM        * Acute cystitis without hematuria   Assessment & Plan    · Coag-negative staph was isolated  · Completed antibiotics  · Clinically stable         Acute encephalopathy   Assessment & Plan    · Due to infection, falls, rhabdo  · Improved and now appears to be at baseline  · Due to patient's reported psychiatric history, we will need to get a psychiatrist evaluation as well as a capacity evaluation as she will require nursing home short-term rehab placement after this hospitalization  Appreciate consultant's input  Diastolic CHF ([facility] [address] )   Assessment & Plan    · 7/7 ECHO- EF 93% grade 2 diastolic dysfunction, elft atrium mildly dilated, mild to moderate mitral regurgitation, moderate to severe tricuspid regurugitation with moderate plumonary hypertension, IVC dilated with blunted respirophasic changes  · Patient appears compensated at this time  Did get IV Lasix while in the ICU  · Continue beta-blocker        Subdural hematoma [facility])   Assessment & Plan    · Appreciate neurosurgery input  · Repeat CT scan on July 8, 2018 showed that there was a stable subdural hemorrhage within the right hemisphere  · No neuro surgical intervention is required in the hospital   · DVT prophylaxis has been approved however anticoagulation has not been approved  · She will need to have outpatient follow-up with Neurosurgery with the preceding CT scan          Lumbar transverse process fracture [facility])   Assessment & Plan    · Dr Joelle Fishman from trauma - recs conservative mgmt w/ pain control  · No surgical intervention  · Continue physical therapy         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[address] )   Assessment & Plan    · Stable on lyrica and neurontin        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[address] )   Assessment & Plan · outpt neuro f/u  · PT/OT evals - pt reportedly having falls - needs STR, case management is following        Urinary retention   Assessment & Plan    · Attempted to mobilize patient out of bed to urinate and patient was still unable so mcrae was placed 7/10/2018 at 0100  · Will attempt voiding trial tomorrow- discussed attempting weaning trial today however the patient does not want to as she still is weak  Paroxysmal atrial fibrillation ([facility])   Assessment & Plan    · Patient's rate is controlled at this time  · Anticoagulation will not be started at this time  · Cardiology input appreciated            VTE Pharmacologic Prophylaxis:   Pharmacologic: Enoxaparin (Lovenox)  Mechanical VTE Prophylaxis in Place: Yes    Patient Centered Rounds: I have performed bedside rounds with nursing staff today  Discussions with Specialists or Other Care Team Provider:  Primary RN    Education and Discussions with Family / Patient:  Patient, message left for her daughter with callback number    Time Spent for Care: 15 minutes  More than 50% of total time spent on counseling and coordination of care as described above  Current Length of Stay: 8 day(s)    Current Patient Status: Inpatient   Certification Statement: The patient will continue to require additional inpatient hospital stay due to Deconditioning and need for rehab placement    Discharge Plan / Estimated Discharge Date:  Working on the discharge plan      Code Status: Level 1 - Full Code      Subjective:   Feels weak today  No nausea vomiting diarrhea but is constipated  Passing gas  Eating well  Does not feel as though she could have a catheter come out today  Would like to try tomorrow  Does not wear oxygen at home and does not feel short of breath  No cough  No overnight events      Objective:     Vitals:   Temp (24hrs), Av 6 °F (37 °C), Min:98 4 °F (36 9 °C), Max:98 8 °F (37 1 °C)    HR:  [71-80] 80  Resp:  [18] 18  BP: ()/(53-72) 91/55  SpO2:  [90 %-99 %] 90 %  Body mass index is 22 35 kg/m²  Input and Output Summary (last 24 hours): Intake/Output Summary (Last 24 hours) at 07/11/18 1045  Last data filed at 07/11/18 0944   Gross per 24 hour   Intake              240 ml   Output             2100 ml   Net            -1860 ml       Physical Exam:     Physical Exam   Constitutional: No distress  HENT:   Head: Normocephalic  Eyes: Pupils are equal, round, and reactive to light  Neck: Normal range of motion  Neck supple  Cardiovascular: Normal rate  Murmur heard  Pulmonary/Chest: Effort normal and breath sounds normal  No respiratory distress  Abdominal: Soft  Bowel sounds are normal    Musculoskeletal: Normal range of motion  She exhibits no edema  Neurological: She is alert  Nursing note and vitals reviewed  Additional Data:     Labs:      Results from last 7 days  Lab Units 07/09/18  0516  07/06/18  0433   WBC Thousand/uL 9 00  < > 2 99*   HEMOGLOBIN g/dL 9 3*  < > 9 0*   HEMATOCRIT % 29 6*  < > 29 2*   PLATELETS Thousands/uL 125*  < > 76*   LYMPHO PCT %  --   --  20   MONO PCT MAN %  --   --  0*   EOSINO PCT MANUAL %  --   --  0   < > = values in this interval not displayed  Results from last 7 days  Lab Units 07/09/18  0516 07/08/18  0438   SODIUM mmol/L 143 143   POTASSIUM mmol/L 3 9 3 5   CHLORIDE mmol/L 109* 109*   CO2 mmol/L 28 27   BUN mg/dL 16 19   CREATININE mg/dL 0 72 0 85   CALCIUM mg/dL 8 3 8 3   TOTAL PROTEIN g/dL  --  5 8*   BILIRUBIN TOTAL mg/dL  --  0 80   ALK PHOS U/L  --  285*   ALT U/L  --  58   AST U/L  --  28   GLUCOSE RANDOM mg/dL 85 81       Results from last 7 days  Lab Units 07/06/18  0433   INR  1 10       * I Have Reviewed All Lab Data Listed Above  Recent Cultures (last 7 days):       Results from last 7 days  Lab Units 07/06/18  0434   BLOOD CULTURE  No Growth After 5 Days  No Growth After 5 Days         Last 24 Hours Medication List:     Current Facility-Administered Medications:  bisacodyl 5 mg Oral Daily JESSY Granados   enoxaparin 40 mg Subcutaneous Q24H [address] JESSY Person   gabapentin 100 mg Oral BID JESSY Person   lidocaine 1 patch Transdermal Daily Laddie Blight, DO   metoprolol tartrate 12 5 mg Oral Q12H [address] Darwin Hardwick MD   midodrine 10 mg Oral TID Laddie Blight, DO   ondansetron 4 mg Intravenous Q6H PRN Laddie Blight, DO   oxyCODONE 5 mg Oral Q6H PRN JESSY Neff   pneumococcal 13-valent conjugate vaccine 0 5 mL Intramuscular Prior to discharge Laddie Blight, DO   polyethylene glycol 17 g Oral BID JESSY Granados   pregabalin 50 mg Oral TID JESSY Neff        Today, Patient Was Seen By: JESSY Granados    ** Please Note: Dragon 360 Dictation voice to text software may have been used in the creation of this document   **

## 2018-07-11 NOTE — ASSESSMENT & PLAN NOTE
· Attempted to mobilize patient out of bed to urinate and patient was still unable so mcrae was placed 7/10/2018 at 0100  · Will attempt voiding trial tomorrow- discussed attempting weaning trial today however the patient does not want to as she still is weak

## 2018-07-11 NOTE — PROGRESS NOTES
Attempted to wean off O2 per direction by SLIM  Dropped to 87% on RA  Placed back on 2L  Currently at 92%

## 2018-07-11 NOTE — CONSULTS
Consultation - Neuropsychology/Psychology Department  Fan Drew 67 y o  female MRN: [id number]  Unit/Bed#: -01 Encounter: [id number]        BACKGROUND:  Fan Drew is a 67y o  year old female who was referred for a Neuropsychological Exam to assess cognitive functioning and comment on capacity to make informed medical and self care decisions  History of Present Illness  Presents with fall at home; covered in feces/urine; OD on Xanax  Physician Requesting Consult: Hussein Leslie DO  Consults      Historical Information   Past Medical History:   Diagnosis Date    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[facility] [address] )     Depression     Disease of thyroid gland     History of suicide attempt     Daughter reports 4 5 years ago pt was depressed/SI attempt after partner    Hypertension     Hypothyroidism     Melanoma ([facility] )     malignant - of the skin    Renal infarct ([facility] )     Seizures ([facility] )     Suicide attempt [facility])      Past Surgical History:   Procedure Laterality Date    CHOLECYSTECTOMY      laparoscopic    COLONOSCOPY      fiberoptic    ESOPHAGOGASTRODUODENOSCOPY N/A 3/10/2017    Procedure: ESOPHAGOGASTRODUODENOSCOPY (EGD); Surgeon: Louretta Apgar, DO;  Location: [facility];   Service:     SKIN LESION EXCISION      melanoma back     Social History   History   Alcohol Use No     Comment: wine rarely     History   Drug Use No     History   Smoking Status    Never Smoker   Smokeless Tobacco    Former User     Comment: former smoker - social-quit years ago     Family History:   Family History   Problem Relation Age of Onset    Heart disease Father     Heart attack Father         acute MI    Deafness Father         bilateral due to meningitis age 15    Deafness Mother         congenital    Glaucoma Mother     Stroke Mother     Diabetes Sister     Glaucoma Sister     Ataxia Brother         cerebral - possible    Diabetes Brother     Glaucoma Brother        Meds/Allergies   current meds:   Current Facility-Administered Medications   Medication Dose Route Frequency    bisacodyl (DULCOLAX) EC tablet 5 mg  5 mg Oral Daily    enoxaparin (LOVENOX) subcutaneous injection 40 mg  40 mg Subcutaneous Q24H JUDITH    gabapentin (NEURONTIN) capsule 100 mg  100 mg Oral BID    lidocaine (LIDODERM) 5 % patch 1 patch  1 patch Transdermal Daily    metoprolol tartrate (LOPRESSOR) partial tablet 12 5 mg  12 5 mg Oral Q12H [address]    midodrine (PROAMATINE) tablet 10 mg  10 mg Oral TID    ondansetron (ZOFRAN) injection 4 mg  4 mg Intravenous Q6H PRN    oxyCODONE (ROXICODONE) IR tablet 5 mg  5 mg Oral Q6H PRN    pneumococcal 13-valent conjugate vaccine (PREVNAR-13) IM injection 0 5 mL  0 5 mL Intramuscular Prior to discharge    polyethylene glycol (MIRALAX) packet 17 g  17 g Oral BID    pregabalin (LYRICA) capsule 50 mg  50 mg Oral TID       Allergies   Allergen Reactions    Acetaminophen     Acetaminophen-Codeine GI Intolerance    Codeine     Penicillins Other (See Comments)     Unknown reaction; however, has tolerated multiple Cephalosporins including Cefazolin, Cephalexin, Ceftriaxone, & Cefepime       Imaging Studies:       Family and Social Support:   No Data Recorded     Behavioral Observations: Alert, oriented x 3, cooperative; affect appeared pleasant, denied depressed mood, suicidal ideation; no overt evidence of psychotic process; denied cognitive changes  Cognitive Examination    General Cognitive Functioning  MMSE = 27/28 Average; General Fund of Information = Average    Attention/Concertration  Auditory Selective Attention = Average; Auditory Vigilance = Average; Information Processing Speed = Average    Frontal Systems/Executive Functioning  Mental Flexibility/Cognitive Control = Average; Working Memory = Impaired; Abstract Reasoning = Average;  Generative Ability = Borderline; Language Functioning  Confrontation naming = Average;  Phonemic Fluency = Borderline; Semantic Retrieval = Borderline; Comprehension of Complex Ideational Material = Average;  Praxis = WNL's; Repetition = WNL'S; Basic Reading = WNL's; Written Expression = WNL's; Following Commands = WNL's    Memory Functioning  Narrative Recall - Short Delay = High Average; Long Delay Narrative Recall = Average;Visual Recognition = Average    Visuo-Spatial Abilities Not Assessed    Functional Knowledge  Health & Safety Knowledge = Within Normal Limits; Emotional Functioning: Denied depressed mood and suicidal ideation    Summary/Impression: Results of Neuropsychological Exam revealed generally adequate cognitive functioning, except for impaired working memory and weaknesses in generative ability and semantic retrieval   On a measure assessing awareness of personal health status and ability to evaluate health problems, handle medical emergencies and take safety precautions, patient performed Within Normal Limits  At this time, patient appears to have capacity to make informed medical and self care decisions  Unspecified Depressive Disorder      Recommendations:   Reassess cognitive functioning in approximately one year to monitor cognitive functioning    Patient would benefit from outpatient psychological counseling and should continue to be followed by Geriatric Physician

## 2018-07-11 NOTE — SOCIAL WORK
Call to [facility] and  for supervisor Val Pepe [phone]  Call back from [facility] and faxed ayleen mera to her at [phone]  [facility] states case is not complete and she wants to speak to pt and dtrs Cayetano Martinez and Jerad Savage again

## 2018-07-11 NOTE — PHYSICAL THERAPY NOTE
Physical Therapy Cancellation Note  Pt refused stating she has "pain with my feet "  Daughters present to encourage pt to participate as well however pt refused  Will follow as appropriate      Evelia Come, PTA

## 2018-07-11 NOTE — CASE MANAGEMENT
Continued Stay Review    Date: 18 ACUTE MED SURG LEVEL OF CARE    Vital Signs: /63 (BP Location: Right arm)   Pulse 87   Temp 98 3 °F (36 8 °C) (Oral)   Resp 18   Ht 5' 2" (1 575 m)   Wt 55 4 kg (122 lb 3 2 oz)   SpO2 92%   BMI 22 35 kg/m²      Vitals:   Temp (24hrs), Av 6 °F (37 °C), Min:98 4 °F (36 9 °C), Max:98 8 °F (37 1 °C)   HR:  [71-80] 80  Resp:  [18] 18  BP: ()/(53-72) 91/55  SpO2:  [90 %-99 %] 90 %  Body mass index is 22 35 kg/m²         Input and Output Summary (last 24 hours):   Last data filed at 18 0944    Gross per 24 hour   Intake              240 ml   Output             2100 ml   Net            -1860 ml       Diet Dysphagia/Modified Consistency; Dysphagia 1-Pureed; Dysphagia 1-Pureed;  Honey Thick Liquid     Dietary nutrition supplements HoneyShake TID with Meals       IV ACCESS      Medications:   Scheduled Meds:   Current Facility-Administered Medications:  bisacodyl 5 mg Oral Daily JESSY Vaca   enoxaparin 40 mg Subcutaneous Q24H [address] JESSY Townsend   gabapentin 100 mg Oral BID JESSY Townsend   lidocaine 1 patch Transdermal Daily Marisela Goddard DO   metoprolol tartrate 12 5 mg Oral Q12H [address] Aron Mccoy MD   midodrine 10 mg Oral TID Marisela Goddard DO   ondansetron 4 mg Intravenous Q6H PRN Marisela Goddard DO   oxyCODONE 5 mg Oral Q6H PRN JESSY Joseph   pneumococcal 13-valent conjugate vaccine 0 5 mL Intramuscular Prior to discharge Marisela Goddard DO   polyethylene glycol 17 g Oral BID JESSY Vaca   pregabalin 50 mg Oral TID JESSY Joseph       PRN Meds:      ondansetron    oxyCODONE 5 mg q4hrs prn given x 2/ 24 hrs      LABS/Diagnostic Results:   CBC  Results from last 7 days  Lab Units 18  0516   18  0433   WBC Thousand/uL 9 00  < > 2 99*   HEMOGLOBIN g/dL 9 3*  < > 9 0*   HEMATOCRIT % 29 6*  < > 29 2*   PLATELETS Thousands/uL 125*  < > 76*   LYMPHO PCT %  --   --  20   MONO PCT MAN %  --   --  0*   EOSINO PCT MANUAL %  --   --  0      CMP  Results from last 7 days  Lab Units 07/09/18  0516 07/08/18  0438   SODIUM mmol/L 143 143   POTASSIUM mmol/L 3 9 3 5   CHLORIDE mmol/L 109* 109*   CO2 mmol/L 28 27   BUN mg/dL 16 19   CREATININE mg/dL 0 72 0 85   CALCIUM mg/dL 8 3 8 3   TOTAL PROTEIN g/dL  --  5 8*   BILIRUBIN TOTAL mg/dL  --  0 80   ALK PHOS U/L  --  285*   ALT U/L  --  58   AST U/L  --  28   GLUCOSE RANDOM mg/dL 85 81      Results from last 7 days  Lab Units 07/06/18  0433   INR   1 10      Micro  Results from last 7 days  Lab Units 07/06/18  0434   BLOOD CULTURE   No Growth After 5 Days  No Growth After 5 Days         Age/Sex: 67 y o  female     Assessment/Plan:   * Acute cystitis without hematuria   Assessment & Plan     · Coag-negative staph was isolated  · Completed antibiotics  · Clinically stable           Acute encephalopathy   Assessment & Plan     · Due to infection, falls, rhabdo  · Improved and now appears to be at baseline  · Due to patient's reported psychiatric history, we will need to get a psychiatrist evaluation as well as a capacity evaluation as she will require nursing home short-term rehab placement after this hospitalization  Appreciate consultant's input           Diastolic CHF (HCC)   Assessment & Plan     · 7/7 ECHO- EF 69% grade 2 diastolic dysfunction, elft atrium mildly dilated, mild to moderate mitral regurgitation, moderate to severe tricuspid regurugitation with moderate plumonary hypertension, IVC dilated with blunted respirophasic changes  · Patient appears compensated at this time  Did get IV Lasix while in the ICU  · Continue beta-blocker          Subdural hematoma [facility])   Assessment & Plan     · Appreciate neurosurgery input  · Repeat CT scan on July 8, 2018 showed that there was a stable subdural hemorrhage within the right hemisphere    · No neuro surgical intervention is required in the hospital   · DVT prophylaxis has been approved however anticoagulation has not been approved  · She will need to have outpatient follow-up with Neurosurgery with the preceding CT scan           Lumbar transverse process fracture [facility])   Assessment & Plan     · Dr Angle Salazar from trauma - recs conservative mgmt w/ pain control  · No surgical intervention  · Continue physical therapy           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[facility] [address] )   Assessment & Plan     · Stable on lyrica and neurontin          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[facility] [address] )   Assessment & Plan     · outpt neuro f/u  · PT/OT evals - pt reportedly having falls - needs STR, case management is following          Urinary retention   Assessment & Plan     · Attempted to mobilize patient out of bed to urinate and patient was still unable so mcrae was placed 7/10/2018 at 0100  · Will attempt voiding trial tomorrow- discussed attempting weaning trial today however the patient does not want to as she still is weak           Paroxysmal atrial fibrillation ([facility] [address] )   Assessment & Plan     · Patient's rate is controlled at this time  · Anticoagulation will not be started at this time  · Cardiology input appreciated              VTE Pharmacologic Prophylaxis:   Pharmacologic: Enoxaparin (Lovenox)  Mechanical VTE Prophylaxis in Place: Yes      Current Length of Stay: 8 day(s)     Current Patient Status: Inpatient   Certification Statement: The patient will continue to require additional inpatient hospital stay due to Deconditioning and need for rehab placement            Discharge Plan:   Emy   Treatment/Interventions Functional transfer training;LE strengthening/ROM; Therapeutic exercise; Endurance training;Cognitive reorientation;Patient/family training;Bed mobility;Gait training   Progress Slow progress, decreased activity tolerance   PT Frequency (3-5x/week)   Recommendation   Recommendation Short-term skilled PT     CASE MANAGEMENT FOLLOWING CLOSELY FOR ALL DISCHARGE NEEDS

## 2018-07-11 NOTE — ASSESSMENT & PLAN NOTE
· 7/7 ECHO- EF 61% grade 2 diastolic dysfunction, elft atrium mildly dilated, mild to moderate mitral regurgitation, moderate to severe tricuspid regurugitation with moderate plumonary hypertension, IVC dilated with blunted respirophasic changes  · Patient appears compensated at this time  Did get IV Lasix while in the ICU    · Continue beta-blocker

## 2018-07-11 NOTE — ASSESSMENT & PLAN NOTE
· Due to infection, falls, rhabdo  · Improved and now appears to be at baseline  · Due to patient's reported psychiatric history, we will need to get a psychiatrist evaluation as well as a capacity evaluation as she will require nursing home short-term rehab placement after this hospitalization  Appreciate consultant's input

## 2018-07-12 PROBLEM — K59.00 CONSTIPATION: Status: ACTIVE | Noted: 2018-07-12

## 2018-07-12 PROCEDURE — 99232 SBSQ HOSP IP/OBS MODERATE 35: CPT | Performed by: HOSPITALIST

## 2018-07-12 PROCEDURE — 97530 THERAPEUTIC ACTIVITIES: CPT

## 2018-07-12 RX ORDER — LACTULOSE 20 G/30ML
20 SOLUTION ORAL ONCE
Status: COMPLETED | OUTPATIENT
Start: 2018-07-12 | End: 2018-07-12

## 2018-07-12 RX ORDER — BISACODYL 10 MG
10 SUPPOSITORY, RECTAL RECTAL DAILY PRN
Status: DISCONTINUED | OUTPATIENT
Start: 2018-07-12 | End: 2018-07-17 | Stop reason: HOSPADM

## 2018-07-12 RX ORDER — FUROSEMIDE 20 MG/1
20 TABLET ORAL ONCE
Status: DISCONTINUED | OUTPATIENT
Start: 2018-07-12 | End: 2018-07-17 | Stop reason: HOSPADM

## 2018-07-12 RX ADMIN — ENOXAPARIN SODIUM 40 MG: 40 INJECTION SUBCUTANEOUS at 09:46

## 2018-07-12 RX ADMIN — PREGABALIN 50 MG: 50 CAPSULE ORAL at 17:12

## 2018-07-12 RX ADMIN — MIDODRINE HYDROCHLORIDE 10 MG: 5 TABLET ORAL at 21:16

## 2018-07-12 RX ADMIN — MIDODRINE HYDROCHLORIDE 10 MG: 5 TABLET ORAL at 17:12

## 2018-07-12 RX ADMIN — LACTULOSE 20 G: 20 SOLUTION ORAL at 09:43

## 2018-07-12 RX ADMIN — OXYCODONE HYDROCHLORIDE 5 MG: 5 TABLET ORAL at 20:27

## 2018-07-12 RX ADMIN — POLYETHYLENE GLYCOL 3350 17 G: 17 POWDER, FOR SOLUTION ORAL at 09:54

## 2018-07-12 RX ADMIN — GABAPENTIN 100 MG: 100 CAPSULE ORAL at 17:13

## 2018-07-12 RX ADMIN — BISACODYL 5 MG: 5 TABLET, COATED ORAL at 09:43

## 2018-07-12 RX ADMIN — GABAPENTIN 100 MG: 100 CAPSULE ORAL at 09:44

## 2018-07-12 RX ADMIN — PREGABALIN 50 MG: 50 CAPSULE ORAL at 09:43

## 2018-07-12 RX ADMIN — LIDOCAINE 1 PATCH: 50 PATCH CUTANEOUS at 09:47

## 2018-07-12 RX ADMIN — MIDODRINE HYDROCHLORIDE 10 MG: 5 TABLET ORAL at 09:52

## 2018-07-12 RX ADMIN — PREGABALIN 50 MG: 50 CAPSULE ORAL at 20:28

## 2018-07-12 NOTE — ASSESSMENT & PLAN NOTE
· Dr Nancy Ayala from trauma - recs conservative mgmt w/ pain control  · No surgical intervention  · Continue physical therapy

## 2018-07-12 NOTE — ASSESSMENT & PLAN NOTE
· 7/7 ECHO- EF 70% grade 2 diastolic dysfunction, elft atrium mildly dilated, mild to moderate mitral regurgitation, moderate to severe tricuspid regurugitation with moderate plumonary hypertension, IVC dilated with blunted respirophasic changes  · Patient appears compensated at this time  Did get IV Lasix while in the ICU  · Continue beta-blocker  · Has been on nasal cannula, saturation was 89% this morning on nasal cannula however she is a mouth breather  Titrate oxygen further  Review her were dose of oral 20 mg of Lasix today    Incentive spirometer

## 2018-07-12 NOTE — ASSESSMENT & PLAN NOTE
· Continue MiraLax, Dulcolax    Add 1 time oral lactulose and if that does not work use suppository  · Need to move bowels to help with urinary retention

## 2018-07-12 NOTE — ASSESSMENT & PLAN NOTE
· Patient's rate is controlled at this time with metoprolol  · Anticoagulation will not be started at this time due to SDH  · Cardiology input appreciated

## 2018-07-12 NOTE — RESTORATIVE TECHNICIAN NOTE
Restorative Specialist Mobility Note       Activity: Commode, Chair     Assistive Device: Other (Comment) (HHA x 2)

## 2018-07-12 NOTE — PROGRESS NOTES
Progress Note - Afia Roger 1945, 67 y o  female MRN: [id number]    Unit/Bed#: -01 Encounter: [id number]    Primary Care Provider: Luis Christianson DO   Date and time admitted to hospital: 7/3/2018  4:04 PM        Constipation   Assessment & Plan    · Continue MiraLax, Dulcolax  Add 1 time oral lactulose and if that does not work use suppository  · Need to move bowels to help with urinary retention        Urinary retention   Assessment & Plan    · Attempted to mobilize patient out of bed to urinate and patient was still unable so mcrae was placed 7/10/2018 at 0100  · Treat constipation and perform a voiding trial today        Diastolic CHF ([facility] [address] )   Assessment & Plan    · 7/7 ECHO- EF 39% grade 2 diastolic dysfunction, elft atrium mildly dilated, mild to moderate mitral regurgitation, moderate to severe tricuspid regurugitation with moderate plumonary hypertension, IVC dilated with blunted respirophasic changes  · Patient appears compensated at this time  Did get IV Lasix while in the ICU  · Continue beta-blocker  · Has been on nasal cannula, saturation was 89% this morning on nasal cannula however she is a mouth breather  Titrate oxygen further  Review her were dose of oral 20 mg of Lasix today  Incentive spirometer          Paroxysmal atrial fibrillation (HCC)   Assessment & Plan    · Patient's rate is controlled at this time with metoprolol  · Anticoagulation will not be started at this time due to SDH  · Cardiology input appreciated        Subdural hematoma [facility])   Assessment & Plan    · Appreciate neurosurgery input  · Repeat CT scan on July 8, 2018 showed that there was a stable subdural hemorrhage within the right hemisphere  · No neuro surgical intervention is required in the hospital   · DVT prophylaxis has been approved however anticoagulation has not been approved  · She will need to have outpatient follow-up with Neurosurgery with a repeat CT scan          Lumbar transverse process fracture [facility])   Assessment & Plan    · Dr Ana Maria Coley from trauma - recs conservative mgmt w/ pain control  · No surgical intervention  · Continue physical therapy         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[facility] [address] )   Assessment & Plan    · Stable on lyrica and neurontin        Acute encephalopathy   Assessment & Plan    · Due to infection, falls, rhabdo  · Improved and now appears to be at baseline  · Evaluated by Psychiatry - deemed to be stable on current medications and cleared for discharge to rehab  Also evaluated by neuropsychology who deemed her to be competent to make decisions for herself    Neuropsychology also recommends outpatient Geriatric evaluation and repeat detailed cognitive evaluation after 1 year        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[facility] [address] )   Assessment & Plan    · outpt neuro f/u  · PT/OT evals - pt reportedly having falls - needs STR, case management is following        * Acute cystitis without hematuria   Assessment & Plan    · Coag-negative staph was isolated  · Completed antibiotics  · Clinically stable             [facility] Internal Medicine Progress Note  Patient: Dodie Aguilar 67 y o  female   MRN: [id number]  PCP: Tata Marquez DO  Unit/Bed#: -01 Encounter: [id number]  Date Of Visit: 07/12/18    Assessment:    Principal Problem:    Acute cystitis without hematuria  Active Problems:    Hypothyroidism    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[facility] [address] )    Anemia    Acute encephalopathy    Transaminitis    UTI (urinary tract infection)    Neuropathy, peripheral, idiopathic    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[facility] [address] )    Lumbar transverse process fracture (HCC)    Subdural hematoma (HCC)    Paroxysmal atrial fibrillation (HCC)    Diastolic CHF ([facility] [address] )    Urinary retention    Recurrent major depressive disorder, in partial remission (HCC)    Constipation        VTE Pharmacologic Prophylaxis:   Pharmacologic: Enoxaparin (Lovenox)  Mechanical VTE Prophylaxis in Place: Yes    Patient Centered Rounds: I have performed bedside rounds with nursing staff today  Discussions with Specialists or Other Care Team Provider: RNBEKAH    Education and Discussions with Family / Patient: patient, left VM for MELISSA castano    Time Spent for Care: 30 minutes  More than 50% of total time spent on counseling and coordination of care as described above  Current Length of Stay: 9 day(s)    Current Patient Status: Inpatient   Certification Statement: The patient will continue to require additional inpatient hospital stay due to voiding trial, BM, rehab palcement    Discharge Plan / Estimated Discharge Date: will be ready later to day or tomorrow    Code Status: Level 1 - Full Code      Subjective:   Feels good, no CP/SOB, c/o constipation    Objective:     Vitals:   Temp (24hrs), Av 7 °F (37 1 °C), Min:98 3 °F (36 8 °C), Max:99 °F (37 2 °C)    HR:  [79-94] 79  Resp:  [18] 18  BP: ()/(53-66) 90/53  SpO2:  [89 %-94 %] 89 %  Body mass index is 22 33 kg/m²  Input and Output Summary (last 24 hours): Intake/Output Summary (Last 24 hours) at 18 0933  Last data filed at 18 0631   Gross per 24 hour   Intake              240 ml   Output             2350 ml   Net            -2110 ml       Physical Exam:     Physical Exam   Constitutional: She appears well-developed and well-nourished  HENT:   Head: Normocephalic and atraumatic  Eyes: Conjunctivae are normal  No scleral icterus  Cardiovascular: Normal rate, regular rhythm and normal heart sounds  Exam reveals no friction rub  No murmur heard  Pulmonary/Chest: Effort normal and breath sounds normal  No respiratory distress  She has no wheezes  She has no rales  Abdominal: Soft  She exhibits no distension  There is no tenderness  Musculoskeletal: She exhibits no edema  Neurological: She is alert  Vitals reviewed          Additional Data:     Labs:      Results from last 7 days  Lab Units 18  0516  18  0433   WBC Thousand/uL 9 00  < > 2 99* HEMOGLOBIN g/dL 9 3*  < > 9 0*   HEMATOCRIT % 29 6*  < > 29 2*   PLATELETS Thousands/uL 125*  < > 76*   LYMPHO PCT %  --   --  20   MONO PCT MAN %  --   --  0*   EOSINO PCT MANUAL %  --   --  0   < > = values in this interval not displayed  Results from last 7 days  Lab Units 07/09/18  0516 07/08/18  0438   SODIUM mmol/L 143 143   POTASSIUM mmol/L 3 9 3 5   CHLORIDE mmol/L 109* 109*   CO2 mmol/L 28 27   BUN mg/dL 16 19   CREATININE mg/dL 0 72 0 85   CALCIUM mg/dL 8 3 8 3   TOTAL PROTEIN g/dL  --  5 8*   BILIRUBIN TOTAL mg/dL  --  0 80   ALK PHOS U/L  --  285*   ALT U/L  --  58   AST U/L  --  28   GLUCOSE RANDOM mg/dL 85 81       Results from last 7 days  Lab Units 07/06/18  0433   INR  1 10       * I Have Reviewed All Lab Data Listed Above  * Additional Pertinent Lab Tests Reviewed: No New Labs Available For Today    Imaging:    Imaging Reports Reviewed Today Include:   Imaging Personally Reviewed by Myself Includes:      Recent Cultures (last 7 days):       Results from last 7 days  Lab Units 07/06/18  0434   BLOOD CULTURE  No Growth After 5 Days  No Growth After 5 Days         Last 24 Hours Medication List:     Current Facility-Administered Medications:  bisacodyl 5 mg Oral Daily JESSY Trent   bisacodyl 10 mg Rectal Daily PRN Joellen Beck MD   enoxaparin 40 mg Subcutaneous Q24H [address] JESSY Holcomb   furosemide 20 mg Oral Once Joellen Beck MD   gabapentin 100 mg Oral BID JESSY Holcomb   lactulose 20 g Oral Once Joellen Beck MD   lidocaine 1 patch Transdermal Daily Masha Lab, DO   metoprolol tartrate 12 5 mg Oral Q12H [address] Ashley Polanco MD   midodrine 10 mg Oral TID Masha Lab, DO   ondansetron 4 mg Intravenous Q6H PRN Masha Gipson, DO   oxyCODONE 5 mg Oral Q6H PRN JESSY Willams   pneumococcal 13-valent conjugate vaccine 0 5 mL Intramuscular Prior to discharge Masha Lab, DO   polyethylene glycol 17 g Oral BID JESSY Trent pregabalin 50 mg Oral TID JESSY Bullard        Today, Patient Was Seen By: Mike Davis MD    ** Please Note: This note has been constructed using a voice recognition system   **

## 2018-07-12 NOTE — ASSESSMENT & PLAN NOTE
· Due to infection, falls, rhabdo  · Improved and now appears to be at baseline  · Evaluated by Psychiatry - deemed to be stable on current medications and cleared for discharge to rehab  Also evaluated by neuropsychology who deemed her to be competent to make decisions for herself    Neuropsychology also recommends outpatient Geriatric evaluation and repeat detailed cognitive evaluation after 1 year

## 2018-07-12 NOTE — SOCIAL WORK
Pt rec'd visit from [facility] [employer] to complete [facility] paperwork  CM provided clinical paperwork to [facility]  Per Carlos Rand was not signed  CM will page physician to sign and fax to [facility]

## 2018-07-12 NOTE — PLAN OF CARE
Problem: PHYSICAL THERAPY ADULT  Goal: Performs mobility at highest level of function for planned discharge setting  See evaluation for individualized goals  Treatment/Interventions: Functional transfer training, LE strengthening/ROM, Therapeutic exercise, Equipment eval/education, Bed mobility, Gait training, OT, Spoke to nursing  Equipment Recommended: Mary Angelo (RW)       See flowsheet documentation for full assessment, interventions and recommendations  Outcome: Not Progressing  Prognosis: Fair  Problem List: Decreased strength, Decreased endurance, Impaired balance, Decreased mobility, Decreased safety awareness, Impaired judgement, Decreased cognition  Assessment: Pt required increased encouragement and education to complete session today  Agreeable as long as PT assissted to get her in improved position to eat lunch  Demonstrated ability to complete LE exercises with frequent re direction back to task  Required hand placement instruction during all transfers with no carryover  Poor standing tolerance 2* to fatigue  Poor ability to weight shift and use of UE to reposition in chair placing pt at high risk for additional skin breakdown  Pt will benefit from continued inpt skilled PT to maximize functional mobility and safety  Barriers to Discharge: Decreased caregiver support     Recommendation: Short-term skilled PT     PT - OK to Discharge:  (to rehab when medically stable )    See flowsheet documentation for full assessment

## 2018-07-12 NOTE — PHYSICAL THERAPY NOTE
PHYSICAL THERAPY NOTE          Patient Name: Tenisha Nogueira  EDLJY'D Date: 7/12/2018 07/12/18 1120   Pain Assessment   Pain Assessment 0-10   Pain Score No Pain   Restrictions/Precautions   Weight Bearing Precautions Per Order No   Other Precautions Chair Alarm; Bed Alarm; Fall Risk;Multiple lines   General   Chart Reviewed Yes   Family/Caregiver Present No   Cognition   Orientation Level Oriented to person;Oriented to place   Subjective   Subjective Pt found resting in recliner, I really want to eat, agreeable to incorperate respositing into treatment session   Transfers   Sit to Stand 3  Moderate assistance   Additional items Assist x 1; Increased time required   Stand to Sit 3  Moderate assistance   Additional items Assist x 1   Stand pivot 3  Moderate assistance   Additional items Assist x 1   Ambulation/Elevation   Gait pattern Decreased foot clearance; Improper Weight shift; Shuffling   Gait Assistance 3  Moderate assist   Additional items Assist x 1   Assistive Device Rolling walker   Distance 2'   Balance   Static Sitting Fair -   Static Standing Poor   Ambulatory Poor   Endurance Deficit   Endurance Deficit Yes   Endurance Deficit Description grossly deconditioned, fatigues with minimal activity   Activity Tolerance   Activity Tolerance Patient limited by fatigue   Medical Staff Made Aware yes, spoke to CM about D/C planning   Nurse Made Aware yes clearance by Caden Mendoza to see pt  Exercises   Hip Flexion Sitting;10 reps;AROM   Knee AROM Long Arc Quad 10 reps; Sitting;AROM   Ankle Pumps 10 reps; Sitting;AROM   Marching 10 reps; Sitting;AROM   Neuro re-ed sit<>stand x2    Assessment   Prognosis Fair   Problem List Decreased strength;Decreased endurance; Impaired balance;Decreased mobility; Decreased safety awareness; Impaired judgement;Decreased cognition   Assessment Pt required increased encouragement and education to complete session today  Agreeable as long as PT assissted to get her in improved position to eat lunch  Demonstrated ability to complete LE exercises with frequent re direction back to task  Required hand placement instruction during all transfers with no carryover  Poor standing tolerance 2* to fatigue  Poor ability to weight shift and use of UE to reposition in chair placing pt at high risk for additional skin breakdown  Pt will benefit from continued inpt skilled PT to maximize functional mobility and safety  Goals   Patient Goals To eat lunch   Treatment Day 2   Plan   Treatment/Interventions Functional transfer training;LE strengthening/ROM; Patient/family training;Bed mobility;Spoke to nursing; Endurance training   Progress Slow progress, decreased activity tolerance   PT Frequency (3-5x/week)   Recommendation   Recommendation Short-term skilled PT   Equipment Recommended Walker   PT - OK to Discharge (to rehab when medically stable )   Jose Carlos Shelley PT, DPT

## 2018-07-12 NOTE — ASSESSMENT & PLAN NOTE
· Attempted to mobilize patient out of bed to urinate and patient was still unable so mcrae was placed 7/10/2018 at 0100  · Treat constipation and perform a voiding trial today

## 2018-07-12 NOTE — ASSESSMENT & PLAN NOTE
· Appreciate neurosurgery input  · Repeat CT scan on July 8, 2018 showed that there was a stable subdural hemorrhage within the right hemisphere  · No neuro surgical intervention is required in the hospital   · DVT prophylaxis has been approved however anticoagulation has not been approved  · She will need to have outpatient follow-up with Neurosurgery with a repeat CT scan

## 2018-07-13 ENCOUNTER — TELEPHONE (OUTPATIENT)
Dept: NEUROSURGERY | Facility: CLINIC | Age: 73
End: 2018-07-13

## 2018-07-13 PROCEDURE — 97110 THERAPEUTIC EXERCISES: CPT

## 2018-07-13 PROCEDURE — 97535 SELF CARE MNGMENT TRAINING: CPT | Performed by: STUDENT IN AN ORGANIZED HEALTH CARE EDUCATION/TRAINING PROGRAM

## 2018-07-13 PROCEDURE — 99232 SBSQ HOSP IP/OBS MODERATE 35: CPT | Performed by: PHYSICIAN ASSISTANT

## 2018-07-13 PROCEDURE — 97530 THERAPEUTIC ACTIVITIES: CPT | Performed by: STUDENT IN AN ORGANIZED HEALTH CARE EDUCATION/TRAINING PROGRAM

## 2018-07-13 PROCEDURE — 97116 GAIT TRAINING THERAPY: CPT

## 2018-07-13 RX ORDER — ROPINIROLE 1 MG/1
1 TABLET, FILM COATED ORAL
Status: DISCONTINUED | OUTPATIENT
Start: 2018-07-13 | End: 2018-07-17 | Stop reason: HOSPADM

## 2018-07-13 RX ORDER — LEVOTHYROXINE SODIUM 0.05 MG/1
50 TABLET ORAL
Status: DISCONTINUED | OUTPATIENT
Start: 2018-07-14 | End: 2018-07-17 | Stop reason: HOSPADM

## 2018-07-13 RX ADMIN — ENOXAPARIN SODIUM 40 MG: 40 INJECTION SUBCUTANEOUS at 08:18

## 2018-07-13 RX ADMIN — MIDODRINE HYDROCHLORIDE 10 MG: 5 TABLET ORAL at 17:05

## 2018-07-13 RX ADMIN — PREGABALIN 50 MG: 50 CAPSULE ORAL at 21:32

## 2018-07-13 RX ADMIN — BISACODYL 5 MG: 5 TABLET, COATED ORAL at 08:18

## 2018-07-13 RX ADMIN — GABAPENTIN 100 MG: 100 CAPSULE ORAL at 17:05

## 2018-07-13 RX ADMIN — METOPROLOL TARTRATE 12.5 MG: 25 TABLET ORAL at 23:09

## 2018-07-13 RX ADMIN — MIDODRINE HYDROCHLORIDE 10 MG: 5 TABLET ORAL at 08:19

## 2018-07-13 RX ADMIN — MIDODRINE HYDROCHLORIDE 10 MG: 5 TABLET ORAL at 21:32

## 2018-07-13 RX ADMIN — LIDOCAINE 1 PATCH: 50 PATCH CUTANEOUS at 08:18

## 2018-07-13 RX ADMIN — PREGABALIN 50 MG: 50 CAPSULE ORAL at 08:18

## 2018-07-13 RX ADMIN — ROPINIROLE 1 MG: 1 TABLET, FILM COATED ORAL at 21:32

## 2018-07-13 RX ADMIN — OXYCODONE HYDROCHLORIDE 5 MG: 5 TABLET ORAL at 21:32

## 2018-07-13 RX ADMIN — PREGABALIN 50 MG: 50 CAPSULE ORAL at 17:05

## 2018-07-13 RX ADMIN — OXYCODONE HYDROCHLORIDE 5 MG: 5 TABLET ORAL at 02:45

## 2018-07-13 RX ADMIN — GABAPENTIN 100 MG: 100 CAPSULE ORAL at 08:18

## 2018-07-13 RX ADMIN — OXYCODONE HYDROCHLORIDE 5 MG: 5 TABLET ORAL at 14:45

## 2018-07-13 RX ADMIN — POLYETHYLENE GLYCOL 3350 17 G: 17 POWDER, FOR SOLUTION ORAL at 08:18

## 2018-07-13 RX ADMIN — METOPROLOL TARTRATE 12.5 MG: 25 TABLET ORAL at 00:28

## 2018-07-13 NOTE — ASSESSMENT & PLAN NOTE
· Patient appears compensated at this time  Did get IV Lasix while in the ICU    · Continue beta-blocker

## 2018-07-13 NOTE — TELEPHONE ENCOUNTER
07/18/2018-CALLED [facility], TRANSFERRED TO [facility]-UNIT CLER AND LEFT MESSAGE ON MACHINE CONFIRMING 07/19/2018 APPT  PT Frederic ON 07/17/2018 07/17/2018-PT STILL IN HOSPITAL    07/13/2018-PT STILL IN HOSPITAL    07/12/2018-PT STILL IN HOSPITAL, 07/19/2018 APPT W/CT HEAD    Rama Rounds    ----- Message from Julita Leyva PA-C sent at 7/10/2018  4:13 PM EDT -----  F/u 7/19 with ct head wo

## 2018-07-13 NOTE — TELEPHONE ENCOUNTER
07/12/2018-PT Emilee [facility], 07/19/2018 APPT W/CT HEAD    Fabienne Cooks    ----- Message from Ines Lott PA-C sent at 7/10/2018  4:13 PM EDT -----  F/u 7/19 with ct head wo

## 2018-07-13 NOTE — PROGRESS NOTES
Progress Note - Johnye Homans 1945, 67 y o  female MRN: [id number]    Unit/Bed#: -01 Encounter: [id number]    Primary Care Provider: Taryn Wynn DO   Date and time admitted to hospital: 7/3/2018  4:04 PM    * Acute cystitis without hematuria   Assessment & Plan    · Coag-negative staph was isolated  · Completed antibiotics  · Clinically stable         Subdural hematoma [facility])   Assessment & Plan    · Appreciate neurosurgery input  · Repeat CT scan on July 8, 2018 showed that there was a stable subdural hemorrhage within the right hemisphere  · No neuro surgical intervention is required in the hospital   · Hold anticoagulation, antiplatelets  DVT ppx okay  · She will need to have outpatient follow-up with Neurosurgery with a repeat CT scan in 2 weeks        Acute encephalopathy   Assessment & Plan    · Due to infection, falls, rhabdo  · Improved and now appears to be at baseline  · Evaluated by Psychiatry - deemed to be stable on current medications and cleared for discharge to rehab  Also evaluated by neuropsychology who deemed her to be competent to make decisions for herself  Neuropsychology also recommends outpatient Geriatric evaluation and repeat detailed cognitive evaluation after 1 year        Constipation   Assessment & Plan    · Continue bowel regimen        Urinary retention   Assessment & Plan    · Voiding trial attempted but patient now straight cathed again x 2  · May need to replace Elena and have patient f/u with urology outpatient  · Continue bowel regimen, mobilization        Diastolic CHF [facility])   Assessment & Plan    · Patient appears compensated at this time  Did get IV Lasix while in the ICU    · Continue beta-blocker        Paroxysmal atrial fibrillation (HCC)   Assessment & Plan    · Patient's rate is controlled at this time with metoprolol  · Anticoagulation will not be started at this time due to SDH  · Cardiology input appreciated        Lumbar transverse process fracture ([facility] [address] )   Assessment & Plan    · Trauma recommends conservative mgmt w/ pain control  · No surgical intervention  · Continue physical therapy         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[facility] [address] )   Assessment & Plan    · Stable on lyrica and neurontin        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[facility] [address] )   Assessment & Plan    · outpt neuro f/u  · PT/OT evals - pt reportedly having falls - needs STR, case management is following          VTE Pharmacologic Prophylaxis:   Pharmacologic: Enoxaparin (Lovenox)  Mechanical VTE Prophylaxis in Place: Yes    Patient Centered Rounds: I have performed bedside rounds with nursing staff today  Discussions with Specialists or Other Care Team Provider:  CM    Education and Discussions with Family / Patient: Spoke with daughter, Collin Nunez, on the phone and provided update  Time Spent for Care: 30 minutes  More than 50% of total time spent on counseling and coordination of care as described above  Current Length of Stay: 10 day(s)    Current Patient Status: Inpatient   Certification Statement: The patient will continue to require additional inpatient hospital stay due to rehab placement  Discharge Plan: Medically stable- awaiting placement  Code Status: Level 1 - Full Code      Subjective:   Reports feeling tired today  Otherwise denies complaints  She had Elena removed and required straight cath x 2 since then  Did have BM last night  Objective:     Vitals:   Temp (24hrs), Av °F (36 7 °C), Min:97 6 °F (36 4 °C), Max:98 5 °F (36 9 °C)    HR:  [70-77] 71  Resp:  [14-18] 16  BP: ()/(51-80) 90/51  SpO2:  [90 %-95 %] 90 %  Body mass index is 22 39 kg/m²  Input and Output Summary (last 24 hours): Intake/Output Summary (Last 24 hours) at 18 1159  Last data filed at 18 0838   Gross per 24 hour   Intake              480 ml   Output             1131 ml   Net             -651 ml       Physical Exam:     Physical Exam   Constitutional: No distress     HENT: Head: Normocephalic and atraumatic  Eyes: No scleral icterus  Neck: Normal range of motion  Neck supple  Cardiovascular: Normal rate, regular rhythm and normal heart sounds  Pulmonary/Chest: Effort normal and breath sounds normal  No respiratory distress  She has no wheezes  She has no rales  Abdominal: Soft  Bowel sounds are normal  She exhibits no distension  There is no tenderness  There is no rebound  Musculoskeletal: She exhibits no edema  Neurological: She is alert  +dysarthria- baseline   Skin: Skin is warm and dry  She is not diaphoretic  Psychiatric: She has a normal mood and affect  Additional Data:     Labs:      Results from last 7 days  Lab Units 07/09/18  0516   WBC Thousand/uL 9 00   HEMOGLOBIN g/dL 9 3*   HEMATOCRIT % 29 6*   PLATELETS Thousands/uL 125*       Results from last 7 days  Lab Units 07/09/18  0516 07/08/18  0438   SODIUM mmol/L 143 143   POTASSIUM mmol/L 3 9 3 5   CHLORIDE mmol/L 109* 109*   CO2 mmol/L 28 27   BUN mg/dL 16 19   CREATININE mg/dL 0 72 0 85   CALCIUM mg/dL 8 3 8 3   TOTAL PROTEIN g/dL  --  5 8*   BILIRUBIN TOTAL mg/dL  --  0 80   ALK PHOS U/L  --  285*   ALT U/L  --  58   AST U/L  --  28   GLUCOSE RANDOM mg/dL 85 81           * I Have Reviewed All Lab Data Listed Above  * Additional Pertinent Lab Tests Reviewed:  All Labs Within Last 24 Hours Reviewed    Imaging:    Imaging Reports Reviewed Today Include: None  Imaging Personally Reviewed by Myself Includes:  NOne    Recent Cultures (last 7 days):           Last 24 Hours Medication List:     Current Facility-Administered Medications:  bisacodyl 5 mg Oral Daily JESSY Lowe   bisacodyl 10 mg Rectal Daily PRN Henrry Ortiz MD   enoxaparin 40 mg Subcutaneous Q24H [facility] JESSY Haque   furosemide 20 mg Oral Once Henrry Ortiz MD   gabapentin 100 mg Oral BID JESSY Haque   lidocaine 1 patch Transdermal Daily Garcia Lora DO   metoprolol tartrate 12 5 mg Oral Q12H [facility] Srini Newsome MD   midodrine 10 mg Oral TID Romana Cure, DO   ondansetron 4 mg Intravenous Q6H PRN Romana Cure, DO   oxyCODONE 5 mg Oral Q6H PRN JESSY Aguirre   pneumococcal 13-valent conjugate vaccine 0 5 mL Intramuscular Prior to discharge Romana Cure, DO   polyethylene glycol 17 g Oral BID JESSY Sanchez   pregabalin 50 mg Oral TID JESSY Aguirre        Today, Patient Was Seen By: Le Riley PA-C    ** Please Note: Dragon 360 Dictation voice to text software may have been used in the creation of this document   **

## 2018-07-13 NOTE — OCCUPATIONAL THERAPY NOTE
07/13/18 1445   Restrictions/Precautions   Weight Bearing Precautions Per Order No   Other Precautions Multiple lines;Telemetry;O2;Fall Risk;Cognitive   Pain Assessment   Pain Assessment No/denies pain   Pain Score No Pain   ADL   Where Assessed Chair   LB Dressing Assistance 4  Minimal Assistance   LB Dressing Deficit Setup;Steadying;Verbal cueing;Supervision/safety; Increased time to complete;Pull up over hips;Use of adaptive equipment   LB Dressing Comments don socks and pants   Bed Mobility   Sit to Supine 3  Moderate assistance   Additional items Assist x 1   Transfers   Sit to Stand 3  Moderate assistance   Additional items Assist x 1   Stand to Sit 3  Moderate assistance   Additional items Assist x 1   Stand pivot 3  Moderate assistance   Additional items Assist x 1   Toilet Transfers   Toilet Transfer From Rolling walker   Toilet Transfer Type To and from   Toilet Transfer to Standard bedside commode   Toilet Transfer Technique Stand pivot   Toilet Transfers Moderate assistance   Cognition   Overall Cognitive Status Impaired   Arousal/Participation Responsive   Attention Attends with cues to redirect   Orientation Level Oriented to person;Oriented to situation   Memory Decreased short term memory;Decreased recall of recent events;Decreased recall of precautions   Following Commands Follows one step commands with increased time or repetition   Activity Tolerance   Activity Tolerance Patient tolerated treatment well   Assessment   Assessment Pt participates in OT session with focus on LB dressing, activity tolerance, transfers, and bed mobility to increase I for d/c  Pt min A LB dressing to don socks and pants while seated/standing from bedside chair with RW support  Pt requires A to pull up pants during stance 2* decreased standing balance  Pt takes extra time to complete LB dressing activity 2* decreased endurance  Pt found to have incontinence with BM during stance and pt cleaned up   Pt mod A SPT with RW from chair to commode and pt sat on commode for a few minutes but states "I don't feel like going"  Pt mod A bed mobility sit to supine from EOB and bed alarm turned on post session  Pt will continue to benefit from activity tolerance, adls, and transfers  Plan   Treatment Interventions ADL retraining;Functional transfer training; Endurance training; Activityengagement   Goal Expiration Date 07/14/18   Treatment Day 2   OT Frequency 3-5x/wk   Recommendation   OT Discharge Recommendation Short Term Rehab

## 2018-07-13 NOTE — PHYSICAL THERAPY NOTE
PHYSICAL THERAPY NOTE          Patient Name: Guanako Arce  WMXEG'N Date: 7/13/2018 07/13/18 1130   Pain Assessment   Pain Assessment 0-10   Pain Score No Pain   Restrictions/Precautions   Weight Bearing Precautions Per Order No   Other Precautions Multiple lines;Telemetry;O2;Fall Risk;Cognitive   General   Chart Reviewed Yes   Family/Caregiver Present No   Cognition   Orientation Level Oriented to person;Oriented to place   Subjective   Subjective "What are we going to do today?"   Bed Mobility   Supine to Sit (pt found resting in chair)   Sit to Supine (pt left resting in chair with call bell, nsg watching pt eat)   Transfers   Sit to Stand 3  Moderate assistance   Additional items Assist x 1; Increased time required   Stand to Sit 3  Moderate assistance   Additional items Assist x 1; Increased time required   Ambulation/Elevation   Gait pattern Ataxia; Inconsistent lady;Decreased foot clearance;Shuffling;Excessively slow; Wide MONIKA   Gait Assistance 3  Moderate assist   Additional items Assist x 1   Assistive Device Rolling walker   Distance 40+ long sitting rest break + 25   Balance   Static Sitting Fair -   Static Standing Poor   Dynamic Standing Poor   Ambulatory Poor   Endurance Deficit   Endurance Deficit Yes   Endurance Deficit Description grossly deconditioned   Activity Tolerance   Activity Tolerance Patient limited by fatigue   Medical Staff Made Aware yes, spoke to Niels about D/C planning   Nurse Made Aware yes, Abran Moreno gave clearance to work with pt  Marisa aware pt was set up with lunch and reports she will watch pt   Exercises   THR Sitting;20 reps;AROM   Balance training  sitting balance without UE support   Assessment   Prognosis Fair   Problem List Decreased strength;Decreased endurance; Impaired balance;Decreased mobility; Decreased safety awareness;Pain   Assessment Pt demonstrated improved engagement this session with decreased fatigue  Improved ambulation tolerance  Continues to require constant hand placement instruction during transfers and device management for distance and placement during ambulation  Shuffling unsteady gait with poor foot clearance  Tolerated LE exercises with minimal fatigue  Pt will benefit from continued inpt skilled PT to maximize functional mobility & safety  Barriers to Discharge Decreased caregiver support   Goals   Patient Goals To get stronger and leave here   LTG Expiration Date 07/18/18   Treatment Day 3   Plan   Treatment/Interventions Functional transfer training;LE strengthening/ROM; Endurance training; Therapeutic exercise;Spoke to nursing;Spoke to case management;Gait training   Progress Slow progress, decreased activity tolerance   PT Frequency (3-5x/wk)   Recommendation   Recommendation Short-term skilled PT   Equipment Recommended Walker   PT - OK to Discharge (to rehab when medically stable)   Ania Kelly, PT DPT

## 2018-07-13 NOTE — PLAN OF CARE
Problem: OCCUPATIONAL THERAPY ADULT  Goal: Performs self-care activities at highest level of function for planned discharge setting  See evaluation for individualized goals  Treatment Interventions: ADL retraining, Functional transfer training, UE strengthening/ROM, Endurance training, Cognitive reorientation, Patient/family training, Equipment evaluation/education, Compensatory technique education, Continued evaluation, Energy conservation, Activityengagement          See flowsheet documentation for full assessment, interventions and recommendations  Outcome: Progressing  Limitation: Decreased ADL status, Decreased UE ROM, Decreased UE strength, Decreased Safe judgement during ADL, Decreased cognition, Decreased endurance, Decreased self-care trans, Decreased high-level ADLs  Prognosis: Fair  Assessment: Pt participates in OT session with focus on LB dressing, activity tolerance, transfers, and bed mobility to increase I for d/c  Pt min A LB dressing to don socks and pants while seated/standing from bedside chair with RW support  Pt requires A to pull up pants during stance 2* decreased standing balance  Pt takes extra time to complete LB dressing activity 2* decreased endurance  Pt found to have incontinence with BM during stance and pt cleaned up  Pt mod A SPT with RW from chair to commode and pt sat on commode for a few minutes but states "I don't feel like going"  Pt mod A bed mobility sit to supine from EOB and bed alarm turned on post session  Pt will continue to benefit from activity tolerance, adls, and transfers       OT Discharge Recommendation: Short Term Rehab  OT - OK to Discharge: Yes (when medically stable)

## 2018-07-13 NOTE — ASSESSMENT & PLAN NOTE
· Trauma recommends conservative mgmt w/ pain control  · No surgical intervention  · Continue physical therapy

## 2018-07-13 NOTE — ASSESSMENT & PLAN NOTE
· Voiding trial attempted but patient now straight cathed again x 2  · May need to replace Elena and have patient f/u with urology outpatient  · Continue bowel regimen, mobilization

## 2018-07-13 NOTE — PLAN OF CARE
Problem: PHYSICAL THERAPY ADULT  Goal: Performs mobility at highest level of function for planned discharge setting  See evaluation for individualized goals  Treatment/Interventions: Functional transfer training, LE strengthening/ROM, Therapeutic exercise, Equipment eval/education, Bed mobility, Gait training, OT, Spoke to nursing  Equipment Recommended: Alesha Fairbanks (RW)       See flowsheet documentation for full assessment, interventions and recommendations  Outcome: Progressing  Prognosis: Fair  Problem List: Decreased strength, Decreased endurance, Impaired balance, Decreased mobility, Decreased safety awareness, Pain  Assessment: Pt demonstrated improved engagement this session with decreased fatigue  Improved ambulation tolerance  Continues to require constant hand placement instruction during transfers and device management for distance and placement during ambulation  Shuffling unsteady gait with poor foot clearance  Tolerated LE exercises with minimal fatigue  Pt will benefit from continued inpt skilled PT to maximize functional mobility & safety  Barriers to Discharge: Decreased caregiver support     Recommendation: Short-term skilled PT     PT - OK to Discharge:  (to rehab when medically stable)    See flowsheet documentation for full assessment

## 2018-07-13 NOTE — ASSESSMENT & PLAN NOTE
· Appreciate neurosurgery input  · Repeat CT scan on July 8, 2018 showed that there was a stable subdural hemorrhage within the right hemisphere  · No neuro surgical intervention is required in the hospital   · Hold anticoagulation, antiplatelets   DVT ppx okay  · She will need to have outpatient follow-up with Neurosurgery with a repeat CT scan in 2 weeks

## 2018-07-14 PROCEDURE — 99231 SBSQ HOSP IP/OBS SF/LOW 25: CPT | Performed by: PHYSICIAN ASSISTANT

## 2018-07-14 RX ORDER — MAGNESIUM SULFATE HEPTAHYDRATE 40 MG/ML
2 INJECTION, SOLUTION INTRAVENOUS ONCE
Status: COMPLETED | OUTPATIENT
Start: 2018-07-14 | End: 2018-07-15

## 2018-07-14 RX ORDER — ACETAMINOPHEN 325 MG/1
650 TABLET ORAL EVERY 6 HOURS PRN
Status: DISCONTINUED | OUTPATIENT
Start: 2018-07-14 | End: 2018-07-17 | Stop reason: HOSPADM

## 2018-07-14 RX ADMIN — PREGABALIN 50 MG: 50 CAPSULE ORAL at 10:15

## 2018-07-14 RX ADMIN — GABAPENTIN 100 MG: 100 CAPSULE ORAL at 10:15

## 2018-07-14 RX ADMIN — LIDOCAINE 1 PATCH: 50 PATCH CUTANEOUS at 10:15

## 2018-07-14 RX ADMIN — POLYETHYLENE GLYCOL 3350 17 G: 17 POWDER, FOR SOLUTION ORAL at 17:30

## 2018-07-14 RX ADMIN — OXYCODONE HYDROCHLORIDE 5 MG: 5 TABLET ORAL at 16:03

## 2018-07-14 RX ADMIN — MIDODRINE HYDROCHLORIDE 10 MG: 5 TABLET ORAL at 10:24

## 2018-07-14 RX ADMIN — ROPINIROLE 1 MG: 1 TABLET, FILM COATED ORAL at 22:00

## 2018-07-14 RX ADMIN — LEVOTHYROXINE SODIUM 50 MCG: 50 TABLET ORAL at 06:12

## 2018-07-14 RX ADMIN — METOPROLOL TARTRATE 12.5 MG: 25 TABLET ORAL at 10:38

## 2018-07-14 RX ADMIN — MAGNESIUM SULFATE HEPTAHYDRATE 2 G: 40 INJECTION, SOLUTION INTRAVENOUS at 04:07

## 2018-07-14 RX ADMIN — MIDODRINE HYDROCHLORIDE 10 MG: 5 TABLET ORAL at 16:03

## 2018-07-14 RX ADMIN — ENOXAPARIN SODIUM 40 MG: 40 INJECTION SUBCUTANEOUS at 10:14

## 2018-07-14 RX ADMIN — POLYETHYLENE GLYCOL 3350 17 G: 17 POWDER, FOR SOLUTION ORAL at 10:15

## 2018-07-14 RX ADMIN — MIDODRINE HYDROCHLORIDE 10 MG: 5 TABLET ORAL at 22:00

## 2018-07-14 RX ADMIN — GABAPENTIN 100 MG: 100 CAPSULE ORAL at 17:30

## 2018-07-14 RX ADMIN — PREGABALIN 50 MG: 50 CAPSULE ORAL at 22:00

## 2018-07-14 RX ADMIN — OXYCODONE HYDROCHLORIDE 5 MG: 5 TABLET ORAL at 04:14

## 2018-07-14 RX ADMIN — BISACODYL 5 MG: 5 TABLET, COATED ORAL at 10:15

## 2018-07-14 RX ADMIN — PREGABALIN 50 MG: 50 CAPSULE ORAL at 16:03

## 2018-07-14 NOTE — ASSESSMENT & PLAN NOTE
· Appreciate neurosurgery input  · Repeat CT scan on July 8, 2018 showed that there was a stable subdural hemorrhage within the right hemisphere  · No neuro surgical intervention is required in the hospital   · Hold anticoagulation, antiplatelets   DVT ppx okay  · She will need to have outpatient follow-up with Neurosurgery with a repeat CT scan in 2 weeks from previous (due 7/22)

## 2018-07-14 NOTE — PROGRESS NOTES
Progress Note - Richardo Kayser 1945, 67 y o  female MRN: [id number]    Unit/Bed#: -01 Encounter: [id number]    Primary Care Provider: Chai Cooper DO   Date and time admitted to hospital: 7/3/2018  4:04 PM      * Acute cystitis without hematuria   Assessment & Plan    · Coag-negative staph was isolated  · Completed antibiotics  · Clinically stable         Subdural hematoma [facility])   Assessment & Plan    · Appreciate neurosurgery input  · Repeat CT scan on July 8, 2018 showed that there was a stable subdural hemorrhage within the right hemisphere  · No neuro surgical intervention is required in the hospital   · Hold anticoagulation, antiplatelets  DVT ppx okay  · She will need to have outpatient follow-up with Neurosurgery with a repeat CT scan in 2 weeks from previous (due 7/22)        Acute encephalopathy   Assessment & Plan    · Due to infection, falls, rhabdo  · Improved and now appears to be at baseline  · Evaluated by Psychiatry - deemed to be stable on current medications and cleared for discharge to rehab  Also evaluated by neuropsychology who deemed her to be competent to make decisions for herself  Neuropsychology also recommends outpatient Geriatric evaluation and repeat detailed cognitive evaluation after 1 year        Constipation   Assessment & Plan    · Continue bowel regimen        Urinary retention   Assessment & Plan    · Voiding trial attempted but patient now straight cathed again x 2  · Elena replaced, recommend patient f/u with urology outpatient  · Continue bowel regimen, mobilization        Diastolic CHF [facility])   Assessment & Plan    · Patient appears compensated at this time  Did get IV Lasix while in the ICU    · Continue beta-blocker        Paroxysmal atrial fibrillation (HCC)   Assessment & Plan    · Patient's rate is controlled at this time with metoprolol  · Anticoagulation will not be started at this time due to SDH  · Cardiology input appreciated        Lumbar transverse process fracture (HCC)   Assessment & Plan    · Trauma recommends conservative mgmt w/ pain control  · No surgical intervention  · Continue physical therapy         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[address] )   Assessment & Plan    · Stable on lyrica and neurontin        Anemia   Assessment & Plan    · Stable  · Monitor        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[facility] [address] )   Assessment & Plan    · outpt neuro f/u  · PT/OT evals - pt reportedly having falls - needs STR, case management is following  · Patient has been accepted at [facility], target patient, waiting for letter from [facility] for d/c         Hypothyroidism   Assessment & Plan    · Continue levothyroxine, decreased to 50 mcg on admission, repeat TSH 18            VTE Pharmacologic Prophylaxis:   Pharmacologic: Enoxaparin (Lovenox)  Mechanical VTE Prophylaxis in Place: No    Patient Centered Rounds: I have performed bedside rounds with nursing staff today  Discussions with Specialists or Other Care Team Provider: RNBEKAH     Education and Discussions with Family / Patient: patient     Time Spent for Care: 30 minutes  More than 50% of total time spent on counseling and coordination of care as described above  Current Length of Stay: 11 day(s)    Current Patient Status: Inpatient   Certification Statement: The patient will continue to require additional inpatient hospital stay due to awaiting placemetn    Discharge Plan: medically stable - awaiting placement  Accepted at [facility], awaiting for letter from Critical access hospital for ok to d/c     Code Status: Level 1 - Full Code      Subjective:   Patient has no complaints other than she has not been sleeping well and she is tired  Had to have mcrae replaced  Objective:     Vitals:   Temp (24hrs), Av 3 °F (36 8 °C), Min:97 9 °F (36 6 °C), Max:98 7 °F (37 1 °C)    HR:  [68-72] 70  Resp:  [18] 18  BP: (116-128)/(55-68) 127/68  SpO2:  [92 %-98 %] 98 %  Body mass index is 22 26 kg/m²       Input and Output Summary (last 24 hours): Intake/Output Summary (Last 24 hours) at 07/14/18 1553  Last data filed at 07/14/18 1300   Gross per 24 hour   Intake              928 ml   Output             1850 ml   Net             -922 ml       Physical Exam:     Physical Exam   Constitutional: She appears well-developed and well-nourished  No distress  HENT:   Head: Normocephalic and atraumatic  Mouth/Throat: Oropharynx is clear and moist    Eyes: EOM are normal  Pupils are equal, round, and reactive to light  No scleral icterus  Neck: Normal range of motion  Neck supple  No thyromegaly present  Cardiovascular: Normal rate, regular rhythm, normal heart sounds and intact distal pulses  No murmur heard  Pulmonary/Chest: Breath sounds normal  No respiratory distress  She has no wheezes  She has no rales  She exhibits no tenderness  Abdominal: Soft  Bowel sounds are normal  She exhibits no distension  There is no tenderness  Genitourinary:   Genitourinary Comments: Elena draining clear, yellow urine   Musculoskeletal: Normal range of motion  She exhibits no edema or tenderness  Ecchymosis of B/L LE   Neurological: She is alert  Dysarthria, baseline   Skin: Skin is warm and dry  She is not diaphoretic  No pallor  Psychiatric: She has a normal mood and affect  Vitals reviewed        Additional Data:     Labs:      Results from last 7 days  Lab Units 07/09/18  0516   WBC Thousand/uL 9 00   HEMOGLOBIN g/dL 9 3*   HEMATOCRIT % 29 6*   PLATELETS Thousands/uL 125*       Results from last 7 days  Lab Units 07/09/18  0516 07/08/18  0438   SODIUM mmol/L 143 143   POTASSIUM mmol/L 3 9 3 5   CHLORIDE mmol/L 109* 109*   CO2 mmol/L 28 27   BUN mg/dL 16 19   CREATININE mg/dL 0 72 0 85   CALCIUM mg/dL 8 3 8 3   TOTAL PROTEIN g/dL  --  5 8*   BILIRUBIN TOTAL mg/dL  --  0 80   ALK PHOS U/L  --  285*   ALT U/L  --  58   AST U/L  --  28   GLUCOSE RANDOM mg/dL 85 81           Results from last 7 days  Lab Units 07/11/18  1047 07/11/18  0630 07/10/18  2038 07/10/18  1609 07/10/18  6077 07/09/18  2106 07/09/18  1550 07/09/18  0604 07/09/18  0000 07/08/18  1209 07/08/18  0554 07/07/18  2353   POC GLUCOSE mg/dl 185* 126 124 121 119 217* 170* 84 84 120 101 92             * I Have Reviewed All Lab Data Listed Above  * Additional Pertinent Lab Tests Reviewed: All Labs Within Last 24 Hours Reviewed    Imaging:    Imaging Reports Reviewed Today Include: None  Imaging Personally Reviewed by Myself Includes:  None    Recent Cultures (last 7 days):           Last 24 Hours Medication List:     Current Facility-Administered Medications:  acetaminophen 650 mg Oral Q6H PRN Johnny Hernandez, JESSY   bisacodyl 5 mg Oral Daily JESSY Perez   bisacodyl 10 mg Rectal Daily PRN Donnie Drake MD   enoxaparin 40 mg Subcutaneous Q24H [facility] JESSY Medina   furosemide 20 mg Oral Once Donnie Drake MD   gabapentin 100 mg Oral BID JESSY Medina   levothyroxine 50 mcg Oral Early Morning Jericho Benavidez PA-C   lidocaine 1 patch Transdermal Daily Elston Sever,    metoprolol tartrate 12 5 mg Oral Q12H [facility] Winston Noonan MD   midodrine 10 mg Oral TID Elston Sever,    ondansetron 4 mg Intravenous Q6H PRN Elston Sever, DO   oxyCODONE 5 mg Oral Q6H PRN JESSY Joseph   pneumococcal 13-valent conjugate vaccine 0 5 mL Intramuscular Prior to discharge Elston Sever, DO   polyethylene glycol 17 g Oral BID JESSY Perez   pregabalin 50 mg Oral TID JESSY Joseph   rOPINIRole 1 mg Oral HS Cinthia Bence, PA-C        Today, Patient Was Seen By: Ever Pugh PA-C    ** Please Note: Dictation voice to text software may have been used in the creation of this document   **

## 2018-07-14 NOTE — ASSESSMENT & PLAN NOTE
· Voiding trial attempted but patient now straight cathed again x 2  · Elena replaced, recommend patient f/u with urology outpatient  · Continue bowel regimen, mobilization

## 2018-07-15 ENCOUNTER — APPOINTMENT (INPATIENT)
Dept: RADIOLOGY | Facility: HOSPITAL | Age: 73
DRG: 682 | End: 2018-07-15
Payer: MEDICARE

## 2018-07-15 LAB — GLUCOSE SERPL-MCNC: 124 MG/DL (ref 65–140)

## 2018-07-15 PROCEDURE — 99231 SBSQ HOSP IP/OBS SF/LOW 25: CPT | Performed by: PHYSICIAN ASSISTANT

## 2018-07-15 PROCEDURE — 82948 REAGENT STRIP/BLOOD GLUCOSE: CPT

## 2018-07-15 PROCEDURE — 92610 EVALUATE SWALLOWING FUNCTION: CPT

## 2018-07-15 PROCEDURE — 71046 X-RAY EXAM CHEST 2 VIEWS: CPT

## 2018-07-15 RX ADMIN — PREGABALIN 50 MG: 50 CAPSULE ORAL at 17:02

## 2018-07-15 RX ADMIN — ACETAMINOPHEN 650 MG: 325 TABLET, FILM COATED ORAL at 19:57

## 2018-07-15 RX ADMIN — GABAPENTIN 100 MG: 100 CAPSULE ORAL at 09:53

## 2018-07-15 RX ADMIN — LIDOCAINE 1 PATCH: 50 PATCH CUTANEOUS at 09:53

## 2018-07-15 RX ADMIN — POLYETHYLENE GLYCOL 3350 17 G: 17 POWDER, FOR SOLUTION ORAL at 09:53

## 2018-07-15 RX ADMIN — POLYETHYLENE GLYCOL 3350 17 G: 17 POWDER, FOR SOLUTION ORAL at 17:02

## 2018-07-15 RX ADMIN — PREGABALIN 50 MG: 50 CAPSULE ORAL at 09:53

## 2018-07-15 RX ADMIN — OXYCODONE HYDROCHLORIDE 5 MG: 5 TABLET ORAL at 12:21

## 2018-07-15 RX ADMIN — BISACODYL 5 MG: 5 TABLET, COATED ORAL at 09:53

## 2018-07-15 RX ADMIN — MIDODRINE HYDROCHLORIDE 10 MG: 5 TABLET ORAL at 17:03

## 2018-07-15 RX ADMIN — ROPINIROLE 1 MG: 1 TABLET, FILM COATED ORAL at 21:58

## 2018-07-15 RX ADMIN — ACETAMINOPHEN 650 MG: 325 TABLET, FILM COATED ORAL at 05:26

## 2018-07-15 RX ADMIN — LEVOTHYROXINE SODIUM 50 MCG: 50 TABLET ORAL at 05:27

## 2018-07-15 RX ADMIN — ENOXAPARIN SODIUM 40 MG: 40 INJECTION SUBCUTANEOUS at 09:58

## 2018-07-15 RX ADMIN — MIDODRINE HYDROCHLORIDE 10 MG: 5 TABLET ORAL at 21:58

## 2018-07-15 RX ADMIN — PREGABALIN 50 MG: 50 CAPSULE ORAL at 21:58

## 2018-07-15 RX ADMIN — GABAPENTIN 100 MG: 100 CAPSULE ORAL at 17:02

## 2018-07-15 RX ADMIN — MIDODRINE HYDROCHLORIDE 10 MG: 5 TABLET ORAL at 09:56

## 2018-07-15 NOTE — ASSESSMENT & PLAN NOTE
· Patient appears compensated at this time  Euvolemic  Did get IV Lasix while in the ICU    · Continue beta-blocker

## 2018-07-15 NOTE — SPEECH THERAPY NOTE
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[facility] )    Restless leg syndrome    Depression with anxiety    Hypothyroid    GERD (gastroesophageal reflux disease)    Hypernatremia    Bilateral renal infarcts    Hypotension    Encephalopathy    Esophageal achalasia    Overdose    Acute encephalopathy    Transaminitis    UTI (urinary tract infection)    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 ([address] )    Suicide attempt ([facility] )    Headache    Protein calorie malnutrition ([facility] )    Major depressive disorder, recurrent severe without psychotic features ([facility] )    Cognitive change    Neuropathy, peripheral, idiopathic    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[address] )    Pain of right hip joint    Lumbar transverse process fracture (HCC)    Acute cystitis without hematuria    Elevated brain natriuretic peptide (BNP) level    Subdural hematoma (HCC)    Paroxysmal atrial fibrillation (HCC)    Diastolic CHF ([address] )    Urinary retention    Recurrent major depressive disorder, in partial remission ([address] )    Constipation     Past Medical History  Past Medical History:   Diagnosis Date    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[facility] )     Depression     Disease of thyroid gland     History of suicide attempt     Daughter reports 4 5 years ago pt was depressed/SI attempt after partner       Hypertension     Hypothyroidism     Melanoma ([facility] )     malignant - of the skin    Renal infarct ([facility] )     Seizures ([facility] )     Suicide attempt [facility])      Past Surgical History  Past Surgical History:   Procedure Laterality Date    CHOLECYSTECTOMY      laparoscopic    COLONOSCOPY      fiberoptic    ESOPHAGOGASTRODUODENOSCOPY N/A 3/10/2017    Procedure: ESOPHAGOGASTRODUODENOSCOPY (EGD); Surgeon: Janelle Guidry DO;  Location: [facility]; Service:     SKIN LESION EXCISION      melanoma back     Per chart review:  Patient coughing and choking on lunch, patient in recliner with back of recliner not elevated  Patient improved once head of recliner elevated to sitting position  Suction provided to patient, no respiratory distress at this time  Will order for speech evaluation and CXR  Pt well known to this service c multiple VBS completed  Most recent VBS was 7/9/18 with the following results: Pt presents w/ mod oropharyngeal dysphagia devika by poorly controlled bolus, spill prior to the swallow at times into the airway  The pt is unable to fully complete & perform the strategies or when prompted cannot contain material in the time it takes her to get into the position  She tends to throw her head back when trying to eat or drink  There is a large amt of silent aspiration on nt by cup, aspiration of nt by tsp  This occurred during & w/o the strategy  She is unable to cough out the aspirated material     Pt seen for lunch after coughing episode  Pt sat fully upright and given mashed potatoes  Pt verbally prompted to flex neck during PO  When given trials of HTL via cup sips, pt noted to take larger sips with resulting cough response  Pt then given HTL via tsp and prompts for neck flexion c no further episodes of coughing  Spoke c MD/nursing, discussed HTL via tsp only, pt must be sat fully upright and pt will benefit from full supervision for safe PO intake  Aspiration Precaution sheet updated as well

## 2018-07-15 NOTE — PROGRESS NOTES
Increase coughing during lunch time, MD aware  As per MD assistance when feeding, speech to re-evaluate the patient

## 2018-07-15 NOTE — PROGRESS NOTES
Progress Note - Tenisha Nogueira 1945, 67 y o  female MRN: [id number]    Unit/Bed#: -01 Encounter: [id number]    Primary Care Provider: Yadiel Lopez DO   Date and time admitted to hospital: 7/3/2018  4:04 PM    Addendum: Patient coughing and choking on lunch, patient in recliner with back of recliner not elevated  Patient improved once head of recliner elevated to sitting position  Suction provided to patient, no respiratory distress at this time  Will order for speech evaluation and CXR  * Acute cystitis without hematuria   Assessment & Plan    · Coag-negative staph was isolated  · Completed antibiotics  · Clinically stable         Subdural hematoma (HCC)   Assessment & Plan    · Appreciate neurosurgery input  · Repeat CT scan on July 8, 2018 showed that there was a stable subdural hemorrhage within the right hemisphere  · No neuro surgical intervention is required in the hospital   · Hold anticoagulation, antiplatelets  DVT ppx okay  · She will need to have outpatient follow-up with Neurosurgery with a repeat CT scan in 2 weeks from previous (due 7/22)        Acute encephalopathy   Assessment & Plan    · Due to infection, falls, rhabdo  · Improved and now appears to be at baseline  · Evaluated by Psychiatry - deemed to be stable on current medications and cleared for discharge to rehab  Also evaluated by neuropsychology who deemed her to be competent to make decisions for herself    Neuropsychology also recommends outpatient Geriatric evaluation and repeat detailed cognitive evaluation after 1 year        Constipation   Assessment & Plan    · Continue bowel regimen  · Nursing reports pt had BM this morning        Urinary retention   Assessment & Plan    · Voiding trial attempted but patient now straight cathed again x 2  · Elena replaced, recommend patient f/u with urology outpatient  · Continue bowel regimen, mobilization        Diastolic CHF [facility])   Assessment & Plan    · Patient appears compensated at this time  Euvolemic  Did get IV Lasix while in the ICU  · Continue beta-blocker        Paroxysmal atrial fibrillation (HCC)   Assessment & Plan    · Patient's rate is controlled at this time with metoprolol  · Anticoagulation will not be started at this time due to SDH  · Cardiology input appreciated        Lumbar transverse process fracture [facility])   Assessment & Plan    · Trauma recommends conservative mgmt w/ pain control  · No surgical intervention  · Continue physical therapy         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[address] )   Assessment & Plan    · Stable on lyrica and neurontin        Neuropathy, peripheral, idiopathic   Assessment & Plan    C/w lyrica and neurontin        Anemia   Assessment & Plan    · Stable        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[address] )   Assessment & Plan    · outpt neuro f/u  · PT/OT evals - pt reportedly having falls - needs STR, case management is following  · Patient has been accepted at [facility], target patient, waiting for letter from [facility] for d/c         Hypothyroidism   Assessment & Plan    · Continue levothyroxine, decreased to 50 mcg on admission, repeat TSH 8/14/18            VTE Pharmacologic Prophylaxis:   Pharmacologic: Enoxaparin (Lovenox)  Mechanical VTE Prophylaxis in Place: Yes    Patient Centered Rounds: I have performed bedside rounds with nursing staff today  Discussions with Specialists or Other Care Team Provider: RN, BEKAH     Education and Discussions with Family / Patient: patient  Time Spent for Care: 30 minutes  More than 50% of total time spent on counseling and coordination of care as described above      Current Length of Stay: 12 day(s)    Current Patient Status: Inpatient   Certification Statement: The patient will continue to require additional inpatient hospital stay due to awaiting placement    Discharge Plan: accepted at [facility], awaiting letter from [facility], target patient, to approve for discharge    Code Status: Level 1 - Full Code      Subjective:   Patient states she feels tired today  Is ready to be discharged and wants to know when this will happen  Objective:     Vitals:   Temp (24hrs), Av 6 °F (37 °C), Min:98 5 °F (36 9 °C), Max:98 7 °F (37 1 °C)    HR:  [70-80] 71  Resp:  [18] 18  BP: ()/(54-68) 101/59  SpO2:  [95 %-98 %] 95 %  Body mass index is 21 65 kg/m²  Input and Output Summary (last 24 hours): Intake/Output Summary (Last 24 hours) at 07/15/18 1006  Last data filed at 07/15/18 6038   Gross per 24 hour   Intake              568 ml   Output              850 ml   Net             -282 ml       Physical Exam:     Physical Exam   Constitutional: She is oriented to person, place, and time  She appears well-developed and well-nourished  No distress  Sleeping upon entering room   HENT:   Head: Normocephalic and atraumatic  Mouth/Throat: Oropharynx is clear and moist  No oropharyngeal exudate  Eyes: EOM are normal  Pupils are equal, round, and reactive to light  No scleral icterus  Neck: Normal range of motion  Neck supple  No thyromegaly present  Cardiovascular: Normal rate, regular rhythm and normal heart sounds  No murmur heard  Pulmonary/Chest: Effort normal and breath sounds normal  No respiratory distress  She has no wheezes  She has no rales  She exhibits no tenderness  Abdominal: Soft  Bowel sounds are normal  She exhibits no distension  There is no tenderness  Musculoskeletal: Normal range of motion  She exhibits no edema or tenderness  Neurological: She is alert and oriented to person, place, and time  No cranial nerve deficit  Skin: Skin is warm and dry  No rash noted  She is not diaphoretic  No erythema  Psychiatric: She has a normal mood and affect  Her behavior is normal    Vitals reviewed        Additional Data:     Labs:      Results from last 7 days  Lab Units 18  0516   WBC Thousand/uL 9 00   HEMOGLOBIN g/dL 9 3*   HEMATOCRIT % 29 6*   PLATELETS Thousands/uL 125*       Results from last 7 days  Lab Units 07/09/18  0516   SODIUM mmol/L 143   POTASSIUM mmol/L 3 9   CHLORIDE mmol/L 109*   CO2 mmol/L 28   BUN mg/dL 16   CREATININE mg/dL 0 72   CALCIUM mg/dL 8 3   GLUCOSE RANDOM mg/dL 85           Results from last 7 days  Lab Units 07/11/18  1047 07/11/18  0630 07/10/18  2038 07/10/18  1609 07/10/18  0611 07/09/18  2106 07/09/18  1550 07/09/18  0604 07/09/18  0000 07/08/18  1209   POC GLUCOSE mg/dl 185* 126 124 121 119 217* 170* 84 84 120             * I Have Reviewed All Lab Data Listed Above  * Additional Pertinent Lab Tests Reviewed:  All Labs Within Last 24 Hours Reviewed    Imaging:    Imaging Reports Reviewed Today Include: none  Imaging Personally Reviewed by Myself Includes:  none    Recent Cultures (last 7 days):           Last 24 Hours Medication List:     Current Facility-Administered Medications:  acetaminophen 650 mg Oral Q6H PRN JESSY Ramirez   bisacodyl 5 mg Oral Daily Gearl Maxville, JESSY   bisacodyl 10 mg Rectal Daily PRN Ella Torres MD   enoxaparin 40 mg Subcutaneous Q24H [address] JESSY Lafleur   furosemide 20 mg Oral Once Ella Torres MD   gabapentin 100 mg Oral BID JESSY Lafleur   levothyroxine 50 mcg Oral Early Morning Lilibeth Lopez PA-C   lidocaine 1 patch Transdermal Daily Belita April,    metoprolol tartrate 12 5 mg Oral Q12H [address] Veronika López MD   midodrine 10 mg Oral TID Belita April, DO   ondansetron 4 mg Intravenous Q6H PRN Belita Boelus, DO   oxyCODONE 5 mg Oral Q6H PRN Wilkes Nails, CRNP   pneumococcal 13-valent conjugate vaccine 0 5 mL Intramuscular Prior to discharge Belita April, DO   polyethylene glycol 17 g Oral BID Gearl Maxville, CRNP   pregabalin 50 mg Oral TID Wilkes Nails, CRNP   rOPINIRole 1 mg Oral HS Nadia Carey PA-C        Today, Patient Was Seen By: Jaylan David PA-C    ** Please Note: Dictation voice to text software may have been used in the creation of this document   **

## 2018-07-15 NOTE — ASSESSMENT & PLAN NOTE
· outpt neuro f/u  · PT/OT evals - pt reportedly having falls - needs STR, case management is following      · [place] [facility], waiting for letter from state/[facility] for d/c

## 2018-07-15 NOTE — DISCHARGE INSTR - DIET
Dysphagia level 1 puree c HTL  Full assist c meals  HTL via tsp ONLY  Verbal cue pt to flex neck for all swallows

## 2018-07-16 LAB
BASOPHILS # BLD AUTO: 0.03 THOUSANDS/ΜL (ref 0–0.1)
BASOPHILS NFR BLD AUTO: 0 % (ref 0–1)
EOSINOPHIL # BLD AUTO: 0.11 THOUSAND/ΜL (ref 0–0.61)
EOSINOPHIL NFR BLD AUTO: 1 % (ref 0–6)
ERYTHROCYTE [DISTWIDTH] IN BLOOD BY AUTOMATED COUNT: 14.6 % (ref 11.6–15.1)
HCT VFR BLD AUTO: 30.2 % (ref 34.8–46.1)
HGB BLD-MCNC: 9.1 G/DL (ref 11.5–15.4)
IMM GRANULOCYTES # BLD AUTO: 0.04 THOUSAND/UL (ref 0–0.2)
IMM GRANULOCYTES NFR BLD AUTO: 0 % (ref 0–2)
LYMPHOCYTES # BLD AUTO: 1.06 THOUSANDS/ΜL (ref 0.6–4.47)
LYMPHOCYTES NFR BLD AUTO: 11 % (ref 14–44)
MCH RBC QN AUTO: 29.4 PG (ref 26.8–34.3)
MCHC RBC AUTO-ENTMCNC: 30.1 G/DL (ref 31.4–37.4)
MCV RBC AUTO: 98 FL (ref 82–98)
MONOCYTES # BLD AUTO: 0.51 THOUSAND/ΜL (ref 0.17–1.22)
MONOCYTES NFR BLD AUTO: 5 % (ref 4–12)
NEUTROPHILS # BLD AUTO: 7.71 THOUSANDS/ΜL (ref 1.85–7.62)
NEUTS SEG NFR BLD AUTO: 83 % (ref 43–75)
NRBC BLD AUTO-RTO: 0 /100 WBCS
PLATELET # BLD AUTO: 409 THOUSANDS/UL (ref 149–390)
PMV BLD AUTO: 11.3 FL (ref 8.9–12.7)
PROCALCITONIN SERPL-MCNC: 0.1 NG/ML
RBC # BLD AUTO: 3.09 MILLION/UL (ref 3.81–5.12)
WBC # BLD AUTO: 9.46 THOUSAND/UL (ref 4.31–10.16)

## 2018-07-16 PROCEDURE — 85025 COMPLETE CBC W/AUTO DIFF WBC: CPT | Performed by: PHYSICIAN ASSISTANT

## 2018-07-16 PROCEDURE — 84145 PROCALCITONIN (PCT): CPT | Performed by: PHYSICIAN ASSISTANT

## 2018-07-16 PROCEDURE — 99231 SBSQ HOSP IP/OBS SF/LOW 25: CPT | Performed by: PHYSICIAN ASSISTANT

## 2018-07-16 RX ADMIN — BISACODYL 5 MG: 5 TABLET, COATED ORAL at 09:43

## 2018-07-16 RX ADMIN — POLYETHYLENE GLYCOL 3350 17 G: 17 POWDER, FOR SOLUTION ORAL at 09:43

## 2018-07-16 RX ADMIN — POLYETHYLENE GLYCOL 3350 17 G: 17 POWDER, FOR SOLUTION ORAL at 17:20

## 2018-07-16 RX ADMIN — ROPINIROLE 1 MG: 1 TABLET, FILM COATED ORAL at 21:23

## 2018-07-16 RX ADMIN — METOPROLOL TARTRATE 12.5 MG: 25 TABLET ORAL at 11:45

## 2018-07-16 RX ADMIN — GABAPENTIN 100 MG: 100 CAPSULE ORAL at 09:42

## 2018-07-16 RX ADMIN — MIDODRINE HYDROCHLORIDE 10 MG: 5 TABLET ORAL at 06:19

## 2018-07-16 RX ADMIN — LIDOCAINE 1 PATCH: 50 PATCH CUTANEOUS at 09:43

## 2018-07-16 RX ADMIN — LEVOTHYROXINE SODIUM 50 MCG: 50 TABLET ORAL at 05:42

## 2018-07-16 RX ADMIN — ACETAMINOPHEN 650 MG: 325 TABLET, FILM COATED ORAL at 18:02

## 2018-07-16 RX ADMIN — ENOXAPARIN SODIUM 40 MG: 40 INJECTION SUBCUTANEOUS at 09:43

## 2018-07-16 RX ADMIN — MIDODRINE HYDROCHLORIDE 10 MG: 5 TABLET ORAL at 21:23

## 2018-07-16 RX ADMIN — PREGABALIN 50 MG: 50 CAPSULE ORAL at 09:42

## 2018-07-16 RX ADMIN — OXYCODONE HYDROCHLORIDE 5 MG: 5 TABLET ORAL at 14:06

## 2018-07-16 RX ADMIN — GABAPENTIN 100 MG: 100 CAPSULE ORAL at 17:20

## 2018-07-16 RX ADMIN — PREGABALIN 50 MG: 50 CAPSULE ORAL at 21:23

## 2018-07-16 RX ADMIN — PREGABALIN 50 MG: 50 CAPSULE ORAL at 17:20

## 2018-07-16 RX ADMIN — MIDODRINE HYDROCHLORIDE 10 MG: 5 TABLET ORAL at 09:44

## 2018-07-16 NOTE — PLAN OF CARE
Problem: Potential for Falls  Goal: Patient will remain free of falls  INTERVENTIONS:  - Assess patient frequently for physical needs  -  Identify cognitive and physical deficits and behaviors that affect risk of falls    -  [place] fall precautions as indicated by assessment   - Educate patient/family on patient safety including physical limitations  - Instruct patient to call for assistance with activity based on assessment  - Modify environment to reduce risk of injury  - Consider OT/PT consult to assist with strengthening/mobility   Outcome: Progressing      Problem: PAIN - ADULT  Goal: Verbalizes/displays adequate comfort level or baseline comfort level  Interventions:  - Encourage patient to monitor pain and request assistance  - Assess pain using appropriate pain scale  - Administer analgesics based on type and severity of pain and evaluate response  - Implement non-pharmacological measures as appropriate and evaluate response  - Consider cultural and social influences on pain and pain management  - Notify physician/advanced practitioner if interventions unsuccessful or patient reports new pain   Outcome: Progressing      Problem: INFECTION - ADULT  Goal: Absence of fever/infection during neutropenic period  INTERVENTIONS:  - Monitor WBC  - Implement neutropenic guidelines   Outcome: Progressing

## 2018-07-16 NOTE — ASSESSMENT & PLAN NOTE
· outpt neuro f/u  · PT/OT evals - pt reportedly having falls - needs STR, case management is following      · Patient has been accepted [place] [facility], waiting for letter from state/[facility] for d/c

## 2018-07-16 NOTE — PROGRESS NOTES
Progress Note - Rebeca Romo 1945, 67 y o  female MRN: [id number]    Unit/Bed#: -01 Encounter: [id number]    Primary Care Provider: Jose Montiel DO   Date and time admitted to hospital: 7/3/2018  4:04 PM      * Acute cystitis without hematuria   Assessment & Plan    · Coag-negative staph was isolated  · Completed antibiotics  · Clinically stable         Subdural hematoma [facility])   Assessment & Plan    · Appreciate neurosurgery input  · Repeat CT scan on July 8, 2018 showed that there was a stable subdural hemorrhage within the right hemisphere  · No neuro surgical intervention is required in the hospital   · Hold anticoagulation, antiplatelets  DVT ppx okay  · She will need to have outpatient follow-up with Neurosurgery with a repeat CT scan in 2 weeks from previous (due 7/22)        Acute encephalopathy   Assessment & Plan    · Due to infection, falls, rhabdo  · Improved and now appears to be at baseline  · Evaluated by Psychiatry - deemed to be stable on current medications and cleared for discharge to rehab  Also evaluated by neuropsychology who deemed her to be competent to make decisions for herself  Neuropsychology also recommends outpatient Geriatric evaluation and repeat detailed cognitive evaluation after 1 year        Constipation   Assessment & Plan    · Continue bowel regimen  · Nursing reports pt had BM this morning        Urinary retention   Assessment & Plan    · Voiding trial attempted but patient now straight cathed again x 2  · Elena replaced, recommend patient f/u with urology outpatient  · Continue bowel regimen, mobilization        Diastolic CHF ([address] )   Assessment & Plan    · Patient appears compensated at this time  Euvolemic  Did get IV Lasix while in the ICU    · Continue beta-blocker        Paroxysmal atrial fibrillation (HCC)   Assessment & Plan    · Patient's rate is controlled at this time with metoprolol  · Anticoagulation will not be started at this time due to SDH  · Cardiology input appreciated        Lumbar transverse process fracture [facility])   Assessment & Plan    · Trauma recommends conservative mgmt w/ pain control  · No surgical intervention  · Continue physical therapy         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[address] )   Assessment & Plan    · Stable on lyrica and neurontin        Neuropathy, peripheral, idiopathic   Assessment & Plan    C/w lyrica and neurontin        Anemia   Assessment & Plan    · Stable        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[address] )   Assessment & Plan    · outpt neuro f/u  · PT/OT evals - pt reportedly having falls - needs STR, case management is following  · Patient has been accepted at [facility], target patient, waiting for letter from [facility] for d/c         Hypothyroidism   Assessment & Plan    · Continue levothyroxine, decreased to 50 mcg on admission, repeat TSH 18            VTE Pharmacologic Prophylaxis:   Pharmacologic: Enoxaparin (Lovenox)  Mechanical VTE Prophylaxis in Place: No    Patient Centered Rounds: I have performed bedside rounds with nursing staff today  Discussions with Specialists or Other Care Team Provider: rn    Education and Discussions with Family / Patient: patient     Time Spent for Care: 20 minutes  More than 50% of total time spent on counseling and coordination of care as described above  Current Length of Stay: 13 day(s)    Current Patient Status: Inpatient   Certification Statement: The patient will continue to require additional inpatient hospital stay due to awaiting placement    Discharge Plan: awaiting placement, medically stable     Code Status: Level 1 - Full Code      Subjective:   Patient is sad because she misses her family  Otherwise she has no complaints  Objective:     Vitals:   Temp (24hrs), Av 1 °F (36 7 °C), Min:97 5 °F (36 4 °C), Max:98 5 °F (36 9 °C)    HR:  [63-79] 79  Resp:  [18] 18  BP: (100-126)/(57-66) 100/61  SpO2:  [90 %-98 %] 90 %  Body mass index is 21 47 kg/m²  Input and Output Summary (last 24 hours): Intake/Output Summary (Last 24 hours) at 07/16/18 1236  Last data filed at 07/16/18 1100   Gross per 24 hour   Intake              570 ml   Output             2300 ml   Net            -1730 ml       Physical Exam:     Physical Exam   Constitutional: She is oriented to person, place, and time  She appears well-developed and well-nourished  No distress  HENT:   Head: Normocephalic and atraumatic  Mouth/Throat: Oropharynx is clear and moist    Eyes: EOM are normal  Pupils are equal, round, and reactive to light  No scleral icterus  Neck: Normal range of motion  Neck supple  No thyromegaly present  Cardiovascular: Normal rate, regular rhythm, normal heart sounds and intact distal pulses  No murmur heard  Pulmonary/Chest: Effort normal and breath sounds normal  No respiratory distress  She has no wheezes  She has no rales  She exhibits no tenderness  Abdominal: Soft  Bowel sounds are normal  She exhibits no distension  There is no tenderness  Musculoskeletal: Normal range of motion  She exhibits no edema or tenderness  Neurological: She is alert and oriented to person, place, and time  No cranial nerve deficit  Skin: Skin is warm and dry  No rash noted  She is not diaphoretic  No erythema  Psychiatric: Thought content normal    Tearful   Vitals reviewed  Additional Data:     Labs:      Results from last 7 days  Lab Units 07/16/18  0450   WBC Thousand/uL 9 46   HEMOGLOBIN g/dL 9 1*   HEMATOCRIT % 30 2*   PLATELETS Thousands/uL 409*   NEUTROS PCT % 83*   LYMPHS PCT % 11*   MONOS PCT % 5   EOS PCT % 1           Invalid input(s): LABALBU        Results from last 7 days  Lab Units 07/15/18  2052 07/11/18  1047 07/11/18  0630 07/10/18  2038 07/10/18  1609 07/10/18  0611 07/09/18  2106 07/09/18  1550   POC GLUCOSE mg/dl 124 185* 126 124 121 119 217* 170*             * I Have Reviewed All Lab Data Listed Above    * Additional Pertinent Lab Tests Reviewed: All Labs Within Last 24 Hours Reviewed    Imaging:    Imaging Reports Reviewed Today Include: CXR   Imaging Personally Reviewed by Myself Includes:  CXR     Recent Cultures (last 7 days):           Last 24 Hours Medication List:     Current Facility-Administered Medications:  acetaminophen 650 mg Oral Q6H PRN  Cannina, CRNP   bisacodyl 5 mg Oral Daily Chivo Magallon, JESSY   bisacodyl 10 mg Rectal Daily PRN Mike Davis MD   enoxaparin 40 mg Subcutaneous Q24H [address] Shelva Noel, CRNICOLE   furosemide 20 mg Oral Once Mike Davis MD   gabapentin 100 mg Oral BID Shelva Noel, JESSY   levothyroxine 50 mcg Oral Early Morning Evelyn Pugh PA-C   lidocaine 1 patch Transdermal Daily Hayley Zhao,    metoprolol tartrate 12 5 mg Oral Q12H [address] Celine Beltran MD   midodrine 10 mg Oral TID Hayley Zhoa,    ondansetron 4 mg Intravenous Q6H PRN Hayley Zhao, DO   oxyCODONE 5 mg Oral Q6H PRN JESSY Bullard   pneumococcal 13-valent conjugate vaccine 0 5 mL Intramuscular Prior to discharge Hayley Zhao, DO   polyethylene glycol 17 g Oral BID JESSY Baez   pregabalin 50 mg Oral TID JESSY Bullard   rOPINIRole 1 mg Oral HS Hui Cornejo PA-C        Today, Patient Was Seen By: Sal Seaman PA-C    ** Please Note: Dictation voice to text software may have been used in the creation of this document   **

## 2018-07-16 NOTE — CASE MANAGEMENT
Continued Stay Review    Date: 7/15/18 [place] ACUTE MED SURG LEVEL OF CARE    Vital Signs:   Temp (24hrs), Av 6 °F (37 °C), Min:98 5 °F (36 9 °C), Max:98 7 °F (37 1 °C)   HR:  [70-80] 71  Resp:  [18] 18  BP: ()/(54-68) 101/59  SpO2:  [95 %-98 %] 95 %  Body mass index is 21 65 kg/m²         Input and Output Summary (last 24 hours):   Last data filed at 07/15/18 0807    Gross per 24 hour   Intake              568 ml   Output              850 ml   Net             -282 ml       Diet Dysphagia/Modified Consistency; Dysphagia 1-Pureed; Dysphagia 1-Pureed;  Honey Thick Liquid       Dietary nutrition supplements HoneyShake TID with Meals      IV ACCESS      Medications:   Scheduled Meds:   Current Facility-Administered Medications:  acetaminophen 650 mg Oral Q6H PRN JESSY Mcdonald   bisacodyl 5 mg Oral Daily JESSY Boudreaux   bisacodyl 10 mg Rectal Daily PRN Yoli Atkins MD   enoxaparin 40 mg Subcutaneous Q24H [facility] JESSY Lopez   furosemide 20 mg Oral Once Yoli Atkins MD   gabapentin 100 mg Oral BID JESSY Lopez   levothyroxine 50 mcg Oral Early Morning Jacquelyn Lazaro PA-C   lidocaine 1 patch Transdermal Daily Colonel David DO   metoprolol tartrate 12 5 mg Oral Q12H [facility] Saurabh Shields MD   midodrine 10 mg Oral TID Colonel David DO   ondansetron 4 mg Intravenous Q6H PRN Colonel David DO   oxyCODONE 5 mg Oral Q6H PRN JESSY Orellana   pneumococcal 13-valent conjugate vaccine 0 5 mL Intramuscular Prior to discharge Colonel David DO   polyethylene glycol 17 g Oral BID JESSY Boudreaux   pregabalin 50 mg Oral TID JESSY Orellana   rOPINIRole 1 mg Oral HS Lyric Judd PA-C       PRN Meds:     Acetaminophen 650 mg q6hrs prn given x 2/ 24 hrs    bisacodyl    ondansetron    oxyCODONE 5 mg q6hrs prn given x 2/ 24 hrs    pneumococcal 13-valent conjugate vaccine      LABS/DIagnostic Results:   Results from last 7 days  Lab Units 07/09/18  0516   WBC Thousand/uL 9 00   HEMOGLOBIN g/dL 9 3*   HEMATOCRIT % 29 6*   PLATELETS Thousands/uL 125*       Results from last 7 days  Lab Units 07/09/18  0516   SODIUM mmol/L 143   POTASSIUM mmol/L 3 9   CHLORIDE mmol/L 109*   CO2 mmol/L 28   BUN mg/dL 16   CREATININE mg/dL 0 72   CALCIUM mg/dL 8 3   GLUCOSE RANDOM mg/dL 85       Age/Sex: 67 y o  female     Assessment/Plan:       * Acute cystitis without hematuria   Assessment & Plan     · Coag-negative staph was isolated  · Completed antibiotics  · Clinically stable           Subdural hematoma (HCC)   Assessment & Plan     · Appreciate neurosurgery input  · Repeat CT scan on July 8, 2018 showed that there was a stable subdural hemorrhage within the right hemisphere  · No neuro surgical intervention is required in the hospital   · Hold anticoagulation, antiplatelets  DVT ppx okay  · She will need to have outpatient follow-up with Neurosurgery with a repeat CT scan in 2 weeks from previous (due 7/22)          Acute encephalopathy   Assessment & Plan     · Due to infection, falls, rhabdo  · Improved and now appears to be at baseline  · Evaluated by Psychiatry - deemed to be stable on current medications and cleared for discharge to rehab  Also evaluated by neuropsychology who deemed her to be competent to make decisions for herself  Neuropsychology also recommends outpatient Geriatric evaluation and repeat detailed cognitive evaluation after 1 year          Constipation   Assessment & Plan     · Continue bowel regimen  · Nursing reports pt had BM this morning          Urinary retention   Assessment & Plan     · Voiding trial attempted but patient now straight cathed again x 2  · Elena replaced, recommend patient f/u with urology outpatient  · Continue bowel regimen, mobilization          Diastolic CHF ([address] )   Assessment & Plan     · Patient appears compensated at this time  Euvolemic  Did get IV Lasix while in the ICU    · Continue beta-blocker        Paroxysmal atrial fibrillation (HCC)   Assessment & Plan     · Patient's rate is controlled at this time with metoprolol  · Anticoagulation will not be started at this time due to SDH  · Cardiology input appreciated          Lumbar transverse process fracture [facility])   Assessment & Plan     · Trauma recommends conservative mgmt w/ pain control  · No surgical intervention  · Continue physical therapy           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[address] )   Assessment & Plan     · Stable on lyrica and neurontin          Neuropathy, peripheral, idiopathic   Assessment & Plan     C/w lyrica and neurontin          Anemia   Assessment & Plan     · Stable          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HCC)   Assessment & Plan     · outpt neuro f/u  · PT/OT evals - pt reportedly having falls - needs STR, case management is following  · Patient has been accepted at [facility], target patient, waiting for letter from state[facility] for d/c           Hypothyroidism   Assessment & Plan     · Continue levothyroxine, decreased to 50 mcg on admission, repeat TSH 8/14/18                VTE Pharmacologic Prophylaxis:   Pharmacologic: Enoxaparin (Lovenox)  Mechanical VTE Prophylaxis in Place: Yes      Current Length of Stay: 12 day(s)     Current Patient Status: Inpatient   Certification Statement: The patient will continue to require additional inpatient hospital stay due to awaiting placement           Discharge Plan:   ANTICIPATE DISCHARGE TO INPATIENT REHAB - WHEN MEDICALLY CLEARED      *PER PT 7/13/18  Plan   Treatment/Interventions Functional transfer training;LE strengthening/ROM; Endurance training; Therapeutic exercise;Spoke to nursing;Spoke to case management;Gait training   Progress Slow progress, decreased activity tolerance   PT Frequency (3-5x/wk)   Recommendation   Recommendation Short-term skilled PT     CASE MANAGEMENT FOLLOWING CLOSELY FOR ALL DISCHARGE NEEDS

## 2018-07-17 ENCOUNTER — APPOINTMENT (INPATIENT)
Dept: RADIOLOGY | Facility: HOSPITAL | Age: 73
DRG: 682 | End: 2018-07-17
Payer: MEDICARE

## 2018-07-17 VITALS
OXYGEN SATURATION: 95 % | TEMPERATURE: 97.8 F | HEART RATE: 67 BPM | DIASTOLIC BLOOD PRESSURE: 57 MMHG | BODY MASS INDEX: 21.6 KG/M2 | HEIGHT: 62 IN | SYSTOLIC BLOOD PRESSURE: 108 MMHG | RESPIRATION RATE: 18 BRPM | WEIGHT: 117.4 LBS

## 2018-07-17 PROBLEM — R13.10 DYSPHAGIA: Status: ACTIVE | Noted: 2018-07-17

## 2018-07-17 PROCEDURE — 70450 CT HEAD/BRAIN W/O DYE: CPT

## 2018-07-17 PROCEDURE — 99239 HOSP IP/OBS DSCHRG MGMT >30: CPT | Performed by: PHYSICIAN ASSISTANT

## 2018-07-17 RX ORDER — POLYETHYLENE GLYCOL 3350 17 G/17G
17 POWDER, FOR SOLUTION ORAL DAILY
Qty: 14 EACH | Refills: 0
Start: 2018-07-17

## 2018-07-17 RX ORDER — ACETAMINOPHEN 325 MG/1
650 TABLET ORAL EVERY 6 HOURS PRN
Qty: 30 TABLET | Refills: 0
Start: 2018-07-17

## 2018-07-17 RX ORDER — OXYCODONE HYDROCHLORIDE 5 MG/1
5 TABLET ORAL EVERY 4 HOURS PRN
Qty: 20 TABLET | Refills: 0 | Status: SHIPPED | OUTPATIENT
Start: 2018-07-17 | End: 2018-07-27

## 2018-07-17 RX ADMIN — MIDODRINE HYDROCHLORIDE 10 MG: 5 TABLET ORAL at 08:17

## 2018-07-17 RX ADMIN — ACETAMINOPHEN 650 MG: 325 TABLET, FILM COATED ORAL at 08:15

## 2018-07-17 RX ADMIN — PREGABALIN 50 MG: 50 CAPSULE ORAL at 18:15

## 2018-07-17 RX ADMIN — POLYETHYLENE GLYCOL 3350 17 G: 17 POWDER, FOR SOLUTION ORAL at 08:15

## 2018-07-17 RX ADMIN — BISACODYL 5 MG: 5 TABLET, COATED ORAL at 08:16

## 2018-07-17 RX ADMIN — GABAPENTIN 100 MG: 100 CAPSULE ORAL at 18:15

## 2018-07-17 RX ADMIN — MIDODRINE HYDROCHLORIDE 10 MG: 5 TABLET ORAL at 18:15

## 2018-07-17 RX ADMIN — METOPROLOL TARTRATE 12.5 MG: 25 TABLET ORAL at 00:07

## 2018-07-17 RX ADMIN — LIDOCAINE 1 PATCH: 50 PATCH CUTANEOUS at 08:16

## 2018-07-17 RX ADMIN — ENOXAPARIN SODIUM 40 MG: 40 INJECTION SUBCUTANEOUS at 08:19

## 2018-07-17 RX ADMIN — PREGABALIN 50 MG: 50 CAPSULE ORAL at 08:16

## 2018-07-17 RX ADMIN — GABAPENTIN 100 MG: 100 CAPSULE ORAL at 08:15

## 2018-07-17 RX ADMIN — LEVOTHYROXINE SODIUM 50 MCG: 50 TABLET ORAL at 06:48

## 2018-07-17 NOTE — ASSESSMENT & PLAN NOTE
· Modified diet  · Note suspected aspiration episode 7/15- CXR was concerning for new LLL infiltrate  However she is afebrile, no leukocytosis, procalcitonin negative  Will hold off on antibiotics  Monitor respiratory status  Repeat CXR in one month

## 2018-07-17 NOTE — SOCIAL WORK
Transport with [facility] via BLS @ 8pm to [address] [place] [address] [address]   notified patient, daughter Sergei Cruz [phone] (she stated she will notify Lizzie Chambers), MARCELA, facility and physician   Report# [id number], Fax# [phone]
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
## 2018-07-17 NOTE — ASSESSMENT & PLAN NOTE
· Appreciate neurosurgery input  · Repeat CT scan on July 8, 2018 showed that there was a stable subdural hemorrhage within the right hemisphere  · No neuro surgical intervention is required in the hospital   · Hold anticoagulation, antiplatelets  DVT ppx okay  · She will need to have outpatient follow-up with Neurosurgery 7/19  Ordered STAT head CT to be completed prior to hospital discharge so that this can be completed prior to scheduled appointment

## 2018-07-17 NOTE — SOCIAL WORK
BEKAH called and spoke with Mattie Schwab [phone] at [facility] on aging regarding case  Per Carly Boone pts case is still being worked on, and she will follow pt at [facility] for a sit down meeting with pt and both daughters  Per Carly Boone no precautions need to be taken at this time  BEKAH called Meredith [phone] ([facility]) and passed on information from Mattie Schwab

## 2018-07-17 NOTE — RESTORATIVE TECHNICIAN NOTE
Restorative Specialist Mobility Note       Activity: Ambulate in dominguez, Chair     Assistive Device: Front wheel walker
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
## 2018-07-18 ENCOUNTER — TELEPHONE (OUTPATIENT)
Dept: UROLOGY | Facility: AMBULATORY SURGERY CENTER | Age: 73
End: 2018-07-18

## 2018-07-18 ENCOUNTER — TRANSITIONAL CARE MANAGEMENT (OUTPATIENT)
Dept: FAMILY MEDICINE CLINIC | Facility: OTHER | Age: 73
End: 2018-07-18

## 2018-07-18 NOTE — TELEPHONE ENCOUNTER
Reason for appointment/Complaint/Diagnosis : URINARY RETENTION HAS MENDOZA     Insurance: Medicare & [employer] PPO     History of Cancer?             spoke with Annette from [facility] she did not know   If yes, what kind? Previous urologist?     unkown                   Records requested/where? Records in [address] [place] [address] Rd     Outside testing/where? Records in Epic     Location Preference for office visit?  [facility]

## 2018-07-19 ENCOUNTER — OFFICE VISIT (OUTPATIENT)
Dept: NEUROSURGERY | Facility: CLINIC | Age: 73
End: 2018-07-19
Payer: MEDICARE

## 2018-07-19 VITALS
TEMPERATURE: 97.8 F | HEIGHT: 62 IN | WEIGHT: 117 LBS | DIASTOLIC BLOOD PRESSURE: 70 MMHG | SYSTOLIC BLOOD PRESSURE: 108 MMHG | RESPIRATION RATE: 16 BRPM | HEART RATE: 68 BPM | BODY MASS INDEX: 21.53 KG/M2

## 2018-07-19 DIAGNOSIS — S32.009D CLOSED FRACTURE OF TRANSVERSE PROCESS OF LUMBAR VERTEBRA WITH ROUTINE HEALING, SUBSEQUENT ENCOUNTER: ICD-10-CM

## 2018-07-19 DIAGNOSIS — S06.5X9A SUBDURAL HEMATOMA (HCC): Primary | ICD-10-CM

## 2018-07-19 PROCEDURE — 99213 OFFICE O/P EST LOW 20 MIN: CPT | Performed by: PHYSICIAN ASSISTANT

## 2018-07-19 NOTE — PATIENT INSTRUCTIONS
Gradual return to all usual activities  Close supervision with ambulation and use of walker for fall prevention  Continue with conservative management for transverse process fractures, lidocaine patches are most ideal     Should low back pain be problematic consultation with pain management may be considered  Continue to avoid aspirin, NSAIDs or other anti-platelet agents, this is a lifelong recommendation      In addition continue follow-up with neurology is also advised per their protocols

## 2018-07-19 NOTE — PROGRESS NOTES
Assessment/Plan:    Very pleasant 77-year-old female, arrives by wheelchair today, to review CT head, history of subdural hematoma  She has had updated CT head as requested 7/17/18, the study was carefully reviewed in detail by Dr Iker Beckett, and compared with prior studies of 7/8/18, 7/6/18, and 7/3/18 the prior identified subdural hematoma on the right is essentially resolved at this point  Clinically there are no new focal neurologic deficits on examination  She also has history of lumbar transverse process fractures, 1 2 and 3, her greatest complaint today is her low back pain  She reports she has had lidocaine patches in the past which she reports were quite effective  Continued use of these are preferred over narcotic pain medication, Tylenol also make be tried  Should these modalities not be effective consultation with pain management for alternative strategies is advised  Continue follow-up with neurology for her multiple other conditions is also advised per their protocol  Further follow-up with Neurosurgery relative to her subdural hematoma will be on an as needed basis  No additional imaging is required at this time, relative to her subdural hematoma  These findings, impressions and recommendations are reviewed in great detail with the patient, she expressed understanding and agreement, her questions were answered completely and to her satisfaction         Diagnoses and all orders for this visit:    Subdural hematoma ([address] )    Closed fracture of transverse process of lumbar vertebra with routine healing, subsequent encounter          Return if symptoms worsen or fail to improve  Subjective:      Patient ID: Edward Hernandes is a 67 y o  female  Very pleasant 77-year-old female, returns for hospital follow-up, and to review CT head  She is currently a resident at [facility]  She has history of multiple falls      She has history of hospital admission, [facility] 7/3/18 through 7/17/18, principal Diagnosis was rhabdomyolysis, she also was found to have a subdural hematoma as well as lumbar transverse process fractures  She has followed in the past with Neurology for spinocerebellar ataxia, seizure disorder, restless leg syndrome, and peripheral neuropathy        The following portions of the patient's history were reviewed and updated as appropriate: allergies, current medications, past family history, past medical history, past social history and past surgical history  Review of Systems   HENT: Negative  Eyes: Negative  Respiratory: Negative  Cardiovascular: Negative  Endocrine: Negative  Genitourinary: Negative  Musculoskeletal: Positive for back pain (radiates to both l;gs)  Allergic/Immunologic: Negative  Neurological: Positive for weakness  Hematological: Negative  Psychiatric/Behavioral: Negative  Objective:    Physical Exam   Constitutional: She is oriented to person, place, and time  She appears well-developed and well-nourished  Eyes: EOM are normal  Pupils are equal, round, and reactive to light  Cardiovascular: Normal rate, regular rhythm and normal heart sounds  Pulmonary/Chest: Effort normal and breath sounds normal    Neurological: She is alert and oriented to person, place, and time  Reflex Scores:       Bicep reflexes are 2+ on the right side and 2+ on the left side  Neurologic Exam     Mental Status   Oriented to person, place, and time  Level of consciousness: alert    Cranial Nerves     CN III, IV, VI   Pupils are equal, round, and reactive to light    Extraocular motions are normal      Motor Exam   Muscle bulk: normal  Overall muscle tone: normal  Right arm pronator drift: absent  Left arm pronator drift: absent    Strength   Right deltoid: 4/5  Left deltoid: 4/5  Right biceps: 4/5  Left biceps: 4/5    Gait, Coordination, and Reflexes     Gait  Gait: (Wheelchair time of visit )    Reflexes Right biceps: 2+  Left biceps: 2+     CT BRAIN - WITHOUT CONTRAST   7/17/18     INDICATION:   Follow-up of known subdural hemorrhage      COMPARISON:  CT head 7/8/2018      TECHNIQUE:  CT examination of the brain was performed  In addition to axial images, coronal 2D reformatted images were created and submitted for interpretation        Radiation dose length product (DLP) for this visit:  1002  85 mGy-cm   This examination, like all CT scans performed in the [facility], was performed utilizing techniques to minimize radiation dose exposure, including the use of   iterative reconstruction and automated exposure control        IMAGE QUALITY:  Diagnostic      FINDINGS:     PARENCHYMA:  Previously seen right-sided subdural hemorrhage layering over the right tentorium measured    4mm now measures 2mm      Mild stable microangiopathic changes      No new findings      VENTRICLES AND EXTRA-AXIAL SPACES:  Normal for the patient's age      VISUALIZED ORBITS AND PARANASAL SINUSES:  Unremarkable      CALVARIUM AND EXTRACRANIAL SOFT TISSUES:  Normal      IMPRESSION:     Previously seen right-sided subdural hemorrhage layering over the right tentorium measured  4mm in thickness and now measures 2mm      No new findings

## 2018-07-19 NOTE — LETTER
July 19, 2018     Khang Pickering DO  [address]  [facility] [address]    Patient: Giorgio Quinteros   YOB: 1945   Date of Visit: 7/19/2018       Dear Dr Ulysses Gelineau:    Thank you for referring Ana Marinelli to me for evaluation  Below are my notes for this consultation  If you have questions, please do not hesitate to call me  I look forward to following your patient along with you  Sincerely,        Bernice Matthews PA-C        CC: No Recipients  Bernice Matthews PA-C  7/19/2018 11:34 AM  Sign at close encounter  Assessment/Plan:    Very pleasant 79-year-old female, arrives by wheelchair today, to review CT head, history of subdural hematoma  She has had updated CT head as requested 7/17/18, the study was carefully reviewed in detail by Dr Jason Mayo, and compared with prior studies of 7/8/18, 7/6/18, and 7/3/18 the prior identified subdural hematoma on the right is essentially resolved at this point  Clinically there are no new focal neurologic deficits on examination  She also has history of lumbar transverse process fractures, 1 2 and 3, her greatest complaint today is her low back pain  She reports she has had lidocaine patches in the past which she reports were quite effective  Continued use of these are preferred over narcotic pain medication, Tylenol also make be tried  Should these modalities not be effective consultation with pain management for alternative strategies is advised  Continue follow-up with neurology for her multiple other conditions is also advised per their protocol  Further follow-up with Neurosurgery relative to her subdural hematoma will be on an as needed basis  No additional imaging is required at this time, relative to her subdural hematoma      These findings, impressions and recommendations are reviewed in great detail with the patient, she expressed understanding and agreement, her questions were answered completely and to her satisfaction         Diagnoses and all orders for this visit:    Subdural hematoma ([address] )    Closed fracture of transverse process of lumbar vertebra with routine healing, subsequent encounter          Return if symptoms worsen or fail to improve  Subjective:      Patient ID: June Woodall is a 67 y o  female  Very pleasant 41-year-old female, returns for hospital follow-up, and to review CT head  She is currently a resident at [facility]  She has history of multiple falls  She has history of hospital admission, [facility] 7/3/18 through 7/17/18, principal Diagnosis was rhabdomyolysis, she also was found to have a subdural hematoma as well as lumbar transverse process fractures  She has followed in the past with Neurology for spinocerebellar ataxia, seizure disorder, restless leg syndrome, and peripheral neuropathy        The following portions of the patient's history were reviewed and updated as appropriate: allergies, current medications, past family history, past medical history, past social history and past surgical history  Review of Systems   HENT: Negative  Eyes: Negative  Respiratory: Negative  Cardiovascular: Negative  Endocrine: Negative  Genitourinary: Negative  Musculoskeletal: Positive for back pain (radiates to both l;gs)  Allergic/Immunologic: Negative  Neurological: Positive for weakness  Hematological: Negative  Psychiatric/Behavioral: Negative  Objective:    Physical Exam   Constitutional: She is oriented to person, place, and time  She appears well-developed and well-nourished  Eyes: EOM are normal  Pupils are equal, round, and reactive to light  Cardiovascular: Normal rate, regular rhythm and normal heart sounds  Pulmonary/Chest: Effort normal and breath sounds normal    Neurological: She is alert and oriented to person, place, and time     Reflex Scores:       Bicep reflexes are 2+ on the right side and 2+ on the left side  Neurologic Exam     Mental Status   Oriented to person, place, and time  Level of consciousness: alert    Cranial Nerves     CN III, IV, VI   Pupils are equal, round, and reactive to light  Extraocular motions are normal      Motor Exam   Muscle bulk: normal  Overall muscle tone: normal  Right arm pronator drift: absent  Left arm pronator drift: absent    Strength   Right deltoid: 4/5  Left deltoid: 4/5  Right biceps: 4/5  Left biceps: 4/5    Gait, Coordination, and Reflexes     Gait  Gait: (Wheelchair time of visit )    Reflexes   Right biceps: 2+  Left biceps: 2+     CT BRAIN - WITHOUT CONTRAST   7/17/18     INDICATION:   Follow-up of known subdural hemorrhage      COMPARISON:  CT head 7/8/2018      TECHNIQUE:  CT examination of the brain was performed  In addition to axial images, coronal 2D reformatted images were created and submitted for interpretation        Radiation dose length product (DLP) for this visit:  1002  85 mGy-cm   This examination, like all CT scans performed in the [facility], was performed utilizing techniques to minimize radiation dose exposure, including the use of   iterative reconstruction and automated exposure control        IMAGE QUALITY:  Diagnostic      FINDINGS:     PARENCHYMA:  Previously seen right-sided subdural hemorrhage layering over the right tentorium measured    4mm now measures 2mm      Mild stable microangiopathic changes      No new findings      VENTRICLES AND EXTRA-AXIAL SPACES:  Normal for the patient's age      VISUALIZED ORBITS AND PARANASAL SINUSES:  Unremarkable      CALVARIUM AND EXTRACRANIAL SOFT TISSUES:  Normal      IMPRESSION:     Previously seen right-sided subdural hemorrhage layering over the right tentorium measured  4mm in thickness and now measures 2mm      No new findings

## 2018-07-23 DIAGNOSIS — G89.29 OTHER CHRONIC PAIN: ICD-10-CM

## 2018-07-23 RX ORDER — MELOXICAM 15 MG/1
TABLET ORAL
Qty: 30 TABLET | Refills: 0 | Status: SHIPPED | OUTPATIENT
Start: 2018-07-23
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
## 2018-07-28 DIAGNOSIS — F41.8 DEPRESSION WITH ANXIETY: ICD-10-CM

## 2018-07-30 RX ORDER — BUPROPION HYDROCHLORIDE 300 MG/1
TABLET ORAL
Qty: 90 TABLET | Refills: 0 | Status: SHIPPED | OUTPATIENT
Start: 2018-07-30 | End: 2018-08-29 | Stop reason: HOSPADM

## 2018-08-20 ENCOUNTER — LAB REQUISITION (OUTPATIENT)
Dept: LAB | Facility: HOSPITAL | Age: 73
End: 2018-08-20
Payer: MEDICARE

## 2018-08-20 DIAGNOSIS — E78.2 MIXED HYPERLIPIDEMIA: ICD-10-CM

## 2018-08-20 LAB
ALBUMIN SERPL BCP-MCNC: 3.5 G/DL (ref 3.5–5)
ALP SERPL-CCNC: 321 U/L (ref 46–116)
ALT SERPL W P-5'-P-CCNC: 41 U/L (ref 12–78)
ANION GAP SERPL CALCULATED.3IONS-SCNC: 11 MMOL/L (ref 4–13)
AST SERPL W P-5'-P-CCNC: 33 U/L (ref 5–45)
BILIRUB SERPL-MCNC: 0.3 MG/DL (ref 0.2–1)
BUN SERPL-MCNC: 23 MG/DL (ref 5–25)
CALCIUM SERPL-MCNC: 9.1 MG/DL (ref 8.3–10.1)
CHLORIDE SERPL-SCNC: 105 MMOL/L (ref 100–108)
CHOLEST SERPL-MCNC: 214 MG/DL (ref 50–200)
CO2 SERPL-SCNC: 28 MMOL/L (ref 21–32)
CREAT SERPL-MCNC: 1.23 MG/DL (ref 0.6–1.3)
ERYTHROCYTE [DISTWIDTH] IN BLOOD BY AUTOMATED COUNT: 15.5 % (ref 11.6–15.1)
EST. AVERAGE GLUCOSE BLD GHB EST-MCNC: 103 MG/DL
GFR SERPL CREATININE-BSD FRML MDRD: 44 ML/MIN/1.73SQ M
GLUCOSE SERPL-MCNC: 75 MG/DL (ref 65–140)
HBA1C MFR BLD: 5.2 % (ref 4.2–6.3)
HCT VFR BLD AUTO: 37.6 % (ref 34.8–46.1)
HDLC SERPL-MCNC: 50 MG/DL (ref 40–60)
HGB BLD-MCNC: 11.6 G/DL (ref 11.5–15.4)
LDLC SERPL CALC-MCNC: 125 MG/DL (ref 0–100)
MCH RBC QN AUTO: 30.9 PG (ref 26.8–34.3)
MCHC RBC AUTO-ENTMCNC: 30.9 G/DL (ref 31.4–37.4)
MCV RBC AUTO: 100 FL (ref 82–98)
NONHDLC SERPL-MCNC: 164 MG/DL
PLATELET # BLD AUTO: 229 THOUSANDS/UL (ref 149–390)
PMV BLD AUTO: 12.3 FL (ref 8.9–12.7)
POTASSIUM SERPL-SCNC: 4.2 MMOL/L (ref 3.5–5.3)
PROT SERPL-MCNC: 7.5 G/DL (ref 6.4–8.2)
RBC # BLD AUTO: 3.76 MILLION/UL (ref 3.81–5.12)
SODIUM SERPL-SCNC: 144 MMOL/L (ref 136–145)
TRIGL SERPL-MCNC: 196 MG/DL
TSH SERPL DL<=0.05 MIU/L-ACNC: 5.21 UIU/ML (ref 0.36–3.74)
WBC # BLD AUTO: 5.14 THOUSAND/UL (ref 4.31–10.16)

## 2018-08-20 PROCEDURE — 85027 COMPLETE CBC AUTOMATED: CPT | Performed by: INTERNAL MEDICINE

## 2018-08-20 PROCEDURE — 80061 LIPID PANEL: CPT | Performed by: INTERNAL MEDICINE

## 2018-08-20 PROCEDURE — 84443 ASSAY THYROID STIM HORMONE: CPT | Performed by: INTERNAL MEDICINE

## 2018-08-20 PROCEDURE — 83036 HEMOGLOBIN GLYCOSYLATED A1C: CPT | Performed by: INTERNAL MEDICINE

## 2018-08-20 PROCEDURE — 80053 COMPREHEN METABOLIC PANEL: CPT | Performed by: INTERNAL MEDICINE

## 2018-08-27 ENCOUNTER — APPOINTMENT (EMERGENCY)
Dept: CT IMAGING | Facility: HOSPITAL | Age: 73
DRG: 100 | End: 2018-08-27
Payer: MEDICARE

## 2018-08-27 ENCOUNTER — HOSPITAL ENCOUNTER (INPATIENT)
Facility: HOSPITAL | Age: 73
LOS: 2 days | Discharge: HOME HEALTH | DRG: 100 | End: 2018-08-29
Attending: EMERGENCY MEDICINE | Admitting: INTERNAL MEDICINE
Payer: MEDICARE

## 2018-08-27 ENCOUNTER — APPOINTMENT (EMERGENCY)
Dept: RADIOLOGY | Facility: HOSPITAL | Age: 73
DRG: 100 | End: 2018-08-27
Payer: MEDICARE

## 2018-08-27 DIAGNOSIS — G11.9 ATAXIA DUE TO CEREBELLAR DEGENERATION (HCC): Chronic | ICD-10-CM

## 2018-08-27 DIAGNOSIS — I95.9 HYPOTENSION, UNSPECIFIED HYPOTENSION TYPE: Chronic | ICD-10-CM

## 2018-08-27 DIAGNOSIS — R41.82 ALTERED MENTAL STATUS, UNSPECIFIED ALTERED MENTAL STATUS TYPE: Primary | ICD-10-CM

## 2018-08-27 DIAGNOSIS — R00.1 BRADYCARDIA: ICD-10-CM

## 2018-08-27 DIAGNOSIS — T68.XXXA HYPOTHERMIA, INITIAL ENCOUNTER: ICD-10-CM

## 2018-08-27 PROBLEM — Z97.8 CHRONIC INDWELLING FOLEY CATHETER: Status: ACTIVE | Noted: 2018-08-27

## 2018-08-27 PROBLEM — R41.89 COGNITIVE CHANGE: Chronic | Status: ACTIVE | Noted: 2017-04-30

## 2018-08-27 PROBLEM — S32.009A LUMBAR TRANSVERSE PROCESS FRACTURE (HCC): Chronic | Status: ACTIVE | Noted: 2018-07-03

## 2018-08-27 PROBLEM — K59.00 CONSTIPATION: Chronic | Status: ACTIVE | Noted: 2018-07-12

## 2018-08-27 PROBLEM — E46 PROTEIN CALORIE MALNUTRITION (HCC): Chronic | Status: ACTIVE | Noted: 2017-04-27

## 2018-08-27 PROBLEM — G11.8 SPINOCEREBELLAR ATAXIA (HCC): Chronic | Status: ACTIVE | Noted: 2017-02-22

## 2018-08-27 PROBLEM — D64.9 ANEMIA: Chronic | Status: ACTIVE | Noted: 2017-02-22

## 2018-08-27 PROBLEM — I48.0 PAROXYSMAL ATRIAL FIBRILLATION (HCC): Chronic | Status: ACTIVE | Noted: 2018-07-08

## 2018-08-27 PROBLEM — F33.41 RECURRENT MAJOR DEPRESSIVE DISORDER, IN PARTIAL REMISSION (HCC): Chronic | Status: ACTIVE | Noted: 2018-07-10

## 2018-08-27 PROBLEM — N39.0 UTI (URINARY TRACT INFECTION): Status: RESOLVED | Noted: 2017-04-21 | Resolved: 2018-08-27

## 2018-08-27 PROBLEM — R55 SYNCOPE: Status: RESOLVED | Noted: 2017-02-22 | Resolved: 2018-08-27

## 2018-08-27 PROBLEM — T14.91XA SUICIDE ATTEMPT (HCC): Status: RESOLVED | Noted: 2017-04-25 | Resolved: 2018-08-27

## 2018-08-27 PROBLEM — E87.0 HYPERNATREMIA: Chronic | Status: RESOLVED | Noted: 2017-03-03 | Resolved: 2018-08-27

## 2018-08-27 PROBLEM — R10.9 ABDOMINAL PAIN: Status: RESOLVED | Noted: 2017-02-22 | Resolved: 2018-08-27

## 2018-08-27 PROBLEM — S06.5X9A SUBDURAL HEMATOMA (HCC): Chronic | Status: ACTIVE | Noted: 2018-07-06

## 2018-08-27 PROBLEM — M25.551 PAIN OF RIGHT HIP JOINT: Status: RESOLVED | Noted: 2018-06-01 | Resolved: 2018-08-27

## 2018-08-27 PROBLEM — N30.00 ACUTE CYSTITIS WITHOUT HEMATURIA: Status: RESOLVED | Noted: 2018-07-03 | Resolved: 2018-08-27

## 2018-08-27 PROBLEM — G40.309 GENERALIZED NONCONVULSIVE SEIZURE (HCC): Chronic | Status: ACTIVE | Noted: 2017-11-15
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
## 2018-08-27 PROBLEM — R33.9 URINARY RETENTION: Chronic | Status: ACTIVE | Noted: 2018-07-09

## 2018-08-27 PROBLEM — L03.116 CELLULITIS OF LEFT LOWER EXTREMITY: Status: RESOLVED | Noted: 2017-02-22 | Resolved: 2018-08-27

## 2018-08-27 PROBLEM — G93.40 ENCEPHALOPATHY: Chronic | Status: RESOLVED | Noted: 2017-03-03 | Resolved: 2018-08-27

## 2018-08-27 PROBLEM — T50.901A OVERDOSE: Status: RESOLVED | Noted: 2017-04-19 | Resolved: 2018-08-27

## 2018-08-27 PROBLEM — K22.0 ESOPHAGEAL ACHALASIA: Chronic | Status: ACTIVE | Noted: 2017-03-04

## 2018-08-27 PROBLEM — R79.89 ELEVATED BRAIN NATRIURETIC PEPTIDE (BNP) LEVEL: Status: RESOLVED | Noted: 2018-07-06 | Resolved: 2018-08-27

## 2018-08-27 PROBLEM — R51.9 HEADACHE: Status: RESOLVED | Noted: 2017-04-27 | Resolved: 2018-08-27

## 2018-08-27 PROBLEM — R74.01 TRANSAMINITIS: Status: RESOLVED | Noted: 2017-04-19 | Resolved: 2018-08-27

## 2018-08-27 PROBLEM — F33.41 RECURRENT MAJOR DEPRESSIVE DISORDER, IN PARTIAL REMISSION (HCC): Chronic | Status: RESOLVED | Noted: 2018-07-10 | Resolved: 2018-08-27

## 2018-08-27 PROBLEM — F33.2 MAJOR DEPRESSIVE DISORDER, RECURRENT SEVERE WITHOUT PSYCHOTIC FEATURES (HCC): Chronic | Status: ACTIVE | Noted: 2017-04-29

## 2018-08-27 LAB
ANION GAP BLD CALC-SCNC: 17 MMOL/L (ref 4–13)
ANION GAP SERPL CALCULATED.3IONS-SCNC: 7 MMOL/L (ref 4–13)
BASOPHILS # BLD AUTO: 0.02 THOUSANDS/ΜL (ref 0–0.1)
BASOPHILS NFR BLD AUTO: 0 % (ref 0–1)
BILIRUB UR QL STRIP: NEGATIVE
BUN BLD-MCNC: 33 MG/DL (ref 5–25)
BUN SERPL-MCNC: 36 MG/DL (ref 5–25)
CA-I BLD-SCNC: 1.24 MMOL/L (ref 1.12–1.32)
CALCIUM SERPL-MCNC: 9 MG/DL (ref 8.3–10.1)
CHLORIDE BLD-SCNC: 105 MMOL/L (ref 100–108)
CHLORIDE SERPL-SCNC: 108 MMOL/L (ref 100–108)
CLARITY UR: CLEAR
CO2 SERPL-SCNC: 28 MMOL/L (ref 21–32)
COLOR UR: YELLOW
CREAT BLD-MCNC: 1.3 MG/DL (ref 0.6–1.3)
CREAT SERPL-MCNC: 1.47 MG/DL (ref 0.6–1.3)
EOSINOPHIL # BLD AUTO: 0.19 THOUSAND/ΜL (ref 0–0.61)
EOSINOPHIL NFR BLD AUTO: 3 % (ref 0–6)
ERYTHROCYTE [DISTWIDTH] IN BLOOD BY AUTOMATED COUNT: 15.9 % (ref 11.6–15.1)
GFR SERPL CREATININE-BSD FRML MDRD: 35 ML/MIN/1.73SQ M
GFR SERPL CREATININE-BSD FRML MDRD: 41 ML/MIN/1.73SQ M
GLUCOSE SERPL-MCNC: 129 MG/DL (ref 65–140)
GLUCOSE SERPL-MCNC: 130 MG/DL (ref 65–140)
GLUCOSE UR STRIP-MCNC: NEGATIVE MG/DL
HCT VFR BLD AUTO: 29.7 % (ref 34.8–46.1)
HCT VFR BLD CALC: 28 % (ref 34.8–46.1)
HGB BLD-MCNC: 9.3 G/DL (ref 11.5–15.4)
HGB BLDA-MCNC: 9.5 G/DL (ref 11.5–15.4)
HGB UR QL STRIP.AUTO: NEGATIVE
IMM GRANULOCYTES # BLD AUTO: 0.02 THOUSAND/UL (ref 0–0.2)
IMM GRANULOCYTES NFR BLD AUTO: 0 % (ref 0–2)
KETONES UR STRIP-MCNC: NEGATIVE MG/DL
LEUKOCYTE ESTERASE UR QL STRIP: NEGATIVE
LYMPHOCYTES # BLD AUTO: 1.31 THOUSANDS/ΜL (ref 0.6–4.47)
LYMPHOCYTES NFR BLD AUTO: 18 % (ref 14–44)
MCH RBC QN AUTO: 31.4 PG (ref 26.8–34.3)
MCHC RBC AUTO-ENTMCNC: 31.3 G/DL (ref 31.4–37.4)
MCV RBC AUTO: 100 FL (ref 82–98)
MONOCYTES # BLD AUTO: 0.33 THOUSAND/ΜL (ref 0.17–1.22)
MONOCYTES NFR BLD AUTO: 5 % (ref 4–12)
NEUTROPHILS # BLD AUTO: 5.27 THOUSANDS/ΜL (ref 1.85–7.62)
NEUTS SEG NFR BLD AUTO: 74 % (ref 43–75)
NITRITE UR QL STRIP: NEGATIVE
NRBC BLD AUTO-RTO: 0 /100 WBCS
PCO2 BLD: 27 MMOL/L (ref 21–32)
PH UR STRIP.AUTO: 5.5 [PH] (ref 4.5–8)
PLATELET # BLD AUTO: 144 THOUSANDS/UL (ref 149–390)
PMV BLD AUTO: 12.3 FL (ref 8.9–12.7)
POTASSIUM BLD-SCNC: 3.6 MMOL/L (ref 3.5–5.3)
POTASSIUM SERPL-SCNC: 3.7 MMOL/L (ref 3.5–5.3)
PROT UR STRIP-MCNC: NEGATIVE MG/DL
RBC # BLD AUTO: 2.96 MILLION/UL (ref 3.81–5.12)
SODIUM BLD-SCNC: 143 MMOL/L (ref 136–145)
SODIUM SERPL-SCNC: 143 MMOL/L (ref 136–145)
SP GR UR STRIP.AUTO: 1.01 (ref 1–1.03)
SPECIMEN SOURCE: ABNORMAL
SPECIMEN SOURCE: NORMAL
TROPONIN I BLD-MCNC: 0 NG/ML (ref 0–0.08)
TROPONIN I SERPL-MCNC: <0.02 NG/ML
TSH SERPL DL<=0.05 MIU/L-ACNC: 2.4 UIU/ML (ref 0.36–3.74)
UROBILINOGEN UR QL STRIP.AUTO: 0.2 E.U./DL
WBC # BLD AUTO: 7.14 THOUSAND/UL (ref 4.31–10.16)

## 2018-08-27 PROCEDURE — 99285 EMERGENCY DEPT VISIT HI MDM: CPT

## 2018-08-27 PROCEDURE — 70496 CT ANGIOGRAPHY HEAD: CPT

## 2018-08-27 PROCEDURE — 82533 TOTAL CORTISOL: CPT | Performed by: NURSE PRACTITIONER

## 2018-08-27 PROCEDURE — 93005 ELECTROCARDIOGRAM TRACING: CPT

## 2018-08-27 PROCEDURE — 84484 ASSAY OF TROPONIN QUANT: CPT

## 2018-08-27 PROCEDURE — 96360 HYDRATION IV INFUSION INIT: CPT

## 2018-08-27 PROCEDURE — 71045 X-RAY EXAM CHEST 1 VIEW: CPT

## 2018-08-27 PROCEDURE — 70450 CT HEAD/BRAIN W/O DYE: CPT

## 2018-08-27 PROCEDURE — 84145 PROCALCITONIN (PCT): CPT | Performed by: NURSE PRACTITIONER

## 2018-08-27 PROCEDURE — 84146 ASSAY OF PROLACTIN: CPT | Performed by: NURSE PRACTITIONER

## 2018-08-27 PROCEDURE — 70498 CT ANGIOGRAPHY NECK: CPT

## 2018-08-27 PROCEDURE — 99223 1ST HOSP IP/OBS HIGH 75: CPT | Performed by: NURSE PRACTITIONER

## 2018-08-27 PROCEDURE — 84443 ASSAY THYROID STIM HORMONE: CPT | Performed by: NURSE PRACTITIONER

## 2018-08-27 PROCEDURE — 85025 COMPLETE CBC W/AUTO DIFF WBC: CPT | Performed by: EMERGENCY MEDICINE

## 2018-08-27 PROCEDURE — 81003 URINALYSIS AUTO W/O SCOPE: CPT | Performed by: NURSE PRACTITIONER

## 2018-08-27 PROCEDURE — 80048 BASIC METABOLIC PNL TOTAL CA: CPT | Performed by: EMERGENCY MEDICINE

## 2018-08-27 PROCEDURE — 84484 ASSAY OF TROPONIN QUANT: CPT | Performed by: EMERGENCY MEDICINE

## 2018-08-27 PROCEDURE — 80047 BASIC METABLC PNL IONIZED CA: CPT

## 2018-08-27 PROCEDURE — 87040 BLOOD CULTURE FOR BACTERIA: CPT | Performed by: EMERGENCY MEDICINE

## 2018-08-27 PROCEDURE — 36415 COLL VENOUS BLD VENIPUNCTURE: CPT | Performed by: EMERGENCY MEDICINE

## 2018-08-27 PROCEDURE — 85014 HEMATOCRIT: CPT

## 2018-08-27 RX ORDER — LEVOTHYROXINE SODIUM 0.05 MG/1
50 TABLET ORAL
Status: DISCONTINUED | OUTPATIENT
Start: 2018-08-28 | End: 2018-08-29 | Stop reason: HOSPADM

## 2018-08-27 RX ORDER — PRAMIPEXOLE DIHYDROCHLORIDE 0.5 MG/1
0.5 TABLET ORAL
Status: DISCONTINUED | OUTPATIENT
Start: 2018-08-27 | End: 2018-08-29 | Stop reason: HOSPADM

## 2018-08-27 RX ORDER — MIDODRINE HYDROCHLORIDE 5 MG/1
5 TABLET ORAL 3 TIMES DAILY
Status: DISCONTINUED | OUTPATIENT
Start: 2018-08-27 | End: 2018-08-29 | Stop reason: HOSPADM

## 2018-08-27 RX ORDER — POLYETHYLENE GLYCOL 3350 17 G/17G
17 POWDER, FOR SOLUTION ORAL DAILY
Status: DISCONTINUED | OUTPATIENT
Start: 2018-08-28 | End: 2018-08-29 | Stop reason: HOSPADM

## 2018-08-27 RX ORDER — MIRTAZAPINE 15 MG/1
15 TABLET, FILM COATED ORAL
Status: DISCONTINUED | OUTPATIENT
Start: 2018-08-27 | End: 2018-08-29 | Stop reason: HOSPADM

## 2018-08-27 RX ORDER — PREGABALIN 50 MG/1
50 CAPSULE ORAL 3 TIMES DAILY
Status: DISCONTINUED | OUTPATIENT
Start: 2018-08-27 | End: 2018-08-29 | Stop reason: HOSPADM

## 2018-08-27 RX ORDER — BUPROPION HYDROCHLORIDE 150 MG/1
300 TABLET ORAL DAILY
Status: DISCONTINUED | OUTPATIENT
Start: 2018-08-28 | End: 2018-08-29 | Stop reason: HOSPADM

## 2018-08-27 RX ORDER — SODIUM CHLORIDE, SODIUM GLUCONATE, SODIUM ACETATE, POTASSIUM CHLORIDE, MAGNESIUM CHLORIDE, SODIUM PHOSPHATE, DIBASIC, AND POTASSIUM PHOSPHATE .53; .5; .37; .037; .03; .012; .00082 G/100ML; G/100ML; G/100ML; G/100ML; G/100ML; G/100ML; G/100ML
75 INJECTION, SOLUTION INTRAVENOUS CONTINUOUS
Status: DISCONTINUED | OUTPATIENT
Start: 2018-08-27 | End: 2018-08-29 | Stop reason: HOSPADM

## 2018-08-27 RX ORDER — HEPARIN SODIUM 5000 [USP'U]/ML
5000 INJECTION, SOLUTION INTRAVENOUS; SUBCUTANEOUS EVERY 8 HOURS SCHEDULED
Status: DISCONTINUED | OUTPATIENT
Start: 2018-08-27 | End: 2018-08-29 | Stop reason: HOSPADM

## 2018-08-27 RX ORDER — GABAPENTIN 100 MG/1
100 CAPSULE ORAL 2 TIMES DAILY
Status: DISCONTINUED | OUTPATIENT
Start: 2018-08-27 | End: 2018-08-27 | Stop reason: ALTCHOICE

## 2018-08-27 RX ORDER — LIDOCAINE 50 MG/G
1 PATCH TOPICAL DAILY
Status: DISCONTINUED | OUTPATIENT
Start: 2018-08-28 | End: 2018-08-29 | Stop reason: HOSPADM

## 2018-08-27 RX ADMIN — PREGABALIN 50 MG: 50 CAPSULE ORAL at 22:34

## 2018-08-27 RX ADMIN — IOHEXOL 85 ML: 350 INJECTION, SOLUTION INTRAVENOUS at 18:28

## 2018-08-27 RX ADMIN — VANCOMYCIN HYDROCHLORIDE 750 MG: 750 INJECTION, SOLUTION INTRAVENOUS at 20:37

## 2018-08-27 RX ADMIN — PRAMIPEXOLE DIHYDROCHLORIDE 0.5 MG: 0.5 TABLET ORAL at 22:34

## 2018-08-27 RX ADMIN — HEPARIN SODIUM 5000 UNITS: 5000 INJECTION, SOLUTION INTRAVENOUS; SUBCUTANEOUS at 22:30

## 2018-08-27 RX ADMIN — CEFEPIME HYDROCHLORIDE 2000 MG: 2 INJECTION, POWDER, FOR SOLUTION INTRAVENOUS at 19:34

## 2018-08-27 RX ADMIN — SODIUM CHLORIDE, SODIUM GLUCONATE, SODIUM ACETATE, POTASSIUM CHLORIDE, MAGNESIUM CHLORIDE, SODIUM PHOSPHATE, DIBASIC, AND POTASSIUM PHOSPHATE 75 ML/HR: .53; .5; .37; .037; .03; .012; .00082 INJECTION, SOLUTION INTRAVENOUS at 22:36

## 2018-08-27 RX ADMIN — SODIUM CHLORIDE 500 ML: 0.9 INJECTION, SOLUTION INTRAVENOUS at 17:40

## 2018-08-27 RX ADMIN — MIRTAZAPINE 15 MG: 15 TABLET, FILM COATED ORAL at 22:34

## 2018-08-27 RX ADMIN — MIDODRINE HYDROCHLORIDE 5 MG: 5 TABLET ORAL at 22:35
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
## 2018-08-27 NOTE — SEPSIS NOTE
Sepsis Note   Gema Moseley 67 y o  female MRN: [id number]  Unit/Bed#: ED 26 Encounter: [id number]            Initial Sepsis Screening     [address] Name 08/27/18 1905                Is the patient's history suggestive of a new or worsening infection? (!)  Yes (Proceed)  -MB        Suspected source of infection urinary tract infection  -MB        Are two or more of the following signs & symptoms of infection both present and new to the patient? No  -MB        Indicate SIRS criteria Hypothermia < 36C (96 8F)  -MB        If the answer is yes to both questions, suspicion of sepsis is present          If severe sepsis is present AND tissue hypoperfusion perists in the hour after fluid resuscitation or lactate > 4, the patient meets criteria for SEPTIC SHOCK          Are any of the following organ dysfunction criteria present within 6 hours of suspected infection and SIRS criteria that are NOT considered to be chronic conditions?         Organ dysfunction          Date of presentation of severe sepsis          Time of presentation of severe sepsis          Tissue hypoperfusion persists in the hour after crystalloid fluid administration, evidenced, by either:          Was hypotension present within one hour of the conclusion of crystalloid fluid administration?           Date of presentation of septic shock          Time of presentation of septic shock            User Key  (r) = Recorded By, (t) = Taken By, (c) = Cosigned By    [address] Name Provider Type    SHEA Davey MD Physician

## 2018-08-27 NOTE — ED PROVIDER NOTES
History  Chief Complaint   Patient presents with    Syncope     Syncopal at home with home PT  Patient slurring words, gazed sight  67 yr female as per ems- was at pt- pt called from home-- for change in ms-- not acting normally -- no sz activity- unresponsiveness- as per ems- pt not cooperative-  In er bed- pt with slurred speech -- er md called to room to evaluate pt- pt shakes head no to    Headache/neck pain/cp/back pain         History provided by:  EMS personnel  History limited by:  Mental status change   used: No        Prior to Admission Medications   Prescriptions Last Dose Informant Patient Reported?  Taking?   acetaminophen (TYLENOL) 325 mg tablet   No No   Sig: Take 2 tablets (650 mg total) by mouth every 6 (six) hours as needed for mild pain   bisacodyl (DULCOLAX) 5 mg EC tablet   No No   Sig: Take 1 tablet (5 mg total) by mouth daily   buPROPion (WELLBUTRIN XL) 300 mg 24 hr tablet   No No   Sig: TAKE 1 TABLET BY MOUTH  DAILY   gabapentin (NEURONTIN) 100 mg capsule   No No   Sig: Take 1 capsule by mouth 2 (two) times a day for 30 days   levothyroxine 75 mcg tablet   No No   Sig: Take 1 tablet (75 mcg total) by mouth daily   lidocaine (LIDODERM) 5 %   No No   Sig: Place 1 patch on the skin daily Remove & Discard patch within 12 hours or as directed by MD   meloxicam (MOBIC) 15 mg tablet   No No   Sig: TAKE 1 TABLET BY MOUTH  DAILY AS NEEDED FOR  MODERATE PAIN   metoprolol tartrate (LOPRESSOR) 25 mg tablet   No No   Sig: Take 0 5 tablets (12 5 mg total) by mouth every 12 (twelve) hours for 30 days   midodrine (PROAMATINE) 10 MG tablet   Yes No   Sig: Take 10 mg by mouth 3 (three) times a day   mirtazapine (REMERON) 15 mg tablet   No No   Sig: Take 1 tablet by mouth daily at bedtime for 30 days   ondansetron (ZOFRAN) 8 mg tablet   No No   Sig: Take 1 tablet (8 mg total) by mouth every 8 (eight) hours as needed for nausea or vomiting   polyethylene glycol (MIRALAX) 17 g packet No No   Sig: Take 17 g by mouth daily   pramipexole (MIRAPEX) 0 25 mg tablet   No No   Sig: Take 2 tablets (0 5 mg total) by mouth daily at bedtime   pregabalin (LYRICA) 50 mg capsule   No No   Sig: Take 1 capsule (50 mg total) by mouth 3 (three) times a day      Facility-Administered Medications: None       Past Medical History:   Diagnosis Date    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[address] )     Depression     Disease of thyroid gland     History of suicide attempt     Daughter reports 4 5 years ago pt was depressed/SI attempt after partner    Hypertension     Hypothyroidism     Melanoma ([facility] )     malignant - of the skin    Renal infarct ([facility] )     Seizures ([facility] )     Suicide attempt [facility])        Past Surgical History:   Procedure Laterality Date    CHOLECYSTECTOMY      laparoscopic    COLONOSCOPY      fiberoptic    ESOPHAGOGASTRODUODENOSCOPY N/A 3/10/2017    Procedure: ESOPHAGOGASTRODUODENOSCOPY (EGD); Surgeon: Anamaria Henson DO;  Location: [facility]; Service:     SKIN LESION EXCISION      melanoma back       Family History   Problem Relation Age of Onset    Heart disease Father     Heart attack Father         acute MI    Deafness Father         bilateral due to meningitis age 15    Deafness Mother         congenital    Glaucoma Mother     Stroke Mother     Diabetes Sister     Glaucoma Sister     Ataxia Brother         cerebral - possible    Diabetes Brother     Glaucoma Brother      I have reviewed and agree with the history as documented  Social History   Substance Use Topics    Smoking status: Never Smoker    Smokeless tobacco: Former User      Comment: former smoker - social-quit years ago    Alcohol use No      Comment: wine rarely        Review of Systems   Constitutional: Negative  HENT: Negative  Eyes: Negative  Respiratory: Negative  Cardiovascular: Negative  Gastrointestinal: Negative  Endocrine: Negative  Genitourinary: Negative  Musculoskeletal: Negative  Skin: Negative  Allergic/Immunologic: Negative  Neurological: Positive for speech difficulty  Negative for dizziness, tremors, seizures, syncope, facial asymmetry, weakness, light-headedness, numbness and headaches  Hematological: Negative  Psychiatric/Behavioral: Positive for confusion  Negative for agitation, behavioral problems, decreased concentration, dysphoric mood, hallucinations, self-injury, sleep disturbance and suicidal ideas  The patient is not nervous/anxious and is not hyperactive  Physical Exam  Physical Exam   Constitutional: No distress  avss-- b rady- pulse ox 93 % on ra- interpretation is low- normal- no intervention   HENT:   Head: Normocephalic and atraumatic  Eyes: Conjunctivae and EOM are normal  Pupils are equal, round, and reactive to light  Right eye exhibits no discharge  Left eye exhibits no discharge  No scleral icterus  Mm pink   Neck: Normal range of motion  Neck supple  No JVD present  No tracheal deviation present  No thyromegaly present  Cardiovascular: Regular rhythm, normal heart sounds and intact distal pulses  Exam reveals no gallop and no friction rub  No murmur heard  Pulmonary/Chest: Effort normal and breath sounds normal  No stridor  No respiratory distress  She has no wheezes  She has no rales  She exhibits no tenderness  Abdominal: Soft  Bowel sounds are normal  She exhibits no distension and no mass  There is no tenderness  There is no rebound and no guarding  No hernia  Soft- nt- no peritoneal signs--    Musculoskeletal: She exhibits no edema, tenderness or deformity  Equal bilateral radial/dp pulses- no ble edema/calf tenderness/assym/ erythema   Lymphadenopathy:     She has no cervical adenopathy  Neurological: A sensory deficit is present  No cranial nerve deficit  She exhibits abnormal muscle tone   Coordination abnormal    Marked dsyarthria-- initial lue/lle weakness- lle was flaccid- with left sided neglect- thsi has improved during er stay - with equal bue/ble strength-sensation    Skin: Skin is warm  Capillary refill takes less than 2 seconds  No rash noted  She is not diaphoretic  No erythema  No pallor  Psychiatric: She has a normal mood and affect  Her behavior is normal    Nursing note and vitals reviewed        Vital Signs  ED Triage Vitals [08/27/18 1741]   Temp Pulse Respirations Blood Pressure SpO2   -- (!) 47 20 92/53 93 %      Temp src Heart Rate Source Patient Position - Orthostatic VS BP Location FiO2 (%)   -- -- -- -- --      Pain Score       --           Vitals:    08/27/18 1741   BP: 92/53   Pulse: (!) 47       Visual Acuity      ED Medications  Medications   sodium chloride 0 9 % bolus 500 mL (500 mL Intravenous New Bag 8/27/18 1740)       Diagnostic Studies  Results Reviewed     Procedure Component Value Units Date/Time    POCT Chem 8+ [[id number]]  (Abnormal) Collected:  08/27/18 1747    Lab Status:  Final result Updated:  08/27/18 1751     SODIUM, I-STAT 143 mmol/l      Potassium, i-STAT 3 6 mmol/L      Chloride, istat 105 mmol/L      CO2, i-STAT 27 mmol/L      Anion Gap, Istat 17 (H) mmol/L      Calcium, Ionized i-STAT 1 24 mmol/L      BUN, I-STAT 33 (H) mg/dl      Creatinine, i-STAT 1 3 mg/dl      eGFR 41 ml/min/1 73sq m      Glucose, i-STAT 129 mg/dl      Hct, i-STAT 28 (L) %      Hgb, i-STAT 9 5 (L) g/dl      Specimen Type VENOUS    CBC and differential [[id number]]  (Abnormal) Collected:  08/27/18 1742    Lab Status:  Final result Specimen:  Blood from Arm, Right Updated:  08/27/18 1751     WBC 7 14 Thousand/uL      RBC 2 96 (L) Million/uL      Hemoglobin 9 3 (L) g/dL      Hematocrit 29 7 (L) %       (H) fL      MCH 31 4 pg      MCHC 31 3 (L) g/dL      RDW 15 9 (H) %      MPV 12 3 fL      Platelets 094 (L) Thousands/uL      nRBC 0 /100 WBCs      Neutrophils Relative 74 %      Immat GRANS % 0 %      Lymphocytes Relative 18 %      Monocytes Relative 5 %      Eosinophils Relative 3 %      Basophils Relative 0 %      Neutrophils Absolute 5 27 Thousands/µL      Immature Grans Absolute 0 02 Thousand/uL      Lymphocytes Absolute 1 31 Thousands/µL      Monocytes Absolute 0 33 Thousand/µL      Eosinophils Absolute 0 19 Thousand/µL      Basophils Absolute 0 02 Thousands/µL     Troponin I [[id number]]     Lab Status:  No result Specimen:  Blood     Basic metabolic panel [[id number]] Collected:  08/27/18 1742    Lab Status:   In process Specimen:  Blood from Arm, Right Updated:  08/27/18 1748                 CTA head and neck with and without contrast    (Results Pending)   XR stroke alert portable chest    (Results Pending)   CT head without contrast    (Results Pending)              Procedures  Procedures       Phone Contacts  ED Phone Contact    ED Course  ED Course as of Aug 27 1933   Mon Aug 27, 2018   1739 - prehospital accucheck 128    1742 Er md note-  after pt seen and cva called--  old records reviewed-- pt with hx of  r sided sdh  from 7/18 fall-  case d/w neurologist on call=- pt not a tpa candidate do to sdh--     1801 Er md note- pt - neuro exam- has improved-- with improvement in lue/lle strength--     1810 - er md note- recent labs reviewed by er md    1822 Head ct- cta of head/neck-- radiology -- no acute bleed/ r sdh has resolved--  no sign vascular stenosis    1835 Er md note- ano-rectal exam - normal anal exam -- brown  heme neg stool in vault- no impaction     1837 Cxr portable- compared to previous from 7/18- no sign change- no change in mediastinum/cardiac silhouette- no free/sq air-- no infiltrate    1910 - ER MD NOTE-  FAMILY AT BEDSIDE- SON IN LAW WAS THERE - PT WAS HAVING HOME OT-- STOOD UP AND HAD SEVERAL MINUTES OF NOT ACTING NORMALLY - STARING- NOT RESPONDING-- NO TONIC/CLONIC ACTIVITY - LASTED FOR 1-2 MINUTES- NO TRAUMA--  WHEN CAME AROUND SPEECH WAS MORE SLURRED THAN USUAL - DAUGHTER STATES NEUROLOGY HAS TOLD FAMILY THAT PT HAS SZ- DAUGHTER WITNESSED  1 STARING TYPE SZ IN PAST- PT IS CURRENTLY NOT ON ANY ANTIEPILEPTICS--     1920 ER MD NOTE- PT  RE-EVALUATION - PT IS CURRENTLY BACK TO BASELINE MENTATION AND SPEECH- FAMILY AWARE OF PENDING STEP DOWN ADMIT                    Stroke Assessment     Row Name 08/27/18 1747             NIH Stroke Scale    Interval Baseline      Level of Consciousness (1a ) 0      LOC Questions (1b ) 0      LOC Commands (1c ) 0      Best Gaze (2 ) 0      Visual (3 ) 0      Facial Palsy (4 ) 0      Motor Arm, Left (5a ) 1      Motor Arm, Right (5b ) 0      Motor Leg, Left (6a ) 1      Motor Leg, Right (6b ) 0      Limb Ataxia (7 ) 0      Sensory (8 ) 0      Best Language (9 ) 0      Dysarthria (10 ) 2      Extinction and Inattention (11 ) (Formerly Neglect) 0      Total 4                First Filed Value   TPA Decision  Patient not a TPA candidate  MDM  The patient presented with a condition in which there was a high probability of imminent or life-threatening deterioration, and critical care services (excluding separately billable procedures) totalled 30-74 minutes  Disposition  Final diagnoses:   None     ED Disposition     None      Follow-up Information    None         Patient's Medications   Discharge Prescriptions    No medications on file     No discharge procedures on file      ED Provider  Electronically Signed by           Kameron Garcia MD  08/27/18 0539       Kameron Garcia MD  08/27/18 5604       Kameron Garcia MD  08/28/18 0715

## 2018-08-27 NOTE — ED NOTES
Report given to Receiving MARCELA Marc, ICU team Okay to evaluate patient upstairs at bedside        Sebastian Silvestre RN  08/27/18 0717

## 2018-08-27 NOTE — ED PROCEDURE NOTE
PROCEDURE  ECG 12 Lead Documentation  Date/Time: 8/27/2018 6:25 PM  Performed by: Rick Vásquez  Authorized by: Rick Vásquez     Indications / Diagnosis:  Bradycardia/ cahge in ms  ECG reviewed by me, the ED Provider: yes    Previous ECG:     Previous ECG:  Compared to current    Comparison ECG info:   LIKLEY SINUS JENNA HAS REPLACED AFIB WITH RVR    Similarity:  Changes noted    Comparison to cardiac monitor: Yes    Interpretation:     Interpretation: non-specific    Rate:     ECG rate:  46    ECG rate assessment: bradycardic    Rhythm:     Rhythm comment:  Rhythm is either sinus jenna vers junctional rhythm  Ectopy:     Ectopy: none    QRS:     QRS axis:  Left    QRS intervals:  Normal  Conduction:     Conduction: normal    ST segments:     ST segments:  Normal  T waves:     T waves: flattening      Flattening:  III, aVF, V1, V5 and V6  Other findings:     Other findings: poor R wave progression and U wave    Comments:      No ecg signs of ischemia/ injury / r heart strain     -  Ems ecg  Reviewed-- --  No sign chaNGES           Joby Harris MD  08/27/18 5628

## 2018-08-27 NOTE — ED PROCEDURE NOTE
PROCEDURE  CriticalCare Time  Performed by: Lexie Dodson  Authorized by: Lexie Dodson     Critical care provider statement:     Critical care time (minutes):  35    Critical care start time:  8/27/2018 6:30 PM    Critical care end time:  8/27/2018 7:05 PM    Critical care time was exclusive of:  Separately billable procedures and treating other patients and teaching time    Critical care was necessary to treat or prevent imminent or life-threatening deterioration of the following conditions: MS CHANGE      Critical care was time spent personally by me on the following activities:  Examination of patient, obtaining history from patient or surrogate, development of treatment plan with patient or surrogate, review of old charts, re-evaluation of patient's condition, ordering and review of radiographic studies and ordering and review of laboratory studies    I assumed direction of critical care for this patient from another provider in my specialty: tanya Bryson MD  08/27/18 7176

## 2018-08-28 LAB
ANION GAP SERPL CALCULATED.3IONS-SCNC: 8 MMOL/L (ref 4–13)
BASOPHILS # BLD AUTO: 0.01 THOUSANDS/ΜL (ref 0–0.1)
BASOPHILS NFR BLD AUTO: 0 % (ref 0–1)
BUN SERPL-MCNC: 28 MG/DL (ref 5–25)
CALCIUM SERPL-MCNC: 8.6 MG/DL (ref 8.3–10.1)
CHLORIDE SERPL-SCNC: 109 MMOL/L (ref 100–108)
CO2 SERPL-SCNC: 28 MMOL/L (ref 21–32)
CORTIS SERPL-MCNC: 4.9 UG/DL
CREAT SERPL-MCNC: 1.21 MG/DL (ref 0.6–1.3)
EOSINOPHIL # BLD AUTO: 0.21 THOUSAND/ΜL (ref 0–0.61)
EOSINOPHIL NFR BLD AUTO: 4 % (ref 0–6)
ERYTHROCYTE [DISTWIDTH] IN BLOOD BY AUTOMATED COUNT: 15.8 % (ref 11.6–15.1)
GFR SERPL CREATININE-BSD FRML MDRD: 45 ML/MIN/1.73SQ M
GLUCOSE SERPL-MCNC: 76 MG/DL (ref 65–140)
HCT VFR BLD AUTO: 30.1 % (ref 34.8–46.1)
HGB BLD-MCNC: 9.3 G/DL (ref 11.5–15.4)
IMM GRANULOCYTES # BLD AUTO: 0.01 THOUSAND/UL (ref 0–0.2)
IMM GRANULOCYTES NFR BLD AUTO: 0 % (ref 0–2)
LYMPHOCYTES # BLD AUTO: 1.4 THOUSANDS/ΜL (ref 0.6–4.47)
LYMPHOCYTES NFR BLD AUTO: 29 % (ref 14–44)
MCH RBC QN AUTO: 30.9 PG (ref 26.8–34.3)
MCHC RBC AUTO-ENTMCNC: 30.9 G/DL (ref 31.4–37.4)
MCV RBC AUTO: 100 FL (ref 82–98)
MONOCYTES # BLD AUTO: 0.24 THOUSAND/ΜL (ref 0.17–1.22)
MONOCYTES NFR BLD AUTO: 5 % (ref 4–12)
NEUTROPHILS # BLD AUTO: 3.05 THOUSANDS/ΜL (ref 1.85–7.62)
NEUTS SEG NFR BLD AUTO: 62 % (ref 43–75)
NRBC BLD AUTO-RTO: 0 /100 WBCS
PLATELET # BLD AUTO: 137 THOUSANDS/UL (ref 149–390)
PMV BLD AUTO: 12.1 FL (ref 8.9–12.7)
POTASSIUM SERPL-SCNC: 4.2 MMOL/L (ref 3.5–5.3)
PROCALCITONIN SERPL-MCNC: <0.05 NG/ML
PROLACTIN SERPL-MCNC: 1.2 NG/ML
RBC # BLD AUTO: 3.01 MILLION/UL (ref 3.81–5.12)
SODIUM SERPL-SCNC: 145 MMOL/L (ref 136–145)
WBC # BLD AUTO: 4.92 THOUSAND/UL (ref 4.31–10.16)

## 2018-08-28 PROCEDURE — G8978 MOBILITY CURRENT STATUS: HCPCS

## 2018-08-28 PROCEDURE — 99223 1ST HOSP IP/OBS HIGH 75: CPT | Performed by: PSYCHIATRY & NEUROLOGY

## 2018-08-28 PROCEDURE — 99232 SBSQ HOSP IP/OBS MODERATE 35: CPT | Performed by: INTERNAL MEDICINE

## 2018-08-28 PROCEDURE — 97163 PT EVAL HIGH COMPLEX 45 MIN: CPT

## 2018-08-28 PROCEDURE — 97110 THERAPEUTIC EXERCISES: CPT

## 2018-08-28 PROCEDURE — 80048 BASIC METABOLIC PNL TOTAL CA: CPT | Performed by: NURSE PRACTITIONER

## 2018-08-28 PROCEDURE — G8996 SWALLOW CURRENT STATUS: HCPCS

## 2018-08-28 PROCEDURE — 97112 NEUROMUSCULAR REEDUCATION: CPT

## 2018-08-28 PROCEDURE — G8979 MOBILITY GOAL STATUS: HCPCS

## 2018-08-28 PROCEDURE — G8997 SWALLOW GOAL STATUS: HCPCS

## 2018-08-28 PROCEDURE — 92610 EVALUATE SWALLOWING FUNCTION: CPT

## 2018-08-28 PROCEDURE — 85025 COMPLETE CBC W/AUTO DIFF WBC: CPT | Performed by: NURSE PRACTITIONER

## 2018-08-28 RX ADMIN — MIDODRINE HYDROCHLORIDE 5 MG: 5 TABLET ORAL at 08:18

## 2018-08-28 RX ADMIN — MIDODRINE HYDROCHLORIDE 5 MG: 5 TABLET ORAL at 16:44

## 2018-08-28 RX ADMIN — BISACODYL 5 MG: 5 TABLET, COATED ORAL at 08:17

## 2018-08-28 RX ADMIN — BUPROPION HYDROCHLORIDE 300 MG: 150 TABLET, FILM COATED, EXTENDED RELEASE ORAL at 08:18

## 2018-08-28 RX ADMIN — HEPARIN SODIUM 5000 UNITS: 5000 INJECTION, SOLUTION INTRAVENOUS; SUBCUTANEOUS at 21:15

## 2018-08-28 RX ADMIN — MIRTAZAPINE 15 MG: 15 TABLET, FILM COATED ORAL at 21:14

## 2018-08-28 RX ADMIN — LIDOCAINE 1 PATCH: 50 PATCH TOPICAL at 08:18

## 2018-08-28 RX ADMIN — PREGABALIN 50 MG: 50 CAPSULE ORAL at 21:14

## 2018-08-28 RX ADMIN — HEPARIN SODIUM 5000 UNITS: 5000 INJECTION, SOLUTION INTRAVENOUS; SUBCUTANEOUS at 05:03

## 2018-08-28 RX ADMIN — HEPARIN SODIUM 5000 UNITS: 5000 INJECTION, SOLUTION INTRAVENOUS; SUBCUTANEOUS at 13:06

## 2018-08-28 RX ADMIN — LEVOTHYROXINE SODIUM 50 MCG: 50 TABLET ORAL at 05:03

## 2018-08-28 RX ADMIN — SODIUM CHLORIDE, SODIUM GLUCONATE, SODIUM ACETATE, POTASSIUM CHLORIDE, MAGNESIUM CHLORIDE, SODIUM PHOSPHATE, DIBASIC, AND POTASSIUM PHOSPHATE 75 ML/HR: .53; .5; .37; .037; .03; .012; .00082 INJECTION, SOLUTION INTRAVENOUS at 19:57

## 2018-08-28 RX ADMIN — SODIUM CHLORIDE, SODIUM GLUCONATE, SODIUM ACETATE, POTASSIUM CHLORIDE, MAGNESIUM CHLORIDE, SODIUM PHOSPHATE, DIBASIC, AND POTASSIUM PHOSPHATE 75 ML/HR: .53; .5; .37; .037; .03; .012; .00082 INJECTION, SOLUTION INTRAVENOUS at 09:18

## 2018-08-28 RX ADMIN — PREGABALIN 50 MG: 50 CAPSULE ORAL at 08:17

## 2018-08-28 RX ADMIN — PREGABALIN 50 MG: 50 CAPSULE ORAL at 16:44

## 2018-08-28 RX ADMIN — PRAMIPEXOLE DIHYDROCHLORIDE 0.5 MG: 0.5 TABLET ORAL at 21:14

## 2018-08-28 NOTE — CASE MANAGEMENT
Thank you,  [address]  Utilization Review Department  Phone: [phone]; Fax [phone]  ATTENTION: Please call with any questions or concerns to [phone]  and carefully follow the prompts so that you are directed to the right person  Send all requests for admission clinical reviews, approved or denied determinations and any other requests to fax [phone]  All voicemails are confidential    Initial Clinical Review    Admission: Date/Time/Statement: inpatient admission 8/27/18 @ [facility] This Encounter   Procedures    Inpatient Admission (expected length of stay for this patient is greater than two midnights)     Standing Status:   Standing     Number of Occurrences:   1     Order Specific Question:   Admitting Physician     Answer:   Mitchell Thorpe [[id number]]     Order Specific Question:   Level of Care     Answer:   Level 2 Stepdown / HOT [14]     Order Specific Question:   Estimated length of stay     Answer:   More than 2 Midnights     Order Specific Question:   Certification     Answer:   I certify that inpatient services are medically necessary for this patient for a duration of greater than two midnights  See H&P and MD Progress Notes for additional information about the patient's course of treatment  ED: Date/Time/Mode of Arrival:   ED Arrival Information     Expected Arrival Acuity Means of Arrival Escorted By Service Admission Type    - 8/27/2018 17:23 Emergent [address], Susan. \Hospital Has a New Name and Outlook.\"" Emergency [facility] General Medicine Emergency    Arrival Complaint    SYNCOPE          Chief Complaint:   Chief Complaint   Patient presents with    Syncope     Syncopal at home with home PT  Patient slurring words, gazed sight  History of Illness: 67 y o  Female per family the patient has been acting "strangely" over the last 5 days  They describe her as crawling outside to pick weeds, turning on the stove and walking away requiring them to take off knobs    Today she was working with occupational therapy and while standing she had a staring spell that lasted approximately 5 minutes  During these 5 minutes she would have brief verbal responsiveness however incomprehensible  At that time a pulse ox was placed on the patient and they stated better oxygen levels were low along with her heart rate      ED Vital Signs:   ED Triage Vitals   Temperature Pulse Respirations Blood Pressure SpO2   08/27/18 1833 08/27/18 1741 08/27/18 1741 08/27/18 1741 08/27/18 1741   (!) 90 1 °F (32 3 °C) (!) 47 20 92/53 93 %      Temp Source Heart Rate Source Patient Position - Orthostatic VS BP Location FiO2 (%)   08/27/18 1833 08/27/18 1815 08/27/18 1815 08/27/18 1815 --   Rectal Monitor Sitting Right arm       Pain Score       08/27/18 1815       No Pain        Wt Readings from Last 1 Encounters:   08/28/18 54 5 kg (120 lb 2 4 oz)       Vital Signs (abnormal):                                  Temp        HR  08/28/18 0550   97 3 °F (36 3 °C)  63  20   08/28/18 0421   96 1 °F (35 6 °C)  64  20   08/28/18 0400   95 9 °F (35 5 °C)  63  19   08/28/18 0301   95 6 °F (35 3 °C)  75  22   08/28/18 0215   95 4 °F (35 2 °C)  62  18   08/27/18 2316   94 6 °F (34 8 °C)  55  16   08/27/18 1940  --  --  --   08/27/18 1930  --   50  --   08/27/18 1845  --   44  14   08/27/18 1833   90 1 °F (32 3 °C)  --  --   08/27/18 1815  --   48  --   08/27/18 17:41:08  --   47  20         Abnormal Labs/Diagnostic Test Results: 08/27/2018  Anion Gap, Istat 17     BUN, I-STAT 33     Hct, i-STAT 28     Hgb, i-STAT 9 5     BUN 36, creatinine 1 47,   RBC 2 96     Hemoglobin 9 3     Hematocrit 29 7     Platelets 717     CTA head/neck:  1  stenosis at the right carotid bifurcation      08/28/2018: chloride 109, BUN 28  RBC 3 01     Hemoglobin 9 3     Hematocrit 30 1     Platelets 839     XR chest:1   Diminished inspiration   Mild vascular congestion    2   Groundglass infiltrates, likely cardiogenic in nature, however evolving pneumonia not excluded   Clinical correlation and follow-up examination recommended  ED Treatment:   Medication Administration from 08/27/2018 1723 to 08/27/2018 1955       Date/Time Order Dose Route Action Comments     08/27/2018 1847 sodium chloride 0 9 % bolus 500 mL 0 mL Intravenous Stopped      08/27/2018 1740 sodium chloride 0 9 % bolus 500 mL 500 mL Intravenous New Bag      08/27/2018 1828 iohexol (OMNIPAQUE) 350 MG/ML injection (MULTI-DOSE) 85 mL 85 mL Intravenous Given      08/27/2018 1934 cefepime (MAXIPIME) 2 g/50 mL dextrose IVPB 2,000 mg Intravenous New Bag           Past Medical/Surgical History:    Active Ambulatory Problems     Diagnosis Date Noted    History of suicidal tendencies 11/13/2016    Adrenal insufficiency ([address] ) 11/15/2016    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    Anemia 02/22/2017    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HCC) 03/03/2017    Restless leg syndrome 03/03/2017    Depression with anxiety 03/03/2017    Hypothyroid 03/03/2017    GERD (gastroesophageal reflux disease) 03/03/2017    Bilateral renal infarcts 03/03/2017    Hypotension 03/03/2017    Esophageal achalasia 03/04/2017    Acute encephalopathy 04/19/2017    Hypothermia 04/20/2017    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 ([address] ) 04/24/2017    Protein calorie malnutrition ([address] ) 04/27/2017    Major depressive disorder, recurrent severe without psychotic features ([address] ) 04/29/2017    Cognitive change 04/30/2017    Neuropathy, peripheral, idiopathic 05/12/2014    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[address] ) 11/15/2017    GILBERTO (acute kidney injury) ([address] ) 07/03/2018    Lumbar transverse process fracture ([address] ) 07/03/2018    Subdural hematoma (HCC) 07/06/2018    Paroxysmal atrial fibrillation (HCC) 00/12/2447    Diastolic CHF ([address] ) 96/28/3307    Urinary retention 07/09/2018    Constipation 07/12/2018    Dysphagia 07/17/2018     Resolved Ambulatory Problems     Diagnosis Date Noted    Encephalopathy acute 11/13/2016    Acute respiratory failure with hypoxia ([facility] ) 11/13/2016    Hypothermia 11/13/2016    Hypothyroidism 11/13/2016    Depression 11/13/2016    Cellulitis of left lower extremity 02/22/2017    Syncope 02/22/2017    Abdominal pain 02/22/2017    Fall 02/22/2017    Hypotension 02/22/2017    Hypernatremia 03/03/2017    Hypokalemia 03/03/2017    Hypothermia 03/03/2017    Encephalopathy 03/03/2017    Overdose 04/19/2017    Acute respiratory failure with hypoxia (HCC) 04/19/2017    Rhabdomyolysis 04/19/2017    Transaminitis 04/19/2017    Lactic acid acidosis 04/19/2017    Hypernatremia 04/20/2017    Leukocytosis 04/20/2017    Thrombocytopenia ([facility]) 04/20/2017    UTI (urinary tract infection) 04/21/2017    Sepsis ([facility] ) 04/24/2017    Toxic encephalopathy 04/24/2017    Acute metabolic encephalopathy 99/64/9100    Suicide attempt ([facility] ) 04/25/2017    Headache 04/27/2017    Pain of right hip joint 06/01/2018    Acute cystitis without hematuria 07/03/2018    Elevated brain natriuretic peptide (BNP) level 07/06/2018    Recurrent major depressive disorder, in partial remission ([facility] ) 07/10/2018     Past Medical History:   Diagnosis Date    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[facility] )     Depression     Disease of thyroid gland     History of suicide attempt     Hypertension     Hypothyroidism     Melanoma ([facility] )     Renal infarct ([facility] )     Seizures ([facility] )     Suicide attempt ([facility] )        Admitting Diagnosis: Bradycardia [R00 1]  Syncope [R55]  Hypothermia, initial encounter [T68  XXXA]  Altered mental status, unspecified altered mental status type [R41 82]    Age/Sex: 67 y o  female    Assessment/Plan:   Acute encephalopathy   Assessment & Plan     · Toxic metabolic/seizure  · CT of the head/CTA was negative  · Initially stroke alert in seen by Neurology  · Seizure precautions  · Serial neuro exams  · Check UA/prolactin  · Follow up Neurology in the a m   Consider EEG          Bradycardia   Assessment & Plan     · Likely secondary to hypothermia  · EKG without acute changes  · Hold home metoprolol  · Atropine at bedside/transcutaneous pacer pads on if symptomatic  · Check TSH          Hypothermia   Assessment & Plan     · Sepsis versus adrenal insufficiency versus thyroid  · Warming blanket  · Temp sensing Elena  · Blood cultures sent, check UA with reflex to culture, chest x-ray clear, check TSH/cortisol levels/procalcitonin  · Given cefepime/vancomycin x1, no clear source await UA if positive continue cefepime given history of UTI with urine culture in June of E coli and July of MSSA          GILBERTO (acute kidney injury) [facility])   Assessment & Plan     · Likely pre renal in the setting of hypotension/bradycardia  · Check UA/CK levels  · BMP in the a m    · Isolate at 75 cc/hour for hydration          Hypotension   Assessment & Plan     · Hold beta-blocker given bradycardia  · Continue home midodrine          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[facility] )   Assessment & Plan     · Per family remote prior history of seizure activity described as more tonic-clonic like activity  · Episode today more described as a staring spell  · Consider EEG will follow up with Neurology  · Patient was not on antiepileptics at home  · Seizure precautions          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 [facility])   Assessment & Plan     · Neurology following  · PT consult          Depression with anxiety   Assessment & Plan     · Continue home Wellbutrin/Remeron          Dysphagia   Assessment & Plan     · Per family patient is supposed to be on dysphagia thickened liquid diet however is noncompliant with this  · Will review records for last speech recommendations consider speech re-evaluation          Anemia   Assessment & Plan     · Chronic at baseline          Hypothyroid   Assessment & Plan     · Check TSH  · Continue home Synthroid          Urinary retention   Assessment & Plan     · Follow up with family of patient was on Flomax  · Outpatient follow-up with Urology for necessity of urinary catheter          Chronic indwelling Elena catheter   Assessment & Plan     · Recently patient pulled catheter out was exchanged 2 weeks ago at [facility]  · Exchanged in the ER today         Admission Orders:  Scheduled Meds:   Current Facility-Administered Medications:  bisacodyl 5 mg Oral Daily    buPROPion 300 mg Oral Daily    heparin (porcine) 5,000 Units Subcutaneous Q8H [facility]    levothyroxine 50 mcg Oral Early Morning    lidocaine 1 patch Transdermal Daily    midodrine 5 mg Oral TID    mirtazapine 15 mg Oral HS    multi-electrolyte 75 mL/hr Intravenous Continuous Last Rate: 75 mL/hr (08/28/18 0918)   polyethylene glycol 17 g Oral Daily    pramipexole 0 5 mg Oral HS    pregabalin 50 mg Oral TID      Continuous Infusions:   multi-electrolyte 75 mL/hr Last Rate: 75 mL/hr (08/28/18 0918)     PRN Meds:   Peripheral IV  Cardio-pulmonary monitoring  Heparin SC & platelet count  OT/PT eval & treat  BMP  CBC & diff  Mg  Phosphorus  Inpt to Neurology,         08/28: Neurology consult:   Slurred speech and drowsiness - likely toxic metabolic encephalopathy 2/2 sepsis vs post-ictal confusion              -CT/ CTA without acute intracranial abnormality                -Routine EEG from 2/25/17 demonstrates               -UA, electrolytes, WBC wnl               -Recommend discontinuation of Wellbutrin 2/2 to seizure risk              -Will initiate Keppra 500mg PO BID              -Pt to follow up with OP epilepsy clinic, please call with questions               -No additional acute IP neurological recommendations at this time, please call with questions      2)   Reported hx of PAF              -Will defer AC at this point 2/2 to high fall risk

## 2018-08-28 NOTE — PLAN OF CARE
Problem: DISCHARGE PLANNING - CARE MANAGEMENT  Goal: Discharge to post-acute care or home with appropriate resources  INTERVENTIONS:  - Conduct assessment to determine patient/family and health care team treatment goals, and need for post-acute services based on payer coverage, community resources, and patient preferences, and barriers to discharge  - Address psychosocial, clinical, and financial barriers to discharge as identified in assessment in conjunction with the patient/family and health care team  - Arrange appropriate level of post-acute services according to patients   needs and preference and payer coverage in collaboration with the physician and health care team  - Communicate with and update the patient/family, physician, and health care team regarding progress on the discharge plan  - Arrange appropriate transportation to post-acute venues  Outcome: Progressing  LOS 1 DAY  PATIENT IS NOT A BUNDLE  PATIENT IS NOT A READMISSION  CM met with patient at bedside, patient alert and oriented and seated in recliner  Patient accompanied by her sisters Susan Mancini and Liza Mathews  CM name and role reviewed  Discharge Checklist reviewed and CM will continue to monitor for progress toward discharge goals in nursing and provider rounds  Patient resides in a ranch home with 2 TAMERA with her daughter Hazel River in [place]  Patient utilizes a wheelchair to ambulate and reports that she is independent with ADLs  Patient reports history of VNA and current services with [facility] for SN/PT/OT  Patient also reports history of STR at [facility]  CM discussed PT evaluation and recommendation for home services with 24 hour family supervision vs rehab  Patient stated that her daughter Hazel River works during the day but is home with her every night  Patient stated that her sister Liza Mathews also spends time with her at home, as well as her other daughter Kenna Rao   According to patient and her family, patient receives 24 hour care at home and would like to return home with current services in place at discharge  Patient utilizes mail order for her Rxs, has coverage and is able to afford all copays  If needed, patient will utilize [facility] [place]  Patient denies history of MH/substance use  Patient identified her daughter Tami Mace as her POA and is unsure if she has AD  Patient reports that her family provides all transportation and will transport home at discharge  Patient's sister Rosalie asked CM re: information about Assisted Living facilities  CM discussed [facility] and [facility] services and agreed to provide patient's family with information  CM also spoke with patient about updating her daughter Akanksha Payton re: discharge planning and patient agreeable to CM calling Akanksha Payton  CM called Akanksha Payton, who stated she would be visiting patient at the hospital shortly  CM confirmed all information gathered from patient and her sisters and Akanksha Payton stated that patient should have 24 hour supervision between her family members and that she agrees with the plan to discharge home with [facility] for VNA services  CM also mentioned assisted living inquiry and Akanksha Payton asked that CM provide patient with information so that Akanksha Fito can review at bedside later  CM also agreed to send referral through [facility] to [address] [place] [address] for Mom and identified Akanksha Payton as patient's contact  CM sent referral through [facility] for PK with [facility] upon discharge  CM Department will follow up on VNA referral, will continue to assess for needs and will follow through discharge  CM reviewed discharge planning process including the following: identifying caregivers at home, preference for d/c planning needs, availability of treatment team to discuss questions or concerns patient and/or family may have regarding diagnosis, plan of care, old or new medications and discharge planning   CM will continue to follow for care coordination and update assessment as necessary

## 2018-08-28 NOTE — SPEECH THERAPY NOTE
Speech-Language Pathology Bedside Swallow Evaluation        Patient Name: Manjeet Barry    XQPJX'T Date: 2018     Problem List  Patient Active Problem List   Diagnosis    History of suicidal tendencies    Ad[facility] insufficiency [facility]    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[facility] )    Anemia    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[facility] )    Restless leg syndrome    Depression with anxiety    Hypothyroid    GERD (gastroesophageal reflux disease)    Bilateral renal infarcts    Hypotension    Esophageal achalasia    Acute encephalopathy    Hypothermia    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 ([facility] )    Protein calorie malnutrition ([facility]    Major depressive disorder, recurrent severe without psychotic features ([facility] )    Cognitive change    Neuropathy, peripheral, idiopathic    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[facility] )    GILBERTO (acute kidney injury) ([facility] )    Lumbar transverse process fracture (HCC)    Subdural hematoma (HCC)    Paroxysmal atrial fibrillation (HCC)    Diastolic CHF ([facility]    Urinary retention    Constipation    Dysphagia    Bradycardia    Chronic indwelling Elena catheter       Past Medical History  Past Medical History:   Diagnosis Date    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[facility] )     Depression     Disease of thyroid gland     History of suicide attempt     Daughter reports 4 5 years ago pt was depressed/SI attempt after partner    Hypertension     Hypothyroidism     Melanoma ([facility] )     malignant - of the skin    Renal infarct ([facility] )     Seizures ([facility] )     Suicide attempt [facility])        Past Surgical History  Past Surgical History:   Procedure Laterality Date    CHOLECYSTECTOMY      laparoscopic    COLONOSCOPY      fiberoptic    ESOPHAGOGASTRODUODENOSCOPY N/A 3/10/2017    Procedure: ESOPHAGOGASTRODUODENOSCOPY (EGD); Surgeon: Kelly Mora DO;  Location: [facility];   Service:     SKIN LESION EXCISION      melanoma back       Summary    Pt presents with at least moderate oropharyngeal dysphagia due to lethargy, wet voice, and delayed cough after successive sips of HTL by straw  Recommendations:   Diet: puree/level 1 diet w/ honey thick liquids   Meds: crushed with puree   Aspiration precautions and compensatory swallowing strategies: upright posture, only feed when fully alert, slow rate of feeding and small bites/sips  Other Recommendations/ considerations: will cont to follow for diet tolerance and potential to advance diet to [facility] soft  Current Medical Status  Pt is a 67 y o  female who presented to [facility] with acute encephalopathy  Per family the patient has been acting "strangely" over the last 5 days  They describe her as crawling out thigh to pick weeds, turning on the stove and walking away requiring them to take off knobs  Today she was working with occupational therapy and while standing she had a staring spell that lasted approximately 5 minutes  During these 5 minutes she would have brief verbal responsiveness however incomprehensible  At that time a pulse ox was placed on the patient and they stated better oxygen levels were low wall along with her heart rate prompting them to call EMS  Past medical history:   Please see H&P for details    Special Studies:  CT-head: 8/27 no evidence of acute infarction, hem or mass effect  CXR: 8/27 ground glass infiltrates, likely cardiogenic in nature, however evolving pneumonia is not excluded  VBS 7/9/18 mod oropharyngeal dysphagia devika by poorly controlled bolus, spill prior to the swallow at times into the airway  The pt is unable to fully complete & perform the strategies or when prompted cannot contain material in the time it takes her to get into the position  She tends to throw her head back when trying to eat or drink  There is a large amt of silent aspiration on nt by cup, aspiration of nt by tsp  This occurred during & w/o the strategy    She is unable to cough out the aspirated material  Puree w/ HTL by tsp or cup was recommended  Social/Education/Vocational Hx:  Pt lives with family    Swallow Information   Current Risks for Dysphagia & Aspiration: known history of dysphagia, known history of aspiration, dysarthria and AMS  Current Symptoms/Concerns: known hx of aspiration and dysphagia  Current Diet: puree/level 1 diet and honey thick liquids   Baseline Diet: mechanically altered/level 2 diet and honey thick liquids-per family, pt is non-compliant w/ modified diet  Baseline Assessment   Behavior/Cognition: alert and waxing and waning arousal level-occas falling asleep w/o stimulation  Speech/Language Status: able to participate in conversation and able to follow commands-dysarthric speech  Patient Positioning: upright in chair     Swallow Mechanism Exam   Facial: symmetrical  Labial: WFL  Lingual: WFL  Velum: decreased elevation  Mandible: adequate ROM  Dentition: adequate  Vocal quality:clear/adequate and hypernasal   Volitional Cough: weak   Respiratory: NC    Consistencies Assessed and Performance   Consistencies Administered: honey thick, puree and hard solids    Oral Stage: pt was able to mastication small pcs of chinyere doone cookie  Mastication was slow, but adequate  Pt stated she needs to drink from a straw- pt took successive sips of HTL  Appeared w/ adequate oral control and transfer  Pharyngeal Stage: swallows appeared timely and complete, however delayed cough noted after successive drinking of HTL  And delayed cough x1 after cookie  Esophageal Concerns: none reported; however pt mentioned a "stretching"  Per chart review, pt had a GI consult and barium swallow 3/9/17 which showed Patulous, dilated esophagus with tertiary contractions and some poolinh of contrast in the distal esophagus  While there is some narrowing at the gastroesophageal junction, there is not a truly well-defined beak sign and contrast does pass through the gastroesophageal junction  Findings still may indicate mild achalasia  Results Reviewed with: patient and MD   Dysphagia Goals: pt will tolerate puree diet with honey thick by single straw sips without s/s of aspiration x48 hours and tolerate trials of mech soft foods w/o sig oral dysphagia symptoms      Speech Therapy Prognosis   Prognosis: fair and poor    Prognosis Considerations: age, medical status, prior medical history, progressive disease process and therapeutic potential

## 2018-08-28 NOTE — ASSESSMENT & PLAN NOTE
· Recently patient pulled catheter out was exchanged 2 weeks ago at [facility]  · Exchanged in the ER today

## 2018-08-28 NOTE — ASSESSMENT & PLAN NOTE
· Sepsis versus adrenal insufficiency versus thyroid  · Warming blanket  · Temp sensing Elena  · Blood cultures sent, check UA with reflex to culture, chest x-ray clear, check TSH/cortisol levels/procalcitonin  · Given cefepime/vancomycin x1, no clear source await UA if positive continue cefepime given history of UTI with urine culture in June of E coli and July of MSSA

## 2018-08-28 NOTE — PROGRESS NOTES
Progress Note - Julia Nip 1945, 67 y o  female MRN: [id number]    Unit/Bed#:  Encounter: [id number]    Primary Care Provider: Crow Goss DO   Date and time admitted to hospital: 8/27/2018  5:23 PM        Bradycardia   Assessment & Plan    Likely 2/2 hypothermia   HR is still low with low BPs   Will continue to hold BB  GILBERTO (acute kidney injury) ([facility] [address] )   Assessment & Plan    Creatinine is 1 21  Will continue to follow   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[facility] [address] )   Assessment & Plan    C/w home medications  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 Toxic metabolic  Will discuss with NOK regarding baseline  AAOx3 currently  VTE Pharmacologic Prophylaxis:   Pharmacologic: Heparin  Mechanical VTE Prophylaxis in Place: Yes    Patient Centered Rounds: I have performed bedside rounds with nursing staff today  Discussions with Specialists or Other Care Team Provider: discussed with neurology  ? Awaiting MRI  Education and Discussions with Family / Patient: Discussed with patient   Called daughter Helen Ye - no answer  Time Spent for Care: 30 minutes  More than 50% of total time spent on counseling and coordination of care as described above      Current Length of Stay: 1 day(s)    Current Patient Status: Inpatient   Certification Statement: The patient will continue to require additional inpatient hospital stay due to work up by neurology and PT/OT evaluation  Discharge Plan: Not medically stable for DC  Code Status: Level 3 - DNAR and DNI      Subjective:   Patient seen and examined  No new symptoms      " I feel okay"    Denies any pain  Objective:     Vitals:   Temp (24hrs), Av 6 °F (35 3 °C), Min:90 1 °F (32 3 °C), Max:97 7 °F (36 5 °C)    HR:  [44-76] 68  Resp:  [14-22] 22  BP: ()/(53-76) 94/55  SpO2:  [93 %-100 %] 98 %  Body mass index is 21 98 kg/m²  Input and Output Summary (last 24 hours): Intake/Output Summary (Last 24 hours) at 18 1324  Last data filed at 18 1200   Gross per 24 hour   Intake             1985 ml   Output             1560 ml   Net              425 ml       Physical Exam:     Physical Exam   Constitutional: She is oriented to person, place, and time  She appears well-developed and well-nourished  No distress  HENT:   Head: Normocephalic and atraumatic  Mouth/Throat: No oropharyngeal exudate  Eyes: Right eye exhibits no discharge  Left eye exhibits no discharge  No scleral icterus  Neck: No JVD present  No tracheal deviation present  No thyromegaly present  Cardiovascular: Normal rate and regular rhythm  Exam reveals no gallop and no friction rub  No murmur heard  Pulmonary/Chest: Effort normal and breath sounds normal  No respiratory distress  She has no wheezes  She has no rales  She exhibits no tenderness  Abdominal: Soft  Bowel sounds are normal  She exhibits no distension and no mass  There is no tenderness  There is no rebound and no guarding  Musculoskeletal: She exhibits no edema, tenderness or deformity  Lymphadenopathy:     She has no cervical adenopathy  Neurological: She is alert and oriented to person, place, and time  She displays normal reflexes  No cranial nerve deficit  She exhibits normal muscle tone  Coordination normal    Skin: Skin is warm  No rash noted   She is not diaphoretic  No erythema  No pallor  Psychiatric: She has a normal mood and affect  Additional Data:     Labs:      Results from last 7 days  Lab Units 08/28/18  0438   WBC Thousand/uL 4 92   HEMOGLOBIN g/dL 9 3*   HEMATOCRIT % 30 1*   PLATELETS Thousands/uL 137*   NEUTROS PCT % 62   LYMPHS PCT % 29   MONOS PCT % 5   EOS PCT % 4       Results from last 7 days  Lab Units 08/28/18  0438 08/27/18  1747   SODIUM mmol/L 145  --    POTASSIUM mmol/L 4 2  --    CHLORIDE mmol/L 109*  --    CO2 mmol/L 28  --    BUN mg/dL 28*  --    CREATININE mg/dL 1 21  --    CALCIUM mg/dL 8 6  --    GLUCOSE, ISTAT mg/dl  --  129                     * I Have Reviewed All Lab Data Listed Above  * Additional Pertinent Lab Tests Reviewed: [address] 66 Admission Reviewed    Imaging:    Imaging Reports Reviewed Today Include:   CTA head -   "1   Mild (less than 50%) stenosis at the right carotid bifurcation  2   No hemodynamically significant stenosis or occlusion of the major vessels of the [place] "  Imaging Personally Reviewed by Myself Includes:  As above       Recent Cultures (last 7 days):           Last 24 Hours Medication List:     Current Facility-Administered Medications:  bisacodyl 5 mg Oral Daily JESSY Reyes    buPROPion 300 mg Oral Daily JESSY Cramer    heparin (porcine) 5,000 Units Subcutaneous Q8H [address] JESSY Cramer    levothyroxine 50 mcg Oral Early Morning JESSY Cramer    lidocaine 1 patch Transdermal Daily JESSY Cramer    midodrine 5 mg Oral TID JESSY Reyes    mirtazapine 15 mg Oral HS JESSY Cramer    multi-electrolyte 75 mL/hr Intravenous Continuous JESSY Reyes Last Rate: 75 mL/hr (08/28/18 5500)   polyethylene glycol 17 g Oral Daily JESSY Cramer    pramipexole 0 5 mg Oral HS JESSY Cramer    pregabalin 50 mg Oral TID Marny JESSY Cristina         Today, Patient Was Seen By: Vannessa Kelly MD    ** Please Note: Dictation voice to text software may have been used in the creation of this document   **

## 2018-08-28 NOTE — ASSESSMENT & PLAN NOTE
· Likely secondary to hypothermia  · EKG without acute changes  · Hold home metoprolol  · Atropine at bedside/transcutaneous pacer pads on if symptomatic  · Check TSH

## 2018-08-28 NOTE — PHYSICAL THERAPY NOTE
Physical Therapy Evaluation    Patient Name: Gentry ROWE Date: 2018     Problem List  Patient Active Problem List   Diagnosis    History of suicidal tendencies    Adrenal insufficiency ([facility] [address] )    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[facility] [address] )    Anemia    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[facility] [address] )    Restless leg syndrome    Depression with anxiety    Hypothyroid    GERD (gastroesophageal reflux disease)    Bilateral renal infarcts    Hypotension    Esophageal achalasia    Acute encephalopathy    Hypothermia    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 ([facility] [address] )    Protein calorie malnutrition ([address] )    Major depressive disorder, recurrent severe without psychotic features ([address] )    Cognitive change    Neuropathy, peripheral, idiopathic    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[facility] [address] )    GILBERTO (acute kidney injury) ([facility] [address] )    Lumbar transverse process fracture (HCC)    Subdural hematoma (HCC)    Paroxysmal atrial fibrillation (HCC)    Diastolic CHF ([address]    Urinary retention    Constipation    Dysphagia    Bradycardia    Chronic indwelling Elena catheter        Past Medical History  Past Medical History:   Diagnosis Date    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[facility] [address] )     Depression     Disease of thyroid gland     History of suicide attempt     Daughter reports 4 5 years ago pt was depressed/SI attempt after partner    Hypertension     Hypothyroidism     Melanoma ([facility] [address] )     malignant - of the skin    Renal infarct ([facility] [address] )     Seizures ([facility] [address] )     Suicide attempt [facility])         Past Surgical History  Past Surgical History:   Procedure Laterality Date    CHOLECYSTECTOMY      laparoscopic    COLONOSCOPY      fiberoptic    ESOPHAGOGASTRODUODENOSCOPY N/A 3/10/2017    Procedure: ESOPHAGOGASTRODUODENOSCOPY (EGD); Surgeon: Shira Lehman DO;  Location: [facility];   Service:     SKIN LESION EXCISION      melanoma back      18 1024   Note Type   Note type Eval/Treat   Pain Assessment Pain Assessment No/denies pain  (reports "Pain patch is helping" )   Pain Score No Pain   Home Living   Type of Home House  (r[place])   Home Layout One level  (1 TAMERA through garage)   [address] shower seat;Grab bars around toilet;Commode  (walk in tub w/ jets)   [address]; Wheelchair-manual;Grab bars  ((rollator)- not using curently )   Additional Comments ? able historian 2* admission dx- however pt is A+O x 4 on eval and info is highly consistent w/ review of prior records   Prior Function   Level of [place] Needs assistance with ADLs and functional mobility   Lives With Daughter  (reports to live w/ "Dianne Camacho"- other daughter + sister assist)   Receives Help From Family;Home health  (was receiving [facility]- PT/OT services PTA- recent d/c from rehab)   ADL Assistance Needs assistance   IADLs Needs assistance  (0 drive)   Falls in the last 6 months 1 to 4  (reports 0 since d/c home from [facility] ~2 weeks ago)   Vocational Retired   Comments Pt recent d/c from [place] [facility]hab ~2 weeks ago w/ [facility] PT/OT services in home since d/c- pt reports not indep ambulating since d/c home and only ambulating short household distances w/ RW w/ sister; or daughter assisting 2* fear of falling- pt 1& using W/C and is able to self propell- pt reports having A w/ ADL's including bathing and dressing prn- pt is able to perform functional SPV xfers using RW or via SPV- reports having a commode in the living area if needed- pt reports having 24/7 care from family since d/c from [facility] rehab  (CM to clarify w/ family for accuracy)   Restrictions/Precautions   Weight Bearing Precautions Per Order No   Other Precautions Chair Alarm; Bed Alarm;Multiple lines;Telemetry;O2;Fall Risk;Aspiration; Seizure   General   Additional Pertinent History recent admision and d/c from [facility] early 7/2018   Family/Caregiver Present No   Cognition   Arousal/Participation Alert  (w/ periods of lethargy) Orientation Level Oriented X4   Memory Decreased recall of recent events   Following Commands Follows one step commands with increased time or repetition   Comments pt awake and answering all questions appropriately and consistantly- periods of lethargy noted throughout but pt easily aroused- ableto follow all commands w/ mild delay and pt able to make needs known    RUE Assessment   RUE Assessment X  (AROM WFL)   RUE Strength   R Shoulder Flexion 4/5   R Elbow Flexion 4/5   R Elbow Extension 4/5   R Wrist Flexion 4/5   LUE Assessment   LUE Assessment WFL  (4/5 throughout- equal to RUE)   RLE Assessment   RLE Assessment WFL  (4-4+/5 throughout)   LLE Assessment   LLE Assessment WFL  (4-4+/5 throughout)   Coordination   Movements are Fluid and Coordinated 0   Coordination and Movement Description significant UE and LE ataxia noted- impainted fine and gross notor coordination w/ decreaed fing to nose and unable to complete heel to chin-    Light Touch   RLE Light Touch Impaired   RLE Light Touch Comments numbness in B/L LE's into feet from below knee level   LLE Light Touch Impaired   LLE Light Touch Comments (numbness in B/L LE's into feet from below knee level)   Proprioception   RLE Proprioception Impaired   LLE Proprioception Impaired   Bed Mobility   Additional Comments OOB to chair on presentation    Transfers   Sit to Stand 3  Moderate assistance   Additional items Assist x 1  (retropulsive on stand w/ poor- standing  balance - )   Stand to Sit 3  Moderate assistance   Additional items Assist x 1   Stand pivot 3  Moderate assistance   Additional items Assist x 1  (A for lines )   Ambulation/Elevation   Gait pattern Ataxia; Foward flexed; Shuffling;Decreased foot clearance;Retropulsion; Improper Weight shift; Excessively slow   Gait Assistance 2  Maximal assist   Additional items Assist x 1;Verbal cues; Tactile cues   Assistive Device Rolling walker   Distance 6' total- 3' forward and backward w/ RW- pt requriing A from PT for hand placement on RW 2* UE ataxia; A for RW negotiation; A for initiation of weight shifting for initiation of swing  pt w/ ataxic gait poor foot palcement and is a high fall risk- recommend A x2 w/ chair follow and A for lines for gait progression   Balance   Static Sitting Fair   Dynamic Sitting Fair -   Static Standing Poor -  (retropulsion on initial standing )   Ambulatory Poor -  (rw)   Endurance Deficit   Endurance Deficit Yes   Endurance Deficit Description limited standing tolerance; limited activity tolerance    Activity Tolerance   Activity Tolerance Patient limited by fatigue   Medical Staff Made Aware yes- MARCELA iLn cleared for session- eval and tx- also s/w SLP- April and Wendy Quispe    Nurse Made Aware yes   Assessment   Prognosis Fair   Problem List Decreased endurance; Impaired balance;Decreased mobility; Decreased coordination; Impaired judgement; Impaired sensation;Decreased skin integrity   Assessment Pt is 67 y o  female seen for PT evaluation s/p admit to [address] [place] [address] on 8/27/2018  Pt presenting via EMS from home following a "staring spell" that lasted approx 5 minutes while pt was working w/ home OT  Of note- pt's family also reports that pt has been acting strangely over last 5 day period PTA - crawling outside to pick weeds and turing on stove and then walking away requiring them to take off knobs  Stroke alert was called and  CTB was negative; CTA w/ only mild stenosis  Pt is currently being managed and monitored in ICU setting  Current dx/ problem list includes: Altered mental status; acute encelopathy Bradycardia; Syncope; hypothermia;  PT now consulted for assessment of mobility and d/c needs    PMhx is extensive (see above) and significant for cerebral spinal ataxia, w/ significnat falls hx; recent admission to rehab following hospital admission (found down at home) in 7/2018 w/ [facility] rehab  Following; hard of hearing, depression/anxiety with prior suicidal attempts, dysphagia, urinary retention with recent placement of Elena catheter, seizures not on AED, fall with subdural hemorrhage, hypotension on midodrine transverse process fractures rhabdoand  personal factors currently affecting pt's physical performance include: depression nd anxiety w/ prior suicide attempts; TAMERA; advanced age; use of AD at baseline; unknown level and quality of assistance provided at home by family; unable to perform household or community ambulation ; use of W/C at baseline and requiring A for all ADL's IADL's  PTA- pt reports living w/ daughter Oralia Joyner) in  [address] w/ 1 TAMERA through garage; pt has been using W/C for 1* mobility since d/c from rehab 2* fear of falling and progressive ataxia; pt gets prn A w/ dressing and bathing from 2 daughters or sister- pt reports having 24/7 care- however this needs to be confirmed w/ family by CM  Upon evaluation, pt currently is requiring mod/maxA  for functional transfers and mod/maxA for ambulation w/ RW 6' total w/ significant deviations as noted on flow sheet- ataxia  Pt presents functioning below baseline and currently w/ overall mobility deficits 2* to: poor coordination; compromised skin integrity; ataxic gait; poor balance  limited flexibility;  generalized weakness/ deconditioning; decreased endurance; decreased activity tolerance; decreased coordination; impaired balance; gait deviations; decreased safety awareness; SOB/MYLES; on O2; fatigue; impaired safety and judgement; limited insight into current def   Goals   Patient Goals "Iwant to go home"    STG Expiration Date 09/07/18   Short Term Goal #1 In 10 days pt will: perform bed mobility skills w/ supervision for increased independence in her home environment; perform all functional xfers w/ min A for return to least restrictive environment; perform w/c mobility skills 150' on level tile surfaces for increased indep in home and close community   ambulate w/ [place] x1 25' x2 to maintain functional LE strength for xfers and for access to home bathroom  Pt will negotiate 1 steps w/ [place] to access home Pt will increase LE strength 1/2 grade and perform I HEP for improved transfers and functional mobility; improve standing balance grades to F/F+ to decrease fall risk     Treatment Day 0   Plan   Treatment/Interventions ADL retraining;Functional transfer training;LE strengthening/ROM; Elevations; Therapeutic exercise; Endurance training;Cognitive reorientation;Patient/family training;Equipment eval/education; Bed mobility;Gait training;Spoke to nursing;Spoke to case management   PT Frequency (3-5x/ week )   Recommendation   Recommendation Post acute IP rehab  (vs home w/ 24 /7 care as pt reports (see assessment) )   Equipment Recommended (has all? = needs confirmation )   PT - OK to Discharge No  (no to home- yes to rehab)   Additional Comments alarm activated post session w/ all needs in reach    Modified [place] Scale   Modified [place] Scale 4   Barthel Index   Feeding 5   Bathing 0   Grooming Score 0   Dressing Score 5   Bladder Score 0  (mcrae)   Bowels Score 5   Toilet Use Score 5   Transfers (Bed/Chair) Score 5   Mobility (Level Surface) Score 0   Stairs Score 0   Barthel Index Score 25   Pt is 67 y o  female seen for PT evaluation s/p admit to [address] [place] [address] on 8/27/2018  Pt presenting via EMS from home following a "staring spell" that lasted approx 5 minutes while pt was working w/ home OT  Of note- pt's family also reports that pt has been acting strangely over last 5 day period PTA - crawling outside to pick weeds and turing on stove and then walking away requiring them to take off knobs  Stroke alert was called and  CTB was negative; CTA w/ only mild stenosis  Pt is currently being managed and monitored in ICU setting  Current dx/ problem list includes: Altered mental status; acute encelopathy Bradycardia; Syncope; hypothermia;  PT now consulted for assessment of mobility and d/c needs    PMhx is extensive (see above) and significant for cerebral spinal ataxia, w/ significnat falls hx; recent admission to rehab following hospital admission (found down at home) in 7/2018 w/ [facility] re[facility]  Following; hard of hearing, depression/anxiety with prior suicidal attempts, dysphagia, urinary retention with recent placement of Mcrae catheter, seizures not on AED, fall with subdural hemorrhage, hypotension on midodrine transverse process fractures rhabdoand  personal factors currently affecting pt's physical performance include: depression nd anxiety w/ prior suicide attempts; TAMERA; advanced age; use of AD at baseline; unknown level and quality of assistance provided at home by family; unable to perform household or community ambulation ; use of W/C at baseline and requiring A for all ADL's IADL's  PTA- pt reports living w/ daughter Jian Solomon) in  [address] w/ 1 TAMERA through garage; pt has been using W/C for 1* mobility since d/c from rehab 2* fear of falling and progressive ataxia; pt gets prn A w/ dressing and bathing from 2 daughters or sister- pt reports having 24/7 care- however this needs to be confirmed w/ family by CM  Upon evaluation, pt currently is requiring mod/maxA  for functional transfers and mod/maxA for ambulation w/ RW 6' total w/ significant deviations as noted on flow sheet- ataxia  Pt presents functioning below baseline and currently w/ overall mobility deficits 2* to: poor coordination; compromised skin integrity; ataxic gait; poor balance  limited flexibility;  generalized weakness/ deconditioning; decreased endurance; decreased activity tolerance; decreased coordination; impaired balance; gait deviations; decreased safety awareness; SOB/MYLES; on O2; fatigue; impaired safety and judgement; limited insight into current deficits; bed/ chair alarms; multiple lines; hearing impairment/ visual impairments; slurred speech (chronic); chronic mcrae;  altered sensation    Pt currently at risk for falls and hospital readmisison  (Please find additional objective findings from PT assessment regarding body systems outlined above ) Pt will continue to benefit from skilled PT interventions to address stated impairments; to maximize functional potential; for ongoing pt/ family training; and DME needs  PT is currently recommending home w/ continued 24/7 S/A if available vs inpt rehab pending progress; available resources; and family availability to provide care  Pt may require LTC placement vs increased level of care for safety as long term plan  PT s/w CM Mery Nath as well as Seth) re above   Will cont to follow for goals as stated above amd to maximize functional potential      Luis Hackett, PT

## 2018-08-28 NOTE — ASSESSMENT & PLAN NOTE
· Likely pre renal in the setting of hypotension/bradycardia  · Check UA/CK levels  · BMP in the a m    · Isolate at 75 cc/hour for hydration

## 2018-08-28 NOTE — PLAN OF CARE
Problem: PHYSICAL THERAPY ADULT  Goal: Performs mobility at highest level of function for planned discharge setting  See evaluation for individualized goals  Treatment/Interventions: ADL retraining, Functional transfer training, LE strengthening/ROM, Elevations, Therapeutic exercise, Endurance training, Cognitive reorientation, Patient/family training, Equipment eval/education, Bed mobility, Gait training, Spoke to nursing, Spoke to case management  Equipment Recommended:  (has all? = needs confirmation )       See flowsheet documentation for full assessment, interventions and recommendations  Outcome: Progressing  Prognosis: Fair  Problem List: Decreased endurance, Impaired balance, Decreased mobility, Decreased coordination, Impaired judgement, Impaired sensation, Decreased skin integrity  Assessment: PT initiated tx session w/ focus on balance; therex and neuro re-edu for improved transfers and gait quality as well as to improve overall ADL's  Pt noted to have significant improvement w/ repetitive training especially w/ sit<>stand transitions from chair  Tolerated therex in seated position well w/ VSS and no pain reported  Pt reports that she enjoys working w/ therapy and "likes to exercise" as much as she can  Pt was seated in chair post session w/ all needs in reach- alarm activated  Barriers to Discharge: Inaccessible home environment, Decreased caregiver support  Barriers to Discharge Comments: TAMERA and ?home support- ? historian- multiple admissions recently   Recommendation: Post acute IP rehab (vs home w/ family assist)     PT - OK to Discharge: No (unless to rehab )    See flowsheet documentation for full assessment

## 2018-08-28 NOTE — SOCIAL WORK
LOS 1 DAY  PATIENT IS NOT A BUNDLE  PATIENT IS NOT A READMISSION  CM met with patient at bedside, patient alert and oriented and seated in recliner  Patient accompanied by her sisters Opal Prince and Barbara Londono  CM name and role reviewed  Discharge Checklist reviewed and CM will continue to monitor for progress toward discharge goals in nursing and provider rounds  Patient resides in a ranch home with 2 TAMERA with her daughter Herminio Serra in [place]  Patient utilizes a wheelchair to ambulate and reports that she is independent with ADLs  Patient reports history of VNA and current services with [facility] for SN/PT/OT  Patient also reports history of STR at [place]  CM discussed PT evaluation and recommendation for home services with 24 hour family supervision vs rehab  Patient stated that her daughter Herminio Serra works during the day but is home with her every night  Patient stated that her sister Barbara Londono also spends time with her at home, as well as her other daughter Jessie Gibbs  According to patient and her family, patient receives 24 hour care at home and would like to return home with current services in place at discharge  Patient utilizes mail order for her Rxs, has coverage and is able to afford all copays  If needed, patient will utilize [facility] in [place]  Patient denies history of MH/substance use  Patient identified her daughter Mindi Damon as her POA and is unsure if she has AD  Patient reports that her family provides all transportation and will transport home at discharge  Patient's sister Rosalie asked CM re: information about Assisted Living facilities  CM discussed [facility] and [facility] services and agreed to provide patient's family with information  CM also spoke with patient about updating her daughter Tameka Erickson re: discharge planning and patient agreeable to CM calling Tameka Erickson  CM called Tameka Erickson, who stated she would be visiting patient at the hospital shortly   CM confirmed all information gathered from patient and her sisters and Collin Nunez stated that patient should have 24 hour supervision between her family members and that she agrees with the plan to discharge home with [facility] for VNA services  CM also mentioned assisted living inquiry and Collin Nunez asked that CM provide patient with information so that Collin Nunez can review at bedside later  CM also agreed to send referral through [facility] to [address] [place] [address] for Mom and identified Collin Nunez as patient's contact  CM sent referral through [facility] for PK with [facility] upon discharge  CM Department will follow up on VNA referral, will continue to assess for needs and will follow through discharge  CM reviewed discharge planning process including the following: identifying caregivers at home, preference for d/c planning needs, availability of treatment team to discuss questions or concerns patient and/or family may have regarding diagnosis, plan of care, old or new medications and discharge planning   CM will continue to follow for care coordination and update assessment as necessary

## 2018-08-28 NOTE — ASSESSMENT & PLAN NOTE
· Toxic metabolic/seizure  · CT of the head/CTA was negative  · Initially stroke alert in seen by Neurology  · Seizure precautions  · Serial neuro exams  · Check UA/prolactin  · Follow up Neurology in the a m   Consider EEG

## 2018-08-28 NOTE — ASSESSMENT & PLAN NOTE
· Per family remote prior history of seizure activity described as more tonic-clonic like activity  · Episode today more described as a staring spell  · Consider EEG will follow up with Neurology  · Patient was not on antiepileptics at home  · Seizure precautions

## 2018-08-28 NOTE — CONSULTS
Consultation - Neurology   Magi Castro 67 y o  female MRN: [id number]  Unit/Bed#:  Encounter: [id number]      Assessment/Plan   1)  Slurred speech and drowsiness - likely toxic metabolic encephalopathy 2/2 sepsis vs post-ictal confusion   -CT/ CTA without acute intracranial abnormality     -Routine EEG from 2/25/17 demonstrates    -UA, electrolytes, WBC wnl    -Recommend discontinuation of Wellbutrin 2/2 to seizure risk   -Will initiate Keppra 500mg PO BID   -Pt to follow up with [facility] epilepsy clinic, please call with questions    -No additional acute IP neurological recommendations at this time, please call with questions     2)  Reported hx of PAF   -Will defer AC at this point 2/2 to high fall risk     History of Present Illness     Reason for Consult / Principal Problem: 1  Altered mental status   Hx and PE limited by: None   HPI: Magi Castro is a 67 y o  female, well known to Neurology, with a PMH of spinocerebellar ataxia, Complex-partial seizures, non compliant on AEDs, bradycardia, hypotension on midodrine, RLS, recent SDH in July, 2018, and PAF and renal artery infarctions, not on [facility] 2/2 SDH (discovered on same admission, never on [facility]),  who presents with confusion and slurred speech at home  Per report, the pt additionally had a 5 minute staring spell earlier in the day  The patient was found to be hypotensive in the 90s upon arrival, despite midodrine use, and was hypothermic at 91 degrees rectally  The patient has a complicated neurological history, as truncated from [employer] office note from 3/29/18:    "To review, symptoms were first brought to her attention about 7 years ago  In retrospect, she and her  had noted she tended to veer off to the left when walking for at least 10 years  Slurring of speech and hearing loss have occurred over the past 6 years  Both her parents were deaf at early ages  MRI showed age appropriate volume loss and cerebellar volume loss  Genetic testing was negative  Her father walked like her and she has a brother in [place] that walks like her too  She has had a decline in her balance and walking over the past few years  Pt also has a history of RLS  She has been on Mirapex, Neurontin and Cymbalta in the past for her RLS all which led to nausea       In February 2017, she had a seizure in the room and was transported to the hospital  EMS found that pt had a very low BP (70/30)  Upon admission she was found to have a low Hgb and GILBERTO  She was seen by Neurology and started on Keppra  (NOTE: EEG note from 324/17:  70year old female who presents after having had a syncopal episode while in the Neurology office seeing Dr Adrienne Barone on 02/22/17 for symptoms of difficulties at home and the inability  to swallow  During the office visit, the patient yelled, became rigid, with a right gaze  preference and unresponsivness for approximately 30 seconds  Was very confused after the episode  EMS was called and she was brought to the ER  Routine EEG to r/o seizure  Intermittent diffuse irregular mid to high amplitude intermixed theta and delta slowing were noted  Frequent spikes and sharp waves were noted, occurring independently at T3T5 more than at T4T6  No electrographic seizures occurred during this recording )     Pt is no longer taking the Keppra cant remember if she ever took it  She was also started on Midodrine which she is still taking  There have been no witnessed seizures but pt is home alone during the day while her daughter is at work  She was readmitted to the hospital in April 2017after an overdose "    The pt was additionally [facility] to [facility] from 7/3-7/17/18 after a fall likely related to spinal cerebellar ataxia  Primary dx was rhabdomyolysis and was also found to have PAF and a SHD, as well as lumbar transverse process fractures    Subsequent Neurosurgery follow-up found complete resolution of her subdural with her last scan on July 17th      On exam today, the pt demonstrates dysarthric speech but no notable confusion  She cannot recall events leading up to hospitalization  A 12 point ROS was completed and is negative  No focal findings on neurologic exam as detailed below  Consults    Review of Systems   See HPI    Historical Information   Past Medical History:   Diagnosis Date    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[facility] )     Depression     Disease of thyroid gland     History of suicide attempt     Daughter reports 4 5 years ago pt was depressed/SI attempt after partner    Hypertension     Hypothyroidism     Melanoma ([facility] )     malignant - of the skin    [facility]     Seizures ([facility] )     Suicide attempt [facility])      Past Surgical History:   Procedure Laterality Date    CHOLECYSTECTOMY      laparoscopic    COLONOSCOPY      fiberoptic    ESOPHAGOGASTRODUODENOSCOPY N/A 3/10/2017    Procedure: ESOPHAGOGASTRODUODENOSCOPY (EGD); Surgeon: Anamaria Henson DO;  Location: [facility]; Service:     SKIN LESION EXCISION      melanoma back     Social History   History   Alcohol Use No     Comment: wine rarely     History   Drug Use No     History   Smoking Status    Never Smoker   Smokeless Tobacco    Former User     Comment: former smoker - social-quit years ago     Family History: non-contributory    Review of previous medical records was completed       Meds/Allergies   Scheduled Meds:  Current Facility-Administered Medications:  bisacodyl 5 mg Oral Daily JESSY Cervantes    buPROPion 300 mg Oral Daily JESSY Cramer    heparin (porcine) 5,000 Units Subcutaneous Q8H [address] JESSY Cramer    levothyroxine 50 mcg Oral Early Morning JESSY Cramer    lidocaine 1 patch Transdermal Daily JESSY Cramer    midodrine 5 mg Oral TID JESSY Cervantes    mirtazapine 15 mg Oral HS BRENDA CervantesNP    multi-electrolyte 75 mL/hr Intravenous Continuous Isaac BRENDA ReddyNP Last Rate: 75 mL/hr (08/28/18 3517)   polyethylene glycol 17 g Oral Daily JESSY Cramer    pramipexole 0 5 mg Oral HS JESSY Cramer    pregabalin 50 mg Oral TID Jacqualin Loss, CRNP      Continuous Infusions:  multi-electrolyte 75 mL/hr Last Rate: 75 mL/hr (08/28/18 0918)     PRN Meds:  Allergies   Allergen Reactions    Acetaminophen     Acetaminophen-Codeine GI Intolerance    Codeine     Penicillins Other (See Comments)     Unknown reaction; however, has tolerated multiple Cephalosporins including Cefazolin, Cephalexin, Ceftriaxone, & Cefepime       Objective   Vitals:Blood pressure 129/74, pulse 76, temperature 97 7 °F (36 5 °C), temperature source Probe, resp  rate 20, height 5' 2" (1 575 m), weight 54 5 kg (120 lb 2 4 oz), SpO2 97 %  ,Body mass index is 21 98 kg/m²  Intake/Output Summary (Last 24 hours) at 08/28/18 0915  Last data filed at 08/28/18 0800   Gross per 24 hour   Intake             1205 ml   Output              960 ml   Net              245 ml       Invasive Devices: Invasive Devices     Peripheral Intravenous Line            Peripheral IV 08/27/18 Left Antecubital less than 1 day    Peripheral IV 08/27/18 Left Hand less than 1 day    Peripheral IV 08/27/18 Right Forearm less than 1 day          Drain            Urethral Catheter Temperature probe 16 Fr  less than 1 day                Physical Exam   Constitutional: She is oriented to person, place, and time  She appears well-developed  She appears ill  No distress  HENT:   Head: Normocephalic and atraumatic  Right Ear: External ear normal    Left Ear: External ear normal    Nose: Nose normal    Mouth/Throat: No oropharyngeal exudate  Eyes: Conjunctivae are normal  Right eye exhibits no discharge  Left eye exhibits no discharge  No scleral icterus  Neck: Normal range of motion  Neck supple  Cardiovascular: Normal rate and regular rhythm  Pulmonary/Chest: No respiratory distress  She has no wheezes   She has no rales    Abdominal: Soft  Bowel sounds are normal  She exhibits no distension  There is no tenderness  There is no rebound and no guarding  Musculoskeletal: Normal range of motion  She exhibits tenderness  She exhibits no edema or deformity  Neurological: She is oriented to person, place, and time  She has normal strength  Skin: Skin is warm and dry  No rash noted  She is not diaphoretic  No erythema  Psychiatric: She has a normal mood and affect  Her speech is normal and behavior is normal    Nursing note and vitals reviewed  Neurologic Exam     Mental Status   Oriented to person, place, and time  Follows 2 step commands  Attention: normal  Concentration: normal    Speech: speech is normal   Level of consciousness: alert  Able to perform simple calculations  Normal comprehension  Cranial Nerves   Cranial nerves II through XII intact  With exception of dysarthric speech and b/l saccadic eye movements, R>L     Motor Exam   Muscle bulk: decreased  Overall muscle tone: normal    Strength   Strength 5/5 throughout  Sensory Exam   Light touch normal    Right arm vibration: normal  Left arm vibration: normal  Right leg vibration: decreased from knee    Temperature decrease b/l LE      Gait, Coordination, and Reflexes     Gait  Gait: (deferred for safety)    Tremor   Resting tremor: absent    Reflexes   Right plantar: normal  Left plantar: normal  Right ankle clonus: absent  Left ankle clonus: absent  Trace reflexes throughout     Ataxia noted with FTN b/l        Lab Results: I have personally reviewed pertinent reports       Recent Results (from the past 24 hour(s))   CBC and differential    Collection Time: 08/27/18  5:42 PM   Result Value Ref Range    WBC 7 14 4 31 - 10 16 Thousand/uL    RBC 2 96 (L) 3 81 - 5 12 Million/uL    Hemoglobin 9 3 (L) 11 5 - 15 4 g/dL    Hematocrit 29 7 (L) 34 8 - 46 1 %     (H) 82 - 98 fL    MCH 31 4 26 8 - 34 3 pg    MCHC 31 3 (L) 31 4 - 37 4 g/dL    RDW 15 9 (H) 11 6 - 15 1 %    MPV 12 3 8 9 - 12 7 fL    Platelets 523 (L) 787 - 390 Thousands/uL    nRBC 0 /100 WBCs    Neutrophils Relative 74 43 - 75 %    Immat GRANS % 0 0 - 2 %    Lymphocytes Relative 18 14 - 44 %    Monocytes Relative 5 4 - 12 %    Eosinophils Relative 3 0 - 6 %    Basophils Relative 0 0 - 1 %    Neutrophils Absolute 5 27 1 85 - 7 62 Thousands/µL    Immature Grans Absolute 0 02 0 00 - 0 20 Thousand/uL    Lymphocytes Absolute 1 31 0 60 - 4 47 Thousands/µL    Monocytes Absolute 0 33 0 17 - 1 22 Thousand/µL    Eosinophils Absolute 0 19 0 00 - 0 61 Thousand/µL    Basophils Absolute 0 02 0 00 - 0 10 Thousands/µL   Basic metabolic panel    Collection Time: 08/27/18  5:42 PM   Result Value Ref Range    Sodium 143 136 - 145 mmol/L    Potassium 3 7 3 5 - 5 3 mmol/L    Chloride 108 100 - 108 mmol/L    CO2 28 21 - 32 mmol/L    ANION GAP 7 4 - 13 mmol/L    BUN 36 (H) 5 - 25 mg/dL    Creatinine 1 47 (H) 0 60 - 1 30 mg/dL    Glucose 130 65 - 140 mg/dL    Calcium 9 0 8 3 - 10 1 mg/dL    eGFR 35 ml/min/1 73sq m   TSH, 3rd generation with Free T4 reflex    Collection Time: 08/27/18  5:42 PM   Result Value Ref Range    TSH 3RD GENERATON 2 395 0 358 - 3 740 uIU/mL   POCT troponin    Collection Time: 08/27/18  5:46 PM   Result Value Ref Range    POC Troponin I 0 00 0 00 - 0 08 ng/ml    Specimen Type VENOUS    POCT Chem 8+    Collection Time: 08/27/18  5:47 PM   Result Value Ref Range    SODIUM, I-STAT 143 136 - 145 mmol/l    Potassium, i-STAT 3 6 3 5 - 5 3 mmol/L    Chloride, istat 105 100 - 108 mmol/L    CO2, i-STAT 27 21 - 32 mmol/L    Anion Gap, Istat 17 (H) 4 - 13 mmol/L    Calcium, Ionized i-STAT 1 24 1 12 - 1 32 mmol/L    BUN, I-STAT 33 (H) 5 - 25 mg/dl    Creatinine, i-STAT 1 3 0 6 - 1 3 mg/dl    eGFR 41 ml/min/1 73sq m    Glucose, i-STAT 129 65 - 140 mg/dl    Hct, i-STAT 28 (L) 34 8 - 46 1 %    Hgb, i-STAT 9 5 (L) 11 5 - 15 4 g/dl    Specimen Type VENOUS    Troponin I    Collection Time: 08/27/18  6:09 PM Result Value Ref Range    Troponin I <0 02 <=0 04 ng/mL   Cortisol    Collection Time: 08/27/18  9:36 PM   Result Value Ref Range    Cortisol, Random 4 9 ug/dL   Procalcitonin    Collection Time: 08/27/18  9:36 PM   Result Value Ref Range    Procalcitonin <0 05 <=0 25 ng/ml   UA w Reflex to Microscopic w Reflex to Culture    Collection Time: 08/27/18  9:36 PM   Result Value Ref Range    Color, UA Yellow     Clarity, UA Clear     Specific [place], UA 1 010 1 003 - 1 030    pH, UA 5 5 4 5 - 8 0    Leukocytes, UA Negative Negative    Nitrite, UA Negative Negative    Protein, UA Negative Negative mg/dl    Glucose, UA Negative Negative mg/dl    Ketones, UA Negative Negative mg/dl    Urobilinogen, UA 0 2 0 2, 1 0 E U /dl E U /dl    Bilirubin, UA Negative Negative    Blood, UA Negative Negative   CBC and differential    Collection Time: 08/28/18  4:38 AM   Result Value Ref Range    WBC 4 92 4 31 - 10 16 Thousand/uL    RBC 3 01 (L) 3 81 - 5 12 Million/uL    Hemoglobin 9 3 (L) 11 5 - 15 4 g/dL    Hematocrit 30 1 (L) 34 8 - 46 1 %     (H) 82 - 98 fL    MCH 30 9 26 8 - 34 3 pg    MCHC 30 9 (L) 31 4 - 37 4 g/dL    RDW 15 8 (H) 11 6 - 15 1 %    MPV 12 1 8 9 - 12 7 fL    Platelets 649 (L) 432 - 390 Thousands/uL    nRBC 0 /100 WBCs    Neutrophils Relative 62 43 - 75 %    Immat GRANS % 0 0 - 2 %    Lymphocytes Relative 29 14 - 44 %    Monocytes Relative 5 4 - 12 %    Eosinophils Relative 4 0 - 6 %    Basophils Relative 0 0 - 1 %    Neutrophils Absolute 3 05 1 85 - 7 62 Thousands/µL    Immature Grans Absolute 0 01 0 00 - 0 20 Thousand/uL    Lymphocytes Absolute 1 40 0 60 - 4 47 Thousands/µL    Monocytes Absolute 0 24 0 17 - 1 22 Thousand/µL    Eosinophils Absolute 0 21 0 00 - 0 61 Thousand/µL    Basophils Absolute 0 01 0 00 - 0 10 Thousands/µL   Basic metabolic panel    Collection Time: 08/28/18  4:38 AM   Result Value Ref Range    Sodium 145 136 - 145 mmol/L    Potassium 4 2 3 5 - 5 3 mmol/L    Chloride 109 (H) 100 - 108 mmol/L    CO2 28 21 - 32 mmol/L    ANION GAP 8 4 - 13 mmol/L    BUN 28 (H) 5 - 25 mg/dL    Creatinine 1 21 0 60 - 1 30 mg/dL    Glucose 76 65 - 140 mg/dL    Calcium 8 6 8 3 - 10 1 mg/dL    eGFR 45 ml/min/1 73sq m   ]    Imaging Studies: I have personally reviewed pertinent reports  and I have personally reviewed pertinent films in PACS  EKG, Pathology, and Other Studies: I have personally reviewed pertinent reports      VTE Prophylaxis: Sequential compression device (Venodyne)  and HeparinSQ    Code Status: Level 3 - DNAR and DNI  Advance Directive and Living Will: Yes    Power of :    POLST:

## 2018-08-28 NOTE — PLAN OF CARE
Problem: SLP ADULT - SWALLOWING, IMPAIRED  Goal: Initial SLP swallow eval performed  Outcome: Completed Date Met: 08/28/18  Dysphagia 1 puree w/ HTL by single straw sips

## 2018-08-28 NOTE — ASSESSMENT & PLAN NOTE
· Follow up with family of patient was on Flomax  · Outpatient follow-up with Urology for necessity of urinary catheter

## 2018-08-28 NOTE — H&P
H&P- Umu Morrison 1945, 67 y o  female MRN: [id number]    Unit/Bed#:  Encounter: [id number]    Primary Care Provider: Candi Spaulding DO   Date and time admitted to hospital: 8/27/2018  5:23 PM        Acute encephalopathy   Assessment & Plan    · Toxic metabolic/seizure  · CT of the head/CTA was negative  · Initially stroke alert in seen by Neurology  · Seizure precautions  · Serial neuro exams  · Check UA/prolactin  · Follow up Neurology in the a m  Consider EEG        Bradycardia   Assessment & Plan    · Likely secondary to hypothermia  · EKG without acute changes  · Hold home metoprolol  · Atropine at bedside/transcutaneous pacer pads on if symptomatic  · Check TSH        Hypothermia   Assessment & Plan    · Sepsis versus adrenal insufficiency versus thyroid  · Warming blanket  · Temp sensing Elena  · Blood cultures sent, check UA with reflex to culture, chest x-ray clear, check TSH/cortisol levels/procalcitonin  · Given cefepime/vancomycin x1, no clear source await UA if positive continue cefepime given history of UTI with urine culture in June of E coli and July of MSSA        GILBERTO (acute kidney injury) [facility])   Assessment & Plan    · Likely pre renal in the setting of hypotension/bradycardia  · Check UA/CK levels  · BMP in the a m    · Isolate at 75 cc/hour for hydration        Hypotension   Assessment & Plan    · Hold beta-blocker given bradycardia  · Continue home midodrine        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[facility] [address] )   Assessment & Plan    · Per family remote prior history of seizure activity described as more tonic-clonic like activity  · Episode today more described as a staring spell  · Consider EEG will follow up with Neurology  · Patient was not on antiepileptics at home  · Seizure precautions        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)   Assessment & Plan    · Neurology following  · PT consult        Depression with anxiety   Assessment & Plan    · Continue home Wellbutrin/Remeron        Dysphagia   Assessment & Plan    · Per family patient is supposed to be on dysphagia thickened liquid diet however is noncompliant with this  · Will review records for last speech recommendations consider speech re-evaluation        Anemia   Assessment & Plan    · Chronic at baseline        Hypothyroid   Assessment & Plan    · Check TSH  · Continue home Synthroid        Urinary retention   Assessment & Plan    · Follow up with family of patient was on Flomax  · Outpatient follow-up with Urology for necessity of urinary catheter        Chronic indwelling Elena catheter   Assessment & Plan    · Recently patient pulled catheter out was exchanged 2 weeks ago at [facility]  · Exchanged in the ER today              Subjective/Objective     History of Present Illness     HPI:  Lorin Varela is a 67 y o  female with past medical history of cerebral spinal ataxia, hard of hearing, depression/anxiety with prior suicidal attempts, dysphagia, urinary retention with recent placement of Elena catheter, seizures not on AED, fall with subdural hemorrhage, hypotension on midodrine who presents with encephalopathy, recent admission in July for a fall/subdural hemorrhage/transverse process fractures/rhabdo/a KI with admission to the ICU for UTI MSSA requiring vasopressors and placement of Elena due to urinary retention  History is obtained from family at bedside due to hard of hearing/dysarthria  Per family the patient has been acting "strangely" over the last 5 days  They describe her as crawling out thigh to pick weeds, turning on the stove and walking away requiring them to take off knobs  Today she was working with occupational therapy and while standing she had a staring spell that lasted approximately 5 minutes  During these 5 minutes she would have brief verbal responsiveness however incomprehensible    At that time a pulse ox was placed on the patient and they stated better oxygen levels were low wall along with her heart rate prompting them to call EMS  On arrival to the ER stroke alert was called due to left-sided weakness/dysarthria  CT of the head was negative  CTA revealed mild right carotid stenosis less than 50%  No significant occlusion  Neurology evaluated the patient was deemed not a tPA candidate and concern for postictal state versus cerebellar dysfunction  Labs revealed a WBC count of 7/hemoglobin of 9/creatinine of 1 4/troponin of less than 0 02  Review of Systems   Constitutional: Negative  HENT: Negative  Eyes: Negative  Respiratory: Positive for shortness of breath  Endocrine: Negative  Genitourinary: Negative  Musculoskeletal: Negative  Skin: Negative  Allergic/Immunologic: Negative  Neurological: Negative  Psychiatric/Behavioral: Positive for confusion  Historical Information   Past Medical History:   Diagnosis Date    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[facility] [address] )     Depression     Disease of thyroid gland     History of suicide attempt     Daughter reports 4 5 years ago pt was depressed/SI attempt after partner    Hypertension     Hypothyroidism     Melanoma ([facility] [address] )     malignant - of the skin    Renal infarct ([facility] [address] )     Seizures ([facility] [address] )     Suicide attempt [facility])      Past Surgical History:   Procedure Laterality Date    CHOLECYSTECTOMY      laparoscopic    COLONOSCOPY      fiberoptic    ESOPHAGOGASTRODUODENOSCOPY N/A 3/10/2017    Procedure: ESOPHAGOGASTRODUODENOSCOPY (EGD); Surgeon: Angela Menard DO;  Location: [facility];   Service:     SKIN LESION EXCISION      melanoma back     Social History   History   Alcohol Use No     Comment: wine rarely     History   Drug Use No     History   Smoking Status    Never Smoker   Smokeless Tobacco    Former User     Comment: former smoker - social-quit years ago     Family History:   Family History   Problem Relation Age of Onset    Heart disease Father     Heart attack Father acute MI    Deafness Father         bilateral due to meningitis age 15    Deafness Mother         congenital    Glaucoma Mother     Stroke Mother     Heart disease Mother     Diabetes Sister     Glaucoma Sister     Ataxia Brother         cerebral - possible    Diabetes Brother     Glaucoma Brother        Meds/Allergies   PTA meds:   Prior to Admission Medications   Prescriptions Last Dose Informant Patient Reported?  Taking?   acetaminophen (TYLENOL) 325 mg tablet Unknown at Unknown time  No No   Sig: Take 2 tablets (650 mg total) by mouth every 6 (six) hours as needed for mild pain   bisacodyl (DULCOLAX) 5 mg EC tablet Unknown at Unknown time  No No   Sig: Take 1 tablet (5 mg total) by mouth daily   buPROPion (WELLBUTRIN XL) 300 mg 24 hr tablet Unknown at Unknown time  No No   Sig: TAKE 1 TABLET BY MOUTH  DAILY   gabapentin (NEURONTIN) 100 mg capsule   No No   Sig: Take 1 capsule by mouth 2 (two) times a day for 30 days   levothyroxine 75 mcg tablet 8/27/2018 at Unknown time  No Yes   Sig: Take 1 tablet (75 mcg total) by mouth daily   Patient taking differently: Take 50 mcg by mouth daily     lidocaine (LIDODERM) 5 % 8/27/2018 at Unknown time  No Yes   Sig: Place 1 patch on the skin daily Remove & Discard patch within 12 hours or as directed by MD   meloxicam (MOBIC) 15 mg tablet   No No   Sig: TAKE 1 TABLET BY MOUTH  DAILY AS NEEDED FOR  MODERATE PAIN   metoprolol tartrate (LOPRESSOR) 25 mg tablet 8/27/2018 at Unknown time  No Yes   Sig: Take 0 5 tablets (12 5 mg total) by mouth every 12 (twelve) hours for 30 days   Patient taking differently: Take 25 mg by mouth every 12 (twelve) hours     midodrine (PROAMATINE) 10 MG tablet 8/27/2018 at Unknown time  Yes Yes   Sig: Take 5 mg by mouth 3 (three) times a day     mirtazapine (REMERON) 15 mg tablet   No No   Sig: Take 1 tablet by mouth daily at bedtime for 30 days   ondansetron (ZOFRAN) 8 mg tablet Past Week at Unknown time  No Yes   Sig: Take 1 tablet (8 mg total) by mouth every 8 (eight) hours as needed for nausea or vomiting   polyethylene glycol (MIRALAX) 17 g packet Unknown at Unknown time  No No   Sig: Take 17 g by mouth daily   pramipexole (MIRAPEX) 0 25 mg tablet 8/27/2018 at Unknown time  No Yes   Sig: Take 2 tablets (0 5 mg total) by mouth daily at bedtime   pregabalin (LYRICA) 50 mg capsule 8/27/2018 at Unknown time  No Yes   Sig: Take 1 capsule (50 mg total) by mouth 3 (three) times a day      Facility-Administered Medications: None     Allergies   Allergen Reactions    Acetaminophen     Acetaminophen-Codeine GI Intolerance    Codeine     Penicillins Other (See Comments)     Unknown reaction; however, has tolerated multiple Cephalosporins including Cefazolin, Cephalexin, Ceftriaxone, & Cefepime       Objective   Vitals: Blood pressure 135/76, pulse (!) 50, temperature (!) 90 1 °F (32 3 °C), temperature source Rectal, resp  rate 14, height 5' 2" (1 575 m), weight 54 5 kg (120 lb 2 4 oz), SpO2 100 %  Intake/Output Summary (Last 24 hours) at 08/27/18 2222  Last data filed at 08/27/18 1847   Gross per 24 hour   Intake              500 ml   Output                0 ml   Net              500 ml       Invasive Devices     Peripheral Intravenous Line            Peripheral IV 08/27/18 Left Antecubital less than 1 day    Peripheral IV 08/27/18 Left Hand less than 1 day    Peripheral IV 08/27/18 Right Forearm less than 1 day          Drain            Urethral Catheter Temperature probe 16 Fr  less than 1 day                Physical Exam   Constitutional: She is oriented to person, place, and time  HENT:   Head: Normocephalic  Mouth/Throat: Oropharynx is clear and moist    Eyes: Pupils are equal, round, and reactive to light  No scleral icterus  Neck: Neck supple  No JVD present  Cardiovascular: Regular rhythm, normal heart sounds and intact distal pulses  Bradycardia present  Exam reveals no gallop and no friction rub      No murmur heard   Pulmonary/Chest: Effort normal and breath sounds normal  No respiratory distress  She has no wheezes  She has no rales  Abdominal: Soft  Bowel sounds are normal  She exhibits no distension  There is no tenderness  Musculoskeletal: Normal range of motion  She exhibits no edema  Neurological: She is alert and oriented to person, place, and time  Slight left facial droop finger-to-finger dysmetria   Skin: Skin is warm and dry  No rash noted  No erythema  There is pallor  Lab Results: I have personally reviewed pertinent reports  and I have personally reviewed pertinent films in PACS  Imaging: I have personally reviewed pertinent reports  and I have personally reviewed pertinent films in PACS  EKG, Pathology, and Other Studies: I have personally reviewed pertinent reports  and I have personally reviewed pertinent films in PACS    Code Status: Level 3 - DNAR and DNI  Advance Directive and Living Will: Yes    Power of :    POLST:      Counseling / Coordination of Care  Total floor / unit time spent today 55 minutes  Greater than 50% of total time was spent with the patient and / or family counseling and / or coordination of care  A description of the counseling / coordination of care:  Plan of care

## 2018-08-28 NOTE — PHYSICAL THERAPY NOTE
PHYSICAL THERAPY TREATMENT       08/28/18 1048   Pain Assessment   Pain Assessment No/denies pain   Pain Score No Pain   Restrictions/Precautions   Weight Bearing Precautions Per Order No   General   Chart Reviewed Yes   Family/Caregiver Present No   Cognition   Orientation Level Oriented X4   Memory Decreased recall of recent events   Following Commands Follows one step commands with increased time or repetition   Subjective   Subjective "I'm up for it"- pt referring to therx and tx following eval) "I enjoy working w/ therapy at home "    Transfers   Sit to Stand 3  Moderate assistance   Additional items Assist x 1;Verbal cues   Stand to Sit 3  Moderate assistance   Additional items Verbal cues   Additional Comments performed multiple sit<> stands from chair surface w/ A level fulctuating from mod>maxA for force production and 2* coordination deficits- pt w/ retropulsion on standing but w/ improvement on repeated attempts   Balance   Static Standing Poor -   Dynamic Standing Poor -  (worked on single UE reaching in standing 4x 10 reps each)   Endurance Deficit   Endurance Deficit Yes   Activity Tolerance   Activity Tolerance Patient limited by fatigue   Nurse Made Aware yes   Exercises   Hip Flexion Sitting;10 reps;Right;Left  (x2)   Hip Abduction Sitting;10 reps;Right;Left  (x2)   Knee AROM Long Arc Quad Sitting;10 reps;Right;Left  (x2)   Ankle Pumps Sitting;15 reps;Bilateral  (x2)   Neuro re-ed sit < stands w/ diagonal reaching/ cross body reaching out of MONIKA - stepping and marching in place w/ max A initially and progressing to [place] by conclusion of session    Assessment   Prognosis Fair   Problem List Decreased endurance; Impaired balance;Decreased mobility; Decreased coordination; Impaired judgement; Impaired sensation;Decreased skin integrity   Assessment PT initiated tx session w/ focus on balance; therex and neuro re-edu for improved transfers and gait quality as well as to improve overall ADL's   Pt noted to have significant improvement w/ repetitive training especially w/ sit<>stand transitions from chair  Tolerated therex in seated position well w/ VSS and no pain reported  Pt reports that she enjoys working w/ therapy and "likes to exercise" as much as she can  Pt was seated in chair post session w/ all needs in reach- alarm activated   Barriers to Discharge Inaccessible home environment;Decreased caregiver support   Barriers to Discharge Comments TAMERA and ?home support- ? historian- multiple admissions recently    Goals   Patient Goals go home    STG Expiration Date 09/07/18   Treatment Day 0   Plan   Treatment/Interventions Functional transfer training; Therapeutic exercise; Endurance training;LE strengthening/ROM;Spoke to nursing;Spoke to case management   Progress Progressing toward goals   PT Frequency (3-5x/wk )   Recommendation   Recommendation Post acute IP rehab  (vs home w/ family assist)   PT - OK to Discharge No  (unless to rehab )   Additional Comments alarm activated post session      Drue Merlin, PT

## 2018-08-28 NOTE — ASSESSMENT & PLAN NOTE
· Per family patient is supposed to be on dysphagia thickened liquid diet however is noncompliant with this  · Will review records for last speech recommendations consider speech re-evaluation

## 2018-08-28 NOTE — PLAN OF CARE
Problem: PHYSICAL THERAPY ADULT  Goal: Performs mobility at highest level of function for planned discharge setting  See evaluation for individualized goals  Treatment/Interventions: ADL retraining, Functional transfer training, LE strengthening/ROM, Elevations, Therapeutic exercise, Endurance training, Cognitive reorientation, Patient/family training, Equipment eval/education, Bed mobility, Gait training, Spoke to nursing, Spoke to case management  Equipment Recommended:  (has all? = needs confirmation )       See flowsheet documentation for full assessment, interventions and recommendations  Prognosis: Fair  Problem List: Decreased endurance, Impaired balance, Decreased mobility, Decreased coordination, Impaired judgement, Impaired sensation, Decreased skin integrity  Assessment: Pt is 67 y o  female seen for PT evaluation s/p admit to [address] [place] [address] on 8/27/2018  Pt presenting via EMS from home following a "staring spell" that lasted approx 5 minutes while pt was working w/ home OT  Of note- pt's family also reports that pt has been acting strangely over last 5 day period PTA - crawling outside to pick weeds and turing on stove and then walking away requiring them to take off knobs  Stroke alert was called and  CTB was negative; CTA w/ only mild stenosis  Pt is currently being managed and monitored in ICU setting  Current dx/ problem list includes: Altered mental status; acute encelopathy Bradycardia; Syncope; hypothermia;  PT now consulted for assessment of mobility and d/c needs    PMhx is extensive (see above) and significant for cerebral spinal ataxia, w/ significnat falls hx; recent admission to rehab following hospital admission (found down at home) in 7/2018 w/ [facility] rehab  Following; hard of hearing, depression/anxiety with prior suicidal attempts, dysphagia, urinary retention with recent placement of Elena catheter, seizures not on AED, fall with subdural hemorrhage, hypotension on midodrine transverse process fractures rhabdoand  personal factors currently affecting pt's physical performance include: depression nd anxiety w/ prior suicide attempts; TAMERA; advanced age; use of AD at baseline; unknown level and quality of assistance provided at home by family; unable to perform household or community ambulation ; use of W/C at baseline and requiring A for all ADL's IADL's  PTA- pt reports living w/ daughter Esther Crouch) in  [place] [address] home w/ 1 TAMERA through garage; pt has been using W/C for 1* mobility since d/c from rehab 2* fear of falling and progressive ataxia; pt gets prn A w/ dressing and bathing from 2 daughters or sister- pt reports having 24/7 care- however this needs to be confirmed w/ family by CM  Upon evaluation, pt currently is requiring mod/maxA  for functional transfers and mod/maxA for ambulation w/ RW 6' total w/ significant deviations as noted on flow sheet- ataxia  Pt presents functioning below baseline and currently w/ overall mobility deficits 2* to: poor coordination; compromised skin integrity; ataxic gait; poor balance  limited flexibility;  generalized weakness/ deconditioning; decreased endurance; decreased activity tolerance; decreased coordination; impaired balance; gait deviations; decreased safety awareness; SOB/MYLES; on O2; fatigue; impaired safety and judgement; limited insight into current def        Recommendation: Post acute IP rehab (vs home w/ 24 /7 care as pt reports (see assessment) )     PT - OK to Discharge: No (no to home- yes to rehab)    See flowsheet documentation for full assessment

## 2018-08-29 VITALS
RESPIRATION RATE: 20 BRPM | TEMPERATURE: 97.4 F | BODY MASS INDEX: 21.14 KG/M2 | WEIGHT: 114.86 LBS | OXYGEN SATURATION: 93 % | HEART RATE: 77 BPM | SYSTOLIC BLOOD PRESSURE: 91 MMHG | DIASTOLIC BLOOD PRESSURE: 52 MMHG | HEIGHT: 62 IN

## 2018-08-29 LAB
ANION GAP SERPL CALCULATED.3IONS-SCNC: 14 MMOL/L (ref 4–13)
ATRIAL RATE: 46 BPM
BASOPHILS # BLD AUTO: 0.01 THOUSANDS/ΜL (ref 0–0.1)
BASOPHILS NFR BLD AUTO: 0 % (ref 0–1)
BUN SERPL-MCNC: 15 MG/DL (ref 5–25)
CALCIUM SERPL-MCNC: 7.6 MG/DL (ref 8.3–10.1)
CHLORIDE SERPL-SCNC: 106 MMOL/L (ref 100–108)
CO2 SERPL-SCNC: 25 MMOL/L (ref 21–32)
CORTIS AM PEAK SERPL-MCNC: 9 UG/DL (ref 4.2–22.4)
CREAT SERPL-MCNC: 0.91 MG/DL (ref 0.6–1.3)
EOSINOPHIL # BLD AUTO: 0.14 THOUSAND/ΜL (ref 0–0.61)
EOSINOPHIL NFR BLD AUTO: 3 % (ref 0–6)
ERYTHROCYTE [DISTWIDTH] IN BLOOD BY AUTOMATED COUNT: 15.7 % (ref 11.6–15.1)
GFR SERPL CREATININE-BSD FRML MDRD: 63 ML/MIN/1.73SQ M
GLUCOSE SERPL-MCNC: 78 MG/DL (ref 65–140)
HCT VFR BLD AUTO: 28.5 % (ref 34.8–46.1)
HGB BLD-MCNC: 8.8 G/DL (ref 11.5–15.4)
IMM GRANULOCYTES # BLD AUTO: 0.01 THOUSAND/UL (ref 0–0.2)
IMM GRANULOCYTES NFR BLD AUTO: 0 % (ref 0–2)
LYMPHOCYTES # BLD AUTO: 1.04 THOUSANDS/ΜL (ref 0.6–4.47)
LYMPHOCYTES NFR BLD AUTO: 21 % (ref 14–44)
MAGNESIUM SERPL-MCNC: 2.3 MG/DL (ref 1.6–2.6)
MCH RBC QN AUTO: 31.2 PG (ref 26.8–34.3)
MCHC RBC AUTO-ENTMCNC: 30.9 G/DL (ref 31.4–37.4)
MCV RBC AUTO: 101 FL (ref 82–98)
MONOCYTES # BLD AUTO: 0.3 THOUSAND/ΜL (ref 0.17–1.22)
MONOCYTES NFR BLD AUTO: 6 % (ref 4–12)
NEUTROPHILS # BLD AUTO: 3.56 THOUSANDS/ΜL (ref 1.85–7.62)
NEUTS SEG NFR BLD AUTO: 70 % (ref 43–75)
NRBC BLD AUTO-RTO: 0 /100 WBCS
P AXIS: 150 DEGREES
PHOSPHATE SERPL-MCNC: 3 MG/DL (ref 2.3–4.1)
PLATELET # BLD AUTO: 129 THOUSANDS/UL (ref 149–390)
PMV BLD AUTO: 11.9 FL (ref 8.9–12.7)
POTASSIUM SERPL-SCNC: 4.7 MMOL/L (ref 3.5–5.3)
QRS AXIS: -38 DEGREES
QRSD INTERVAL: 94 MS
QT INTERVAL: 484 MS
QTC INTERVAL: 423 MS
RBC # BLD AUTO: 2.82 MILLION/UL (ref 3.81–5.12)
SODIUM SERPL-SCNC: 145 MMOL/L (ref 136–145)
T WAVE AXIS: 16 DEGREES
VENTRICULAR RATE: 46 BPM
WBC # BLD AUTO: 5.06 THOUSAND/UL (ref 4.31–10.16)

## 2018-08-29 PROCEDURE — 99239 HOSP IP/OBS DSCHRG MGMT >30: CPT | Performed by: PHYSICIAN ASSISTANT

## 2018-08-29 PROCEDURE — 80048 BASIC METABOLIC PNL TOTAL CA: CPT | Performed by: NURSE PRACTITIONER

## 2018-08-29 PROCEDURE — 93010 ELECTROCARDIOGRAM REPORT: CPT | Performed by: INTERNAL MEDICINE

## 2018-08-29 PROCEDURE — 83735 ASSAY OF MAGNESIUM: CPT | Performed by: NURSE PRACTITIONER

## 2018-08-29 PROCEDURE — 97530 THERAPEUTIC ACTIVITIES: CPT

## 2018-08-29 PROCEDURE — 97110 THERAPEUTIC EXERCISES: CPT

## 2018-08-29 PROCEDURE — 84100 ASSAY OF PHOSPHORUS: CPT | Performed by: NURSE PRACTITIONER

## 2018-08-29 PROCEDURE — 97116 GAIT TRAINING THERAPY: CPT

## 2018-08-29 PROCEDURE — 82533 TOTAL CORTISOL: CPT | Performed by: NURSE PRACTITIONER

## 2018-08-29 PROCEDURE — 85025 COMPLETE CBC W/AUTO DIFF WBC: CPT | Performed by: NURSE PRACTITIONER

## 2018-08-29 RX ORDER — LEVETIRACETAM 500 MG/1
500 TABLET ORAL EVERY 12 HOURS SCHEDULED
Qty: 60 TABLET | Refills: 1 | Status: SHIPPED | OUTPATIENT
Start: 2018-08-29

## 2018-08-29 RX ADMIN — POLYETHYLENE GLYCOL 3350 17 G: 17 POWDER, FOR SOLUTION ORAL at 08:02

## 2018-08-29 RX ADMIN — LEVOTHYROXINE SODIUM 50 MCG: 50 TABLET ORAL at 05:21

## 2018-08-29 RX ADMIN — SODIUM CHLORIDE, SODIUM GLUCONATE, SODIUM ACETATE, POTASSIUM CHLORIDE, MAGNESIUM CHLORIDE, SODIUM PHOSPHATE, DIBASIC, AND POTASSIUM PHOSPHATE 75 ML/HR: .53; .5; .37; .037; .03; .012; .00082 INJECTION, SOLUTION INTRAVENOUS at 07:52

## 2018-08-29 RX ADMIN — BUPROPION HYDROCHLORIDE 300 MG: 150 TABLET, FILM COATED, EXTENDED RELEASE ORAL at 08:04

## 2018-08-29 RX ADMIN — BISACODYL 5 MG: 5 TABLET, COATED ORAL at 08:02

## 2018-08-29 RX ADMIN — PREGABALIN 50 MG: 50 CAPSULE ORAL at 08:02

## 2018-08-29 RX ADMIN — HEPARIN SODIUM 5000 UNITS: 5000 INJECTION, SOLUTION INTRAVENOUS; SUBCUTANEOUS at 05:25

## 2018-08-29 RX ADMIN — MIDODRINE HYDROCHLORIDE 5 MG: 5 TABLET ORAL at 08:02

## 2018-08-29 RX ADMIN — HEPARIN SODIUM 5000 UNITS: 5000 INJECTION, SOLUTION INTRAVENOUS; SUBCUTANEOUS at 13:31

## 2018-08-29 RX ADMIN — PREGABALIN 50 MG: 50 CAPSULE ORAL at 17:51

## 2018-08-29 RX ADMIN — LIDOCAINE 1 PATCH: 50 PATCH TOPICAL at 08:02

## 2018-08-29 RX ADMIN — MIDODRINE HYDROCHLORIDE 5 MG: 5 TABLET ORAL at 15:54

## 2018-08-29 NOTE — PROGRESS NOTES
CHARLES notified RN about not initiating voiding trial and patient to be discharged with mcrae  Will continue to monitor, pt asleep and resting in chair with alarm on and call bell within reach

## 2018-08-29 NOTE — ASSESSMENT & PLAN NOTE
· Hemoglobin is 8 8 today; slight drop compared to yesterday  No bleeding noted   Suspect dilutional insetting of IVF

## 2018-08-29 NOTE — DISCHARGE SUMMARY
Discharge- Mac Dilling 1945, 67 y o  female MRN: [id number]    Unit/Bed#: -01 Encounter: [id number]    Primary Care Provider: Marly Espinal DO   Date and time admitted to hospital: 8/27/2018  5:23 PM    Chronic indwelling Elena catheter   Assessment & Plan    · UA negative   · Initially was going to initiate a voiding trial prior to discharge  However, after conversing with patient's daughter 2nd time this evening, she reports the patient did see a urologist who recommended maintaining Elena and using Flomax  She has an upcoming appointment with the urologist an outpatient within the next few weeks        Bradycardia   Assessment & Plan    · Likely 2/2 hypothermia   · HR has improved to 60s-80s off of BB  · D/c BB at discharge        GILBERTO (acute kidney injury) ([facility] [address] )   Assessment & Plan    · POA with Cr of 1 2 on admission; resolved, Creatinine is 0 9          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[facility] [address] )   Assessment & Plan    · C/w home medications  · Can follow up with neurology as an outpatient         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 [facility])   Assessment & Plan    · Has home PT/OT set up  · Continue with this management         Hypothermia   Assessment & Plan    · Resolved  Hypotension   Assessment & Plan    · Resolved, lowered BP in last 24 hours was 484 systolic  · Discontinue BB 2/2 hypotension and bradycardia          Hypothyroid   Assessment & Plan    · THS is 2 395  · C/w with current dose of synthyroid  Depression with anxiety   Assessment & Plan    · Neurology recommends discontinuation of Wellbutrin given increased risk of seizures  · Continue Seroquel  · Follow up with PCP in regards to medication management for depression/anxiety  · Patient would likely benefit from stephanie psych or geriatrics as an outpatient         Anemia   Assessment & Plan    · Hemoglobin is 8 8 today; slight drop compared to yesterday  No bleeding noted   Suspect dilutional insetting of IVF * Acute encephalopathy   Assessment & Plan    · Suspect Toxic metabolic vs  Post-ictal   · Patient appears to be at baseline  · AAOx3 currently          Discharging Physician / Practitioner: Selena Leblanc PA-C  PCP: Tamela Akbar DO  Admission Date:   Admission Orders     Ordered        08/27/18 1909  Inpatient Admission (expected length of stay for this patient is greater than two midnights)  Once             Discharge Date: 08/29/18    Resolved Problems  Date Reviewed: 8/29/2018    None          Consultations During Hospital Stay:  · Neurology-- Dr Ifeanyi Jenkins     Procedures Performed:   · CT head  · CTA head/neck  · CXR    Significant Findings / Test Results:   · CT head  · No evidence of acute territorial infarction, hemorrhage or mass effect  · CTA head/neck  · 1  Mild (less than 50%) stenosis at the right carotid bifurcation  2   No hemodynamically significant stenosis or occlusion of the major vessels of the [place]  · CXR  · 1  Diminished inspiration  Mild vascular congestion  2   Groundglass infiltrates, likely cardiogenic in nature, however evolving pneumonia not excluded  Clinical correlation and follow-up examination recommended    Incidental Findings:   · Mild vascular congestion     Test Results Pending at Discharge (will require follow up):   · none     Outpatient Tests Requested:  · none    Complications:  none    Reason for Admission: slurred speech    Hospital Course:     Blu Alexander is a 67 y o  female patient who originally presented to the hospital on 8/27/2018 due to slurred speech/altered mental status  He was reported that family had physical therapy at home for the patient the physical therapist thought her speech was slurred and was having a staring episode  Family reports she was acting strangely over the last 5 days at home  She came to the ED as a stroke alert    TPA was not given given her recent history of subdural hematoma extends is more likely postictal state versus cerebellar dysfunction  Patient was noted to be hypothermic and bradycardic and hypotensive upon arrival   She was initially admitted to step-down level 2  Patient was seen by Neurology while in the ICU who did not recommend any further imaging  When patient had her history of subdural hematoma she was on antiepileptics  She reports she has not been on them  Neural started patient on Keppra 500 mg p o  B i d  neurology also recommended stopping patient's Wellbutrin given the risk of seizures associated with this medication     From their standpoint, they did not feel there is any further neuro intervention required the stay signed off  Patient's mental status had improved over the course of her stay  Patient's metoprolol was held upon admission secondary to bradycardia and hypotension  This resolved with a course of 48 hr   Patient upon discharge is more alert and oriented and admission  In discussion with Physical therapy and Occupational therapy, their recommendation was short-term rehabilitation  Patient family refuses, stating that they had a strong support system at home with near 24 hr care and home PT/OT/speech therapy/nursing available   did provide this patient with information in regards to independent living facilities  Family reports that they are not quite ready for this, we will review the information  Upon discharge, patient will stop metoprolol and Wellbutrin  She will follow with her PCP in regards to further medical management of these conditions she also follow up with Neurology as an outpatient  She has home VNA through [id number] [facility] for her  Discussed this with daughter via phone daughter is aware of this and expresses understanding of information  A family member will be picking the patient up after 8:00 p m  this evening  Patient will also follow up with an outpatient Urologist to discussed Elena catheter      Please see above list of diagnoses and related plan for additional information  Condition at Discharge: fair     Discharge Day Visit / Exam:     Subjective:  Patient feels well today  Denies SOB  Denies chest pains  Eino Samples to go home  Vitals: Blood Pressure: 110/57 (08/29/18 0714)  Pulse: 72 (08/29/18 0714)  Temperature: 97 8 °F (36 6 °C) (08/29/18 0714)  Temp Source: Oral (08/29/18 0714)  Respirations: 18 (08/29/18 0714)  Height: 5' 2" (157 5 cm) (08/28/18 1021)  Weight - Scale: 52 1 kg (114 lb 13 8 oz) (08/29/18 0531)  SpO2: 97 % (08/29/18 0714)  Exam:   Physical Exam   Constitutional: She is oriented to person, place, and time  HENT:   Head: Normocephalic and atraumatic  Mouth/Throat: No oropharyngeal exudate  Eyes: Conjunctivae and EOM are normal  Pupils are equal, round, and reactive to light  No scleral icterus  Neck: No JVD present  Cardiovascular: Normal rate and regular rhythm  Exam reveals no gallop and no friction rub  No murmur heard  Pulmonary/Chest: Effort normal and breath sounds normal    Coarse breath sounds   Abdominal: Soft  Bowel sounds are normal  She exhibits no distension  There is no tenderness  There is no rebound  Neurological: She is alert and oriented to person, place, and time  No cranial nerve deficit  She exhibits normal muscle tone  Coordination abnormal    Skin: Skin is warm and dry  No rash noted  No erythema  Psychiatric: She has a normal mood and affect  Her behavior is normal        Discussion with Family: daughter Natalia Cantrell     Discharge instructions/Information to patient and family:   See after visit summary for information provided to patient and family  Provisions for Follow-Up Care:  See after visit summary for information related to follow-up care and any pertinent home health orders        Disposition:     Home with VNA Services (Reminder: Complete face to face encounter)    For Discharges to [facility] SNF:   · Not Applicable to this Patient - Not Applicable to this Patient    Planned Readmission: none     Discharge Statement:  I spent 35 minutes discharging the patient  This time was spent on the day of discharge  I had direct contact with the patient on the day of discharge  Greater than 50% of the total time was spent examining patient, answering all patient questions, arranging and discussing plan of care with patient as well as directly providing post-discharge instructions  Additional time then spent on discharge activities  Discharge Medications:  See after visit summary for reconciled discharge medications provided to patient and family        ** Please Note: This note has been constructed using a voice recognition system **

## 2018-08-29 NOTE — ASSESSMENT & PLAN NOTE
· Neurology recommends discontinuation of Wellbutrin given increased risk of seizures  · Continue Seroquel  · Follow up with PCP in regards to medication management for depression/anxiety  · Patient would likely benefit from [facility] psych or geriatrics as an outpatient

## 2018-08-29 NOTE — PROGRESS NOTES
Progress Note - Bethany Hicks 1945, 67 y o  female MRN: [id number]    Unit/Bed#: -01 Encounter: [id number]    Primary Care Provider: Filipe Alvarez DO   Date and time admitted to hospital: 8/27/2018  5:23 PM    Chronic indwelling Elena catheter   Assessment & Plan    · UA negative  · Patient was due to have voiding trial on 8/8/18, but missed appointment    · Will initiate voiding trial here        Bradycardia   Assessment & Plan    · Likely 2/2 hypothermia   · HR has improved to 60s-80s off of BB  · D/c BB at discharge        GILBERTO (acute kidney injury) ([facility] [address] )   Assessment & Plan    · POA with Cr of 1 2 on admission; resolved, Creatinine is 0 9          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[facility] [address] )   Assessment & Plan    · C/w home medications  · Can follow up with neurology as an outpatient         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)   Assessment & Plan    · Has home PT/OT set up  · Continue with this management         Hypothermia   Assessment & Plan    · Resolved  Hypotension   Assessment & Plan    · Resolved, lowered BP in last 24 hours was 784 systolic  · Discontinue BB 2/2 hypotension and bradycardia          Hypothyroid   Assessment & Plan    · THS is 2 395  · C/w with current dose of synthyroid  Depression with anxiety   Assessment & Plan    · Neurology recommends discontinuation of Wellbutrin given increased risk of seizures  · Continue Seroquel  · Follow up with PCP in regards to medication management for depression/anxiety  · Patient would likely benefit from [facility] psych or geriatrics as an outpatient         Anemia   Assessment & Plan    · Hemoglobin is 8 8 today; slight drop compared to yesterday  No bleeding noted   Suspect dilutional insetting of IVF         * Acute encephalopathy   Assessment & Plan    · Suspect Toxic metabolic vs  Post-ictal   · Patient appears to be at baseline  · AAOx3 currently          VTE Pharmacologic Prophylaxis:   Pharmacologic: Heparin  Mechanical VTE Prophylaxis in Place: Yes    Patient Centered Rounds: I have performed bedside rounds with nursing staff today  Discussions with Specialists or Other Care Team Provider: case management    Education and Discussions with Family / Patient: patient, family (daughter Kosta Mendoza)    Time Spent for Care: 30 minutes  More than 50% of total time spent on counseling and coordination of care as described above  Current Length of Stay: 2 day(s)    Current Patient Status: Inpatient   Certification Statement: The patient will continue to require additional inpatient hospital stay due to voiding trial; safe discharge planning    Discharge Plan: d/c home when medically stable; patient is medically stable at this time but Dinh Garduno does not believe that anyone would be home with the patient tonight and does not feel safe taking her home  Code Status: Level 3 - DNAR and DNI      Subjective:   Patient feels well today  No chest pains/SOB  Objective:     Vitals:   Temp (24hrs), Av 8 °F (36 6 °C), Min:97 3 °F (36 3 °C), Max:98 4 °F (36 9 °C)    HR:  [72-86] 77  Resp:  [18-24] 20  BP: ()/(52-88) 91/52  SpO2:  [91 %-100 %] 93 %  Body mass index is 21 01 kg/m²  Input and Output Summary (last 24 hours): Intake/Output Summary (Last 24 hours) at 18 1717  Last data filed at 18 1001   Gross per 24 hour   Intake             2370 ml   Output             3550 ml   Net            -1180 ml       Physical Exam:     Physical Exam   Constitutional: She is oriented to person, place, and time  No distress  HENT:   Head: Normocephalic and atraumatic  Mouth/Throat: No oropharyngeal exudate  Eyes: Conjunctivae and EOM are normal  Pupils are equal, round, and reactive to light  No scleral icterus  Neck: No JVD present  Cardiovascular: Normal rate and regular rhythm  Exam reveals no gallop and no friction rub  No murmur heard    Pulmonary/Chest: Effort normal and breath sounds normal  No respiratory distress  She has no wheezes  She has no rales  Coarse breath sounds   Abdominal: Soft  Bowel sounds are normal  She exhibits no distension  There is no tenderness  There is no rebound  Musculoskeletal: She exhibits no edema or tenderness  Neurological: She is alert and oriented to person, place, and time  She displays normal reflexes  No cranial nerve deficit  She exhibits normal muscle tone  Skin: Skin is warm and dry  No rash noted  She is not diaphoretic  No erythema  Psychiatric: She has a normal mood and affect  Her behavior is normal        Additional Data:     Labs:      Results from last 7 days  Lab Units 08/29/18  0529   WBC Thousand/uL 5 06   HEMOGLOBIN g/dL 8 8*   HEMATOCRIT % 28 5*   PLATELETS Thousands/uL 129*   NEUTROS PCT % 70   LYMPHS PCT % 21   MONOS PCT % 6   EOS PCT % 3       Results from last 7 days  Lab Units 08/29/18  0529  08/27/18  1747   SODIUM mmol/L 145  < >  --    POTASSIUM mmol/L 4 7  < >  --    CHLORIDE mmol/L 106  < >  --    CO2 mmol/L 25  < >  --    BUN mg/dL 15  < >  --    CREATININE mg/dL 0 91  < >  --    CALCIUM mg/dL 7 6*  < >  --    GLUCOSE, ISTAT mg/dl  --   --  129   < > = values in this interval not displayed  * I Have Reviewed All Lab Data Listed Above  * Additional Pertinent Lab Tests Reviewed: [address] Admission Reviewed    Imaging:    Imaging Reports Reviewed Today Include: CXR  Imaging Personally Reviewed by Myself Includes:  CXR    Recent Cultures (last 7 days):       Results from last 7 days  Lab Units 08/27/18  1856 08/27/18  1855   BLOOD CULTURE  No Growth at 24 hrs  No Growth at 24 hrs         Last 24 Hours Medication List:     Current Facility-Administered Medications:  bisacodyl 5 mg Oral Daily JESSY Gambino    buPROPion 300 mg Oral Daily JESSY Cramer    heparin (porcine) 5,000 Units Subcutaneous Q8H [address] JESSY Cramer    levothyroxine 50 mcg Oral Early Morning JESSY Novak    lidocaine 1 patch Transdermal Daily JESSY Cramer    midodrine 5 mg Oral TID JESSY Novak    mirtazapine 15 mg Oral HS JESSY Novak    multi-electrolyte 75 mL/hr Intravenous Continuous JESSY Novak Last Rate: Stopped (08/29/18 1446)   polyethylene glycol 17 g Oral Daily JESSY Cramer    pramipexole 0 5 mg Oral HS JESSY Cramer    pregabalin 50 mg Oral TID JESSY Novak         Today, Patient Was Seen By: Sara Villa PA-C    ** Please Note: Dictation voice to text software may have been used in the creation of this document   **

## 2018-08-29 NOTE — PHYSICAL THERAPY NOTE
PHYSICAL THERAPY NOTE    Patient Name: Guanako Arce  TKDNN'X Date: 18 5164   Pain Assessment   Pain Assessment No/denies pain   Pain Score No Pain   Restrictions/Precautions   Weight Bearing Precautions Per Order No   Other Precautions Chair Alarm; Bed Alarm;Multiple lines;Telemetry;O2;Fall Risk;Aspiration; Seizure   General   Family/Caregiver Present No   Subjective   Subjective Patient seated OOB in recliner and is agreeable to therapy session  Patient identifers obtained from name &   Bed Mobility   Supine to Sit Unable to assess   Sit to Supine Unable to assess   Additional Comments Patient OOB in recliner and is agreeable to therapy session  Patient identifers obtained from name,  & ID bracelet  Transfers   Sit to Stand 4  Minimal assistance   Additional items Assist x 1; Armrests; Increased time required;Verbal cues   Stand to Sit 4  Minimal assistance   Additional items Assist x 1; Armrests; Increased time required;Verbal cues   Ambulation/Elevation   Gait pattern Ataxia; Foward flexed; Shuffling;Decreased foot clearance;Retropulsion; Improper Weight shift; Excessively slow   Gait Assistance 3  Moderate assist   Additional items Assist x 1;Verbal cues   Assistive Device Rolling walker   Distance 25' x2   Balance   Static Sitting Fair   Dynamic Sitting Fair -   Static Standing Poor -   Ambulatory Poor -   Endurance Deficit   Endurance Deficit Yes   Endurance Deficit Description limited ambulation distance and activity tolerance   Activity Tolerance   Activity Tolerance Patient limited by fatigue   Nurse Made Aware Spoke to Yue Curiel RN   Exercises   Hip Flexion Sitting;10 reps;AROM; Bilateral   Hip Abduction Sitting;10 reps;AROM; Bilateral   Knee AROM Long Arc Quad Sitting;10 reps;AROM; Bilateral   Ankle Pumps Sitting;10 reps;AROM; Bilateral   Assessment   Prognosis Fair   Problem List Decreased endurance; Impaired balance;Decreased mobility; Decreased coordination; Impaired judgement; Impaired sensation;Decreased skin integrity   Assessment Patient participated in seated B LE exercise program with fair understanding and occasional input from therapist for form and repepitions  Demonstrated ability to transfer with less person assist and verbal instruction for hand placement and body positioning  Patient impulsive requiring addtional cuing for safety  Patient ambulated increased gait distance with roller walker and mod a  x1  Patient with instability and poor walker managment requiring additional manual assistance for guidance and stability  Patient fatigues quickly requiring seated rest breaks between tasks  Continue to focus on transfer and gait progression as appropriate  Barriers to Discharge Inaccessible home environment;Decreased caregiver support   Barriers to Discharge Comments TAMERA and home support? Goals   Patient Goals to go home   STG Expiration Date 09/07/18   Treatment Day 1   Plan   Treatment/Interventions Functional transfer training;LE strengthening/ROM; Elevations; Therapeutic exercise; Endurance training;Cognitive reorientation;Patient/family training;Equipment eval/education; Bed mobility;Gait training;Spoke to nursing   Progress Progressing toward goals   PT Frequency Other (Comment)  (3-5x/week)   Recommendation   Recommendation Post acute IP rehab  (vs home w/ 24 /7 care as pt reports )       Shirlene Barrett, PTA

## 2018-08-29 NOTE — ASSESSMENT & PLAN NOTE
· UA negative  · Patient was due to have voiding trial on 8/8/18, but missed appointment    · Will initiate voiding trial here

## 2018-08-29 NOTE — ASSESSMENT & PLAN NOTE
· Resolved, lowered BP in last 24 hours was 370 systolic  · Discontinue BB 2/2 hypotension and bradycardia

## 2018-08-29 NOTE — PLAN OF CARE
Problem: PHYSICAL THERAPY ADULT  Goal: Performs mobility at highest level of function for planned discharge setting  See evaluation for individualized goals  Treatment/Interventions: ADL retraining, Functional transfer training, LE strengthening/ROM, Elevations, Therapeutic exercise, Endurance training, Cognitive reorientation, Patient/family training, Equipment eval/education, Bed mobility, Gait training, Spoke to nursing, Spoke to case management  Equipment Recommended:  (has all? = needs confirmation )       See flowsheet documentation for full assessment, interventions and recommendations  Outcome: Progressing  Prognosis: Fair  Problem List: Decreased endurance, Impaired balance, Decreased mobility, Decreased coordination, Impaired judgement, Impaired sensation, Decreased skin integrity  Assessment: Patient participated in seated B LE exercise program with fair understanding and occasional input from therapist for form and repepitions  Demonstrated ability to transfer with less person assist and verbal instruction for hand placement and body positioning  Patient impulsive requiring addtional cuing for safety  Patient ambulated increased gait distance with roller walker and mod a  x1  Patient with instability and poor walker managment requiring additional manual assistance for guidance and stability  Patient fatigues quickly requiring seated rest breaks between tasks  Continue to focus on transfer and gait progression as appropriate  Barriers to Discharge: Inaccessible home environment, Decreased caregiver support  Barriers to Discharge Comments: TAMERA and home support? Recommendation: Post acute IP rehab (vs home w/ 24 /7 care as pt reports )     PT - OK to Discharge: No (unless to rehab )    See flowsheet documentation for full assessment

## 2018-08-29 NOTE — PLAN OF CARE
DISCHARGE PLANNING     Discharge to home or other facility with appropriate resources Progressing        DISCHARGE PLANNING - CARE MANAGEMENT     Discharge to post-acute care or home with appropriate resources Progressing        GENITOURINARY - ADULT     Urinary catheter remains patent Progressing        HEMATOLOGIC - ADULT     Maintains hematologic stability Progressing        Knowledge Deficit     Patient/family/caregiver demonstrates understanding of disease process, treatment plan, medications, and discharge instructions Progressing        MUSCULOSKELETAL - ADULT     Maintain or return mobility to safest level of function Progressing        NEUROSENSORY - ADULT     Achieves stable or improved neurological status Progressing     Absence of seizures Progressing        Nutrition/Hydration-ADULT     Nutrient/Hydration intake appropriate for improving, restoring or maintaining nutritional needs Progressing        Potential for Falls     Patient will remain free of falls Progressing        Prexisting or High Potential for Compromised Skin Integrity     Skin integrity is maintained or improved Progressing        SKIN/TISSUE INTEGRITY - ADULT     Skin integrity remains intact Progressing

## 2018-08-30 ENCOUNTER — TRANSITIONAL CARE MANAGEMENT (OUTPATIENT)
Dept: FAMILY MEDICINE CLINIC | Facility: OTHER | Age: 73
End: 2018-08-30

## 2018-08-30 ENCOUNTER — TELEPHONE (OUTPATIENT)
Dept: NEUROLOGY | Facility: CLINIC | Age: 73
End: 2018-08-30

## 2018-08-30 DIAGNOSIS — F41.8 DEPRESSION WITH ANXIETY: ICD-10-CM

## 2018-08-30 DIAGNOSIS — G60.9 IDIOPATHIC PERIPHERAL NEUROPATHY: ICD-10-CM

## 2018-08-30 RX ORDER — HYDROXYZINE HYDROCHLORIDE 25 MG/1
TABLET, FILM COATED ORAL
Qty: 90 TABLET | OUTPATIENT
Start: 2018-08-30

## 2018-08-30 NOTE — PROGRESS NOTES
I spoke to [place]  She said Pamela Rowley will establish care with a different doctor   They refused to make TCM

## 2018-09-01 LAB
BACTERIA BLD CULT: NORMAL
BACTERIA BLD CULT: NORMAL

## 2018-09-04 ENCOUNTER — TELEPHONE (OUTPATIENT)
Dept: NEUROLOGY | Facility: CLINIC | Age: 73
End: 2018-09-04

## 2018-09-27 ENCOUNTER — TELEPHONE (OUTPATIENT)
Dept: NEUROLOGY | Facility: CLINIC | Age: 73
End: 2018-09-27

## 2018-09-27 NOTE — TELEPHONE ENCOUNTER
Received a call from Tania Manuel [facility] and hospice) re: physician order  I provided her w/ our office backline fax #

## 2018-10-03 NOTE — TELEPHONE ENCOUNTER
Physician order form scanned to [facility] email  Pls print and have Dr Kenisha Barahona review and sign  Once signed, pls fax it to [phone] and CF        Thank you

## 2022-02-16 NOTE — ED NOTES
Visitor at bedside  Daughter Bernardo Goel is at bedside  Pt was asked if daughter could come in and see her  Pt smiled and said " Yes Bernardo Goel"    While daughter, Bernardo Goel, was at bedside, she stated that it is hard for her to see her mom like this  Bernardo Goel is the middle daughter and stated that she doesn't live with the pt, she visited today and was upset about how her mom looked and all the bruising and so she called 911 for help  Bernardo Goel said her sister Sergei Cruz [id number] is the daughter that lives with the pt        Jennifer Drummond RN  07/03/18 2025 soft/nondistended/nontender

## 2023-01-28 NOTE — ASSESSMENT & PLAN NOTE
· THS is 2 395  · C/w with current dose of synthyroid  I personally performed a history and physical exam of the patient and discussed their management with the resident/fellow/TATYANA. I reviewed the resident/fellow/TATYANA's note and agree with the documented findings and plan of care. I made modifications to the above information as I felt appropriate. I was present for and directly supervised any procedure(s) as documented above or in the procedure note. I personally reviewed labwork/imaging if they were obtained and discussed management with the resident/fellow/TATYANA.  Plan and care discussed in length with family, provided anticipatory guidance and answered all questions. Please see MDM which I have read, reviewed and edited as necessary to reflect my assessment/plan of the patient and decision making. Please also review progress notes for updates on patient care/labs/consults and ED course after initial presentation.  Elise Perlman, MD Attending Physician  ------------------------------------------------------------------------------------------------------------------